# Patient Record
Sex: FEMALE | Race: WHITE | NOT HISPANIC OR LATINO | Employment: OTHER | URBAN - METROPOLITAN AREA
[De-identification: names, ages, dates, MRNs, and addresses within clinical notes are randomized per-mention and may not be internally consistent; named-entity substitution may affect disease eponyms.]

---

## 2017-02-21 ENCOUNTER — PRE VISIT (OUTPATIENT)
Dept: ORTHOPEDICS | Facility: CLINIC | Age: 72
End: 2017-02-21

## 2017-02-21 NOTE — TELEPHONE ENCOUNTER
1.  Date/reason for appt: 3/13/17 - Primary osteoarthritis of right knee/ Discuss TKA    2.  Referring provider: Dr. Kleber Valle, internal referral from [facility]     3.  Call to patient (Yes / No - short description): No, all records/imaging/injections in EPIC.    4.  Previous care at / records requested from:      -[facility]     -Dr. Valle    -Dr. Morfin 1/2/14    -Saw Jenelle Winston NP on 11/12/10 for knee pain     -Images are in EPIC/PACS    -[facility] done in [facility]

## 2017-03-10 DIAGNOSIS — M17.10 ARTHRITIS OF KNEE: Primary | ICD-10-CM

## 2017-03-10 ASSESSMENT — ENCOUNTER SYMPTOMS
ARTHRALGIAS: 1
SYNCOPE: 0
HYPERTENSION: 0
ORTHOPNEA: 0
NAUSEA: 0
CLAUDICATION: 0
SLEEP DISTURBANCES DUE TO BREATHING: 0
ABDOMINAL PAIN: 0
LIGHT-HEADEDNESS: 1
MUSCLE CRAMPS: 0
PALPITATIONS: 0
EXERCISE INTOLERANCE: 0
BLOOD IN STOOL: 0
RECTAL PAIN: 1
MYALGIAS: 1
TACHYCARDIA: 0
STIFFNESS: 1
LEG PAIN: 1
LEG SWELLING: 1
MUSCLE WEAKNESS: 1
NECK PAIN: 0
BACK PAIN: 1
BOWEL INCONTINENCE: 0
VOMITING: 0
BLOATING: 0
HYPOTENSION: 0
DIARRHEA: 0
JOINT SWELLING: 1
RECTAL BLEEDING: 0
HEARTBURN: 0
CONSTIPATION: 0
JAUNDICE: 0

## 2017-03-13 ENCOUNTER — OFFICE VISIT (OUTPATIENT)
Dept: ORTHOPEDICS | Facility: CLINIC | Age: 72
End: 2017-03-13

## 2017-03-13 VITALS — HEIGHT: 62 IN | BODY MASS INDEX: 35.88 KG/M2 | WEIGHT: 195 LBS

## 2017-03-13 DIAGNOSIS — M17.10 ARTHRITIS OF KNEE: Primary | ICD-10-CM

## 2017-03-13 NOTE — MR AVS SNAPSHOT
After Visit Summary   3/13/2017    Connie J Frankenberg    MRN: [id number]           Patient Information     Date Of Birth          1945        Visit Information        Provider Department      3/13/2017 1:30 PM Apollo Nguyen MD [facility]        Today's Diagnoses     Arthritis of knee    -  1       Follow-ups after your visit        Your next 10 appointments already scheduled     Oct 19, 2017 10:20 AM CDT   (Arrive by 10:05 AM)   Return Visit with Sammi Beltre MD   [facility] ([facility])    [address] [place] [id number] [phone]              Who to contact     Please call your clinic at [phone] to:    Ask questions about your health    Make or cancel appointments    Discuss your medicines    Learn about your test results    Speak to your doctor   If you have compliments or concerns about an experience at your clinic, or if you wish to file a complaint, please contact [facility] Patient Relations at [phone] or email us at [email or web address]         Additional Information About Your Visit        MyChart Information     Eventapt gives you secure access to your electronic health record. If you see a primary care provider, you can also send messages to your care team and make appointments. If you have questions, please call your primary care clinic.  If you do not have a primary care provider, please call [phone] and they will assist you.      OfferLounge is an electronic gateway that provides easy, online access to your medical records. With [employer]Lounge, you can request a clinic appointment, read your test results, renew a prescription or communicate with your care team.     To access your existing account, please contact your [facility] or call [phone] for assistance.        Care EveryWhere ID     This is your Care  "EveryWhere ID. This could be used by other organizations to access your [facility] medical records  [id number]        Your Vitals Were     Height BMI (Body Mass Index)                1.562 m (5' 1.5\") 36.25 kg/m2           Blood Pressure from Last 3 Encounters:   No data found for BP    Weight from Last 3 Encounters:   No data found for Wt              Today, you had the following     No orders found for display       Primary Care Provider Office Phone # Fax #    Sammi Beltre [phone]        PH[facility] [address] [facility] [address]  [facility] [address]        Thank you!     Thank you for choosing [facility]  for your care. Our goal is always to provide you with excellent care. Hearing back from our patients is one way we can continue to improve our services. Please take a few minutes to complete the written survey that you may receive in the mail after your visit with us. Thank you!             Your Updated Medication List - Protect others around you: Learn how to safely use, store and throw away your medicines at www.disposemymeds.org.          This list is accurate as of: 3/13/17 11:59 PM.  Always use your most recent med list.                   Brand Name Dispense Instructions for use    aspirin 81 MG tablet      Take 1 tablet by mouth daily.       BENADRYL PO      Take 1 tablet by mouth as needed.       * METROGEL 1 % gel   Generic drug:  metroNIDAZOLE      Apply  topically daily.       * metroNIDAZOLE 0.75 % cream    METROCREAM    45 g    Apply  topically 2 times daily.       mupirocin 2 % ointment    BACTROBAN    30 g    Use 2 times a day to affected area.       SUPER B COMPLEX PO      Take 1 tablet by mouth daily.       * VITAMIN D PO      Take 1 tablet by mouth daily.       * vitamin D3 2000 UNITS Caps     90 capsule    Take 1 Units by mouth daily       * Notice:  This list has 4 medication(s) that are the same as other medications prescribed for you. Read the " directions carefully, and ask your doctor or other care provider to review them with you.

## 2017-03-13 NOTE — LETTER
3/13/2017     RE: Connie J Frankenberg  [address]  [facility] [address]     Dear Colleague,    Thank you for referring your patient, Connie J Frankenberg, to the [facility] at [facility]. Please see a copy of my visit note below.        [facility], Orthopaedic Surgery Consultation  by Apollo Nguyen M.D.    Connie J Frankenberg MRN# [id number]   Age: 71 year old YOB: 1945     Requesting physician: Kleber Valle            Assessment and Plan:   Assessment:  Medial and PF OA R knee      Plan:  R TKA in the future   BMI 36- Discussed she may optimize this to 35 in order to minimize risk of infection as much as possible prior to surgery            History of Present Illness:   HISTORY OF PRESENT ILLNESS:  Ms. Frankenberg is a pleasant 71-year-old female with longstanding history of right knee pain.  The patient states she has had right knee pain for the past several years and has been progressively worse over the past 4-5 months.  She has had a series of corticosteroid injections administered by both Dr. Kleber Valle and Dr. Vinay Morfin, the former with ultrasound guidance.  She states that these initially provided relief in the past, but are no longer providing her significant amount of relief.  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  She does have a good understanding of what the surgical treatment of her knee would entail.  She does have a bit of trepidation as she had a brother-in-law who succumbed to the pulmonary embolus after a knee arthroscopy 30 years ago.  Other than this, she states that she is doing relatively well.  Takes only over-the-counter pain medicine occasionally and her pain has progressed to the point where she is no longer able to do the activities that she liked to previously.  The pain is no longer acceptable for her.  At this time,  "she would like to proceed with a right total knee arthroplasty after having all her questions answered and attending a joint class.       Background history:  DX:  1. OA R knee   2. HLP  BPV    TREATMENTS:  1. 4-5 corticosteroid injections in the past. No longer provide adequate relief even with U/S guidance         Current symptoms:  Problem: R knee pain  Onset and duration: several years   Awakens from sleep due to sx's:  Yes  Precipitating Injury:  No    Other joints or sites painful:  No  Fever: No  Appetite change or weight loss: No           Physical Exam:     EXAMINATION pertinent findings:   VITAL SIGNS: Height 1.562 m (5' 1.5\"), weight 88.5 kg (195 lb), not currently breastfeeding.  Body mass index is 36.25 kg/(m^2).  RESP: non labored breathing   ABD: benign   SKIN: grossly normal   LYMPHATIC: grossly normal   NEURO: grossly normal   VASCULAR: satisfactory perfusion of all extremities     MUSCULOSKELETAL:   RLE-  Skin c/d/i  No venous stasis evident today  +25 deg flexion contracture  Flexes to 125 degrees   +EHL/FHL/TA/GSC  SILT DP/SP/SN/Saph/Tib dist  Palp DP pulse                  Data:   All laboratory data reviewed  All imaging studies reviewed by me       MD Dandy Beltran Family Professor  Oncology and Adult Reconstructive Surgery  Dept Orthopaedic Surgery, [facility]  [phone] office, [phone] pager  [email or web address]            DATA for DOCUMENTATION:         Past Medical History:     Patient Active Problem List   Diagnosis     Skin cancer, basal cell     Hyperlipidemia     Classical migraine     Insomnia     Arthritis of knee     Hemorrhoids     Vasovagal syncope     BCC (basal cell carcinoma), face     Swelling of lower extremity     Past Medical History   Diagnosis Date     Arthritis of knee      Basal cell carcinoma      Hemorrhoids      Hyperlipidemia      Insomnia      Migraines, classic      sparkly aura     Plantar fasciitis, bilateral      Skin cancer, basal cell  "     Squamous cell carcinoma (H)      Swelling of the ankle, feet, or leg      Vasovagal response      to injections, scents     Vasovagal syncope      with blood draws, injections       Also see scanned health assessment forms.       Past Surgical History:     Past Surgical History   Procedure Laterality Date     C nonspecific procedure       laparoscopy,     C nonspecific procedure       laser surg basal cell skin cancer     Cataract iol, rt/lt       Lasik bilateral       Mohs micrographic procedure              Social History:     Social History     Social History     Marital status: Single     Spouse name: N/A     Number of children: N/A     Years of education: N/A     Occupational History     EEG [employer]     Social History Main Topics     Smoking status: Never Smoker     Smokeless tobacco: Never Used     Alcohol use Yes      Comment: occ     Drug use: No     Sexual activity: Yes     Partners: Male     Other Topics Concern     Not on file     Social History Narrative    Social History:  Casual/Semi-Retired.  Single, .  Has boyfriend.  One son.    Does vascular/EEG at [employer].            Family History:       Family History   Problem Relation Age of Onset     Neurologic Disorder Mother      headaches     Neurologic Disorder Sister      headaches     Neurologic Disorder Mother      narcolepsy     CANCER Mother      skin     CANCER Father      lung and skin (BCC)     C.A.D. Brother      CABGAge 58, March 2012     CEREBROVASCULAR DISEASE Mother      Thyroid Disease Mother      hypothyroid     Thyroid Disease Sister      hypothyroid     CANCER Brother      skin cancer     C.A.D. Father      MI            Medications:     Current Outpatient Prescriptions   Medication Sig     atorvastatin (LIPITOR) 80 MG tablet Take 1 tablet (80 mg) by mouth daily     butalbital-aspirin-caffeine (FIORINAL) -40 MG per capsule Take 1 capsule by mouth daily as needed     Cholecalciferol  (VITAMIN D3) 2000 UNITS CAPS Take 1 Units by mouth daily     mupirocin (BACTROBAN) 2 % ointment Use 2 times a day to affected area.     metroNIDAZOLE (METROGEL) 1 % gel Apply  topically daily.     metroNIDAZOLE (METROCREAM) 0.75 % cream Apply  topically 2 times daily.     aspirin 81 MG tablet Take 1 tablet by mouth daily.     DiphenhydrAMINE HCl (BENADRYL PO) Take 1 tablet by mouth as needed.     B Complex-C (SUPER B COMPLEX PO) Take 1 tablet by mouth daily.     Cholecalciferol (VITAMIN D PO) Take 1 tablet by mouth daily.     No current facility-administered medications for this visit.               Review of Systems:   A comprehensive 10 point review of systems (constitutional, ENT, cardiac, peripheral vascular, lymphatic, respiratory, GI, , Musculoskeletal, skin, Neurological) was performed and found to be negative except as described in this note.     See intake form completed by patient        Answers for HPI/ROS submitted by the patient on 3/10/2017   General Symptoms: No  Skin Symptoms: No  HENT Symptoms: No  EYE SYMPTOMS: No  HEART SYMPTOMS: Yes  LUNG SYMPTOMS: No  INTESTINAL SYMPTOMS: Yes  URINARY SYMPTOMS: No  GYNECOLOGIC SYMPTOMS: No  BREAST SYMPTOMS: No  SKELETAL SYMPTOMS: Yes  BLOOD SYMPTOMS: No  NERVOUS SYSTEM SYMPTOMS: No  MENTAL HEALTH SYMPTOMS: No  Chest pain or pressure: No  Fast or irregular heartbeat: No  Pain in legs with walking: Yes  Swelling in feet or ankles: Yes  Trouble breathing while lying down: No  Fingers or Toes appear blue: No  High blood pressure: No  Low blood pressure: No  Fainting: No  Murmurs: No  Chest pain on exertion: No  Chest pain at rest: No  Cramping pain in leg during exercise: No  Pacemaker: No  Varicose veins: Yes  Edema or swelling: Yes  Fast heart beat: No  Wake up at night with shortness of breath: No  Heart flutters: No  Light-headedness: Yes  Exercise intolerance: No  Heart burn or indigestion: No  Nausea: No  Vomiting: No  Abdominal pain: No  Bloating:  No  Constipation: No  Diarrhea: No  Blood in stool: No  Black stools: No  Rectal or Anal pain: Yes  Fecal incontinence: No  Rectal bleeding: No  Yellowing of skin or eyes: No  Vomit with blood: No  Change in stools: No  Hemorrhoids: Yes  Back pain: Yes  Muscle aches: Yes  Neck pain: No  Swollen joints: Yes  Joint pain: Yes  Bone pain: Yes  Muscle cramps: No  Muscle weakness: Yes  Joint stiffness: Yes  Bone fracture: No    Answers for HPI/ROS submitted by the patient on 3/10/2017   General Symptoms: No  Skin Symptoms: No  HENT Symptoms: No  EYE SYMPTOMS: No  HEART SYMPTOMS: Yes  LUNG SYMPTOMS: No  INTESTINAL SYMPTOMS: Yes  URINARY SYMPTOMS: No  GYNECOLOGIC SYMPTOMS: No  BREAST SYMPTOMS: No  SKELETAL SYMPTOMS: Yes  BLOOD SYMPTOMS: No  NERVOUS SYSTEM SYMPTOMS: No  MENTAL HEALTH SYMPTOMS: No  Chest pain or pressure: No  Fast or irregular heartbeat: No  Pain in legs with walking: Yes  Swelling in feet or ankles: Yes  Trouble breathing while lying down: No  Fingers or Toes appear blue: No  High blood pressure: No  Low blood pressure: No  Fainting: No  Murmurs: No  Chest pain on exertion: No  Chest pain at rest: No  Cramping pain in leg during exercise: No  Pacemaker: No  Varicose veins: Yes  Edema or swelling: Yes  Fast heart beat: No  Wake up at night with shortness of breath: No  Heart flutters: No  Light-headedness: Yes  Exercise intolerance: No  Heart burn or indigestion: No  Nausea: No  Vomiting: No  Abdominal pain: No  Bloating: No  Constipation: No  Diarrhea: No  Blood in stool: No  Black stools: No  Rectal or Anal pain: Yes  Fecal incontinence: No  Rectal bleeding: No  Yellowing of skin or eyes: No  Vomit with blood: No  Change in stools: No  Hemorrhoids: Yes  Back pain: Yes  Muscle aches: Yes  Neck pain: No  Swollen joints: Yes  Joint pain: Yes  Bone pain: Yes  Muscle cramps: No  Muscle weakness: Yes  Joint stiffness: Yes  Bone fracture: No    Again, thank you for allowing me to participate in the care of your  patient.      Sincerely,    Apollo Nguyen MD

## 2017-03-13 NOTE — NURSING NOTE
"Reason For Visit:   Chief Complaint   Patient presents with     Musculoskeletal Problem     Right knee OA, TKA discussion, Referral from Dr YVONNE Valle     Age: 71 year old    Occupation: Retired (Neurodiagnostic Tech)    Date of surgery: No surgical history to knee    Smoker: No    Pain Assessment  Patient Currently in Pain: Yes  Primary Pain Location: Knee  Pain Orientation: Right  Pain Descriptors: Constant, Discomfort  Alleviating Factors: [place] steroid  Aggravating Factors: Walking, Stairs (Nighttime pain)    Ht 1.562 m (5' 1.5\")  Wt 88.5 kg (195 lb)  BMI 36.25 kg/m2    Current Outpatient Prescriptions   Medication     atorvastatin (LIPITOR) 80 MG tablet     butalbital-aspirin-caffeine (FIORINAL) -40 MG per capsule     Cholecalciferol (VITAMIN D3) 2000 UNITS CAPS     mupirocin (BACTROBAN) 2 % ointment     metroNIDAZOLE (METROGEL) 1 % gel     metroNIDAZOLE (METROCREAM) 0.75 % cream     aspirin 81 MG tablet     DiphenhydrAMINE HCl (BENADRYL PO)     B Complex-C (SUPER B COMPLEX PO)     Cholecalciferol (VITAMIN D PO)     No current facility-administered medications for this visit.                                                       "

## 2017-03-13 NOTE — PROGRESS NOTES
[facility], Orthopaedic Surgery Consultation  by Apollo Nguyen M.D.    Connie J Frankenberg MRN# [id number]   Age: 71 year old YOB: 1945     Requesting physician: Kleber Valle            Assessment and Plan:   Assessment:  Medial and PF OA R knee      Plan:  R TKA in the future   BMI 36- Discussed she may optimize this to 35 in order to minimize risk of infection as much as possible prior to surgery            History of Present Illness:   HISTORY OF PRESENT ILLNESS:  Ms. Frankenberg is a pleasant 71-year-old female with longstanding history of right knee pain.  The patient states she has had right knee pain for the past several years and has been progressively worse over the past 4-5 months.  She has had a series of corticosteroid injections administered by both Dr. Kleber Valle and Dr. Vinay Morfin, the former with ultrasound guidance.  She states that these initially provided relief in the past, but are no longer providing her significant amount of relief.  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  She does have a good understanding of what the surgical treatment of her knee would entail.  She does have a bit of trepidation as she had a brother-in-law who succumbed to the pulmonary embolus after a knee arthroscopy 30 years ago.  Other than this, she states that she is doing relatively well.  Takes only over-the-counter pain medicine occasionally and her pain has progressed to the point where she is no longer able to do the activities that she liked to previously.  The pain is no longer acceptable for her.  At this time, she would like to proceed with a right total knee arthroplasty after having all her questions answered and attending a joint class.       Background history:  DX:  1. OA R knee   2. HLP  BPV    TREATMENTS:  1. 4-5 corticosteroid injections in the past. No longer provide adequate relief even with U/S  "guidance         Current symptoms:  Problem: R knee pain  Onset and duration: several years   Awakens from sleep due to sx's:  Yes  Precipitating Injury:  No    Other joints or sites painful:  No  Fever: No  Appetite change or weight loss: No           Physical Exam:     EXAMINATION pertinent findings:   VITAL SIGNS: Height 1.562 m (5' 1.5\"), weight 88.5 kg (195 lb), not currently breastfeeding.  Body mass index is 36.25 kg/(m^2).  RESP: non labored breathing   ABD: benign   SKIN: grossly normal   LYMPHATIC: grossly normal   NEURO: grossly normal   VASCULAR: satisfactory perfusion of all extremities     MUSCULOSKELETAL:   RLE-  Skin c/d/i  No venous stasis evident today  +25 deg flexion contracture  Flexes to 125 degrees   +EHL/FHL/TA/GSC  SILT DP/SP/SN/Saph/Tib dist  Palp DP pulse                  Data:   All laboratory data reviewed  All imaging studies reviewed by me       MD Dandy Beltran Family Professor  Oncology and Adult Reconstructive Surgery  Dept Orthopaedic Surgery, [facility]  [phone] office, [phone] pager  [email or web address]            DATA for DOCUMENTATION:         Past Medical History:     Patient Active Problem List   Diagnosis     Skin cancer, basal cell     Hyperlipidemia     Classical migraine     Insomnia     Arthritis of knee     Hemorrhoids     Vasovagal syncope     BCC (basal cell carcinoma), face     Swelling of lower extremity     Past Medical History   Diagnosis Date     Arthritis of knee      Basal cell carcinoma      Hemorrhoids      Hyperlipidemia      Insomnia      Migraines, classic      sparkly aura     Plantar fasciitis, bilateral      Skin cancer, basal cell      Squamous cell carcinoma (H)      Swelling of the ankle, feet, or leg      Vasovagal response      to injections, scents     Vasovagal syncope      with blood draws, injections       Also see scanned health assessment forms.       Past Surgical History:     Past Surgical History   Procedure " Laterality Date     C nonspecific procedure       laparoscopy,     C nonspecific procedure       laser surg basal cell skin cancer     Cataract iol, rt/lt       Lasik bilateral       Mohs micrographic procedure              Social History:     Social History     Social History     Marital status: Single     Spouse name: N/A     Number of children: N/A     Years of education: N/A     Occupational History     EEG [employer]     Social History Main Topics     Smoking status: Never Smoker     Smokeless tobacco: Never Used     Alcohol use Yes      Comment: occ     Drug use: No     Sexual activity: Yes     Partners: Male     Other Topics Concern     Not on file     Social History Narrative    Social History:  Casual/Semi-Retired.  Single, .  Has boyfriend.  One son.    Does vascular/EEG at [employer].            Family History:       Family History   Problem Relation Age of Onset     Neurologic Disorder Mother      headaches     Neurologic Disorder Sister      headaches     Neurologic Disorder Mother      narcolepsy     CANCER Mother      skin     CANCER Father      lung and skin (BCC)     C.A.D. Brother      CABGAge 58, March 2012     CEREBROVASCULAR DISEASE Mother      Thyroid Disease Mother      hypothyroid     Thyroid Disease Sister      hypothyroid     CANCER Brother      skin cancer     C.A.D. Father      MI            Medications:     Current Outpatient Prescriptions   Medication Sig     atorvastatin (LIPITOR) 80 MG tablet Take 1 tablet (80 mg) by mouth daily     butalbital-aspirin-caffeine (FIORINAL) -40 MG per capsule Take 1 capsule by mouth daily as needed     Cholecalciferol (VITAMIN D3) 2000 UNITS CAPS Take 1 Units by mouth daily     mupirocin (BACTROBAN) 2 % ointment Use 2 times a day to affected area.     metroNIDAZOLE (METROGEL) 1 % gel Apply  topically daily.     metroNIDAZOLE (METROCREAM) 0.75 % cream Apply  topically 2 times daily.     aspirin 81 MG tablet  Take 1 tablet by mouth daily.     DiphenhydrAMINE HCl (BENADRYL PO) Take 1 tablet by mouth as needed.     B Complex-C (SUPER B COMPLEX PO) Take 1 tablet by mouth daily.     Cholecalciferol (VITAMIN D PO) Take 1 tablet by mouth daily.     No current facility-administered medications for this visit.               Review of Systems:   A comprehensive 10 point review of systems (constitutional, ENT, cardiac, peripheral vascular, lymphatic, respiratory, GI, , Musculoskeletal, skin, Neurological) was performed and found to be negative except as described in this note.     See intake form completed by patient        Answers for HPI/ROS submitted by the patient on 3/10/2017   General Symptoms: No  Skin Symptoms: No  HENT Symptoms: No  EYE SYMPTOMS: No  HEART SYMPTOMS: Yes  LUNG SYMPTOMS: No  INTESTINAL SYMPTOMS: Yes  URINARY SYMPTOMS: No  GYNECOLOGIC SYMPTOMS: No  BREAST SYMPTOMS: No  SKELETAL SYMPTOMS: Yes  BLOOD SYMPTOMS: No  NERVOUS SYSTEM SYMPTOMS: No  MENTAL HEALTH SYMPTOMS: No  Chest pain or pressure: No  Fast or irregular heartbeat: No  Pain in legs with walking: Yes  Swelling in feet or ankles: Yes  Trouble breathing while lying down: No  Fingers or Toes appear blue: No  High blood pressure: No  Low blood pressure: No  Fainting: No  Murmurs: No  Chest pain on exertion: No  Chest pain at rest: No  Cramping pain in leg during exercise: No  Pacemaker: No  Varicose veins: Yes  Edema or swelling: Yes  Fast heart beat: No  Wake up at night with shortness of breath: No  Heart flutters: No  Light-headedness: Yes  Exercise intolerance: No  Heart burn or indigestion: No  Nausea: No  Vomiting: No  Abdominal pain: No  Bloating: No  Constipation: No  Diarrhea: No  Blood in stool: No  Black stools: No  Rectal or Anal pain: Yes  Fecal incontinence: No  Rectal bleeding: No  Yellowing of skin or eyes: No  Vomit with blood: No  Change in stools: No  Hemorrhoids: Yes  Back pain: Yes  Muscle aches: Yes  Neck pain: No  Swollen joints:  Yes  Joint pain: Yes  Bone pain: Yes  Muscle cramps: No  Muscle weakness: Yes  Joint stiffness: Yes  Bone fracture: No    Answers for HPI/ROS submitted by the patient on 3/10/2017   General Symptoms: No  Skin Symptoms: No  HENT Symptoms: No  EYE SYMPTOMS: No  HEART SYMPTOMS: Yes  LUNG SYMPTOMS: No  INTESTINAL SYMPTOMS: Yes  URINARY SYMPTOMS: No  GYNECOLOGIC SYMPTOMS: No  BREAST SYMPTOMS: No  SKELETAL SYMPTOMS: Yes  BLOOD SYMPTOMS: No  NERVOUS SYSTEM SYMPTOMS: No  MENTAL HEALTH SYMPTOMS: No  Chest pain or pressure: No  Fast or irregular heartbeat: No  Pain in legs with walking: Yes  Swelling in feet or ankles: Yes  Trouble breathing while lying down: No  Fingers or Toes appear blue: No  High blood pressure: No  Low blood pressure: No  Fainting: No  Murmurs: No  Chest pain on exertion: No  Chest pain at rest: No  Cramping pain in leg during exercise: No  Pacemaker: No  Varicose veins: Yes  Edema or swelling: Yes  Fast heart beat: No  Wake up at night with shortness of breath: No  Heart flutters: No  Light-headedness: Yes  Exercise intolerance: No  Heart burn or indigestion: No  Nausea: No  Vomiting: No  Abdominal pain: No  Bloating: No  Constipation: No  Diarrhea: No  Blood in stool: No  Black stools: No  Rectal or Anal pain: Yes  Fecal incontinence: No  Rectal bleeding: No  Yellowing of skin or eyes: No  Vomit with blood: No  Change in stools: No  Hemorrhoids: Yes  Back pain: Yes  Muscle aches: Yes  Neck pain: No  Swollen joints: Yes  Joint pain: Yes  Bone pain: Yes  Muscle cramps: No  Muscle weakness: Yes  Joint stiffness: Yes  Bone fracture: No

## 2017-03-16 DIAGNOSIS — E78.5 HYPERLIPIDEMIA LDL GOAL <130: ICD-10-CM

## 2017-03-16 NOTE — LETTER
[facility]  [address] [place] [address]      March 17, 2017      Connie J Frankenberg  [address]  [facility] [address]        Dear Concha,      This letter is a reminder that you are overdue to see your Primary Care Provider for an Annual Visit and needed labs. You must be seen by your Primary Care Provider on a yearly basis and have appropriate labs drawn for continued care and prescription refills. Please call [phone] to schedule an appointment for an Annual Visit with Dr Sammi Beltre MD.         You have been given a 30 day supply/refill of your Atorvastatin while you get your clinic visit/labs completed.      Regards,    Primary Care Center

## 2017-03-17 ENCOUNTER — MYC MEDICAL ADVICE (OUTPATIENT)
Dept: PSYCHIATRY | Facility: CLINIC | Age: 72
End: 2017-03-17

## 2017-03-17 RX ORDER — ATORVASTATIN CALCIUM 80 MG/1
80 TABLET, FILM COATED ORAL DAILY
Qty: 30 TABLET | Refills: 0 | Status: SHIPPED | OUTPATIENT
Start: 2017-03-17 | End: 2017-04-15

## 2017-03-17 NOTE — TELEPHONE ENCOUNTER
atorvastatin (LIPITOR) 80 MG tablet      Last Written Prescription Date:  1-6-16  Last Fill Quantity: 90,   # refills: 3  Last Office Visit : 1-6-16  Future Office visit:  none        Kathleen M Doege RN

## 2017-04-15 ENCOUNTER — OFFICE VISIT (OUTPATIENT)
Dept: INTERNAL MEDICINE | Facility: CLINIC | Age: 72
End: 2017-04-15

## 2017-04-15 VITALS
HEART RATE: 65 BPM | WEIGHT: 182.9 LBS | OXYGEN SATURATION: 97 % | SYSTOLIC BLOOD PRESSURE: 126 MMHG | TEMPERATURE: 97.7 F | DIASTOLIC BLOOD PRESSURE: 78 MMHG | BODY MASS INDEX: 34 KG/M2

## 2017-04-15 DIAGNOSIS — E55.9 VITAMIN D DEFICIENCY: ICD-10-CM

## 2017-04-15 DIAGNOSIS — E53.8 VITAMIN B12 DEFICIENCY: ICD-10-CM

## 2017-04-15 DIAGNOSIS — Z00.00 ROUTINE GENERAL MEDICAL EXAMINATION AT A HEALTH CARE FACILITY: Primary | ICD-10-CM

## 2017-04-15 DIAGNOSIS — E78.5 HYPERLIPIDEMIA, UNSPECIFIED HYPERLIPIDEMIA TYPE: ICD-10-CM

## 2017-04-15 DIAGNOSIS — E66.9 OBESITY (BMI 30-39.9): ICD-10-CM

## 2017-04-15 DIAGNOSIS — R73.09 ELEVATED GLUCOSE: ICD-10-CM

## 2017-04-15 DIAGNOSIS — G43.109 MIGRAINE WITH AURA AND WITHOUT STATUS MIGRAINOSUS, NOT INTRACTABLE: ICD-10-CM

## 2017-04-15 DIAGNOSIS — Z23 NEED FOR VACCINATION: ICD-10-CM

## 2017-04-15 DIAGNOSIS — E78.5 HYPERLIPIDEMIA LDL GOAL <130: ICD-10-CM

## 2017-04-15 DIAGNOSIS — E03.9 HYPOTHYROIDISM, UNSPECIFIED TYPE: ICD-10-CM

## 2017-04-15 DIAGNOSIS — Z00.00 ROUTINE GENERAL MEDICAL EXAMINATION AT A HEALTH CARE FACILITY: ICD-10-CM

## 2017-04-15 DIAGNOSIS — Z78.0 ASYMPTOMATIC MENOPAUSAL STATE: ICD-10-CM

## 2017-04-15 DIAGNOSIS — Z12.31 VISIT FOR SCREENING MAMMOGRAM: ICD-10-CM

## 2017-04-15 DIAGNOSIS — Z84.89 FAMILY HISTORY OF HIP FRACTURE: ICD-10-CM

## 2017-04-15 LAB
ALBUMIN SERPL-MCNC: 4.2 G/DL (ref 3.4–5)
ALP SERPL-CCNC: 51 U/L (ref 40–150)
ALT SERPL W P-5'-P-CCNC: 35 U/L (ref 0–50)
ANION GAP SERPL CALCULATED.3IONS-SCNC: 6 MMOL/L (ref 3–14)
AST SERPL W P-5'-P-CCNC: 21 U/L (ref 0–45)
BILIRUB SERPL-MCNC: 0.7 MG/DL (ref 0.2–1.3)
BUN SERPL-MCNC: 20 MG/DL (ref 7–30)
CALCIUM SERPL-MCNC: 9.7 MG/DL (ref 8.5–10.1)
CHLORIDE SERPL-SCNC: 106 MMOL/L (ref 94–109)
CHOLEST SERPL-MCNC: 203 MG/DL
CO2 SERPL-SCNC: 29 MMOL/L (ref 20–32)
CREAT SERPL-MCNC: 0.92 MG/DL (ref 0.52–1.04)
GFR SERPL CREATININE-BSD FRML MDRD: 60 ML/MIN/1.7M2
GLUCOSE SERPL-MCNC: 97 MG/DL (ref 70–99)
HBA1C MFR BLD: 6.3 % (ref 4.3–6)
HDLC SERPL-MCNC: 69 MG/DL
LDLC SERPL CALC-MCNC: 114 MG/DL
NONHDLC SERPL-MCNC: 134 MG/DL
POTASSIUM SERPL-SCNC: 4.5 MMOL/L (ref 3.4–5.3)
PROT SERPL-MCNC: 7.3 G/DL (ref 6.8–8.8)
SODIUM SERPL-SCNC: 141 MMOL/L (ref 133–144)
TRIGL SERPL-MCNC: 97 MG/DL
TSH SERPL DL<=0.005 MIU/L-ACNC: 1.53 MU/L (ref 0.4–4)
VIT B12 SERPL-MCNC: 547 PG/ML (ref 193–986)

## 2017-04-15 RX ORDER — BUTALBITAL/ASPIRIN/CAFFEINE 50-325-40
1 CAPSULE ORAL DAILY PRN
Qty: 30 CAPSULE | Refills: 2 | Status: ON HOLD | OUTPATIENT
Start: 2017-04-15 | End: 2018-04-16

## 2017-04-15 RX ORDER — ATORVASTATIN CALCIUM 80 MG/1
80 TABLET, FILM COATED ORAL DAILY
Qty: 90 TABLET | Refills: 3 | Status: SHIPPED | OUTPATIENT
Start: 2017-04-15 | End: 2018-07-13

## 2017-04-15 ASSESSMENT — PAIN SCALES - GENERAL: PAINLEVEL: EXTREME PAIN (8)

## 2017-04-15 NOTE — MR AVS SNAPSHOT
After Visit Summary   4/15/2017    Connie J Frankenberg    MRN: [id number]           Patient Information     Date Of Birth          1945        Visit Information        Provider Department      4/15/2017 8:40 AM Sammi Beltre MD [facility]        Today's Diagnoses     Visit for screening mammogram    -  1    Routine general medical examination at a health care facility        Obesity (BMI 30-39.9)        Hyperlipidemia, unspecified hyperlipidemia type        Need for vaccination        Hyperlipidemia LDL goal <130        Migraine with aura and without status migrainosus, not intractable        Vitamin B12 deficiency        Vitamin D deficiency        Hypothyroidism, unspecified type        Elevated glucose        Family history of hip fracture        Asymptomatic menopausal state            Follow-ups after your visit        Follow-up notes from your care team     Return in about 6 months (around 10/15/2017) for Routine Visit.      Your next 10 appointments already scheduled     Apr 15, 2017  9:45 AM CDT   LAB with  LAB   [facility] ([facility])    [address] [place] [id number] [phone]           Patient must bring picture ID.  Patient should be prepared to give a urine specimen  Please do not eat 10-12 hours before your appointment if you are coming in fasting for labs on lipids, cholesterol, or glucose (sugar).  Pregnant women should follow their Care Team instructions. Water with medications is okay. Do not drink coffee or other fluids.   If you have concerns about taking  your medications, please ask at office or if scheduling via [email or web address], send a message by clicking on Secure Messaging, Message Your Care Team.              Future tests that were ordered for you today     Open Future Orders        Priority Expected Expires Ordered    Dexa hip/pelvis/spine* Routine  4/15/2018 4/15/2017    MA SCREENING DIGITAL  BILAT - Future  (s+30) Routine  4/15/2018 4/15/2017    Lipid Profile Routine 4/15/2017 4/15/2018 4/15/2017    Hemoglobin A1c Routine 4/15/2017 4/29/2017 4/15/2017    Comprehensive metabolic panel Routine 4/15/2017 4/29/2017 4/15/2017    TSH with free T4 reflex Routine 4/15/2017 4/29/2017 4/15/2017    Vitamin D Deficiency Routine 4/15/2017 4/15/2018 4/15/2017    Vitamin B12 Routine 4/15/2017 4/15/2018 4/15/2017            Who to contact     Please call your clinic at [phone] to:    Ask questions about your health    Make or cancel appointments    Discuss your medicines    Learn about your test results    Speak to your doctor   If you have compliments or concerns about an experience at your clinic, or if you wish to file a complaint, please contact [facility] Patient Relations at [phone] or email us at [email or web address]         Additional Information About Your Visit        DwellableharSpotwise Information     Rue89 gives you secure access to your electronic health record. If you see a primary care provider, you can also send messages to your care team and make appointments. If you have questions, please call your primary care clinic.  If you do not have a primary care provider, please call [phone] and they will assist you.      Rue89 is an electronic gateway that provides easy, online access to your medical records. With Rue89, you can request a clinic appointment, read your test results, renew a prescription or communicate with your care team.     To access your existing account, please contact your [facility] or call [phone] for assistance.        Care EveryWhere ID     This is your Care EveryWhere ID. This could be used by other organizations to access your [facility] [place] medical records  [id number]        Your Vitals Were     Pulse Temperature Pulse Oximetry Breastfeeding? BMI (Body Mass Index)       65 97.7  F (36.5  C) (Oral) 97%  No 34 kg/m2        Blood Pressure from Last 3 Encounters:   04/15/17 126/78   12/05/16 (!) 152/98   04/21/16 123/82    Weight from Last 3 Encounters:   04/15/17 83 kg (182 lb 14.4 oz)   03/13/17 88.5 kg (195 lb)   12/05/16 86.8 kg (191 lb 4.8 oz)              We Performed the Following     Pneumococcal vaccine 13 valent PCV13 IM (Prevnar) [37231]          Where to get your medicines      These medications were sent to [facility] [address]  [address], [facility] [address]     Phone:  [phone]     atorvastatin 80 MG tablet         Some of these will need a paper prescription and others can be bought over the counter.  Ask your nurse if you have questions.     Bring a paper prescription for each of these medications     butalbital-aspirin-caffeine -40 MG per capsule          Primary Care Provider Office Phone # Fax #    Sammi Beltre [phone]        [facility] [address] [place] [address] [address]  [facility] [address]        Thank you!     Thank you for choosing [facility]  for your care. Our goal is always to provide you with excellent care. Hearing back from our patients is one way we can continue to improve our services. Please take a few minutes to complete the written survey that you may receive in the mail after your visit with us. Thank you!             Your Updated Medication List - Protect others around you: Learn how to safely use, store and throw away your medicines at www.disposemymeds.org.          This list is accurate as of: 4/15/17  9:42 AM.  Always use your most recent med list.                   Brand Name Dispense Instructions for use    aspirin 81 MG tablet      Take 1 tablet by mouth daily.       atorvastatin 80 MG tablet    LIPITOR    90 tablet    Take 1 tablet (80 mg) by mouth daily       BENADRYL PO      Take 1 tablet by mouth as needed.       butalbital-aspirin-caffeine -40 MG per capsule    FIORINAL     30 capsule    Take 1 capsule by mouth daily as needed       * METROGEL 1 % gel   Generic drug:  metroNIDAZOLE      Apply  topically daily.       * metroNIDAZOLE 0.75 % cream    METROCREAM    45 g    Apply  topically 2 times daily.       mupirocin 2 % ointment    BACTROBAN    30 g    Use 2 times a day to affected area.       SUPER B COMPLEX PO      Take 1 tablet by mouth daily.       * VITAMIN D PO      Take 1 tablet by mouth daily.       * vitamin D3 2000 UNITS Caps     90 capsule    Take 1 Units by mouth daily       * Notice:  This list has 4 medication(s) that are the same as other medications prescribed for you. Read the directions carefully, and ask your doctor or other care provider to review them with you.

## 2017-04-15 NOTE — PROGRESS NOTES
CC:  Physical.    HPI:    Concha is here for a routine physical.      The following health care maintenance topics were addressed:  Cancer screening  Blood pressure  Lifestyle habits including exercise  Hyperlipidemia  Immunizations  Advance Directives.  Bone density screening  Weight management  Diabetes screening  Skin cancer screening--going every 6 months to derm, hx BCC  She declines Hep C screening      Personal and family health history was updated    Medications and need for refills reviewed.    ROS:  14 point ROS negative except for:  Right knee pain, close to considering TKA. Ortho notes reviewed. She is contemplating risk/benefits.  It is significantly limiting her daily function and ability to exercise.  She has lost weight, now on Weight Watchers and is eager to continue this program.  Has noticed some intermittent tingling in her left lateral foot and pinky toe.   We discussed her CVD risk calculator results (9.6%).  She was thinking about not taking her stating and eventually agreed to continuing it.    Patient Active Problem List   Diagnosis     Skin cancer, basal cell     Hyperlipidemia     Classical migraine     Insomnia     Arthritis of knee     Hemorrhoids     Vasovagal syncope     BCC (basal cell carcinoma), face     Swelling of lower extremity     Past Surgical History:   Procedure Laterality Date     C NONSPECIFIC PROCEDURE      laparoscopy,     C NONSPECIFIC PROCEDURE      laser surg basal cell skin cancer     CATARACT IOL, RT/LT       LASIK BILATERAL       MOHS MICROGRAPHIC PROCEDURE       Family History   Problem Relation Age of Onset     Neurologic Disorder Mother      headaches     Neurologic Disorder Sister      headaches     Neurologic Disorder Mother      narcolepsy     CANCER Mother      skin     CANCER Father      lung and skin (BCC)     C.A.D. Brother      CABGAge 58, March 2012     CEREBROVASCULAR DISEASE Mother      Thyroid Disease Mother      hypothyroid     Thyroid Disease  Sister      hypothyroid     CANCER Brother      skin cancer     C.A.D. Father      MI     Social History     Social History     Marital status: Single     Spouse name: N/A     Number of children: N/A     Years of education: N/A     Occupational History     EEG [employer]     Social History Main Topics     Smoking status: Never Smoker     Smokeless tobacco: Never Used     Alcohol use Yes      Comment: occ     Drug use: No     Sexual activity: Yes     Partners: Male     Other Topics Concern     Not on file     Social History Narrative    Social History:  Casual/Semi-Retired.  Single, .  Has boyfriend.  One son.    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     Current Outpatient Prescriptions   Medication Sig Dispense Refill     atorvastatin (LIPITOR) 80 MG tablet Take 1 tablet (80 mg) by mouth daily 30 tablet 0     butalbital-aspirin-caffeine (FIORINAL) -40 MG per capsule Take 1 capsule by mouth daily as needed 30 capsule 2     Cholecalciferol (VITAMIN D3) 2000 UNITS CAPS Take 1 Units by mouth daily 90 capsule 3     mupirocin (BACTROBAN) 2 % ointment Use 2 times a day to affected area. 30 g 6     metroNIDAZOLE (METROGEL) 1 % gel Apply  topically daily.       metroNIDAZOLE (METROCREAM) 0.75 % cream Apply  topically 2 times daily. 45 g 11     aspirin 81 MG tablet Take 1 tablet by mouth daily.       DiphenhydrAMINE HCl (BENADRYL PO) Take 1 tablet by mouth as needed.       B Complex-C (SUPER B COMPLEX PO) Take 1 tablet by mouth daily.       Cholecalciferol (VITAMIN D PO) Take 1 tablet by mouth daily.       Allergies   Allergen Reactions     Codeine GI Disturbance     Procaine          Physical Examination:  /78  Pulse 65  Temp 97.7  F (36.5  C) (Oral)  Wt 83 kg (182 lb 14.4 oz)  SpO2 97%  Breastfeeding? No  BMI 34 kg/m2    General:  Pleasant, alert, no acute distress  Eyes:  Pupils 2-3 mm, sclera white, EOM's full.    Ears:  TM's normal, EAC's patent, clear of  cerumen  Nose:  Nasal passages clear, turbinates not swollen, no polyps visualized.  Throat/Mouth:  No pharyngeal erythema or exudate, oral mucosa and tongue moist, no suspicious oral lesions  Neck:  Full AROM, supple, thyroid smooth, symmetric, not enlarge, no nodules  Lungs:  Clear to auscultation throughout, no wheezes, rhonchi or rales.  C/V:  Regular rate and rhythm, no murmurs, rubs or gallops.  No JVD, no carotid bruits.  No peripheral edema.    Breast exam:  No suspicious masses, skin changes, nipple discharge or retraction  Abdomen:  Not distended.  Bowel sounds active.  No tenderness, no hepatosplenomegaly or masses.  No CVA tenderness or masses.  Lymph:  No cervical, axillary or inguinal lymphadenopathy  Neuro:   Face symmetric. No tremor.  M/S:  No joint deformities, no joint swelling noted in hands or feet.  Skin:  No suspicious lesions noted on face, neck, trunk, abdomen, hands, feet.  No rashes or jaundice in same areas.  Normal moisture, good skin turgor.   Psych:  Broad range affect.  Not psychomotor slowed.  No signs of anxiety or agitation.        Concha was seen today for physical.    Diagnoses and all orders for this visit:    Routine general medical examination at a health care facility  -     Vitamin D Deficiency; Future  -     Vitamin B12; Future  -     Dexa hip/pelvis/spine*; Future    Visit for screening mammogram  -     MA SCREENING DIGITAL BILAT - Future  (s+30); Future    Obesity (BMI 30-39.9)  -     Comprehensive metabolic panel; Future  -     On Weight Watchers and has lost 12 pounds in the last month.  Encouraged continued efforts towards weight loss and exercise (currently limited due to right knee pain)    Hyperlipidemia, unspecified hyperlipidemia type  -     Lipid Profile; Future    Need for vaccination  -     Pneumococcal vaccine 13 valent PCV13 IM (Prevnar) [39095]    Hyperlipidemia LDL goal <130  -     Refill atorvastatin (LIPITOR) 80 MG tablet; Take 1 tablet (80 mg) by mouth  daily    Migraine with aura and without status migrainosus, not intractable  -     Refill butalbital-aspirin-caffeine (FIORINAL) -40 MG per capsule; Take 1 capsule by mouth daily as needed    Left foot tingling:  eval for Vitamin B12 deficiency, hypothyroidism  -     Vitamin B12; Future    Hx Vitamin D deficiency  -     Check Vit D    Hx Elevated glucose  -     Hemoglobin A1c; Future    Family history of hip fracture, Asymptomatic menopausal state     -     Dexa hip/pelvis/spine*; Future    RTC for preop once TKA scheduled, and as needed.  Otherwise f/u one year.    Sammi Beltre M.D.  Internal Medicine  Primary Care Center   pager [phone]

## 2017-04-15 NOTE — NURSING NOTE
Patient received Prevnar 13 vaccine.  See immunization list for administration details.  Patient tolerated injection well.     KE TALLEY at 9:48 AM on 4/15/2017.

## 2017-04-15 NOTE — NURSING NOTE
Chief Complaint   Patient presents with     Physical     Patient here for annual physical.     Oumou Bell LPN at 8:46 AM on 4/15/2017.

## 2017-04-17 LAB — DEPRECATED CALCIDIOL+CALCIFEROL SERPL-MC: 22 UG/L (ref 20–75)

## 2017-04-18 DIAGNOSIS — E55.9 VITAMIN D DEFICIENCY: Primary | ICD-10-CM

## 2017-04-18 RX ORDER — ERGOCALCIFEROL 1.25 MG/1
50000 CAPSULE, LIQUID FILLED ORAL WEEKLY
Qty: 8 CAPSULE | Refills: 1 | Status: SHIPPED | OUTPATIENT
Start: 2017-04-18 | End: 2017-06-07

## 2017-05-03 ENCOUNTER — RADIANT APPOINTMENT (OUTPATIENT)
Dept: MAMMOGRAPHY | Facility: CLINIC | Age: 72
End: 2017-05-03
Attending: INTERNAL MEDICINE

## 2017-05-03 DIAGNOSIS — Z12.31 VISIT FOR SCREENING MAMMOGRAM: ICD-10-CM

## 2017-06-10 ENCOUNTER — HEALTH MAINTENANCE LETTER (OUTPATIENT)
Age: 72
End: 2017-06-10

## 2017-08-07 DIAGNOSIS — E55.9 VITAMIN D DEFICIENCY: ICD-10-CM

## 2017-08-07 LAB — DEPRECATED CALCIDIOL+CALCIFEROL SERPL-MC: 35 UG/L (ref 20–75)

## 2017-08-07 PROCEDURE — 82306 VITAMIN D 25 HYDROXY: CPT | Performed by: FAMILY MEDICINE

## 2017-08-07 PROCEDURE — 36415 COLL VENOUS BLD VENIPUNCTURE: CPT | Performed by: FAMILY MEDICINE

## 2017-08-08 ENCOUNTER — TELEPHONE (OUTPATIENT)
Dept: INTERNAL MEDICINE | Facility: CLINIC | Age: 72
End: 2017-08-08

## 2017-08-08 NOTE — TELEPHONE ENCOUNTER
----- Message from Garrick Nelson sent at 8/7/2017  3:48 PM CDT -----  Regarding: Referral/ hemorrhoid  Contact: [phone]  Patient would like to speak with you in regards to her hemorrhoid and wants to see if Dr. Beltre have any suggestions to who she should see for this, and whether or not she would need a referral.       Message left for pt to call backs  Gabi West RN 8:15 AM on 8/8/2017.

## 2017-08-10 ENCOUNTER — MYC MEDICAL ADVICE (OUTPATIENT)
Dept: INTERNAL MEDICINE | Facility: CLINIC | Age: 72
End: 2017-08-10

## 2017-08-10 DIAGNOSIS — K64.4 EXTERNAL HEMORRHOIDS: Primary | ICD-10-CM

## 2017-08-10 NOTE — TELEPHONE ENCOUNTER
I have contacted both Medicare and [facility] . Medicare told me to get  a referral from my primary doctor to see a specialist within network to see regarding my hemorrhoids. I do not need a referral as far as [employer] is concerned. I called 2 days ago and also have sent a previous message. Can Dr. Beltre possibly prepare a referral for me. A nurse called me at 8:00 am Tues. but I was sleeping and did not hear the phone. Please call again or send me an email. [phone]. Thank you , Concha                    Referral done.  Gabi West RN 2:29 PM on 8/10/2017.

## 2017-08-11 ENCOUNTER — PRE VISIT (OUTPATIENT)
Dept: SURGERY | Facility: CLINIC | Age: 72
End: 2017-08-11

## 2017-08-11 NOTE — TELEPHONE ENCOUNTER
1.  Date/reason for appt: 8/21/17- hemorrhoid     2.  Referring provider: Dr. Beltre     3.  Call to patient (Yes / No - short description): No - pt is referred. Records with PCP in [facility]. Per appt note, no outside records.     4.  Previous care at / records requested from:     [address] [place] [address] [facility]

## 2017-08-17 ASSESSMENT — ENCOUNTER SYMPTOMS
STIFFNESS: 1
BACK PAIN: 1
CONSTIPATION: 0
ORTHOPNEA: 0
ARTHRALGIAS: 1
ABDOMINAL PAIN: 0
PALPITATIONS: 0
HYPOTENSION: 0
POOR WOUND HEALING: 0
JOINT SWELLING: 1
SYNCOPE: 0
LEG SWELLING: 1
MUSCLE CRAMPS: 0
NECK PAIN: 0
BOWEL INCONTINENCE: 0
MYALGIAS: 0
NAIL CHANGES: 0
JAUNDICE: 0
MUSCLE WEAKNESS: 0
EXERCISE INTOLERANCE: 0
RECTAL PAIN: 1
VOMITING: 0
BLOOD IN STOOL: 0
HYPERTENSION: 0
TACHYCARDIA: 0
HEARTBURN: 0
CLAUDICATION: 0
NAUSEA: 0
SLEEP DISTURBANCES DUE TO BREATHING: 0
LEG PAIN: 1
SKIN CHANGES: 1
BLOATING: 0
LIGHT-HEADEDNESS: 0
RECTAL BLEEDING: 0
DIARRHEA: 0

## 2017-08-21 ENCOUNTER — OFFICE VISIT (OUTPATIENT)
Dept: SURGERY | Facility: CLINIC | Age: 72
End: 2017-08-21

## 2017-08-21 VITALS
WEIGHT: 161.3 LBS | DIASTOLIC BLOOD PRESSURE: 86 MMHG | SYSTOLIC BLOOD PRESSURE: 148 MMHG | HEART RATE: 63 BPM | BODY MASS INDEX: 28.58 KG/M2 | TEMPERATURE: 98.8 F | HEIGHT: 63 IN | OXYGEN SATURATION: 97 %

## 2017-08-21 DIAGNOSIS — K62.9 PERIANAL LESION: Primary | ICD-10-CM

## 2017-08-21 ASSESSMENT — PAIN SCALES - GENERAL: PAINLEVEL: NO PAIN (0)

## 2017-08-21 NOTE — MR AVS SNAPSHOT
After Visit Summary   8/21/2017    Connie J Frankenberg    MRN: [id number]           Patient Information     Date Of Birth          1945        Visit Information        Provider Department      8/21/2017 4:00 PM Nya Melgar APRN [facility]        Today's Diagnoses     Perianal lesion    -  1       Follow-ups after your visit        Your next 10 appointments already scheduled     Oct 19, 2017 10:20 AM CDT   (Arrive by 10:05 AM)   Return Visit with Sammi Beltre MD   [facility] ([facility])    [address] [place] [id number] [phone]              Who to contact     Please call your clinic at [phone] to:    Ask questions about your health    Make or cancel appointments    Discuss your medicines    Learn about your test results    Speak to your doctor   If you have compliments or concerns about an experience at your clinic, or if you wish to file a complaint, please contact [facility] Patient Relations at [phone] or email us at [email or web address]         Additional Information About Your Visit        MyChart Information     [facility] gives you secure access to your electronic health record. If you see a primary care provider, you can also send messages to your care team and make appointments. If you have questions, please call your primary care clinic.  If you do not have a primary care provider, please call [phone] and they will assist you.      [facility] is an electronic gateway that provides easy, online access to your medical records. With [facility], you can request a clinic appointment, read your test results, renew a prescription or communicate with your care team.     To access your existing account, please contact your [facility] or call [phone] for assistance.        Care EveryWhere  "ID     This is your Care EveryWhere ID. This could be used by other organizations to access your [facility] medical records  [id number]        Your Vitals Were     Pulse Temperature Height Pulse Oximetry BMI (Body Mass Index)       63 98.8  F (37.1  C) (Oral) 5' 3\" 97% 28.57 kg/m2        Blood Pressure from Last 3 Encounters:   08/21/17 148/86   04/15/17 126/78   12/05/16 (!) 152/98    Weight from Last 3 Encounters:   08/21/17 161 lb 4.8 oz   04/15/17 182 lb 14.4 oz   03/13/17 195 lb              We Performed the Following     BIOPSY SKIN/SUBQ/MUC MEM, SINGLE LESION     Surgical pathology exam        Primary Care Provider Office Phone # Fax #    Sammi Beltre [phone]       [address] [place] [address]  [facility] [address]        Equal Access to Services     [facility]: Hadii aad ku hadasho Sogeoffrey, waaxda luqadaha, qaybta kaalmada adeegyada, waxay juaniin hayaan hema gibbons . So [facility] [phone].    ATENCIÓN: Si habla español, tiene a starr disposición servicios gratuitos de asistencia lingüística. Llame al [phone].    We comply with applicable federal civil rights laws and [place] laws. We do not discriminate on the basis of race, color, national origin, age, disability sex, sexual orientation or gender identity.            Thank you!     Thank you for choosing [facility]  for your care. Our goal is always to provide you with excellent care. Hearing back from our patients is one way we can continue to improve our services. Please take a few minutes to complete the written survey that you may receive in the mail after your visit with us. Thank you!             Your Updated Medication List - Protect others around you: Learn how to safely use, store and throw away your medicines at www.disposemymeds.org.          This list is accurate as of: 8/21/17  4:46 PM.  Always use your most recent med list.                   Brand Name Dispense Instructions for use " Diagnosis    aspirin 81 MG tablet      Take 1 tablet by mouth daily.        atorvastatin 80 MG tablet    LIPITOR    90 tablet    Take 1 tablet (80 mg) by mouth daily    Hyperlipidemia LDL goal <130       BENADRYL PO      Take 1 tablet by mouth as needed.        butalbital-aspirin-caffeine -40 MG per capsule    FIORINAL    30 capsule    Take 1 capsule by mouth daily as needed    Migraine with aura and without status migrainosus, not intractable       * METROGEL 1 % gel   Generic drug:  metroNIDAZOLE      Apply  topically daily.        * metroNIDAZOLE 0.75 % cream    METROCREAM    45 g    Apply  topically 2 times daily.    Rosacea       mupirocin 2 % ointment    BACTROBAN    30 g    Use 2 times a day to affected area.    Squamous cell carcinoma of skin of lip       SUPER B COMPLEX PO      Take 1 tablet by mouth daily.        * VITAMIN D PO      Take 1 tablet by mouth daily.        * vitamin D3 2000 UNITS Caps     90 capsule    Take 1 Units by mouth daily    Vitamin D deficiency       * Notice:  This list has 4 medication(s) that are the same as other medications prescribed for you. Read the directions carefully, and ask your doctor or other care provider to review them with you.

## 2017-08-21 NOTE — LETTER
2017      RE: Connie J Frankenberg  [address]  [facility] [address]       Colon and Rectal Surgery Consult Clinic Note    Date: 2017     Referring provider:  Sammi Beltre MD  [address] [place] [address] [address]  [place] [address]     RE: Connie J Frankenberg  : 1945  CHARLES: 2017    Connie J Frankenberg is a very pleasant 71 year old female with a history of basal cell carcinoma with a recent diagnosis of hemorrhoids.  Given these findings they were subsequently sent to the Colon and Rectal Surgery Clinic for an opinion on this and a new patient consultation.     Concha reports having hemorrhoids since the birth of her son 45 years ago. These get irritated when she bikes and are sometimes painful. She has used tronolane cream or takes a warm tub bath which is helpful. She developed a new external lump a few weeks ago. This was initially painful but is no longer painful but has not gone away. She denies any bleeding. She has soft bowel movements with only rare constipation with diet changes.  She had a colonoscopy 3 years ago, which was reportedly normal. She denies any family history of colon cancer. She is on a daily baby aspirin.    Assessment/Plan: 71 year old female with anal skin tag in the anterior midline and perianal lesion. She has a moderate sized, wide based skin tag in the anterior midline. No external hemorrhoids. Small internal hemorrhoids. She indicates that her site of pain was at the site of a lesion in the left perianal position. This is raised and somewhat verruciform in appearance. Recommended biopsy and removal of this in clinic today, especially given her history of multiple basal cell carcinomas. She tolerated this well and the entire lesion was removed with cautery and the base was fulgurated. Sent to pathology. Advised the use of a barrier cream externally when skin tag is bothersome. Discussed surgical removal but she feels that her symptoms are manageable and do  not warrant surgery at this time. She can return to clinic if symptoms worsen or if she would like to discuss surgical removal. Advised avoiding any constipation or straining. Patient's questions were answered to her stated satisfaction and she is in agreement with this plan.      Medical history:  Past Medical History:   Diagnosis Date     Arthritis of knee      Basal cell carcinoma      Hemorrhoids      Hyperlipidemia      Insomnia      Migraines, classic     sparkly aura     Plantar fasciitis, bilateral      Skin cancer, basal cell      Squamous cell carcinoma      Swelling of the ankle, feet, or leg      Vasovagal response     to injections, scents     Vasovagal syncope     with blood draws, injections       Surgical history:  Past Surgical History:   Procedure Laterality Date     C NONSPECIFIC PROCEDURE      laparoscopy,     C NONSPECIFIC PROCEDURE      laser surg basal cell skin cancer     CATARACT IOL, RT/LT       LASIK BILATERAL       MOHS MICROGRAPHIC PROCEDURE         Problem list:  Patient Active Problem List    Diagnosis Date Noted     Swelling of lower extremity      Priority: Medium     Diagnosis updated by automated process. Provider to review and confirm.       BCC (basal cell carcinoma), face 10/26/2012     Priority: Medium     Skin cancer, basal cell      Priority: Medium     Hyperlipidemia      Priority: Medium     Classical migraine      Priority: Medium     sparkly aura  (Problem list name updated by automated process. Provider to review and confirm.)       Insomnia      Priority: Medium     Arthritis of knee      Priority: Medium     Hemorrhoids      Priority: Medium     Vasovagal syncope      Priority: Medium     with blood draws, injections         Medications:  Current Outpatient Prescriptions   Medication Sig Dispense Refill     atorvastatin (LIPITOR) 80 MG tablet Take 1 tablet (80 mg) by mouth daily 90 tablet 3     butalbital-aspirin-caffeine (FIORINAL) -40 MG per capsule Take 1  "capsule by mouth daily as needed 30 capsule 2     Cholecalciferol (VITAMIN D3) 2000 UNITS CAPS Take 1 Units by mouth daily 90 capsule 3     mupirocin (BACTROBAN) 2 % ointment Use 2 times a day to affected area. 30 g 6     metroNIDAZOLE (METROGEL) 1 % gel Apply  topically daily.       metroNIDAZOLE (METROCREAM) 0.75 % cream Apply  topically 2 times daily. 45 g 11     aspirin 81 MG tablet Take 1 tablet by mouth daily.       DiphenhydrAMINE HCl (BENADRYL PO) Take 1 tablet by mouth as needed.       B Complex-C (SUPER B COMPLEX PO) Take 1 tablet by mouth daily.       Cholecalciferol (VITAMIN D PO) Take 1 tablet by mouth daily.         Allergies:  Allergies   Allergen Reactions     Codeine GI Disturbance       Family history:  Family History   Problem Relation Age of Onset     Neurologic Disorder Mother      headaches     Neurologic Disorder Sister      headaches     Neurologic Disorder Mother      narcolepsy     CANCER Mother      skin     CANCER Father      lung and skin (BCC)     C.A.D. Brother      CABGAge 58, March 2012     CEREBROVASCULAR DISEASE Mother      Thyroid Disease Mother      hypothyroid     Thyroid Disease Sister      hypothyroid     CANCER Brother      skin cancer     C.A.D. Father      MI       Social history:  Social History   Substance Use Topics     Smoking status: Never Smoker     Smokeless tobacco: Never Used     Alcohol use Yes      Comment: occ    Marital status: single.  Nursing Notes:   Molly Lemus LPN  8/21/2017  3:55 PM  Signed  Chief Complaint   Patient presents with     Clinic Care Coordination - Initial     new       Vitals:    08/21/17 1554   BP: 148/86   Pulse: 63   Temp: 98.8  F (37.1  C)   TempSrc: Oral   SpO2: 97%   Weight: 161 lb 4.8 oz   Height: 5' 3\"       Body mass index is 28.57 kg/(m^2).      Molly KAY LPN                             Physical Examination:  /86  Pulse 63  Temp 98.8  F (37.1  C) (Oral)  Ht 5' 3\"  Wt 161 lb 4.8 oz  SpO2 97%  BMI 28.57 kg/m2  General: " alert, oriented, in no acute distress, sitting comfortably  HEENT: mucous membranes moist  Abdomen: soft, non distended, non tender on palpation  Perianal external examination:  Perianal skin: Intact with no excoriation or lichenification.  Lesions: Yes: 2 cm raised lesion in the left lateral position without bleeding. Non tender on palpation. Surface is verruciform in appearance.  Eversion of buttocks: There was not evidence of an anal fissure. Details: N/A.  Skin tags or external hemorrhoids: None.  Digital rectal examination: Was performed.   Sphincter tone: Good.  Palpable lesions: No.  Other: None.  Bimanual examination: was not performed      Anoscopy: Was performed.   Hemorrhoids: Grade 1-2 internal hemorrhoids without active bleeding  Lesions: No      Procedures:  After injection with 1% lidocaine, fulguration was performed of the entire lesion in the left perianal position. The base was curetted out and re fulgurated. No bleeding present. Antibiotic dressing applied.    Total face to face time was 20 minutes, outside the procedure time, >50% counseling.    VANESA Mosquera, NP-C  Colon and Rectal Surgery   [facility]    This note was created using speech recognition software and may contain unintended word substitutions.    VANESA Mosquera CNP

## 2017-08-21 NOTE — PROGRESS NOTES
Colon and Rectal Surgery Consult Clinic Note    Date: 2017     Referring provider:  Sammi Beltre MD  [address] [facility] [address]  [place] [address]     RE: Connie J Frankenberg  : 1945  CHARLES: 2017    Connie J Frankenberg is a very pleasant 71 year old female with a history of basal cell carcinoma with a recent diagnosis of hemorrhoids.  Given these findings they were subsequently sent to the Colon and Rectal Surgery Clinic for an opinion on this and a new patient consultation.     Concha reports having hemorrhoids since the birth of her son 45 years ago. These get irritated when she bikes and are sometimes painful. She has used tronolane cream or takes a warm tub bath which is helpful. She developed a new external lump a few weeks ago. This was initially painful but is no longer painful but has not gone away. She denies any bleeding. She has soft bowel movements with only rare constipation with diet changes.  She had a colonoscopy 3 years ago, which was reportedly normal. She denies any family history of colon cancer. She is on a daily baby aspirin.    Assessment/Plan: 71 year old female with anal skin tag in the anterior midline and perianal lesion. She has a moderate sized, wide based skin tag in the anterior midline. No external hemorrhoids. Small internal hemorrhoids. She indicates that her site of pain was at the site of a lesion in the left perianal position. This is raised and somewhat verruciform in appearance. Recommended biopsy and removal of this in clinic today, especially given her history of multiple basal cell carcinomas. She tolerated this well and the entire lesion was removed with cautery and the base was fulgurated. Sent to pathology. Advised the use of a barrier cream externally when skin tag is bothersome. Discussed surgical removal but she feels that her symptoms are manageable and do not warrant surgery at this time. She can return to clinic if symptoms worsen or if she  would like to discuss surgical removal. Advised avoiding any constipation or straining. Patient's questions were answered to her stated satisfaction and she is in agreement with this plan.      Medical history:  Past Medical History:   Diagnosis Date     Arthritis of knee      Basal cell carcinoma      Hemorrhoids      Hyperlipidemia      Insomnia      Migraines, classic     sparkly aura     Plantar fasciitis, bilateral      Skin cancer, basal cell      Squamous cell carcinoma      Swelling of the ankle, feet, or leg      Vasovagal response     to injections, scents     Vasovagal syncope     with blood draws, injections       Surgical history:  Past Surgical History:   Procedure Laterality Date     C NONSPECIFIC PROCEDURE      laparoscopy,     C NONSPECIFIC PROCEDURE      laser surg basal cell skin cancer     CATARACT IOL, RT/LT       LASIK BILATERAL       MOHS MICROGRAPHIC PROCEDURE         Problem list:  Patient Active Problem List    Diagnosis Date Noted     Swelling of lower extremity      Priority: Medium     Diagnosis updated by automated process. Provider to review and confirm.       BCC (basal cell carcinoma), face 10/26/2012     Priority: Medium     Skin cancer, basal cell      Priority: Medium     Hyperlipidemia      Priority: Medium     Classical migraine      Priority: Medium     sparkly aura  (Problem list name updated by automated process. Provider to review and confirm.)       Insomnia      Priority: Medium     Arthritis of knee      Priority: Medium     Hemorrhoids      Priority: Medium     Vasovagal syncope      Priority: Medium     with blood draws, injections         Medications:  Current Outpatient Prescriptions   Medication Sig Dispense Refill     atorvastatin (LIPITOR) 80 MG tablet Take 1 tablet (80 mg) by mouth daily 90 tablet 3     butalbital-aspirin-caffeine (FIORINAL) -40 MG per capsule Take 1 capsule by mouth daily as needed 30 capsule 2     Cholecalciferol (VITAMIN D3) 2000 UNITS  "CAPS Take 1 Units by mouth daily 90 capsule 3     mupirocin (BACTROBAN) 2 % ointment Use 2 times a day to affected area. 30 g 6     metroNIDAZOLE (METROGEL) 1 % gel Apply  topically daily.       metroNIDAZOLE (METROCREAM) 0.75 % cream Apply  topically 2 times daily. 45 g 11     aspirin 81 MG tablet Take 1 tablet by mouth daily.       DiphenhydrAMINE HCl (BENADRYL PO) Take 1 tablet by mouth as needed.       B Complex-C (SUPER B COMPLEX PO) Take 1 tablet by mouth daily.       Cholecalciferol (VITAMIN D PO) Take 1 tablet by mouth daily.         Allergies:  Allergies   Allergen Reactions     Codeine GI Disturbance       Family history:  Family History   Problem Relation Age of Onset     Neurologic Disorder Mother      headaches     Neurologic Disorder Sister      headaches     Neurologic Disorder Mother      narcolepsy     CANCER Mother      skin     CANCER Father      lung and skin (BCC)     C.A.D. Brother      CABGAge 58, March 2012     CEREBROVASCULAR DISEASE Mother      Thyroid Disease Mother      hypothyroid     Thyroid Disease Sister      hypothyroid     CANCER Brother      skin cancer     C.A.D. Father      MI       Social history:  Social History   Substance Use Topics     Smoking status: Never Smoker     Smokeless tobacco: Never Used     Alcohol use Yes      Comment: occ    Marital status: single.  Nursing Notes:   Molly Lemus LPN  8/21/2017  3:55 PM  Signed  Chief Complaint   Patient presents with     Clinic Care Coordination - Initial     new       Vitals:    08/21/17 1554   BP: 148/86   Pulse: 63   Temp: 98.8  F (37.1  C)   TempSrc: Oral   SpO2: 97%   Weight: 161 lb 4.8 oz   Height: 5' 3\"       Body mass index is 28.57 kg/(m^2).      Molly KAY LPN                             Physical Examination:  /86  Pulse 63  Temp 98.8  F (37.1  C) (Oral)  Ht 5' 3\"  Wt 161 lb 4.8 oz  SpO2 97%  BMI 28.57 kg/m2  General: alert, oriented, in no acute distress, sitting comfortably  HEENT: mucous membranes " moist  Abdomen: soft, non distended, non tender on palpation  Perianal external examination:  Perianal skin: Intact with no excoriation or lichenification.  Lesions: Yes: 2 cm raised lesion in the left lateral position without bleeding. Non tender on palpation. Surface is verruciform in appearance.  Eversion of buttocks: There was not evidence of an anal fissure. Details: N/A.  Skin tags or external hemorrhoids: None.  Digital rectal examination: Was performed.   Sphincter tone: Good.  Palpable lesions: No.  Other: None.  Bimanual examination: was not performed      Anoscopy: Was performed.   Hemorrhoids: Grade 1-2 internal hemorrhoids without active bleeding  Lesions: No      Procedures:  After injection with 1% lidocaine, fulguration was performed of the entire lesion in the left perianal position. The base was curetted out and re fulgurated. No bleeding present. Antibiotic dressing applied.    Total face to face time was 20 minutes, outside the procedure time, >50% counseling.    VANESA Mosquera, NP-C  Colon and Rectal Surgery   [facility]    This note was created using speech recognition software and may contain unintended word substitutions.

## 2017-08-21 NOTE — NURSING NOTE
"Chief Complaint   Patient presents with     Clinic Care Coordination - Initial     new       Vitals:    08/21/17 1554   BP: 148/86   Pulse: 63   Temp: 98.8  F (37.1  C)   TempSrc: Oral   SpO2: 97%   Weight: 161 lb 4.8 oz   Height: 5' 3\"       Body mass index is 28.57 kg/(m^2).      Molly KAY LPN                          "

## 2017-08-23 ENCOUNTER — TELEPHONE (OUTPATIENT)
Dept: SURGERY | Facility: CLINIC | Age: 72
End: 2017-08-23

## 2017-08-23 LAB — COPATH REPORT: NORMAL

## 2017-09-07 DIAGNOSIS — I87.2 VENOUS (PERIPHERAL) INSUFFICIENCY: Primary | ICD-10-CM

## 2017-09-21 ASSESSMENT — ENCOUNTER SYMPTOMS
LEG PAIN: 1
SLEEP DISTURBANCES DUE TO BREATHING: 0
ARTHRALGIAS: 1
HYPOTENSION: 0
NECK PAIN: 0
LEG SWELLING: 1
PALPITATIONS: 0
STIFFNESS: 1
HYPERTENSION: 0
ORTHOPNEA: 0
MUSCLE WEAKNESS: 1
JOINT SWELLING: 1
EXERCISE INTOLERANCE: 0
CLAUDICATION: 1
MUSCLE CRAMPS: 0
BACK PAIN: 1
SYNCOPE: 0
TACHYCARDIA: 0
MYALGIAS: 1
LIGHT-HEADEDNESS: 0

## 2017-10-02 ENCOUNTER — OFFICE VISIT (OUTPATIENT)
Dept: VASCULAR SURGERY | Facility: CLINIC | Age: 72
End: 2017-10-02

## 2017-10-02 VITALS
DIASTOLIC BLOOD PRESSURE: 90 MMHG | OXYGEN SATURATION: 96 % | SYSTOLIC BLOOD PRESSURE: 148 MMHG | RESPIRATION RATE: 17 BRPM | HEART RATE: 68 BPM

## 2017-10-02 DIAGNOSIS — I87.2 VENOUS (PERIPHERAL) INSUFFICIENCY: ICD-10-CM

## 2017-10-02 DIAGNOSIS — I73.9 PAD (PERIPHERAL ARTERY DISEASE) (H): Primary | ICD-10-CM

## 2017-10-02 ASSESSMENT — PAIN SCALES - GENERAL: PAINLEVEL: NO PAIN (0)

## 2017-10-02 NOTE — MR AVS SNAPSHOT
After Visit Summary   10/2/2017    Connie J Frankenberg    MRN: [id number]           Patient Information     Date Of Birth          1945        Visit Information        Provider Department      10/2/2017 3:00 PM Audrey Byrnes MD [facility]        Today's Diagnoses     PAD (peripheral artery disease) (H)    -  1    Venous (peripheral) insufficiency           Follow-ups after your visit        Your next 10 appointments already scheduled     Oct 19, 2017 10:20 AM CDT   (Arrive by 10:05 AM)   Return Visit with Sammi Beltre MD   [facility] ([facility])    [address] [place] [address]  4th [facility] [id number] [phone]            Apr 02, 2018 12:30 PM CDT   US RAYMOND DOPPLER NO EXERCISE 1-2 LVLS BILAT with UCUSV1   [facility] ([facility])    [address] [place] [address] [id number] [phone]           Please bring a list of your medicines (including vitamins, minerals and over-the-counter drugs). Also, tell your doctor about any allergies you may have. Wear comfortable clothes and leave your valuables at home.  No caffeine or tobacco for 1 hour prior to exam.  Please call the Imaging Department at your exam site with any questions.            Apr 02, 2018  1:30 PM CDT   (Arrive by 1:15 PM)   Return Vascular Visit with Audrey Byrnes MD   [facility] ([facility])    [address] [place] [address] [id number] [phone]              Future tests that were ordered for you today     Open Future Orders        Priority Expected Expires Ordered    US RAYMOND Doppler No Exercise Routine 10/2/2017 10/2/2018 10/2/2017            Who to contact     Please call your clinic at [phone] to:    Ask questions about your health    Make or cancel appointments    Discuss your medicines    Learn about your test  results    Speak to your doctor   If you have compliments or concerns about an experience at your clinic, or if you wish to file a complaint, please contact [facility] Patient Relations at [phone] or email us at [email or web address]         Additional Information About Your Visit        [employer] Information     [employer] gives you secure access to your electronic health record. If you see a primary care provider, you can also send messages to your care team and make appointments. If you have questions, please call your primary care clinic.  If you do not have a primary care provider, please call [phone] and they will assist you.      [employer] is an electronic gateway that provides easy, online access to your medical records. With [employer], you can request a clinic appointment, read your test results, renew a prescription or communicate with your care team.     To access your existing account, please contact your [facility] or call [phone] for assistance.        Care EveryWhere ID     This is your Care EveryWhere ID. This could be used by other organizations to access your [place] medical records  [id number]        Your Vitals Were     Pulse Respirations Pulse Oximetry Breastfeeding?          68 17 96% No         Blood Pressure from Last 3 Encounters:   10/02/17 148/90   08/21/17 148/86   04/15/17 126/78    Weight from Last 3 Encounters:   08/21/17 73.2 kg (161 lb 4.8 oz)   04/15/17 83 kg (182 lb 14.4 oz)   03/13/17 88.5 kg (195 lb)                 Today's Medication Changes          These changes are accurate as of: 10/2/17  3:39 PM.  If you have any questions, ask your nurse or doctor.               Start taking these medicines.        Dose/Directions    * COMPRESSION STOCKINGS   Used for:  Venous (peripheral) insufficiency   Started by:  Audrey Byrnes MD        Dose:  2 each   2 each daily   Quantity:  2 each   Refills:  1        * COMPRESSION STOCKINGS   Used for:  PAD (peripheral artery disease) (H)   Started by:  Audrey Byrnes MD        Dose:  2 each   2 each daily   Quantity:  2 each   Refills:  1       * Notice:  This list has 2 medication(s) that are the same as other medications prescribed for you. Read the directions carefully, and ask your doctor or other care provider to review them with you.         Where to get your medicines      Some of these will need a paper prescription and others can be bought over the counter.  Ask your nurse if you have questions.     Bring a paper prescription for each of these medications     COMPRESSION STOCKINGS    COMPRESSION STOCKINGS                Primary Care Provider Office Phone # Fax #    Sammi LONNIE MD Alexsander [phone]       [address] [place] [address] [address] [place] [address]        Equal Access to Services     JUAN DIEGO BRISENO : Elizabeth smitho Sogeoffrey, waaxda luqadaha, qaybta kaalmada adeegyada, lyle gibbons . So [facility] [phone].    ATENCIÓN: Si habla español, tiene a starr disposición servicios gratuitos de asistencia lingüística. [facility] [phone].    We comply with applicable federal civil rights laws and [place] laws. We do not discriminate on the basis of race, color, national origin, age, disability, sex, sexual orientation, or gender identity.            Thank you!     Thank you for choosing [facility]  for your care. Our goal is always to provide you with excellent care. Hearing back from our patients is one way we can continue to improve our services. Please take a few minutes to complete the written survey that you may receive in the mail after your visit with us. Thank you!             Your Updated Medication List - Protect others around you: Learn how to safely use, store and throw away your medicines at www.disposemymeds.org.          This list is accurate as of: 10/2/17  3:39 PM.  Always use your most recent  med list.                   Brand Name Dispense Instructions for use Diagnosis    aspirin 81 MG tablet      Take 1 tablet by mouth daily.        atorvastatin 80 MG tablet    LIPITOR    90 tablet    Take 1 tablet (80 mg) by mouth daily    Hyperlipidemia LDL goal <130       BENADRYL PO      Take 1 tablet by mouth as needed.        butalbital-aspirin-caffeine -40 MG per capsule    FIORINAL    30 capsule    Take 1 capsule by mouth daily as needed    Migraine with aura and without status migrainosus, not intractable       * COMPRESSION STOCKINGS     2 each    2 each daily    Venous (peripheral) insufficiency       * COMPRESSION STOCKINGS     2 each    2 each daily    PAD (peripheral artery disease) (H)       * METROGEL 1 % gel   Generic drug:  metroNIDAZOLE      Apply  topically daily.        * metroNIDAZOLE 0.75 % cream    METROCREAM    45 g    Apply  topically 2 times daily.    Rosacea       mupirocin 2 % ointment    BACTROBAN    30 g    Use 2 times a day to affected area.    Squamous cell carcinoma of skin of lip       SUPER B COMPLEX PO      Take 1 tablet by mouth daily.        * VITAMIN D PO      Take 1 tablet by mouth daily.        * vitamin D3 2000 UNITS Caps     90 capsule    Take 1 Units by mouth daily    Vitamin D deficiency       * Notice:  This list has 6 medication(s) that are the same as other medications prescribed for you. Read the directions carefully, and ask your doctor or other care provider to review them with you.

## 2017-10-02 NOTE — LETTER
10/2/2017       RE: Connie J Frankenberg  [address]  [facility] [address]     Dear Colleague,    Thank you for referring your patient, Connie J Frankenberg, to the [facility]ASC[facility] at [facility]. Please see a copy of my visit note below.    VASCULAR SURGERY CLINIC PATIENT CONSULTATION NOTE    HPI:  Connie Frankenberg is a 72 year old female with several year history of bilateral leg swelling. She reports worsening leg swelling with prolonged standing. She has been wearing over-the-counter compression hose for several years which help her leg swelling.  She reports needing to have right knee surgery but wants to get second option.  She is being seen in clinic today to discuss options for leg swelling.       PAST MEDICAL HISTORY:  Past Medical History:   Diagnosis Date     Arthritis of knee      Basal cell carcinoma      Hemorrhoids      Hyperlipidemia      Insomnia      Migraines, classic     sparkly aura     Plantar fasciitis, bilateral      Skin cancer, basal cell      Squamous cell carcinoma      Swelling of the ankle, feet, or leg      Vasovagal response     to injections, scents     Vasovagal syncope     with blood draws, injections       PAST SURGICAL HISTORY:  Past Surgical History:   Procedure Laterality Date     C NONSPECIFIC PROCEDURE      laparoscopy,     C NONSPECIFIC PROCEDURE      laser surg basal cell skin cancer     CATARACT IOL, RT/LT       LASIK BILATERAL       MOHS MICROGRAPHIC PROCEDURE         FAMILY HISTORY:  Family History   Problem Relation Age of Onset     Neurologic Disorder Mother      headaches/narcolepsy     CANCER Mother      skin     CEREBROVASCULAR DISEASE Mother      Thyroid Disease Mother      hypothyroid     Neurologic Disorder Sister      headaches     CANCER Father      lung and skin (BCC)     C.A.D. Father      MI     C.A.D. 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  Lives in a home.    HOME MEDICATIONS:  Prior to Admission medications    Medication Sig Start Date End Date Taking? Authorizing Provider   atorvastatin (LIPITOR) 80 MG tablet Take 1 tablet (80 mg) by mouth daily 4/15/17   Sammi Beltre MD   butalbital-aspirin-caffeine (FIORINAL) -40 MG per capsule Take 1 capsule by mouth daily as needed 4/15/17   Sammi Beltre MD   Cholecalciferol (VITAMIN D3) 2000 UNITS CAPS Take 1 Units by mouth daily 1/6/16   Sammi Beltre MD   mupirocin (BACTROBAN) 2 % ointment Use 2 times a day to affected area. 4/15/14   Redd Holliday MD   metroNIDAZOLE (METROGEL) 1 % gel Apply  topically daily.    Reported, Patient   metroNIDAZOLE (METROCREAM) 0.75 % cream Apply  topically 2 times daily. 10/26/12   Redd Holliday MD   aspirin 81 MG tablet Take 1 tablet by mouth daily.    Reported, Patient   DiphenhydrAMINE HCl (BENADRYL PO) Take 1 tablet by mouth as needed.    Reported, Patient   B Complex-C (SUPER B COMPLEX PO) Take 1 tablet by mouth daily.    Reported, Patient   Cholecalciferol (VITAMIN D PO) Take 1 tablet by mouth daily.    Reported, Patient       VITAL SIGNS:      BP: 148/90 Pulse: 68   Resp: 17 SpO2: 96 %        0 lbs 0 oz    PHYSICAL EXAM:  NEURO/PSYCH:  The patient is alert and oriented.  Appropriate.  Moves all extremities.   SKIN: Color is appropriate for race, warm, dry.  PULMONARY:  Non- labored breathing, no acute distress  EXTREMITIES: trace bilateral ankle edema, small visible varicose veins in left calf and right posterior knee, palpable bilateral DP and PT pulses       ULTRASOUND:  US VENOUS COMPENTENCY BILATERAL 9/15/2017 2:00 PM    Impression:  1. Right leg:  a. No deep  venous incompetence.  b. No deep venous thrombosis or superficial thrombophlebitis.  c. Superficial venous incompetence involving the AASV at the proximal thigh.  d. Competent  veins are seen. No varicose veins.     2. Left leg:  a. Deep venous incompetence involving the common femoral vein.  b. No deep venous thrombosis or superficial thrombophlebitis.  c. Superficial venous incompetence of nearly all visualized veins as described above.  d. Competent  veins are seen. No varicose veins.    ASSESSMENT:  Patient Active Problem List   Diagnosis     Skin cancer, basal cell     Hyperlipidemia     Classical migraine     Insomnia     Arthritis of knee     Hemorrhoids     Vasovagal syncope     BCC (basal cell carcinoma), face     Swelling of lower extremity           PLAN:  - mild venous insufficiency , no surgical intervention warranted at this time    - Prescription compression hose, RX given to patient for 20-30 mmHg and 30-40 mmHg knee high compression hose    - will obtain ABIs    - return to clinic in 6-8 months    Please do not hesitate to contact Dr. Byrnes's vascular surgery team with any questions.    Sharmila ALEXIS, CNS  Division of Vascular Surgery  [facility]  Pager [phone]    I personally examined the patient and agree with the findings above.  I discussed the patient with the resident / fellow and care team, and agree with the assessment and plan of care as documented in the note.  Vascular surgery staff    No varicose veins on examination.  Mild edema.  Notes more pain from her right knee secondary to her arthritis than anything.  Has normal palpable distal pulses on exam, will obtain RAYMOND though for documentation prior to knee surgery  No surgeries on her leg.  Prescription hose compression given  Follow up in 6-8 months.      Again, thank you for allowing me to participate in the care of your patient.      Sincerely,    Audrey Byrnes MD

## 2017-10-02 NOTE — NURSING NOTE
Chief Complaint   Patient presents with     Consult     Venous insufficiency.  planning knee replacements        Vitals:    10/02/17 1457   BP: 148/90   BP Location: Left arm   Pulse: 68   Resp: 17   SpO2: 96%       There is no height or weight on file to calculate BMI.         Lilian Valle LPN

## 2017-10-02 NOTE — PROGRESS NOTES
VASCULAR SURGERY CLINIC PATIENT CONSULTATION NOTE    HPI:  Connie Frankenberg is a 72 year old female with several year history of bilateral leg swelling. She reports worsening leg swelling with prolonged standing. She has been wearing over-the-counter compression hose for several years which help her leg swelling.  She reports needing to have right knee surgery but wants to get second option.  She is being seen in clinic today to discuss options for leg swelling.       PAST MEDICAL HISTORY:  Past Medical History:   Diagnosis Date     Arthritis of knee      Basal cell carcinoma      Hemorrhoids      Hyperlipidemia      Insomnia      Migraines, classic     sparkly aura     Plantar fasciitis, bilateral      Skin cancer, basal cell      Squamous cell carcinoma      Swelling of the ankle, feet, or leg      Vasovagal response     to injections, scents     Vasovagal syncope     with blood draws, injections       PAST SURGICAL HISTORY:  Past Surgical History:   Procedure Laterality Date     C NONSPECIFIC PROCEDURE      laparoscopy,     C NONSPECIFIC PROCEDURE      laser surg basal cell skin cancer     CATARACT IOL, RT/LT       LASIK BILATERAL       MOHS MICROGRAPHIC PROCEDURE         FAMILY HISTORY:  Family History   Problem Relation Age of Onset     Neurologic Disorder Mother      headaches/narcolepsy     CANCER Mother      skin     CEREBROVASCULAR DISEASE Mother      Thyroid Disease Mother      hypothyroid     Neurologic Disorder Sister      headaches     CANCER Father      lung and skin (BCC)     C.A.D. Father      MI     C.A.D. 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  Lives in a  home.    HOME MEDICATIONS:  Prior to Admission medications    Medication Sig Start Date End Date Taking? Authorizing Provider   atorvastatin (LIPITOR) 80 MG tablet Take 1 tablet (80 mg) by mouth daily 4/15/17   Sammi Beltre MD   butalbital-aspirin-caffeine (FIORINAL) -40 MG per capsule Take 1 capsule by mouth daily as needed 4/15/17   Sammi Beltre MD   Cholecalciferol (VITAMIN D3) 2000 UNITS CAPS Take 1 Units by mouth daily 1/6/16   Sammi Beltre MD   mupirocin (BACTROBAN) 2 % ointment Use 2 times a day to affected area. 4/15/14   Redd Holliday MD   metroNIDAZOLE (METROGEL) 1 % gel Apply  topically daily.    Reported, Patient   metroNIDAZOLE (METROCREAM) 0.75 % cream Apply  topically 2 times daily. 10/26/12   Redd Holliday MD   aspirin 81 MG tablet Take 1 tablet by mouth daily.    Reported, Patient   DiphenhydrAMINE HCl (BENADRYL PO) Take 1 tablet by mouth as needed.    Reported, Patient   B Complex-C (SUPER B COMPLEX PO) Take 1 tablet by mouth daily.    Reported, Patient   Cholecalciferol (VITAMIN D PO) Take 1 tablet by mouth daily.    Reported, Patient       VITAL SIGNS:      BP: 148/90 Pulse: 68   Resp: 17 SpO2: 96 %        0 lbs 0 oz    PHYSICAL EXAM:  NEURO/PSYCH:  The patient is alert and oriented.  Appropriate.  Moves all extremities.   SKIN: Color is appropriate for race, warm, dry.  PULMONARY:  Non- labored breathing, no acute distress  EXTREMITIES: trace bilateral ankle edema, small visible varicose veins in left calf and right posterior knee, palpable bilateral DP and PT pulses       ULTRASOUND:  US VENOUS COMPENTENCY BILATERAL 9/15/2017 2:00 PM    Impression:  1. Right leg:  a. No deep venous incompetence.  b. No deep venous thrombosis or superficial thrombophlebitis.  c. Superficial venous incompetence involving the AASV at the proximal thigh.  d. Competent  veins are seen. No varicose veins.     2. Left leg:  a. Deep venous incompetence involving the common femoral  vein.  b. No deep venous thrombosis or superficial thrombophlebitis.  c. Superficial venous incompetence of nearly all visualized veins as described above.  d. Competent  veins are seen. No varicose veins.    ASSESSMENT:  Patient Active Problem List   Diagnosis     Skin cancer, basal cell     Hyperlipidemia     Classical migraine     Insomnia     Arthritis of knee     Hemorrhoids     Vasovagal syncope     BCC (basal cell carcinoma), face     Swelling of lower extremity           PLAN:  - mild venous insufficiency , no surgical intervention warranted at this time    - Prescription compression hose, RX given to patient for 20-30 mmHg and 30-40 mmHg knee high compression hose    - will obtain ABIs    - return to clinic in 6-8 months    Please do not hesitate to contact Dr. Byrnes's vascular surgery team with any questions.    Sharmila ALEXIS, CNS  Division of Vascular Surgery  [facility]  Pager [phone]    I personally examined the patient and agree with the findings above.  I discussed the patient with the resident / fellow and care team, and agree with the assessment and plan of care as documented in the note.  Vascular surgery staff    No varicose veins on examination.  Mild edema.  Notes more pain from her right knee secondary to her arthritis than anything.  Has normal palpable distal pulses on exam, will obtain RAYMOND though for documentation prior to knee surgery  No surgeries on her leg.  Prescription hose compression given  Follow up in 6-8 months.  Audrey Byrnes MD

## 2017-10-05 DIAGNOSIS — E55.9 VITAMIN D DEFICIENCY: ICD-10-CM

## 2017-10-09 RX ORDER — ACETAMINOPHEN 160 MG
TABLET,DISINTEGRATING ORAL
Qty: 90 CAPSULE | Refills: 3 | OUTPATIENT
Start: 2017-10-09

## 2017-12-11 ENCOUNTER — OFFICE VISIT (OUTPATIENT)
Dept: ORTHOPEDICS | Facility: CLINIC | Age: 72
End: 2017-12-11

## 2017-12-11 VITALS — BODY MASS INDEX: 28.74 KG/M2 | HEIGHT: 61 IN | WEIGHT: 152.2 LBS

## 2017-12-11 DIAGNOSIS — M17.10 ARTHRITIS OF KNEE: Primary | ICD-10-CM

## 2017-12-11 DIAGNOSIS — M17.11 ARTHRITIS OF KNEE, RIGHT: Primary | ICD-10-CM

## 2017-12-11 ASSESSMENT — ENCOUNTER SYMPTOMS
NUMBNESS: 0
BACK PAIN: 1
HEADACHES: 0
SPEECH CHANGE: 0
MUSCLE WEAKNESS: 0
MUSCLE CRAMPS: 0
TREMORS: 0
JOINT SWELLING: 1
SEIZURES: 0
STIFFNESS: 1
ARTHRALGIAS: 1
MEMORY LOSS: 0
WEAKNESS: 0
LOSS OF CONSCIOUSNESS: 0
DIZZINESS: 1
PARALYSIS: 0
TINGLING: 0
DISTURBANCES IN COORDINATION: 0
MYALGIAS: 0
NECK PAIN: 0

## 2017-12-11 ASSESSMENT — ACTIVITIES OF DAILY LIVING (ADL): FUNCTION,_DAILY_LIVING_SCORE: 38.23

## 2017-12-11 ASSESSMENT — KOOS JR
HOW SEVERE IS YOUR KNEE STIFFNESS AFTER FIRST WAKING IN MORNING: 1
HOW SEVERE IS YOUR KNEE STIFFNESS AFTER FIRST WAKING IN MORNING: SEVERE

## 2017-12-11 NOTE — LETTER
12/11/2017       RE: Connie J Frankenberg  [address]  [facility] [address]     Dear Colleague,    Thank you for referring your patient, Connie J Frankenberg, to the [facility] ORTHOPAEDIC [facility]INIC at [facility]. Please see a copy of my visit note below.        [facility], Orthopaedic Surgery Consultation  by Apollo Nguyen M.D.    Connie J Frankenberg MRN# [id number]   Age: 71 year old YOB: 1945     Requesting physician: Kleber Valle     DX:  1. OA R knee   2. HLP  BPV    TREATMENTS:  1. 4-5 corticosteroid injections in the past. No longer provide adequate relief even with U/S guidance     She has reduced BMI from 36 - 29.  She wishes to proceed with R TKA.  Having functional problems getting in and out of car, negotiates stairs one at a time, having nocturnal pain.  Has back pain when she limps.     I discussed the risks, benefits, alternatives regarding the proposed surgery with the patient who both demonstrated understanding and indicated a desire to proceed with the surgery as planned.    PLAN:  1. TJA pre-op teaching clinic  2. PACS clinic visit  3. PCP for preop clearance  4. OK to schedule.  She wishes to do so in 3/2019.       Apollo Nguyen MD  [facility] Family Professor  Oncology and Adult Reconstructive Surgery  Dept Orthopaedic Surgery, [facility]  [phone] office, [phone] pager  [email or web address]    Total Time = 25 min, 50% of which was spent in counseling and coordination of care as documented above.          Answers for HPI/ROS submitted by the patient on 12/11/2017   General Symptoms: No  Skin Symptoms: No  HENT Symptoms: No  EYE SYMPTOMS: No  HEART SYMPTOMS: No  LUNG SYMPTOMS: No  INTESTINAL SYMPTOMS: No  URINARY SYMPTOMS: No  GYNECOLOGIC SYMPTOMS: No  BREAST SYMPTOMS: No  SKELETAL SYMPTOMS: Yes  BLOOD SYMPTOMS: No  NERVOUS SYSTEM SYMPTOMS: Yes  MENTAL HEALTH SYMPTOMS: No  Back pain: Yes  Muscle aches: No  Neck  pain: No  Swollen joints: Yes  Joint pain: Yes  Bone pain: No  Muscle cramps: No  Muscle weakness: No  Joint stiffness: Yes  Bone fracture: No  Trouble with coordination: No  Dizziness or trouble with balance: Yes  Fainting or black-out spells: No  Memory loss: No  Headache: No  Seizures: No  Speech problems: No  Tingling: No  Tremor: No  Weakness: No  Difficulty walking: Yes  Paralysis: No  Numbness: No      Again, thank you for allowing me to participate in the care of your patient.      Sincerely,    Apollo Nguyen MD

## 2017-12-11 NOTE — PROGRESS NOTES
[facility], Orthopaedic Surgery Consultation  by Apollo Nguyen M.D.    Connie J Frankenberg MRN# [id number]   Age: 71 year old YOB: 1945     Requesting physician: Kleber Valle     DX:  1. OA R knee   2. HLP  BPV    TREATMENTS:  1. 4-5 corticosteroid injections in the past. No longer provide adequate relief even with U/S guidance     She has reduced BMI from 36 - 29.  She wishes to proceed with R TKA.  Having functional problems getting in and out of car, negotiates stairs one at a time, having nocturnal pain.  Has back pain when she limps.     I discussed the risks, benefits, alternatives regarding the proposed surgery with the patient who both demonstrated understanding and indicated a desire to proceed with the surgery as planned.    PLAN:  1. TJA pre-op teaching clinic  2. PACS clinic visit  3. PCP for preop clearance  4. OK to schedule.  She wishes to do so in 3/2019.       Apollo Nguyen MD  [facility] Family Professor  Oncology and Adult Reconstructive Surgery  Dept Orthopaedic Surgery, [facility]  [phone] office, [phone] pager  [email or web address]    Total Time = 25 min, 50% of which was spent in counseling and coordination of care as documented above.          Answers for HPI/ROS submitted by the patient on 12/11/2017   General Symptoms: No  Skin Symptoms: No  HENT Symptoms: No  EYE SYMPTOMS: No  HEART SYMPTOMS: No  LUNG SYMPTOMS: No  INTESTINAL SYMPTOMS: No  URINARY SYMPTOMS: No  GYNECOLOGIC SYMPTOMS: No  BREAST SYMPTOMS: No  SKELETAL SYMPTOMS: Yes  BLOOD SYMPTOMS: No  NERVOUS SYSTEM SYMPTOMS: Yes  MENTAL HEALTH SYMPTOMS: No  Back pain: Yes  Muscle aches: No  Neck pain: No  Swollen joints: Yes  Joint pain: Yes  Bone pain: No  Muscle cramps: No  Muscle weakness: No  Joint stiffness: Yes  Bone fracture: No  Trouble with coordination: No  Dizziness or trouble with balance: Yes  Fainting or black-out spells: No  Memory loss: No  Headache: No  Seizures: No  Speech  problems: No  Tingling: No  Tremor: No  Weakness: No  Difficulty walking: Yes  Paralysis: No  Numbness: No

## 2017-12-11 NOTE — NURSING NOTE
Teaching Flowsheet   Relevant Diagnosis: Osteoarthritis right knee  Teaching Topic: Pre-op:  Right total knee rejplacement     Person(s) involved in teaching:   Patient and      Motivation Level:  Asks Questions: Yes  Eager to Learn: Yes  Cooperative: Yes  Receptive (willing/able to accept information): Yes  Any cultural factors/Yarsani beliefs that may influence understanding or compliance? NA       Patient and Family demonstrates understanding of the following:  Reason for the appointment, diagnosis and treatment plan: Yes  Knowledge of proper use of medications and conditions for which they are ordered (with special attention to potential side effects or drug interactions): Yes  Which situations necessitate calling provider and whom to contact: Yes       Teaching Concerns Addressed:        Proper use and care of  (medical equip, care aids, etc.): NA  Nutritional needs and diet plan: Yes  Pain management techniques: Yes  Wound Care: Yes  How and/when to access community resources: NA     Instructional Materials Used/Given: Surgery fodler     Time spent with patient: 15 minutes.

## 2017-12-11 NOTE — LETTER
12/11/2017      RE: Connie J Frankenberg  [address]  [facility] [address]           [facility], Orthopaedic Surgery Consultation  by Apollo Nguyen M.D.    Connie J Frankenberg MRN# [id number]   Age: 71 year old YOB: 1945     Requesting physician: Kleber Valle     DX:  1. OA R knee   2. HLP  BPV    TREATMENTS:  1. 4-5 corticosteroid injections in the past. No longer provide adequate relief even with U/S guidance     She has reduced BMI from 36 - 29.  She wishes to proceed with R TKA.  Having functional problems getting in and out of car, negotiates stairs one at a time, having nocturnal pain.  Has back pain when she limps.     I discussed the risks, benefits, alternatives regarding the proposed surgery with the patient who both demonstrated understanding and indicated a desire to proceed with the surgery as planned.    PLAN:  1. TJA pre-op teaching clinic  2. PACS clinic visit  3. PCP for preop clearance  4. OK to schedule.  She wishes to do so in 3/2019.       Apollo Nguyen MD  [employer] [facility] Professor  Oncology and Adult Reconstructive Surgery  Dept Orthopaedic Surgery, [facility]  [phone] office, [phone] pager  [email or web address]    Total Time = 25 min, 50% of which was spent in counseling and coordination of care as documented above.          Answers for HPI/ROS submitted by the patient on 12/11/2017   General Symptoms: No  Skin Symptoms: No  HENT Symptoms: No  EYE SYMPTOMS: No  HEART SYMPTOMS: No  LUNG SYMPTOMS: No  INTESTINAL SYMPTOMS: No  URINARY SYMPTOMS: No  GYNECOLOGIC SYMPTOMS: No  BREAST SYMPTOMS: No  SKELETAL SYMPTOMS: Yes  BLOOD SYMPTOMS: No  NERVOUS SYSTEM SYMPTOMS: Yes  MENTAL HEALTH SYMPTOMS: No  Back pain: Yes  Muscle aches: No  Neck pain: No  Swollen joints: Yes  Joint pain: Yes  Bone pain: No  Muscle cramps: No  Muscle weakness: No  Joint stiffness: Yes  Bone fracture: No  Trouble with coordination: No  Dizziness or trouble with balance:  Yes  Fainting or black-out spells: No  Memory loss: No  Headache: No  Seizures: No  Speech problems: No  Tingling: No  Tremor: No  Weakness: No  Difficulty walking: Yes  Paralysis: No  Numbness: No      Apollo Nguyen MD

## 2017-12-11 NOTE — MR AVS SNAPSHOT
After Visit Summary   12/11/2017    Connie J Frankenberg    MRN: [id number]           Patient Information     Date Of Birth          1945        Visit Information        Provider Department      12/11/2017 2:15 PM Apollo Nguyen MD [facility]        Today's Diagnoses     Arthritis of knee    -  1       Follow-ups after your visit        Your next 10 appointments already scheduled     Apr 02, 2018 12:30 PM CDT   US RAYMOND DOPPLER NO EXERCISE 1-2 LVLS BILAT with [email or web address]   [facility] ([facility])    [address] [place], [place] [id number] [phone]           Please bring a list of your medicines (including vitamins, minerals and over-the-counter drugs). Also, tell your doctor about any allergies you may have. Wear comfortable clothes and leave your valuables at home.  No caffeine or tobacco for 1 hour prior to exam.  Please call the Imaging Department at your exam site with any questions.            Apr 02, 2018  1:30 PM CDT   (Arrive by 1:15 PM)   Return Vascular Visit with Audrey Byrnes MD   [facility] ([facility])    [address] [place] [id number] [phone]              Who to contact     Please call your clinic at [phone] to:    Ask questions about your health    Make or cancel appointments    Discuss your medicines    Learn about your test results    Speak to your doctor   If you have compliments or concerns about an experience at your clinic, or if you wish to file a complaint, please contact [facility] Patient Relations at [phone] or email us at [email or web address]         Additional Information About Your Visit        MyChart Information     [employer] gives you secure access to your electronic health record. If you see a primary care provider, you can also send messages to your care team  "and make appointments. If you have questions, please call your primary care clinic.  If you do not have a primary care provider, please call [phone] and they will assist you.      [employer] is an electronic gateway that provides easy, online access to your medical records. With [employer], you can request a clinic appointment, read your test results, renew a prescription or communicate with your care team.     To access your existing account, please contact your [facility] or call [phone] for assistance.        Care EveryWhere ID     This is your Care EveryWhere ID. This could be used by other organizations to access your [facility] medical records  [id number]        Your Vitals Were     Height BMI (Body Mass Index)                1.549 m (5' 1\") 28.76 kg/m2           Blood Pressure from Last 3 Encounters:   10/02/17 148/90   08/21/17 148/86   04/15/17 126/78    Weight from Last 3 Encounters:   12/11/17 69 kg (152 lb 3.2 oz)   08/21/17 73.2 kg (161 lb 4.8 oz)   04/15/17 83 kg (182 lb 14.4 oz)              Today, you had the following     No orders found for display       Primary Care Provider Office Phone # Fax #    Sammi Beltre [phone]       [address] [place] [address] [address] [place] [address]        Equal Access to Services     JUAN DIEGO BRISENO AH: Hadii sumit marroquin hadasho Sogeoffrey, waaxda luqadaha, qaybta kaalmada christiano, lyle isaac. So [facility] [phone].    ATENCIÓN: Si habla español, tiene a starr disposición servicios gratuitos de asistencia lingüística. Llame al [phone].    We comply with applicable federal civil rights laws and [place] laws. We do not discriminate on the basis of race, color, national origin, age, disability, sex, sexual orientation, or gender identity.            Thank you!     Thank you for choosing [facility] [place]  for your care. Our goal is always to provide you with excellent care. Hearing " back from our patients is one way we can continue to improve our services. Please take a few minutes to complete the written survey that you may receive in the mail after your visit with us. Thank you!             Your Updated Medication List - Protect others around you: Learn how to safely use, store and throw away your medicines at www.disposemymeds.org.          This list is accurate as of: 12/11/17  3:53 PM.  Always use your most recent med list.                   Brand Name Dispense Instructions for use Diagnosis    aspirin 81 MG tablet      Take 1 tablet by mouth daily.        atorvastatin 80 MG tablet    LIPITOR    90 tablet    Take 1 tablet (80 mg) by mouth daily    Hyperlipidemia LDL goal <130       BENADRYL PO      Take 1 tablet by mouth as needed.        butalbital-aspirin-caffeine -40 MG per capsule    FIORINAL    30 capsule    Take 1 capsule by mouth daily as needed    Migraine with aura and without status migrainosus, not intractable       * COMPRESSION STOCKINGS     2 each    2 each daily    Venous (peripheral) insufficiency       * COMPRESSION STOCKINGS     2 each    2 each daily    PAD (peripheral artery disease) (H)       * METROGEL 1 % gel   Generic drug:  metroNIDAZOLE      Apply  topically daily.        * metroNIDAZOLE 0.75 % cream    METROCREAM    45 g    Apply  topically 2 times daily.    Rosacea       mupirocin 2 % ointment    BACTROBAN    30 g    Use 2 times a day to affected area.    Squamous cell carcinoma of skin of lip       SUPER B COMPLEX PO      Take 1 tablet by mouth daily.        * VITAMIN D PO      Take 1 tablet by mouth daily.        * vitamin D3 2000 UNITS Caps     90 capsule    Take 1 Units by mouth daily    Vitamin D deficiency       * Notice:  This list has 6 medication(s) that are the same as other medications prescribed for you. Read the directions carefully, and ask your doctor or other care provider to review them with you.

## 2017-12-11 NOTE — NURSING NOTE
"Chief Complaint   Patient presents with     RECHECK     Pt states that she is here today to Discuss having Right TKA.        72 year old  1945    Ht 1.549 m (5' 1\")  Wt 69 kg (152 lb 3.2 oz)  BMI 28.76 kg/m2        [facility] [address]    Allergies   Allergen Reactions     Codeine GI Disturbance     Current Outpatient Prescriptions   Medication     COMPRESSION STOCKINGS     COMPRESSION STOCKINGS     atorvastatin (LIPITOR) 80 MG tablet     butalbital-aspirin-caffeine (FIORINAL) -40 MG per capsule     Cholecalciferol (VITAMIN D3) 2000 UNITS CAPS     mupirocin (BACTROBAN) 2 % ointment     metroNIDAZOLE (METROGEL) 1 % gel     metroNIDAZOLE (METROCREAM) 0.75 % cream     aspirin 81 MG tablet     DiphenhydrAMINE HCl (BENADRYL PO)     B Complex-C (SUPER B COMPLEX PO)     Cholecalciferol (VITAMIN D PO)     No current facility-administered medications for this visit.          Questionnaires:      KOOS Knee Survey Assessment    Knee Outcome Survey ADL Scale (Jerson, FRANNY; Michelle, L; Monika, RS; Iván, FH; [place], CHET; 1998) 12/11/2017   Do you have swelling in your knee? Always   Do you feel grinding, hear clicking or any other type of noise when your knee moves? Sometimes   Does your knee catch or hang up when moving? Often   Can you straighten your knee fully? Never   Can you bend your knee fully? Always   How severe is your knee joint stiffness after first wakening in the morning? Severe   How severe is your knee stiffness after sitting, lying or resting LATER IN THE DAY? Severe   How often do you experience knee pain? Daily   Twisting/pivoting on your knee Moderate   Straightening knee fully Severe   Bending knee fully Mild   Walking on flat surface Moderate   Going up or down stairs Severe   At night while in bed Mild   Sitting or lying Mild   Standing upright Mild   Descending stairs Moderate   Ascending stairs Severe   Rising from sitting Severe   Standing " Moderate   Bending to floor/ an object Severe   Walking on flat surface Moderate   Getting in/out of car Severe   Going shopping Severe   Putting on socks/stockings Mild   Rising from bed Severe   Taking off socks/stockings Moderate   Lying in bed (turning over, maintaining knee position) Severe   Getting in/out of bath Severe   Sitting Mild   Getting on/off toilet Severe   Heavy domestic duties (moving heavy boxes, scrubbing floors, etc) Severe   Light domestic duties (cooking, dusting, etc) Moderate   Squatting Moderate   Running None   Jumping None   Twisting/pivoting on your injured knee Severe   Kneeling Moderate   How often are you aware of your knee problem? Constantly   Have you modified your life style to avoid potentially damaging activities to your knee? Severely   How much are you troubled with lack of confidence in your knee? Severely   In general, how much difficulty do you have with your knee? Severe   Pain Score 52.77   Symptoms Score 32.14   Function, Daily Living Score 38.23   Sports and Rec Score 65   Quality of Life Score 18.75            Promis 10 Assessment    PROMIS 10 12/11/2017   In general, would you say your health is: Good   In general, would you say your quality of life is: Good   In general, how would you rate your physical health? Good   In general, how would you rate your mental health, including your mood and your ability to think? Very good   In general, how would you rate your satisfaction with your social activities and relationships? Excellent   In general, please rate how well you carry out your usual social activities and roles Good   To what extent are you able to carry out your everyday physical activities such as walking, climbing stairs, carrying groceries, or moving a chair? A little   How often have you been bothered by emotional problems such as feeling anxious, depressed or irritable? Rarely   How would you rate your fatigue on average? Moderate   How would you  rate your pain on average?   0 = No Pain  to  10 = Worst Imaginable Pain 8   In general, would you say your health is: 3   In general, would you say your quality of life is: 3   In general, how would you rate your physical health? 3   In general, how would you rate your mental health, including your mood and your ability to think? 4   In general, how would you rate your satisfaction with your social activities and relationships? 5   In general, please rate how well you carry out your usual social activities and roles. (This includes activities at home, at work and in your community, and responsibilities as a parent, child, spouse, employee, friend, etc.) 3   To what extent are you able to carry out your everyday physical activities such as walking, climbing stairs, carrying groceries, or moving a chair? 2   In the past 7 days, how often have you been bothered by emotional problems such as feeling anxious, depressed, or irritable? 2   In the past 7 days, how would you rate your fatigue on average? 3   In the past 7 days, how would you rate your pain on average, where 0 means no pain, and 10 means worst imaginable pain? 8   Global Mental Health Score 16   Global Physical Health Score 10   PROMIS TOTAL - SUBSCORES 26   Some recent data might be hidden            Sharmila Patel C.M.A.

## 2018-03-29 ENCOUNTER — APPOINTMENT (OUTPATIENT)
Dept: SURGERY | Facility: CLINIC | Age: 73
End: 2018-03-29
Payer: COMMERCIAL

## 2018-03-29 ENCOUNTER — OFFICE VISIT (OUTPATIENT)
Dept: SURGERY | Facility: CLINIC | Age: 73
End: 2018-03-29
Payer: COMMERCIAL

## 2018-03-29 ENCOUNTER — ALLIED HEALTH/NURSE VISIT (OUTPATIENT)
Dept: SURGERY | Facility: CLINIC | Age: 73
End: 2018-03-29
Payer: COMMERCIAL

## 2018-03-29 ENCOUNTER — ANESTHESIA EVENT (OUTPATIENT)
Dept: SURGERY | Facility: CLINIC | Age: 73
End: 2018-03-29

## 2018-03-29 VITALS
TEMPERATURE: 97.9 F | HEART RATE: 64 BPM | WEIGHT: 156.4 LBS | OXYGEN SATURATION: 98 % | BODY MASS INDEX: 29.53 KG/M2 | HEIGHT: 61 IN | RESPIRATION RATE: 16 BRPM | DIASTOLIC BLOOD PRESSURE: 88 MMHG | SYSTOLIC BLOOD PRESSURE: 155 MMHG

## 2018-03-29 DIAGNOSIS — M17.10 ARTHRITIS OF KNEE: ICD-10-CM

## 2018-03-29 DIAGNOSIS — Z01.818 PREOP EXAMINATION: Primary | ICD-10-CM

## 2018-03-29 LAB
ALBUMIN UR-MCNC: NEGATIVE MG/DL
ANION GAP SERPL CALCULATED.3IONS-SCNC: 7 MMOL/L (ref 3–14)
APPEARANCE UR: ABNORMAL
BILIRUB UR QL STRIP: NEGATIVE
BUN SERPL-MCNC: 19 MG/DL (ref 7–30)
CALCIUM SERPL-MCNC: 9.4 MG/DL (ref 8.5–10.1)
CHLORIDE SERPL-SCNC: 104 MMOL/L (ref 94–109)
CO2 SERPL-SCNC: 27 MMOL/L (ref 20–32)
COLOR UR AUTO: YELLOW
CREAT SERPL-MCNC: 0.74 MG/DL (ref 0.52–1.04)
ERYTHROCYTE [DISTWIDTH] IN BLOOD BY AUTOMATED COUNT: 13.8 % (ref 10–15)
GFR SERPL CREATININE-BSD FRML MDRD: 77 ML/MIN/1.7M2
GLUCOSE SERPL-MCNC: 95 MG/DL (ref 70–99)
GLUCOSE UR STRIP-MCNC: NEGATIVE MG/DL
HCT VFR BLD AUTO: 43.6 % (ref 35–47)
HGB BLD-MCNC: 14.2 G/DL (ref 11.7–15.7)
HGB UR QL STRIP: NEGATIVE
HYALINE CASTS #/AREA URNS LPF: 1 /LPF (ref 0–2)
KETONES UR STRIP-MCNC: NEGATIVE MG/DL
LEUKOCYTE ESTERASE UR QL STRIP: ABNORMAL
MCH RBC QN AUTO: 30.3 PG (ref 26.5–33)
MCHC RBC AUTO-ENTMCNC: 32.6 G/DL (ref 31.5–36.5)
MCV RBC AUTO: 93 FL (ref 78–100)
MUCOUS THREADS #/AREA URNS LPF: PRESENT /LPF
NITRATE UR QL: NEGATIVE
PH UR STRIP: 6 PH (ref 5–7)
PLATELET # BLD AUTO: 271 10E9/L (ref 150–450)
POTASSIUM SERPL-SCNC: 4 MMOL/L (ref 3.4–5.3)
RBC # BLD AUTO: 4.69 10E12/L (ref 3.8–5.2)
RBC #/AREA URNS AUTO: 1 /HPF (ref 0–2)
SODIUM SERPL-SCNC: 138 MMOL/L (ref 133–144)
SOURCE: ABNORMAL
SP GR UR STRIP: 1.02 (ref 1–1.03)
SQUAMOUS #/AREA URNS AUTO: 1 /HPF (ref 0–1)
UROBILINOGEN UR STRIP-MCNC: 0 MG/DL (ref 0–2)
WBC # BLD AUTO: 6.9 10E9/L (ref 4–11)
WBC #/AREA URNS AUTO: 3 /HPF (ref 0–5)

## 2018-03-29 NOTE — PATIENT INSTRUCTIONS
Preparing for Your Surgery      Name:  Connie J Frankenberg   MRN:  [id number]   :  1945   Today's Date:  3/29/2018     Arriving for surgery:  Surgery date:  4/10  Arrival time:  9:00AM  Please come to:     [facility] [address]  [address] [place]  [address]    - parking is available in front of [facility] from 5:15AM to 8:00PM. If you prefer, park your car in the Green Lot.    -Proceed to the 3rd floor, check in at the Adult Surgery Waiting Lounge. [phone]    If an escort is needed stop at the Information Desk in the lobby. Inform the information person that you are here for surgery. An escort to the Adult Surgery Waiting Lounge will be provided.        What can I eat or drink?  -  You may have solid food or milk products until 8 hours prior to your surgery--DO NOT EAT FOOD AFTER 3:00am ON THE NIGHT BEFORE SURGERY   -  You may have water, apple juice or 7up/Sprite until 2 hours prior to your surgery--OK TO HAVE CLEAR LIQUIDS UNTIL 9:00AM ON THE MORNING OF SURGERY      Which medicines can I take?  -  Do NOT take these medications in the morning, the day of surgery:    Do not take ibuprofen for two days prior to surgery   Please stop aspirin/vitamins/supplements one week prior to surgery   Do not apply cream/lotion to your skin on the day of surgery         -  Please take these medications the day of surgery:        How do I prepare myself?  -  Take two showers: one the night before surgery; and one the morning of surgery.         Use Scrubcare or Hibiclens to wash from neck down.  You may use your own shampoo and conditioner. No other hair products.   -  Do NOT use lotion, powder, deodorant, or antiperspirant the day of your surgery.  -  Do NOT wear any makeup, fingernail polish or jewelry.  -Do not bring your own medications to the hospital, except for inhalers and eye drops.  -  Bring your ID and insurance card.    Questions or  Concerns:  If you have questions or concerns regarding the day of surgery, please call the Preoperative Assessment Center (PAC), Monday-Friday 7AM-7PM:  [phone].  After surgery please call your surgeons office.             AFTER YOUR SURGERY  Breathing exercises   Breathing exercises help you recover faster. Take deep breaths and let the air out slowly. This will:     Help you wake up after surgery.    Help prevent complications like pneumonia.  Preventing complications will help you go home sooner.   We may give you a breathing device (incentive spirometer) to encourage you to breathe deeply.   Nausea and vomiting   You may feel sick to your stomach after surgery; if so, let your nurse know.    Pain control:  After surgery, you may have pain. Our goal is to help you manage your pain. Pain medicine will help you feel comfortable enough to do activities that will help you heal.  These activities may include breathing exercises, walking and physical therapy.   To help your health care team treat your pain we will ask: 1) If you have pain  2) where it is located 3) describe your pain in your words  Methods of pain control include medications given by mouth, vein or by nerve block for some surgeries.  We may give you a pain control pump that will:  1) Deliver the medicine through a tube placed in your vein  2) Control the amount of medicine you receive  3) Allow you to push a button to deliver a dose of pain medicine  Sequential Compression Device (SCD) or Pneumo Boots:  You may need to wear SCD S on your legs or feet. These are wraps connected to a machine that pumps in air and releases it. The repeated pumping helps prevent blood clots from forming.

## 2018-03-29 NOTE — MR AVS SNAPSHOT
After Visit Summary   3/29/2018    Connie J Frankenberg    MRN: [id number]           Patient Information     Date Of Birth          1945        Visit Information        Provider Department      3/29/2018 9:00 AM Rn, [facility]        Care Instructions    Preparing for Your Surgery      Name:  Connie J Frankenberg   MRN:  [id number]   :  1945   Today's Date:  3/29/2018     Arriving for surgery:  Surgery date:  4/10  Arrival time:  9:00AM  Please come to:     [facility] [address]  [address] [place], [place] [address]  [address]    - parking is available in front of [facility] from 5:15AM to 8:00PM. If you prefer, park your car in the Green Lot.    -Proceed to the 3rd floor, check in at the Adult Surgery Waiting Lounge. [phone]    If an escort is needed stop at the Information Desk in the lobby. Inform the information person that you are here for surgery. An escort to the Adult Surgery Waiting Lounge will be provided.        What can I eat or drink?  -  You may have solid food or milk products until 8 hours prior to your surgery--DO NOT EAT FOOD AFTER 3:00am ON THE NIGHT BEFORE SURGERY   -  You may have water, apple juice or 7up/Sprite until 2 hours prior to your surgery--OK TO HAVE CLEAR LIQUIDS UNTIL 9:00AM ON THE MORNING OF SURGERY      Which medicines can I take?  -  Do NOT take these medications in the morning, the day of surgery:    Do not take ibuprofen for two days prior to surgery   Please stop aspirin/vitamins/supplements one week prior to surgery   Do not apply cream/lotion to your skin on the day of surgery         -  Please take these medications the day of surgery:        How do I prepare myself?  -  Take two showers: one the night before surgery; and one the morning of surgery.         Use Scrubcare or Hibiclens to wash from neck down.  You may use your own shampoo and conditioner. No other  hair products.   -  Do NOT use lotion, powder, deodorant, or antiperspirant the day of your surgery.  -  Do NOT wear any makeup, fingernail polish or jewelry.  -Do not bring your own medications to the hospital, except for inhalers and eye drops.  -  Bring your ID and insurance card.    Questions or Concerns:  If you have questions or concerns regarding the day of surgery, please call the Preoperative Assessment Center (PAC), Monday-Friday 7AM-7PM:  [phone].  After surgery please call your surgeons office.             AFTER YOUR SURGERY  Breathing exercises   Breathing exercises help you recover faster. Take deep breaths and let the air out slowly. This will:     Help you wake up after surgery.    Help prevent complications like pneumonia.  Preventing complications will help you go home sooner.   We may give you a breathing device (incentive spirometer) to encourage you to breathe deeply.   Nausea and vomiting   You may feel sick to your stomach after surgery; if so, let your nurse know.    Pain control:  After surgery, you may have pain. Our goal is to help you manage your pain. Pain medicine will help you feel comfortable enough to do activities that will help you heal.  These activities may include breathing exercises, walking and physical therapy.   To help your health care team treat your pain we will ask: 1) If you have pain  2) where it is located 3) describe your pain in your words  Methods of pain control include medications given by mouth, vein or by nerve block for some surgeries.  We may give you a pain control pump that will:  1) Deliver the medicine through a tube placed in your vein  2) Control the amount of medicine you receive  3) Allow you to push a button to deliver a dose of pain medicine  Sequential Compression Device (SCD) or Pneumo Boots:  You may need to wear SCD S on your legs or feet. These are wraps connected to a machine that pumps in air and releases it. The repeated pumping helps  prevent blood clots from forming.           Follow-ups after your visit        Your next 10 appointments already scheduled     Mar 29, 2018  9:00 AM CDT   (Arrive by 8:45 AM)   PAC RN ASSESSMENT with Lisbeth Pac Rn   [facility] ([facility])    [address] [place] [address]  4th [facility] [id number] [phone]            Mar 29, 2018  9:10 AM CDT   (Arrive by 8:55 AM)   PAC Anesthesia Consult with  Pac Anesthesiologist   [facility] [place] ([facility])    [address] [place] [address]  4th [facility] [id number] [phone]            Mar 29, 2018  9:45 AM CDT   LAB with  LAB   [facility])    [address] [place] [id number] [phone]           Please do not eat 10-12 hours before your appointment if you are coming in fasting for labs on lipids, cholesterol, or glucose (sugar). This does not apply to pregnant women. Water, hot tea and black coffee (with nothing added) are okay. Do not drink other fluids, diet soda or chew gum.            Apr 10, 2018   Procedure with Apollo Nguyen MD   [facility], [place], Same Day Surgery (--)    [address] [facility] [id number] [phone]              Future tests that were ordered for you today     Open Future Orders        Priority Expected Expires Ordered    ABO/Rh type and screen Routine 3/29/2018 4/28/2018 3/29/2018    Basic metabolic panel Routine 3/29/2018 4/28/2018 3/29/2018    CBC with platelets Routine 3/29/2018 4/28/2018 3/29/2018    UA reflex to Microscopic and Culture Routine 3/29/2018 4/28/2018 3/29/2018            Who to contact     Please call your clinic at [phone] to:    Ask questions about your health    Make or cancel appointments    Discuss your medicines    Learn about your test results    Speak to your doctor            Additional Information About Your Visit         5o9hart Information     [employer] gives you secure access to your electronic health record. If you see a primary care provider, you can also send messages to your care team and make appointments. If you have questions, please call your primary care clinic.  If you do not have a primary care provider, please call [phone] and they will assist you.      [employer] is an electronic gateway that provides easy, online access to your medical records. With [employer], you can request a clinic appointment, read your test results, renew a prescription or communicate with your care team.     To access your existing account, please contact your [facility] or call [phone] for assistance.        Care EveryWhere ID     This is your Care EveryWhere ID. This could be used by other organizations to access your [place] medical records  [id number]         Blood Pressure from Last 3 Encounters:   03/29/18 155/88   10/02/17 148/90   08/21/17 148/86    Weight from Last 3 Encounters:   03/29/18 70.9 kg (156 lb 6.4 oz)   12/11/17 69 kg (152 lb 3.2 oz)   08/21/17 73.2 kg (161 lb 4.8 oz)              Today, you had the following     No orders found for display         Today's Medication Changes          These changes are accurate as of 3/29/18  8:52 AM.  If you have any questions, ask your nurse or doctor.               These medicines have changed or have updated prescriptions.        Dose/Directions    atorvastatin 80 MG tablet   Commonly known as:  LIPITOR   This may have changed:  when to take this   Used for:  Hyperlipidemia LDL goal <130        Dose:  80 mg   Take 1 tablet (80 mg) by mouth daily   Quantity:  90 tablet   Refills:  3                Primary Care Provider Office Phone # Fax #    Sammi Beltre [phone]       [address] [facility] [address] [place] [address]        Equal Access to Services     JUAN DIEGO BRISENO AH: semaj Castellon qaybta  lyle rideralfie gibbons ah. Tana [facility] [phone].    ATENCIÓN: Si nancy nascimento, tiene a starr disposición servicios gratuitos de asistencia lingüística. Evgeny al [phone].    We comply with applicable federal civil rights laws and [place] laws. We do not discriminate on the basis of race, color, national origin, age, disability, sex, sexual orientation, or gender identity.            Thank you!     Thank you for choosing [facility]  for your care. Our goal is always to provide you with excellent care. Hearing back from our patients is one way we can continue to improve our services. Please take a few minutes to complete the written survey that you may receive in the mail after your visit with us. Thank you!             Your Updated Medication List - Protect others around you: Learn how to safely use, store and throw away your medicines at www.disposemymeds.org.          This list is accurate as of 3/29/18  8:52 AM.  Always use your most recent med list.                   Brand Name Dispense Instructions for use Diagnosis    aspirin 81 MG tablet      Take 1 tablet by mouth every morning ON HOLD FOR SURGERY AS OF 03/15/2018        atorvastatin 80 MG tablet    LIPITOR    90 tablet    Take 1 tablet (80 mg) by mouth daily    Hyperlipidemia LDL goal <130       butalbital-aspirin-caffeine -40 MG per capsule    FIORINAL    30 capsule    Take 1 capsule by mouth daily as needed    Migraine with aura and without status migrainosus, not intractable       * COMPRESSION STOCKINGS     2 each    2 each daily    Venous (peripheral) insufficiency       * COMPRESSION STOCKINGS     2 each    2 each daily    PAD (peripheral artery disease) (H)       EXCEDRIN PO      Take 1 tablet by mouth as needed        * Notice:  This list has 2 medication(s) that are the same as other medications prescribed for you. Read the directions carefully, and ask your doctor or other  care provider to review them with you.

## 2018-03-29 NOTE — H&P
"  Pre-Operative H & P     CC:  Preoperative exam to assess for increased cardiopulmonary risk while undergoing surgery and anesthesia.    Date of Encounter: 3/29/2018  Primary Care Physician:  Sammi Beltre  Reason for visit: Arthritis of knee, right [M17.11]  - Primary   HPI  Connie J Frankenberg is a 72 year old female who presents for pre-operative H & P in preparation for Right total knee replacement with Dr. Nguyen on 4/10/18 at [facility]. History is obtained from the patient.     Patient with history of right knee arthritis and longstanding issues with pain. Her pain has been worsening over the past year and she now has some difficulty getting in and out of the car. Consult with Dr. Nguyen with above procedure now planned.   Her history is otherwise significant for HLD, vasovagal syncope, BPPV, migraines, and depression.   Past Medical History  Past Medical History:   Diagnosis Date     Arthritis of knee     right     Basal cell carcinoma      BPPV (benign paroxysmal positional vertigo)      Depressive disorder 1985     Hemorrhoids      History of PID      Hyperlipidemia      Insomnia      Migraines, classic     sparkly aura     Plantar fasciitis, bilateral      PONV (postoperative nausea and vomiting)      Skin cancer, basal cell      Squamous cell carcinoma      Swelling of the ankle, feet, or leg      Vasovagal syncope     is \"fainter\" with blood draws, injections-NEEDS TO BE LAYING DOWN       Past Surgical History  Past Surgical History:   Procedure Laterality Date     C NONSPECIFIC PROCEDURE      laparoscopy,     C NONSPECIFIC PROCEDURE      laser surg basal cell skin cancer     C STOMACH SURGERY PROCEDURE UNLISTED      laparoscopy for infertility     CATARACT IOL, RT/LT       LASIK BILATERAL       MOHS MICROGRAPHIC PROCEDURE         Hx of Blood transfusions/reactions: Denies.      Hx of abnormal bleeding or anti-platelet use: ASA 81 mg (on hold since " 3/15/18)    Menstrual history: No LMP recorded. Patient is postmenopausal.    Steroid use in the last year: Denies.     Personal or FH with difficulty with Anesthesia:  PONV.    Prior to Admission Medications  Current Outpatient Prescriptions   Medication Sig Dispense Refill     Aspirin-Acetaminophen-Caffeine (EXCEDRIN PO) Take 1 tablet by mouth as needed       atorvastatin (LIPITOR) 80 MG tablet Take 1 tablet (80 mg) by mouth daily (Patient taking differently: Take 80 mg by mouth At Bedtime ) 90 tablet 3     aspirin 81 MG tablet Take 1 tablet by mouth every morning ON HOLD FOR SURGERY AS OF 03/15/2018       COMPRESSION STOCKINGS 2 each daily 2 each 1     COMPRESSION STOCKINGS 2 each daily 2 each 1     butalbital-aspirin-caffeine (FIORINAL) -40 MG per capsule Take 1 capsule by mouth daily as needed 30 capsule 2       Allergies  Allergies   Allergen Reactions     Codeine GI Disturbance       Social History  Social History     Social History     Marital status: Single     Spouse name: N/A     Number of children: N/A     Years of education: N/A     Occupational History     EEG [employer]     Social History Main Topics     Smoking status: Never Smoker     Smokeless tobacco: Never Used     Alcohol use Yes      Comment: Very little     Drug use: No     Sexual activity: Yes     Partners: Male     Birth control/ protection: Post-menopausal     Other Topics Concern     Not on file     Social History Narrative    Social History:  Casual/Semi-Retired.  Single, .  Has boyfriend.  One son.    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       Family History  Family History   Problem Relation Age of Onset     Neurologic Disorder Mother      headaches/narcolepsy     CANCER Mother      face skin     CEREBROVASCULAR DISEASE Mother      multifocal infarcts to brain     Thyroid Disease Mother      hypothyroid     Seizure Disorder Mother      Migraines Mother      Neurologic Disorder Sister       "headaches     CANCER Father      Skin, lung     C.A.D. Father      MI     Coronary Artery Disease Father      CEREBROVASCULAR DISEASE Father      Alcoholism Father      C.A.D. Brother      CABG     Thyroid Disease Sister      hypothyroid     CANCER Brother      skin cancer     Coronary Artery Disease Brother      Hyperlipidemia Brother      Alcoholism Brother      Hypertension Sister      Thyroid Disease Sister      Substance Abuse Brother        Review of Systems  The complete review of systems is negative other than noted in the HPI or here.   ROS/MED HX    ENT/Pulmonary:  - neg pulmonary ROS     Neurologic:     (+)migraines,     Cardiovascular: Comment: Vasovagal syncope-BPPV faints easily with blood draws and fright   (+) Dyslipidemia, ----. Taking blood thinners : . . . :. . Previous cardiac testing date:results:Stress Testdate:2013 results:Normal stress echoECG reviewed date:2013 results:SR date: results:          METS/Exercise Tolerance:  Able to do housework, walking and stairs are hard. Swims and bikes   Hematologic:  - neg hematologic  ROS       Musculoskeletal:   (+) arthritis      GI/Hepatic:  - neg GI/hepatic ROS       Renal/Genitourinary:  - ROS Renal section negative       Endo:  - neg endo ROS       Psychiatric:     (+) psychiatric history depression      Infectious Disease:  - neg infectious disease ROS       Malignancy:   (+) Malignancy History of Skin  Skin CA Remission status post Surgery,         Other:    (+) C-spine cleared: N/A, H/O Chronic Pain,no other significant disability        Physical Exam      Airway   Mallampati: II  TM distance: >3 FB  Neck ROM: full    Temp: 97.9  F (36.6  C) Temp src: Oral BP: 155/88 Pulse: 64   Resp: 16 SpO2: 98 %         156 lbs 6.4 oz  5' 1\"   Body mass index is 29.55 kg/(m^2).       Physical Exam  Constitutional: Awake, alert, cooperative, no apparent distress, and appears stated age.  Eyes: Pupils equal, round and reactive to light, extra ocular muscles " intact, sclera clear, conjunctiva normal. Glasses on.  HENT: Normocephalic, oral pharynx with moist mucus membranes, good dentition. No goiter appreciated.   Respiratory: Clear to auscultation bilaterally, no crackles or wheezing. No cough or obvious dyspnea.  Cardiovascular: Regular rate and rhythm, normal S1 and S2, and no murmur noted.  Carotids +2, no bruits. No edema. Palpable pulses to radial  DP and PT arteries.   GI: Normal bowel sounds, soft, non-distended, non-tender, no masses palpated, no hepatosplenomegaly.  NOTE: Patient reports brief dizziness when I asked her to lay back on exam table.  Lymph/Hematologic: No cervical lymphadenopathy and no supraclavicular lymphadenopathy.  Genitourinary:  Deferred.  Skin: Warm and dry.    Musculoskeletal: Full ROM of neck. There is no redness, warmth, or swelling of the joints. Gross motor strength is normal.    Neurologic: Awake, alert, oriented to name, place and time. Cranial nerves II-XII are grossly intact. Gait is normal.   Neuropsychiatric: Mildly anxious, cooperative. Normal affect.     Labs: (personally reviewed)  Lab Results   Component Value Date    WBC 6.9 03/29/2018     Lab Results   Component Value Date    RBC 4.69 03/29/2018     Lab Results   Component Value Date    HGB 14.2 03/29/2018     Lab Results   Component Value Date    HCT 43.6 03/29/2018     Lab Results   Component Value Date    MCV 93 03/29/2018     Lab Results   Component Value Date    MCH 30.3 03/29/2018     Lab Results   Component Value Date    MCHC 32.6 03/29/2018     Lab Results   Component Value Date    RDW 13.8 03/29/2018     Lab Results   Component Value Date     03/29/2018     Last Basic Metabolic Panel:  Lab Results   Component Value Date     03/29/2018      Lab Results   Component Value Date    POTASSIUM 4.0 03/29/2018     Lab Results   Component Value Date    CHLORIDE 104 03/29/2018     Lab Results   Component Value Date    NEL 9.4 03/29/2018     Lab Results    Component Value Date    CO2 27 03/29/2018     Lab Results   Component Value Date    BUN 19 03/29/2018     Lab Results   Component Value Date    CR 0.74 03/29/2018     Lab Results   Component Value Date    GLC 95 03/29/2018     Lab Results   Component Value Date    AST 21 04/15/2017     Lab Results   Component Value Date    ALT 35 04/15/2017     Lab Results   Component Value Date    BILICONJ 0.0 01/21/2014      Lab Results   Component Value Date    BILITOTAL 0.7 04/15/2017     Lab Results   Component Value Date    ALBUMIN 4.2 04/15/2017     Lab Results   Component Value Date    PROTTOTAL 7.3 04/15/2017      Lab Results   Component Value Date    ALKPHOS 51 04/15/2017     UA RESULTS: 3/29/18  Recent Labs   Lab Test  03/29/18   0845   COLOR  Yellow   APPEARANCE  Slightly Cloudy   URINEGLC  Negative   URINEBILI  Negative   URINEKETONE  Negative   SG  1.018   UBLD  Negative   URINEPH  6.0   PROTEIN  Negative   NITRITE  Negative   LEUKEST  Trace*   RBCU  1   WBCU  3     EKG: Personally reviewed 2013 Sinus rhythm    Stress test: 2013 Stress echo  Conclusions:  This  is a normal stress  echocardiogram  3/2017 Bilateral knee Xray  Impression: Medial compartment predominant osteoarthritis of bilateral  knees, severe on right and moderate on left.  Imaging and cardiac testing reviewed by this provider    Outside records reviewed from: Care Everywhere    ASSESSMENT and PLAN  Connie J Frankenberg is a 72 year old female scheduled to undergo Right total knee replacement with Dr. Nguyen on 4/10/18. She has the following specific operative considerations:   - RCRI : No serious cardiac risks.   - Anesthesia considerations:  Refer to PAC assessment in anesthesia records  - VTE risk: 0.26%  - RIAZ # of risks 1/8 = Low risk  - Risk of PONV score = 4.  If > 2, anti-emetic intervention recommended. If 3 or > anti emetic intervention recommended with two or more meds     --Right knee arthritis and pain. Above procedure now planned.     --PONV. Significant history. Final decisions regarding prophylaxis by Anesthesia on DOS.   --HLD. Atorvastatin. No other cardiac history or symptoms. Normal stress echo above. ASA 81 mg with planned 7 day hold for surgery. PVD with symptoms of leg swelling and skin redness. Lower extremities have been studied but no intervention needed. Good exercise tolerance; swimming, biking.   --BPPV and vasovagal syncope. Patient becomes dizzy for brief periods with position changes. Also faints easily, triggered by smells, fear, and venipuncture. FALL RISK. Patient should be lying down for all blood draws, IV sticks.   --Nonsmoker. No pulmonary history or symptoms.   --Migraines. Fiorinal prn   --Pain management. Discussed possibility of nerve block if appropriate for patient's procedure. Final counseling and decisions by regional team on DOS.  Type and screen drawn today.     Arrival time, NPO, shower and medication instructions provided by nursing staff today. Preparing For Your Surgery handout given.  Patient was discussed with Dr Somers.    VANESA Vlaenzuela CNS  Preoperative Assessment Center  [facility]  Clinic and Surgery Center  Phone: [phone]  Fax: [phone]

## 2018-04-03 ENCOUNTER — HOSPITAL ENCOUNTER (OUTPATIENT)
Dept: EDUCATION SERVICES | Facility: CLINIC | Age: 73
End: 2018-04-03
Payer: MEDICARE

## 2018-04-10 ENCOUNTER — ANESTHESIA (OUTPATIENT)
Dept: SURGERY | Facility: CLINIC | Age: 73
End: 2018-04-10

## 2018-04-10 ENCOUNTER — HOSPITAL ENCOUNTER (OUTPATIENT)
Facility: CLINIC | Age: 73
Discharge: HOME | DRG: 470 | End: 2018-04-10
Attending: ORTHOPAEDIC SURGERY | Admitting: ORTHOPAEDIC SURGERY
Payer: MEDICARE

## 2018-04-10 ENCOUNTER — DOCUMENTATION ONLY (OUTPATIENT)
Dept: ORTHOPEDICS | Facility: CLINIC | Age: 73
End: 2018-04-10

## 2018-04-10 ENCOUNTER — SURGERY (OUTPATIENT)
Age: 73
End: 2018-04-10

## 2018-04-10 VITALS
HEIGHT: 62 IN | BODY MASS INDEX: 28.76 KG/M2 | OXYGEN SATURATION: 97 % | TEMPERATURE: 97.7 F | DIASTOLIC BLOOD PRESSURE: 93 MMHG | RESPIRATION RATE: 16 BRPM | SYSTOLIC BLOOD PRESSURE: 135 MMHG | WEIGHT: 156.31 LBS

## 2018-04-10 LAB — GLUCOSE BLDC GLUCOMTR-MCNC: 95 MG/DL (ref 70–99)

## 2018-04-10 PROCEDURE — 40000882 ZZH CANCELLED SURGERY UP TO 46-60 MINS: Performed by: ORTHOPAEDIC SURGERY

## 2018-04-10 PROCEDURE — 82962 GLUCOSE BLOOD TEST: CPT

## 2018-04-10 PROCEDURE — 25000132 ZZH RX MED GY IP 250 OP 250 PS 637: Mod: GY | Performed by: ORTHOPAEDIC SURGERY

## 2018-04-10 PROCEDURE — A9270 NON-COVERED ITEM OR SERVICE: HCPCS | Mod: GY | Performed by: ORTHOPAEDIC SURGERY

## 2018-04-10 RX ORDER — LIDOCAINE 40 MG/G
CREAM TOPICAL
Status: DISCONTINUED | OUTPATIENT
Start: 2018-04-10 | End: 2018-04-10 | Stop reason: HOSPADM

## 2018-04-10 RX ORDER — ACETAMINOPHEN 325 MG/1
975 TABLET ORAL ONCE
Status: COMPLETED | OUTPATIENT
Start: 2018-04-10 | End: 2018-04-10

## 2018-04-10 RX ORDER — CEFAZOLIN SODIUM 2 G/100ML
2 INJECTION, SOLUTION INTRAVENOUS
Status: DISCONTINUED | OUTPATIENT
Start: 2018-04-10 | End: 2018-04-10 | Stop reason: HOSPADM

## 2018-04-10 RX ORDER — SODIUM CHLORIDE, SODIUM LACTATE, POTASSIUM CHLORIDE, CALCIUM CHLORIDE 600; 310; 30; 20 MG/100ML; MG/100ML; MG/100ML; MG/100ML
INJECTION, SOLUTION INTRAVENOUS CONTINUOUS
Status: DISCONTINUED | OUTPATIENT
Start: 2018-04-10 | End: 2018-04-10 | Stop reason: HOSPADM

## 2018-04-10 RX ADMIN — ACETAMINOPHEN 975 MG: 325 TABLET ORAL at 10:01

## 2018-04-10 NOTE — OR NURSING
Dr. Nguyen at bedside to notify patient surgical case has been canceled.      50 minutes total spent on patient care

## 2018-04-10 NOTE — PROGRESS NOTES
Patient's surgery with Dr. Nguyen for 4/10/18 was canceled same day due to a delay in the operating room. Patient has been rescheduled for 4/13/18 per Allison Khan RN to Dr. Nguyen.

## 2018-04-13 ENCOUNTER — ANESTHESIA EVENT (OUTPATIENT)
Dept: SURGERY | Facility: CLINIC | Age: 73
DRG: 470 | End: 2018-04-13
Payer: MEDICARE

## 2018-04-13 ENCOUNTER — APPOINTMENT (OUTPATIENT)
Dept: GENERAL RADIOLOGY | Facility: CLINIC | Age: 73
DRG: 470 | End: 2018-04-13
Attending: ORTHOPAEDIC SURGERY
Payer: MEDICARE

## 2018-04-13 ENCOUNTER — HOSPITAL ENCOUNTER (INPATIENT)
Facility: CLINIC | Age: 73
LOS: 3 days | Discharge: NURSING, REHAB OR LONG-TERM CARE | DRG: 470 | End: 2018-04-16
Attending: ORTHOPAEDIC SURGERY | Admitting: ORTHOPAEDIC SURGERY
Payer: MEDICARE

## 2018-04-13 ENCOUNTER — ANESTHESIA (OUTPATIENT)
Dept: SURGERY | Facility: CLINIC | Age: 73
DRG: 470 | End: 2018-04-13
Payer: MEDICARE

## 2018-04-13 DIAGNOSIS — Z96.651 STATUS POST TOTAL RIGHT KNEE REPLACEMENT: Primary | ICD-10-CM

## 2018-04-13 PROBLEM — Z96.659 S/P TOTAL KNEE ARTHROPLASTY: Status: ACTIVE | Noted: 2018-04-13

## 2018-04-13 LAB
ABO + RH BLD: NORMAL
ABO + RH BLD: NORMAL
BLD GP AB SCN SERPL QL: NORMAL
BLOOD BANK CMNT PATIENT-IMP: NORMAL
BLOOD BANK CMNT PATIENT-IMP: NORMAL
GLUCOSE BLDC GLUCOMTR-MCNC: 96 MG/DL (ref 70–99)
INR PPP: 1.09 (ref 0.86–1.14)
SPECIMEN EXP DATE BLD: NORMAL

## 2018-04-13 PROCEDURE — 25000128 H RX IP 250 OP 636: Performed by: ORTHOPAEDIC SURGERY

## 2018-04-13 PROCEDURE — 36415 COLL VENOUS BLD VENIPUNCTURE: CPT | Performed by: ORTHOPAEDIC SURGERY

## 2018-04-13 PROCEDURE — A9270 NON-COVERED ITEM OR SERVICE: HCPCS | Mod: GY | Performed by: ORTHOPAEDIC SURGERY

## 2018-04-13 PROCEDURE — 25000132 ZZH RX MED GY IP 250 OP 250 PS 637: Mod: GY | Performed by: ORTHOPAEDIC SURGERY

## 2018-04-13 PROCEDURE — 25000128 H RX IP 250 OP 636: Performed by: ANESTHESIOLOGY

## 2018-04-13 PROCEDURE — 27210995 ZZH RX 272: Performed by: ORTHOPAEDIC SURGERY

## 2018-04-13 PROCEDURE — 36000064 ZZH SURGERY LEVEL 4 EA 15 ADDTL MIN - UMMC: Performed by: ORTHOPAEDIC SURGERY

## 2018-04-13 PROCEDURE — 27810169 ZZH OR IMPLANT GENERAL: Performed by: ORTHOPAEDIC SURGERY

## 2018-04-13 PROCEDURE — 00000146 ZZHCL STATISTIC GLUCOSE BY METER IP

## 2018-04-13 PROCEDURE — 37000009 ZZH ANESTHESIA TECHNICAL FEE, EACH ADDTL 15 MIN: Performed by: ORTHOPAEDIC SURGERY

## 2018-04-13 PROCEDURE — 37000008 ZZH ANESTHESIA TECHNICAL FEE, 1ST 30 MIN: Performed by: ORTHOPAEDIC SURGERY

## 2018-04-13 PROCEDURE — 25000132 ZZH RX MED GY IP 250 OP 250 PS 637: Mod: GY | Performed by: ANESTHESIOLOGY

## 2018-04-13 PROCEDURE — C1776 JOINT DEVICE (IMPLANTABLE): HCPCS | Performed by: ORTHOPAEDIC SURGERY

## 2018-04-13 PROCEDURE — 36000062 ZZH SURGERY LEVEL 4 1ST 30 MIN - UMMC: Performed by: ORTHOPAEDIC SURGERY

## 2018-04-13 PROCEDURE — 25000132 ZZH RX MED GY IP 250 OP 250 PS 637: Mod: GY | Performed by: INTERNAL MEDICINE

## 2018-04-13 PROCEDURE — A9270 NON-COVERED ITEM OR SERVICE: HCPCS | Mod: GY | Performed by: ANESTHESIOLOGY

## 2018-04-13 PROCEDURE — 85610 PROTHROMBIN TIME: CPT | Performed by: ORTHOPAEDIC SURGERY

## 2018-04-13 PROCEDURE — 0SRC0J9 REPLACEMENT OF RIGHT KNEE JOINT WITH SYNTHETIC SUBSTITUTE, CEMENTED, OPEN APPROACH: ICD-10-PCS | Performed by: ORTHOPAEDIC SURGERY

## 2018-04-13 PROCEDURE — 99232 SBSQ HOSP IP/OBS MODERATE 35: CPT | Performed by: INTERNAL MEDICINE

## 2018-04-13 PROCEDURE — 12000001 ZZH R&B MED SURG/OB UMMC

## 2018-04-13 PROCEDURE — 25800025 ZZH RX 258: Performed by: ORTHOPAEDIC SURGERY

## 2018-04-13 PROCEDURE — 25000125 ZZHC RX 250: Performed by: ANESTHESIOLOGY

## 2018-04-13 PROCEDURE — 40000986 XR KNEE PORT RT 1/2 VW: Mod: RT

## 2018-04-13 PROCEDURE — A9270 NON-COVERED ITEM OR SERVICE: HCPCS | Mod: GY | Performed by: INTERNAL MEDICINE

## 2018-04-13 PROCEDURE — 93010 ELECTROCARDIOGRAM REPORT: CPT | Performed by: INTERNAL MEDICINE

## 2018-04-13 PROCEDURE — 25000128 H RX IP 250 OP 636: Performed by: INTERNAL MEDICINE

## 2018-04-13 PROCEDURE — 40000170 ZZH STATISTIC PRE-PROCEDURE ASSESSMENT II: Performed by: ORTHOPAEDIC SURGERY

## 2018-04-13 PROCEDURE — 27210794 ZZH OR GENERAL SUPPLY STERILE: Performed by: ORTHOPAEDIC SURGERY

## 2018-04-13 PROCEDURE — 99207 ZZC CONSULT E&M CHANGED TO SUBSEQUENT LEVEL: CPT | Performed by: INTERNAL MEDICINE

## 2018-04-13 PROCEDURE — 71000015 ZZH RECOVERY PHASE 1 LEVEL 2 EA ADDTL HR: Performed by: ORTHOPAEDIC SURGERY

## 2018-04-13 PROCEDURE — 40000065 ZZH STATISTIC EKG NON-CHARGEABLE

## 2018-04-13 PROCEDURE — 71000014 ZZH RECOVERY PHASE 1 LEVEL 2 FIRST HR: Performed by: ORTHOPAEDIC SURGERY

## 2018-04-13 DEVICE — IMP COMP PATELLA BIOM ARCM MED 34MM 11-150842: Type: IMPLANTABLE DEVICE | Site: KNEE | Status: FUNCTIONAL

## 2018-04-13 DEVICE — IMP COMP FEMORAL VANGARD BIOM RT 67.5MM 183010: Type: IMPLANTABLE DEVICE | Site: KNEE | Status: FUNCTIONAL

## 2018-04-13 DEVICE — BONE CEMENT SIMPLEX FULL DOSE 6191-1-001: Type: IMPLANTABLE DEVICE | Site: KNEE | Status: FUNCTIONAL

## 2018-04-13 DEVICE — IMPLANTABLE DEVICE: Type: IMPLANTABLE DEVICE | Site: KNEE | Status: FUNCTIONAL

## 2018-04-13 DEVICE — IMP COMP TIBIAL KNEE ASCENT 71MM 141233: Type: IMPLANTABLE DEVICE | Site: KNEE | Status: FUNCTIONAL

## 2018-04-13 RX ORDER — ACETAMINOPHEN 325 MG/1
975 TABLET ORAL ONCE
Status: COMPLETED | OUTPATIENT
Start: 2018-04-13 | End: 2018-04-13

## 2018-04-13 RX ORDER — LIDOCAINE 40 MG/G
CREAM TOPICAL
Status: DISCONTINUED | OUTPATIENT
Start: 2018-04-13 | End: 2018-04-16 | Stop reason: HOSPADM

## 2018-04-13 RX ORDER — METOCLOPRAMIDE 5 MG/1
5 TABLET ORAL EVERY 6 HOURS PRN
Status: DISCONTINUED | OUTPATIENT
Start: 2018-04-13 | End: 2018-04-16 | Stop reason: HOSPADM

## 2018-04-13 RX ORDER — NALOXONE HYDROCHLORIDE 0.4 MG/ML
.1-.4 INJECTION, SOLUTION INTRAMUSCULAR; INTRAVENOUS; SUBCUTANEOUS
Status: DISCONTINUED | OUTPATIENT
Start: 2018-04-13 | End: 2018-04-13

## 2018-04-13 RX ORDER — KETOROLAC TROMETHAMINE 15 MG/ML
15 INJECTION, SOLUTION INTRAMUSCULAR; INTRAVENOUS EVERY 6 HOURS
Status: COMPLETED | OUTPATIENT
Start: 2018-04-13 | End: 2018-04-14

## 2018-04-13 RX ORDER — AMOXICILLIN 250 MG
1 CAPSULE ORAL 2 TIMES DAILY
Status: DISCONTINUED | OUTPATIENT
Start: 2018-04-13 | End: 2018-04-16 | Stop reason: HOSPADM

## 2018-04-13 RX ORDER — CEFAZOLIN SODIUM 1 G/3ML
1 INJECTION, POWDER, FOR SOLUTION INTRAMUSCULAR; INTRAVENOUS EVERY 8 HOURS
Status: COMPLETED | OUTPATIENT
Start: 2018-04-13 | End: 2018-04-14

## 2018-04-13 RX ORDER — PROPOFOL 10 MG/ML
INJECTION, EMULSION INTRAVENOUS CONTINUOUS PRN
Status: DISCONTINUED | OUTPATIENT
Start: 2018-04-13 | End: 2018-04-13

## 2018-04-13 RX ORDER — OXYCODONE HYDROCHLORIDE 5 MG/1
5-10 TABLET ORAL
Status: DISCONTINUED | OUTPATIENT
Start: 2018-04-13 | End: 2018-04-13

## 2018-04-13 RX ORDER — SODIUM CHLORIDE, SODIUM LACTATE, POTASSIUM CHLORIDE, CALCIUM CHLORIDE 600; 310; 30; 20 MG/100ML; MG/100ML; MG/100ML; MG/100ML
INJECTION, SOLUTION INTRAVENOUS CONTINUOUS
Status: DISCONTINUED | OUTPATIENT
Start: 2018-04-13 | End: 2018-04-15

## 2018-04-13 RX ORDER — PROCHLORPERAZINE MALEATE 5 MG
5 TABLET ORAL EVERY 6 HOURS PRN
Status: DISCONTINUED | OUTPATIENT
Start: 2018-04-13 | End: 2018-04-16 | Stop reason: HOSPADM

## 2018-04-13 RX ORDER — ACETAMINOPHEN 325 MG/1
650 TABLET ORAL EVERY 4 HOURS PRN
Status: DISCONTINUED | OUTPATIENT
Start: 2018-04-16 | End: 2018-04-16 | Stop reason: HOSPADM

## 2018-04-13 RX ORDER — PROPOFOL 10 MG/ML
INJECTION, EMULSION INTRAVENOUS PRN
Status: DISCONTINUED | OUTPATIENT
Start: 2018-04-13 | End: 2018-04-13

## 2018-04-13 RX ORDER — CEFAZOLIN SODIUM 1 G/3ML
INJECTION, POWDER, FOR SOLUTION INTRAMUSCULAR; INTRAVENOUS PRN
Status: DISCONTINUED | OUTPATIENT
Start: 2018-04-13 | End: 2018-04-13

## 2018-04-13 RX ORDER — BUPIVACAINE HYDROCHLORIDE 5 MG/ML
INJECTION, SOLUTION EPIDURAL; INTRACAUDAL PRN
Status: DISCONTINUED | OUTPATIENT
Start: 2018-04-13 | End: 2018-04-13

## 2018-04-13 RX ORDER — LIDOCAINE HYDROCHLORIDE 20 MG/ML
INJECTION, SOLUTION INFILTRATION; PERINEURAL PRN
Status: DISCONTINUED | OUTPATIENT
Start: 2018-04-13 | End: 2018-04-13

## 2018-04-13 RX ORDER — ATORVASTATIN CALCIUM 20 MG/1
80 TABLET, FILM COATED ORAL AT BEDTIME
Status: DISCONTINUED | OUTPATIENT
Start: 2018-04-13 | End: 2018-04-16 | Stop reason: HOSPADM

## 2018-04-13 RX ORDER — FENTANYL CITRATE 50 UG/ML
25-50 INJECTION, SOLUTION INTRAMUSCULAR; INTRAVENOUS
Status: DISCONTINUED | OUTPATIENT
Start: 2018-04-13 | End: 2018-04-13 | Stop reason: HOSPADM

## 2018-04-13 RX ORDER — NALOXONE HYDROCHLORIDE 0.4 MG/ML
.1-.4 INJECTION, SOLUTION INTRAMUSCULAR; INTRAVENOUS; SUBCUTANEOUS
Status: DISCONTINUED | OUTPATIENT
Start: 2018-04-13 | End: 2018-04-16 | Stop reason: HOSPADM

## 2018-04-13 RX ORDER — ACETAMINOPHEN 325 MG/1
975 TABLET ORAL ONCE
Status: DISCONTINUED | OUTPATIENT
Start: 2018-04-13 | End: 2018-04-13

## 2018-04-13 RX ORDER — HYDROMORPHONE HYDROCHLORIDE 1 MG/ML
0.5 INJECTION, SOLUTION INTRAMUSCULAR; INTRAVENOUS; SUBCUTANEOUS
Status: DISCONTINUED | OUTPATIENT
Start: 2018-04-13 | End: 2018-04-16 | Stop reason: HOSPADM

## 2018-04-13 RX ORDER — ONDANSETRON 4 MG/1
4 TABLET, ORALLY DISINTEGRATING ORAL EVERY 30 MIN PRN
Status: DISCONTINUED | OUTPATIENT
Start: 2018-04-13 | End: 2018-04-13 | Stop reason: HOSPADM

## 2018-04-13 RX ORDER — AMOXICILLIN 250 MG
2 CAPSULE ORAL 2 TIMES DAILY
Status: DISCONTINUED | OUTPATIENT
Start: 2018-04-13 | End: 2018-04-16 | Stop reason: HOSPADM

## 2018-04-13 RX ORDER — METOCLOPRAMIDE HYDROCHLORIDE 5 MG/ML
5 INJECTION INTRAMUSCULAR; INTRAVENOUS EVERY 6 HOURS PRN
Status: DISCONTINUED | OUTPATIENT
Start: 2018-04-13 | End: 2018-04-16 | Stop reason: HOSPADM

## 2018-04-13 RX ORDER — ONDANSETRON 2 MG/ML
INJECTION INTRAMUSCULAR; INTRAVENOUS PRN
Status: DISCONTINUED | OUTPATIENT
Start: 2018-04-13 | End: 2018-04-13

## 2018-04-13 RX ORDER — ACETAMINOPHEN 325 MG/1
975 TABLET ORAL EVERY 8 HOURS
Status: COMPLETED | OUTPATIENT
Start: 2018-04-13 | End: 2018-04-16

## 2018-04-13 RX ORDER — ONDANSETRON 2 MG/ML
4 INJECTION INTRAMUSCULAR; INTRAVENOUS EVERY 30 MIN PRN
Status: DISCONTINUED | OUTPATIENT
Start: 2018-04-13 | End: 2018-04-13 | Stop reason: HOSPADM

## 2018-04-13 RX ORDER — WARFARIN SODIUM 2 MG/1
4 TABLET ORAL
Status: COMPLETED | OUTPATIENT
Start: 2018-04-13 | End: 2018-04-13

## 2018-04-13 RX ORDER — BISACODYL 10 MG
10 SUPPOSITORY, RECTAL RECTAL DAILY
Status: DISCONTINUED | OUTPATIENT
Start: 2018-04-14 | End: 2018-04-16 | Stop reason: HOSPADM

## 2018-04-13 RX ORDER — DIPHENHYDRAMINE HCL 12.5MG/5ML
12.5 LIQUID (ML) ORAL EVERY 6 HOURS PRN
Status: DISCONTINUED | OUTPATIENT
Start: 2018-04-13 | End: 2018-04-16 | Stop reason: HOSPADM

## 2018-04-13 RX ORDER — SODIUM CHLORIDE, SODIUM LACTATE, POTASSIUM CHLORIDE, CALCIUM CHLORIDE 600; 310; 30; 20 MG/100ML; MG/100ML; MG/100ML; MG/100ML
INJECTION, SOLUTION INTRAVENOUS CONTINUOUS
Status: DISCONTINUED | OUTPATIENT
Start: 2018-04-13 | End: 2018-04-13 | Stop reason: HOSPADM

## 2018-04-13 RX ORDER — HYDROMORPHONE HYDROCHLORIDE 1 MG/ML
.3-.5 INJECTION, SOLUTION INTRAMUSCULAR; INTRAVENOUS; SUBCUTANEOUS EVERY 5 MIN PRN
Status: DISCONTINUED | OUTPATIENT
Start: 2018-04-13 | End: 2018-04-13 | Stop reason: HOSPADM

## 2018-04-13 RX ORDER — DIPHENHYDRAMINE HYDROCHLORIDE 50 MG/ML
12.5 INJECTION INTRAMUSCULAR; INTRAVENOUS EVERY 6 HOURS PRN
Status: DISCONTINUED | OUTPATIENT
Start: 2018-04-13 | End: 2018-04-16 | Stop reason: HOSPADM

## 2018-04-13 RX ORDER — EPHEDRINE SULFATE 50 MG/ML
INJECTION, SOLUTION INTRAVENOUS PRN
Status: DISCONTINUED | OUTPATIENT
Start: 2018-04-13 | End: 2018-04-13

## 2018-04-13 RX ORDER — ONDANSETRON 4 MG/1
4 TABLET, ORALLY DISINTEGRATING ORAL EVERY 6 HOURS PRN
Status: DISCONTINUED | OUTPATIENT
Start: 2018-04-13 | End: 2018-04-16 | Stop reason: HOSPADM

## 2018-04-13 RX ORDER — CEFAZOLIN SODIUM 2 G/100ML
2 INJECTION, SOLUTION INTRAVENOUS
Status: COMPLETED | OUTPATIENT
Start: 2018-04-13 | End: 2018-04-13

## 2018-04-13 RX ORDER — ONDANSETRON 2 MG/ML
4 INJECTION INTRAMUSCULAR; INTRAVENOUS EVERY 6 HOURS PRN
Status: DISCONTINUED | OUTPATIENT
Start: 2018-04-13 | End: 2018-04-16 | Stop reason: HOSPADM

## 2018-04-13 RX ORDER — MAGNESIUM HYDROXIDE 1200 MG/15ML
LIQUID ORAL PRN
Status: DISCONTINUED | OUTPATIENT
Start: 2018-04-13 | End: 2018-04-13 | Stop reason: HOSPADM

## 2018-04-13 RX ORDER — SODIUM CHLORIDE, SODIUM LACTATE, POTASSIUM CHLORIDE, CALCIUM CHLORIDE 600; 310; 30; 20 MG/100ML; MG/100ML; MG/100ML; MG/100ML
INJECTION, SOLUTION INTRAVENOUS CONTINUOUS
Status: DISCONTINUED | OUTPATIENT
Start: 2018-04-13 | End: 2018-04-13

## 2018-04-13 RX ADMIN — CEFAZOLIN 1 G: 1 INJECTION, POWDER, FOR SOLUTION INTRAMUSCULAR; INTRAVENOUS at 22:06

## 2018-04-13 RX ADMIN — ACETAMINOPHEN 975 MG: 325 TABLET ORAL at 09:29

## 2018-04-13 RX ADMIN — SODIUM CHLORIDE, POTASSIUM CHLORIDE, SODIUM LACTATE AND CALCIUM CHLORIDE: 600; 310; 30; 20 INJECTION, SOLUTION INTRAVENOUS at 17:07

## 2018-04-13 RX ADMIN — WARFARIN SODIUM 4 MG: 2 TABLET ORAL at 18:32

## 2018-04-13 RX ADMIN — KETOROLAC TROMETHAMINE 15 MG: 15 INJECTION, SOLUTION INTRAMUSCULAR; INTRAVENOUS at 17:11

## 2018-04-13 RX ADMIN — EPHEDRINE SULFATE 5 MG: 50 INJECTION, SOLUTION INTRAVENOUS at 13:15

## 2018-04-13 RX ADMIN — SODIUM CHLORIDE, POTASSIUM CHLORIDE, SODIUM LACTATE AND CALCIUM CHLORIDE: 600; 310; 30; 20 INJECTION, SOLUTION INTRAVENOUS at 14:25

## 2018-04-13 RX ADMIN — MIDAZOLAM 1 MG: 1 INJECTION INTRAMUSCULAR; INTRAVENOUS at 11:59

## 2018-04-13 RX ADMIN — Medication 1 MG: at 19:26

## 2018-04-13 RX ADMIN — ONDANSETRON 4 MG: 2 INJECTION INTRAMUSCULAR; INTRAVENOUS at 13:59

## 2018-04-13 RX ADMIN — EPHEDRINE SULFATE 5 MG: 50 INJECTION, SOLUTION INTRAVENOUS at 13:05

## 2018-04-13 RX ADMIN — Medication 1 MG: at 22:53

## 2018-04-13 RX ADMIN — CEFAZOLIN SODIUM 2 G: 2 INJECTION, SOLUTION INTRAVENOUS at 12:15

## 2018-04-13 RX ADMIN — Medication 1 MG: at 20:43

## 2018-04-13 RX ADMIN — SODIUM CHLORIDE, POTASSIUM CHLORIDE, SODIUM LACTATE AND CALCIUM CHLORIDE: 600; 310; 30; 20 INJECTION, SOLUTION INTRAVENOUS at 11:59

## 2018-04-13 RX ADMIN — EPHEDRINE SULFATE 5 MG: 50 INJECTION, SOLUTION INTRAVENOUS at 13:25

## 2018-04-13 RX ADMIN — KETOROLAC TROMETHAMINE 15 MG: 15 INJECTION, SOLUTION INTRAMUSCULAR; INTRAVENOUS at 22:38

## 2018-04-13 RX ADMIN — CEFAZOLIN 1 G: 1 INJECTION, POWDER, FOR SOLUTION INTRAMUSCULAR; INTRAVENOUS at 14:17

## 2018-04-13 RX ADMIN — BUPIVACAINE HYDROCHLORIDE 3 ML: 5 INJECTION, SOLUTION EPIDURAL; INTRACAUDAL at 12:07

## 2018-04-13 RX ADMIN — ATORVASTATIN CALCIUM 80 MG: 20 TABLET, FILM COATED ORAL at 22:05

## 2018-04-13 RX ADMIN — LIDOCAINE HYDROCHLORIDE 60 MG: 20 INJECTION, SOLUTION INFILTRATION; PERINEURAL at 12:09

## 2018-04-13 RX ADMIN — PROPOFOL 30 MG: 10 INJECTION, EMULSION INTRAVENOUS at 12:09

## 2018-04-13 RX ADMIN — PROPOFOL 40 MCG/KG/MIN: 10 INJECTION, EMULSION INTRAVENOUS at 12:09

## 2018-04-13 RX ADMIN — EPHEDRINE SULFATE 5 MG: 50 INJECTION, SOLUTION INTRAVENOUS at 13:49

## 2018-04-13 RX ADMIN — ACETAMINOPHEN 975 MG: 325 TABLET ORAL at 17:13

## 2018-04-13 ASSESSMENT — ACTIVITIES OF DAILY LIVING (ADL)
FALL_HISTORY_WITHIN_LAST_SIX_MONTHS: NO
TOILETING: 0-->INDEPENDENT
AMBULATION: 0-->INDEPENDENT
DRESS: 0-->INDEPENDENT
SWALLOWING: 0-->SWALLOWS FOODS/LIQUIDS WITHOUT DIFFICULTY
BATHING: 0-->INDEPENDENT
RETIRED_EATING: 0-->INDEPENDENT
COGNITION: 0 - NO COGNITION ISSUES REPORTED
RETIRED_COMMUNICATION: 0-->UNDERSTANDS/COMMUNICATES WITHOUT DIFFICULTY
TRANSFERRING: 0-->INDEPENDENT

## 2018-04-13 NOTE — LETTER
Transition Communication Hand-off for Care Transitions to Next Level of Care Provider    Name: Connie J Frankenberg  : 1945  MRN #: [id number]  Primary Care Provider: Sammi Beltre     Primary Clinic: [address] [place] [address] [address]  [facility] [address]     Reason for Hospitalization:  Right Knee Osteoarthritis   S/P total knee arthroplasty  Admit Date/Time: 2018  8:29 AM  Discharge Date: 18  Payor Source: Payor: MEDICA / Plan: MEDICA PRIME SOLUTION / Product Type: Indemnity /     Reason for Communication Hand-off Referral: Other DC plan    Discharge Plan:  TCU at [facility] [phone]  Will be followed by Noble Reyna [phone]     Concern for non-adherence with plan of care:   Y/N N  Discharge Needs Assessment:  Needs       Most Recent Value    Equipment Currently Used at Home none    Transportation Available Medicaid transportation          Follow-up plan:  Future Appointments  Date Time Provider Department Center   2018 1:00 PM Keeley Mcgovern, PT URPT [place]   2018 6:30 AM UR OT OVERFLOW UROT [place]       Any outstanding tests or procedures:            JAYNA Nguyễn  8A/10A Ortho/Med/Surg and Adult [address] [facility]    [phone]  [email or web address]      AVS/Discharge Summary is the source of truth; this is a helpful guide for improved communication of patient story

## 2018-04-13 NOTE — PHARMACY-ANTICOAGULATION SERVICE
Clinical Pharmacy - Warfarin Dosing Consult     Pharmacy has been consulted to manage this patient s warfarin therapy.  Indication: DVT/PE Prophylaxis, s/p R Total Knee replacement on 4/13  Therapy Goal: INR 2-3  Warfarin Prior to Admission: No    INR   Date Value Ref Range Status   04/13/2018 1.09 0.86 - 1.14 Final       Recommend warfarin 4 mg today.  Pharmacy will monitor Connie J Frankenberg daily and order warfarin doses to achieve specified goal.      Please contact pharmacy as soon as possible if the warfarin needs to be held for a procedure or if the warfarin goals change.

## 2018-04-13 NOTE — BRIEF OP NOTE
[facility], [place]    Brief Operative Note    Pre-operative diagnosis: Right Knee Osteoarthritis   Post-operative diagnosis * No post-op diagnosis entered *  Procedure: Procedure(s):  Right Total Knee Replacement - Wound Class: I-Clean  Surgeon: Surgeon(s) and Role:     * Apollo Nguyen MD - Primary     * Allan Menezes MD - Assisting     * Chon Sommer - Resident - Assisting  Anesthesia: Spinal   Estimated blood loss: Less than 100 ml  Drains: Shaheen-Kahn  Specimens: * No specimens in log *  Findings:   None.  Complications: None.  Implants: See dictated op note.    POST OPERATIVE PLAN    Assessment/Plan: Connie J Frankenberg is a 72 year old female s/p Procedure(s):  Right Total Knee Replacement - Wound Class: I-Clean on 4/13/2018 with Dr. Spring.    Activity: Up with assist and assistive devices as needed until independent. Weight bearing status: WBAT  Antibiotics: Cefazolin or Clindamycin x 24 hours  Diet: Begin with clear fluids and progress diet as tolerated. Bowel regimen. Anti-emetics PRN.    DVT prophylaxis: warfarin x 4 weeks  Elevation: Elevate heels off of bed on pillows   Wound Care: Aquacel, leave in place for 7-9 days post op    Drains: none  Pain management: Orals PRN, IV for breakthrough only  X-rays: xrays in PACU  Physical Therapy: Mobilization, ROM, ADL's   Occupational Therapy: ADL's  Labs: Trend Hgb on POD #1, 2  Consults: PT, OT. Hospitalist, appreciate assistance in caring for this patient throughout the perioperative period    Future Appointments  Date Time Provider Department [place]   4/14/2018 9:30 AM Keeley Mcgovern, PT [facility] [place]   4/14/2018 11:00 AM Roxana Cee OT [facility] [place]   4/14/2018 1:30 PM Keeley Mcgovern, PT [facility] [place]       Disposition: Pending progress with therapies, pain control on orals, and medical stability, anticipate discharge to Home vs TCU on POD #2-3.      Chon Sommer  PGY4  [phone]

## 2018-04-13 NOTE — IP AVS SNAPSHOT
UR 8A    [address]    [place] [address]    Phone:  [phone]                                       After Visit Summary   4/13/2018    Connie J Frankenberg    MRN: [id number]           After Visit Summary Signature Page     I have received my discharge instructions, and my questions have been answered. I have discussed any challenges I see with this plan with the nurse or doctor.    ..........................................................................................................................................  Patient/Patient Representative Signature      ..........................................................................................................................................  Patient Representative Print Name and Relationship to Patient    ..................................................               ................................................  Date                                            Time    ..........................................................................................................................................  Reviewed by Signature/Title    ...................................................              ..............................................  Date                                                            Time

## 2018-04-13 NOTE — CONSULTS
Consult Date:  04/13/2018      ASSESSMENT:   1.  Status post right total knee arthroplasty.  The patient is doing well postoperatively.   2.  History of frequent longstanding vasovagal episodes which have been precipitated by a variety of stimuli including blood draw, pain, noxious smells.  She is at a high risk for having such episodes in the immediate postoperative period and will need close attention by nursing staff when she is mobilized.   3.  History of benign positional vertigo, stable.   4.  No known coronary artery disease based on negative stress echocardiogram performed in 2013.   5.  History of migraine headaches.   6.  No history of deep venous thrombosis or pulmonary embolism.      RECOMMENDATIONS:  Routine postoperative labs are ordered.  Oxycodone will be changed to Dilaudid as she has not tolerated oxycodone in the past.  She likely would benefit from a short course of ketorolac and attempt to minimize the use of opioids.  She will need to be monitored closely by all providers as she is at risk for paroxysmal vasovagal episodes related to pain, noxious stimuli, etc.  This was discussed with the patient and her friend at length.  Deep venous thrombosis prophylaxis will be with warfarin per Dr. Nguyen's protocol.      DISCUSSION:  Connie Frankenberg is a very pleasant 72-year-old female who underwent a right total knee arthroplasty by Dr. Nguyen for the treatment of osteoarthritis of the knee.  I have been asked to see her by Dr. Nguyen to assess history of vasovagal syncope and benign positional vertigo.      Please see Dr. Nguyen's notes in the charting for details regarding the patient's orthopedic history and the indications for surgery.      The events of surgery are noted.  Estimated blood loss was 100 mL.  She did receive spinal anesthesia.  This also theoretically increases the risk of hypotension with standing.  Vital signs were stable throughout.  Blood pressure most recently was 133/74.       Ms. Frankenberg is seen by me shortly after admission to the Orthopedic unit.  She reports feeling well and has minimal pain in her knee at this time.      PAST MEDICAL HISTORY:   1.  Chronic knee pain secondary to arthritis.  She has remained physically active in spite of her pain.   2.  Long history of vasovagal events which has included dizziness and syncope.  These occur anywhere from several times per month to several times per year and have generally been precipitated by pain and nausea and by noxious smells.   3.  No known cardiac disease.  She had a negative stress test in 2013.   4.  History of migraine headaches.   5.  Benign positional vertigo.      MEDICATIONS PREOPERATIVELY:   1.  Lipitor 80 mg daily.   2.  Aspirin 81 mg daily.   3.  Excedrin on a p.r.n. basis.     4.  Fiorinal on a p.r.n. basis.      ALLERGIES:  Codeine and Percocet which causes GI upset.      SOCIAL HISTORY:  The patient is single.  She works as [employer] tech.  She drinks alcohol very infrequently.      FAMILY HISTORY:  Reviewed and is noncontributory.      PAST SURGERIES:  Include laparoscopy for infertility, basal cell cancer resection.      REVIEW OF SYSTEMS:  A baseline is remarkable for limitation of activity secondary to knee pain, though she does remain active with certain activities including swimming.  She denies exertional chest pain, shortness of breath, abdominal pain, nausea, vomiting, diarrhea, cough.  As above, she does have episodes of lightheadedness and even syncope related to vasovagal episodes.  She describes frequent short-lived episodes of dizziness and vertigo when she turns her head suddenly from one side to another.      PHYSICAL EXAMINATION:   GENERAL:  She is a very pleasant, well-appearing female who appears comfortable.  She is fully oriented.   VITAL SIGNS:  Stable.  O2 saturations are in the 90s on room air.   HEENT:  Facies are symmetric.   NECK:  Supple.  I did not assess range of motion of her head.    LUNGS:  Clear.   CARDIAC:  Reveals a regular rate and rhythm without murmurs.   ABDOMEN:  Soft, nontender, nondistended.   EXTREMITIES:  There was no edema lower extremities are wrapped.   NEUROLOGIC:  Otherwise, she is alert, fully oriented and quite pleasant.  Facies are symmetric.      LABORATORY DATA:  Preoperative labs include a normal BMP and CBC.  EKG revealed normal sinus rhythm.  There are no acute-appearing changes.         ALCIE LIANG MD             D: 2018   T: 2018   MT: NTS      Name:     FRANKENBERG, CONNIE   MRN:      [id number]        Account:       [id number]   :      1945           Consult Date:  2018      Document: [id number]       cc: Sammi Beltre MD

## 2018-04-13 NOTE — ANESTHESIA CARE TRANSFER NOTE
Patient: Connie J Frankenberg    Procedure(s):  Right Total Knee Replacement - Wound Class: I-Clean    Diagnosis: Right Knee Osteoarthritis   Diagnosis Additional Information: No value filed.    Anesthesia Type:   Spinal     Note:  Airway :Face Mask  Patient transferred to:PACU  Handoff Report: Identifed the Patient, Identified the Reponsible Provider, Reviewed the pertinent medical history, Discussed the surgical course, Reviewed Intra-OP anesthesia mangement and issues during anesthesia, Set expectations for post-procedure period and Allowed opportunity for questions and acknowledgement of understanding      Vitals: (Last set prior to Anesthesia Care Transfer)    CRNA VITALS  4/13/2018 1403 - 4/13/2018 1443      4/13/2018             Pulse: 88    SpO2: 94 %                Electronically Signed By: VANESA Stephen CRNA  April 13, 2018  2:43 PM

## 2018-04-13 NOTE — ANESTHESIA PREPROCEDURE EVALUATION
Anesthesia Evaluation     . Pt has had prior anesthetic.     History of anesthetic complications   - PONV        ROS/MED HX    ENT/Pulmonary:  - neg pulmonary ROS     Neurologic: Comment: Benign positional vertigo     - neg neurologic ROS   (+)migraines,     Cardiovascular:  - neg cardiovascular ROS       METS/Exercise Tolerance:     Hematologic:  - neg hematologic  ROS       Musculoskeletal:  - neg musculoskeletal ROS (+) arthritis, , , -       GI/Hepatic:  - neg GI/hepatic ROS       Renal/Genitourinary:  - ROS Renal section negative       Endo: Comment: hyperlipidemia - neg endo ROS       Psychiatric:  - neg psychiatric ROS       Infectious Disease:  - neg infectious disease ROS       Malignancy:   (+) Malignancy History of Skin  Skin CA Remission status post,      - no malignancy   Other:    - neg other ROS                 Physical Exam  Normal systems: cardiovascular, pulmonary and dental    Airway   Mallampati: II  TM distance: >3 FB  Neck ROM: full    Dental     Cardiovascular       Pulmonary                     Anesthesia Plan      History & Physical Review  History and physical reviewed and following examination; no interval change.    ASA Status:  2 .    NPO Status:  > 8 hours    Plan for Spinal Maintenance will be TIVA.    PONV prophylaxis:  Ondansetron (or other 5HT-3)       Postoperative Care  Postoperative pain management:  IV analgesics, Oral pain medications and Multi-modal analgesia.      Consents  Anesthetic plan, risks, benefits and alternatives discussed with:  Patient..                          .

## 2018-04-13 NOTE — ANESTHESIA PROCEDURE NOTES
Spinal/LP Procedure Note    Spinal Block  Staff:     Anesthesiologist:  CORNELL CHI    Resident/CRNA:  WARREN GONZALEZ    Spinal/LP performed by resident/CRNA in presence of a teaching physician.    Location: In OR BEFORE Induction  Procedure Start/Stop Times:      4/13/2018 12:02 PM     4/13/2018 12:07 PM    patient identified, IV checked, site marked, risks and benefits discussed, informed consent, monitors and equipment checked, pre-op evaluation, at physician/surgeon's request and post-op pain management      Correct Patient: Yes      Correct Position: Yes      Correct Site: Yes      Correct Procedure: Yes      Correct Laterality:  Yes    Site Marked:  Yes  Procedure:     Procedure:  Intrathecal    ASA:  2    Position:  Sitting    Sterile Prep: chloraprep, mask, sterile gloves and patient draped      Insertion site:  L3-4    Approach:  Midline    Needle Type:  Richard    Needle gauge (G):  25    Local Skin Infiltration:  1% lidocaine    amount (ml):  2    Needle Length (in):  3.5    Introducer used: Yes      Introducer gauge:  20 G    Attempts:  1    Redirects:  0    CSF:  Clear    Paresthesias:  No    Time injected:  12:07

## 2018-04-13 NOTE — ANESTHESIA POSTPROCEDURE EVALUATION
Patient: Connie J Frankenberg    Procedure(s):  Right Total Knee Replacement - Wound Class: I-Clean    Diagnosis:Right Knee Osteoarthritis   Diagnosis Additional Information: No value filed.    Anesthesia Type:  Spinal    Note:  Anesthesia Post Evaluation    Patient location during evaluation: PACU  Patient participation: Able to participate in evaluation but full recovery from regional anesthesia has not yet ocurrred but is anticipated to occur within 48 hours  Level of consciousness: awake and alert  Pain management: adequate  Airway patency: patent  Cardiovascular status: hemodynamically stable  Respiratory status: acceptable, spontaneous ventilation and nasal cannula  Hydration status: acceptable  PONV: none     Anesthetic complications: None    Comments: NC for comfort--SpO2 mid-high 90s on RA. Will be monitored with capnography and pulse ox on the floor. No PONV. No pain currently. Has had water and apple juice. No questions re anesthesia.        Last vitals:  Vitals:    04/13/18 1515 04/13/18 1530 04/13/18 1545   BP: 120/72 119/71 122/74   Pulse:      Resp: 17 14 10   Temp:      SpO2: 98% 98% 99%         Electronically Signed By: Deana Vera MD  April 13, 2018  3:56 PM

## 2018-04-13 NOTE — OP NOTE
OPERATION REPORT     DATE OF OPERATION: 4/13/2018     SURGEON: Apollo Nguyen MD.     ASSISTANT: Allan Menezes, Fellow, Chon Sommer, PGY4    ANESTHESIOLOGIST: Anesthesiologist: Campbell Ramey MD  CRNA: Pili Walden APRN CRNA; Edmund Phipps APRN CRNA  Anesthesia Resident: Deana Grover MD    ANESTHESIA: Spinal    PREOPERATIVE DIAGNOSIS: Right knee degenerative joint disease.     POSTOPERATIVE DIAGNOSIS: Right knee degenerative joint disease.     OPERATION(S) PERFORMED: Right total knee arthroplasty.     BACKGROUND:  Patient with end-stage DJD of the Right knee that has failed non operative treatment including injections, PT and NSAIDS     OPERATIVE FINDINGS: Patient with end-stage DJD of the Right knee.     OPERATIVE PROCEDURE: After informed consent was obtained, the patient brought to operating room on 4/13/2018. Spinal and Block were performed by the department of anesthesiology. The patient was sedated and then placed supine on the OR table with all bony prominences appropriately padded. A nonsterile tourniquet was placed as proximal as possible. Surgical pause was instituted with agreement between surgeon anesthesia and nursing that the right knee was the correct operative site.  The operative lower extremity was prepped and draped in usual sterile fashion. Midline incision was made, went sharply through skin and subcutaneous tissues. A medial arthrotomy was made through the quadriceps tendon.  The proximal tibial cut was performed perpendicular to the long axis of the tibia using an external guide.  The distal femoral cut was performed in 5 degrees of valgus using an intramedullary guide removing 10 mm of bone.  Osteophytes were removed and the collateral ligaments were balanced and released as necessary given the existing deformity.  The tensioning device was used to measure the extension gap at 40 lbs of tension. After correction the residual medial tightness, the flexion  gap was measured at the same tension force and the size of the femoral component was determined while maintaining the same flexion gap for isometricity of the collateral ligaments.  The 4-in-1 cutting block for the selected femoral size was impacted onto the femur, confirming that it was in an appropriate amount of external rotation. The anterior, posterior and chamfer cuts were now made and the femoral component was placed in trial position. A trial reduction was performed.  The knee was found to be stable with balanced collateral ligaments throughout the entire range of motion.  The tibial component rotation was confirmed and marked.  The femoral component was removed and the tibial tray was used to prepare the tibial surface by using the punch for the tibial keel and winged flange. We then proceeded with preparation of the patella by performing a cut of the articular surface after measuring the patellar thickness in order to restore the anatomic thickness. Trial components were again placed to ensure proper patellofemoral kinematics.  The bony surface of the patella and tibia were drilled with an 1/8 inch drill bit to enhance cement interdigitation with the bone, as were any sclerotic areas of the tibia and femur.  Each of the real components were then cemented in place after pulsatile lavage and cleansing of the bony surfaces. Excess cement was removed.  A 50 cc dose of the anesthetic cocktail using bupicaine, ketorolac, and morphine was injected into the posterior capsule, medial and lateral gutters with care taken to avoid intravascular injection.  After trialing multiple options, the final tibial polyethelene insert was locked in place.  An additional 50 cc dose of the anesthetic cocktail was injected into the quadriceps tendon and muscle tissue.    Wound closure was performed after placing a drain in the knee.  Standard technique using 2-0 vicryl running suture for the fat pad and synovium, interrupted  figure-of-8 0-vicryl sutures for arthrotomy and quadriceps, 2-0 vicryl for subcutaneous tissue, and 3-0 PDS running subcuticular skin closure.  Dressing was applied in the usual sterile fashion..    ESTIMATED BLOOD LOSS: 200 mL.     TOURNIQUET TIME: 69 min    POSTOPERATIVE PLAN: is standard total knee protocol.  Wounds were dressed in usual sterile fashion. The patient was awakened from sedation and sent to PACU in stable condition.     FINAL IMPLANTS:   Implant Name Type Inv. Item Serial No.  Lot No. LRB No. Used   BONE CEMENT SIMPLEX FULL DOSE 6191-1-001 Cement, Bone BONE CEMENT SIMPLEX FULL DOSE 6191-1-001  [employer] [id number] Right 2   IMP COMP TIBIAL KNEE ASCENT 71MM [id number] Total Joint Component/Insert IMP COMP TIBIAL KNEE ASCENT 71MM [id number]  [employer] [id number] Right 1   IMP COMP FEMORAL VANGARD BIOM RT 67.5MM [id number] Total Joint Component/Insert IMP COMP FEMORAL VANGARD BIOM RT 67.5MM [id number]  [employer] [id number] Right 1   IMP COMP PATELLA BIOM ARCM MED 34MM [id number] Total Joint Component/Insert IMP COMP PATELLA BIOM ARCM MED 34MM [id number]  [employer] [id number] Right 1   IMP TIBIAL BEARING CR-LIPPED VANGUARD BIOM 10X71/75 [id number] Total Joint Component/Insert IMP TIBIAL BEARING CR-LIPPED VANGUARD BIOM 10X71/75 [id number]   [employer] [id number] Right 1       ATTESTATION: Dr Nguyen was present at all critical portions of this case and immediately available at all times during the duration of the case.     Signed:  Allan Menezes 4/13/2018 6:26 PM    Attending MD (Dr. Apollo Nguyen) Attestation:  I was present during the key portions of the procedure and I was immediately available for the entire procedure between opening and closing.    MD Dandy Beltran Family Professor  Oncology and Adult Reconstructive Surgery  Dept Orthopaedic Surgery, [facility]

## 2018-04-13 NOTE — OR NURSING
PACU to Inpatient Nursing Handoff    Patient Connie J Frankenberg is a 72 year old female who speaks English.   Procedure Procedure(s):  Right Total Knee Replacement - Wound Class: I-Clean   Surgeon(s) Primary: Apollo Nguyen MD  Assisting: Allan Menezes MD  Resident - Assisting: Chon Sommer     Allergies   Allergen Reactions     Codeine GI Disturbance       Isolation  No active isolations     Past Medical History   has a past medical history of Arthritis of knee; Basal cell carcinoma; BPPV (benign paroxysmal positional vertigo); Depressive disorder (1985); Hemorrhoids; History of PID; Hyperlipidemia; Insomnia; Migraines, classic; Plantar fasciitis, bilateral; PONV (postoperative nausea and vomiting); Skin cancer, basal cell; Squamous cell carcinoma; Swelling of the ankle, feet, or leg; and Vasovagal syncope.    Anesthesia Spinal   Dermatome Level Dermatomes Left: L2  Dermatomes Right: L2   Preop Meds acetaminophen (Tylenol) - time given: 0929   Nerve block Not applicable   Intraop Meds ondansetron (Zofran): last given at 1359  Versed 1mg   Local Meds Yes - Local Cocktail (morphine, ropivacaine, epinephrine, Toradol)   Antibiotics cefazolin (Ancef) - last given at 1215     Pain Patient Currently in Pain: denies   PACU meds  Not applicable   PCA / epidural No   Capnography     Telemetry ECG Rhythm: Normal sinus rhythm   Inpatient Telemetry Monitor Ordered? No        Labs Glucose Lab Results   Component Value Date    GLC 95 03/29/2018       Hgb Lab Results   Component Value Date    HGB 14.2 03/29/2018       INR No results found for: INR   PACU Imaging Completed     Wound/Incision Incision/Surgical Site 04/13/18 Right Knee (Active)   Incision Assessment UTV 4/13/2018  2:37 PM   Closure RAFA 4/13/2018  2:37 PM   Incision Drainage Amount None 4/13/2018  2:37 PM   Dressing Intervention Clean, dry, intact 4/13/2018  2:37 PM   Number of days:0      CMS Peripheral Neurovascular WDL: WDL (04/13/18 3397)  All  Extremities Temperature: warm (04/13/18 1437)  All Extremities Color: no discoloration (04/13/18 1437)  All Extremities Sensation: no numbness;no tingling (04/13/18 0910)  LLE Temperature: warm (04/13/18 1437)  LLE Color: no discoloration (04/13/18 1437)  LLE Sensation: numbness present (spinal) (04/13/18 1437)  RLE Temperature: warm (04/13/18 1437)  RLE Color: no discoloration (04/13/18 1437)  RLE Sensation: numbness present (spinal) (04/13/18 1437)   Equipment ice pack and continuous passive motion machine (CPM)   Other LDA       IV Access Peripheral IV 04/13/18 Right Hand (Active)   Site Assessment WDL 4/13/2018  2:37 PM   Line Status Infusing 4/13/2018  2:37 PM   Phlebitis Scale 0-->no symptoms 4/13/2018  2:37 PM   Infiltration Scale 0 4/13/2018  2:37 PM   Infiltration Site Treatment Method  None 4/13/2018  9:20 AM   Extravasation? No 4/13/2018  9:20 AM   Dressing Intervention New dressing  4/13/2018  9:20 AM   Number of days:0      Blood Products Not applicable  mL   Intake/Output Date 04/13/18 0700 - 04/14/18 0659   Shift 0164-2945 0300-1729 0249-1751 24 Hour Total   I  N  T  A  K  E   P.O. 120 100  220    I.V. 1000   1000    Shift Total  (mL/kg) 1120  (15.91) 100  (1.42)  1220  (17.33)   O  U  T  P  U  T   Urine 100   100    Blood 200   200    Shift Total  (mL/kg) 300  (4.26)   300  (4.26)   Weight (kg) 70.4 70.4 70.4 70.4        Drains / Hicks Closed/Suction Drain Right Knee Bulb 15 Kiswahili (Active)   Site Description UTV 4/13/2018  2:37 PM   Dressing Status Normal: Clean, Dry & Intact 4/13/2018  2:37 PM   Drainage Appearance Dark Red 4/13/2018  2:37 PM   Status To bulb suction 4/13/2018  2:37 PM   Number of days:0       Urethral Catheter Double-lumen;Latex;Straight-tip 16 fr (Active)   Collection Container Standard 4/13/2018  2:37 PM   Securement Method Securing device (Describe) 4/13/2018  2:37 PM   Rationale for Continued Use Anesthesia 4/13/2018  2:37 PM   Number of days:0      Time of void PreOp  Void Prior to Procedure: 0850 (04/13/18 0912)    PostOp      Diapered? No   Bladder Scan      mL (apple juice) (04/13/18 1525)  water     Vitals    B/P: 119/71  T: 97.3  F (36.3  C)    Temp src: Axillary  P:  Pulse: 69 (04/13/18 0846)    Heart Rate: 60 (04/13/18 1530)     R: 14  O2:  SpO2: 98 %    O2 Device: Nasal cannula (pt requests for comfort) (04/13/18 1530)    Oxygen Delivery: 2 LPM (04/13/18 1530)         Family/support present significant other   Patient belongings Patient Belongings: cell phone/electronics;clothing;shoes  Disposition of Belongings: Locker   Patient transported on bed   DC meds/scripts (obs/outpt) Not applicable   Inpatient Pain Meds Released? Yes       Special needs/considerations Pt just told RN that percocet makes her throw-up   Tasks needing completion None       Elsa Velasco, RN  [facility] [id number]

## 2018-04-13 NOTE — IP AVS SNAPSHOT
Frankenberg, Connie J #[id number] (CSN: [id number])  (72 year old F)  (Adm: 18)     [id number]               UR 8A: [phone]            Patient Demographics     Patient Name Sex          Age SSN Address Phone    Frankenberg, Connie J Female 1945 (72 year old) [id number]  [facility] [address] [phone] (Home)  [phone] (Mobile) *Preferred*      Emergency Contact(s)     Name Relation Home Work Mobile    KIKO HERNÁNDEZ Significant other [phone] none [phone]      Admission Information     Attending Provider Admitting Provider Admission Type Admission Date/Time    Apollo Nguyen MD Cheng, Edward Y, MD Elective 18  0829    Discharge Date Hospital Service Auth/Cert Status Service Area     Orthopedics [facility]    Unit Room/Bed Admission Status       UR 8A  Admission (Confirmed)       Admission     Complaint    Right Knee Osteoarthritis , S/P total knee arthroplasty      Hospital Account     Name Acct ID Class Status Primary Coverage    Frankenberg, Connie J [id number] Inpatient Open MEDICARE - MEDICARE FOR HB SUPPLEMENT            Guarantor Account (for Hospital Account #[id number])     Name Relation to Pt Service Area Active? Acct Type    Frankenberg, Connie J  FCS Yes Personal/Family    Address Phone          [address] [place] [address] [phone](H)  [phone](O)              Coverage Information (for Hospital Account #[id number])     1. MEDICARE/MEDICARE FOR HB SUPPLEMENT     F/O Payor/Plan Precert #    MEDICARE/MEDICARE FOR HB SUPPLEMENT     Subscriber Subscriber #    Frankenberg, Connie J [id number]    Address Phone    ATTN CLAIMS  [address]  [facility] [place] [address] [phone]          2. MEDICA/MEDICA PRIME SOLUTION     F/O Payor/Plan Precert #    MEDICA/MEDICA PRIME SOLUTION     Subscriber Subscriber #    Frankenberg, Connie J [id number]    Address Phone    [id number]  [place] [address] [phone]                                                      INTERAGENCY TRANSFER FORM - PHYSICIAN ORDERS   4/13/2018                       UR 8A: [phone]            Attending Provider: Apollo Nguyen MD     Allergies:  Codeine, Percocet [Oxycodone-acetaminophen]    Infection:  None   Service:  ORTHOPEDICS    Ht:  1.575 m (5' 2.01\")   Wt:  70.4 kg (155 lb 3.3 oz)   Admission Wt:  70.4 kg (155 lb 3.3 oz)    BMI:  28.38 kg/m 2   BSA:  1.75 m 2            ED Clinical Impression     Diagnosis Description Comment Added By Time Added    Status post total right knee replacement [Z96.651] Status post total right knee replacement [Z96.651]  Chon Sommer 4/14/2018  9:35 AM      Hospital Problems as of 4/16/2018              Priority Class Noted POA    S/P total knee arthroplasty Medium  4/13/2018 Yes      Non-Hospital Problems as of 4/16/2018              Priority Class Noted    Skin cancer, basal cell Medium  Unknown    Hyperlipidemia Medium  Unknown    Classical migraine Medium  Unknown    Insomnia Medium  Unknown    Arthritis of knee Medium  Unknown    Hemorrhoids Medium  Unknown    Vasovagal syncope Medium  Unknown    BCC (basal cell carcinoma), face Medium  10/26/2012    Swelling of lower extremity Medium  Unknown      Code Status History     Date Active Date Inactive Code Status Order ID Comments User Context    4/14/2018  9:39 AM  Full Code [id number]  Chon Sommer Outpatient    4/13/2018  4:26 PM 4/14/2018  9:39 AM Full Code [id number]  Allan Menezes MD Inpatient    6/11/2004  9:01 AM 6/11/2004  9:01 AM  None  Sherron Weber Demographics      Current Code Status     Date Active Code Status Order ID Comments User Context       Prior      Summary of Visit     Reason for your hospital stay       You were admitted to the hospital following your total knee arthroplasty.                Medication Review      START taking        Dose / Directions Comments    " acetaminophen 325 MG tablet   Commonly known as:  TYLENOL        Dose:  650 mg   Take 2 tablets (650 mg) by mouth every 4 hours as needed for mild pain   Quantity:  100 tablet   Refills:  1        HYDROmorphone 2 MG tablet   Commonly known as:  DILAUDID        For pain complaints of 1-5 give 1 tablets, for pain complaints of 6-10 give 2 tablets every 4 hours as needed for pain.   Quantity:  60 tablet   Refills:  0        senna-docusate 8.6-50 MG per tablet   Commonly known as:  SENOKOT-S;PERICOLACE        Dose:  1 tablet   Take 1 tablet by mouth 2 times daily   Quantity:  100 tablet   Refills:  0        warfarin 5 MG tablet   Commonly known as:  COUMADIN        Dose:  5 mg   Take 1 tablet (5 mg) by mouth once for 1 dose INR tomorrow. LOT 28 days. INR goal 1.8-2.5   Quantity:  30 tablet   Refills:  0          CONTINUE these medications which may have CHANGED, or have new prescriptions. If we are uncertain of the size of tablets/capsules you have at home, strength may be listed as something that might have changed.        Dose / Directions Comments    atorvastatin 80 MG tablet   Commonly known as:  LIPITOR   This may have changed:  when to take this   Used for:  Hyperlipidemia LDL goal <130        Dose:  80 mg   Take 1 tablet (80 mg) by mouth daily   Quantity:  90 tablet   Refills:  3          CONTINUE these medications which have NOT CHANGED        Dose / Directions Comments    * COMPRESSION STOCKINGS   Used for:  Venous (peripheral) insufficiency        Dose:  2 each   2 each daily   Quantity:  2 each   Refills:  1    20-30 mm Hg knee high open toe compression hose. Wear daily       * COMPRESSION STOCKINGS   Used for:  PAD (peripheral artery disease) (H)        Dose:  2 each   2 each daily   Quantity:  2 each   Refills:  1    30-40 mm Hg knee high compressions hose, wear daily.       * Notice:  This list has 2 medication(s) that are the same as other medications prescribed for you. Read the directions carefully, and  ask your doctor or other care provider to review them with you.      STOP taking     aspirin 81 MG tablet           butalbital-aspirin-caffeine -40 MG per capsule   Commonly known as:  FIORINAL           EXCEDRIN PO                   After Care     Activity - Up ad mahendra           Advance Diet as Tolerated       Follow this diet upon discharge: Orders Placed This Encounter      Regular Diet Adult      Diet       Diet       You may return to your regular, pre-surgery diet unless instructed otherwise by your provider.       Discharge Instructions       Post Operative Instructions for Connie J Frankenberg is a 72 year old female    Surgery: Right Total Knee Replacement on 4/13.    If you have any questions contact Dr. Nguyen at the following number: [facility] call [phone].    Follow up appointment:   You are scheduled to follow up with Dr. Nguyen approximately 4 weeks after your surgery.           Anticoagulation:   Take coumadin x 4 weeks (INR goal 1.5-2.5).  This is given to help minimize your risk of blood clot. After this you should resume your home aspirin.     Activity:   -Continue to weight bear on your operative leg as tolerated by pain.  As you are more comfortable, you can discontinue use of the walker and transition to a cane.  Once you are comfortable with the cane, you can also wean from the cane and progress to using no assistive devices.  Do not transition until you are comfortable and have good balance.      -Follow the posterior hip precautions you were taught while at the hospital.        Pain Medications:   -Take pain medications as prescribed.  Wean off the the narcotic pain medications (Oxycodone, Dilaudid, etc) as tolerated by pain.  To help with this, take the Tylenol and Celebrex (if prescribed) to minimize the amount of narcotic pain medication you need to take.      -Do not drive while on pain medications or until your leg feels well enough to do so.      Bandage Care and Showering:    -Keep the dressing clean/dry/intact until follow up.  OK to remove on POD #5 to redress/refinforce as needed.      -If no drainage is present along the wound, OK to shower and let soap and water run over the wound at 10-14 days.  If there is any questions, please call the clinic to confirm.  Do not soak or bathe the incision in water.  Do not scrub the incision.  Just let the water from the shower run over the incision.    -You may remove the exposed tails of suture (looks like fishing line) with a scissors at the level of the skin    --Contact Dr. Nguyen or his staff if there is any drainage from the incision or redness.    Leg Swelling and Icing  -Continue icing the knee 5-6 times per day for approximately 20 minutes each time.  This will help with swelling.    -Some swelling in the leg is normal after surgery.  This type of swelling is usually gravity dependent and is improved in the morning after sleeping.  If the swelling is painful in the calf or the swelling is getting worse, contact Dr. Nguyen's office.  We may recommend presenting to the Emergency Department to obtain an ultrasound of the leg if there is concern for a DVT.      -Elevate the leg if you are sitting as this will help reduce swelling.    Contact clinic if you note any of the following:  -Fevers greater than 101.5 chills  -Increased pain, redness, swelling or discharge at the surgical site.   -During regular business hours call Dr Nguyen's office and request to speak with his nurse or the triage nurses.   -After hours or on weekends call the hospital  at [phone] and ask to speak with the Orthopaedic resident on call.       General info for SNF       Length of Stay Estimate: Short Term Care: Estimated # of Days <30  Condition at Discharge: Improving  Level of care:skilled   Rehabilitation Potential: Excellent  Admission H&P remains valid and up-to-date: Yes  Recent Chemotherapy: N/A  Use Nursing Home Standing Orders: Yes       [place]  instructions       Give two-step Mantoux (PPD) Per Facility Policy Yes       Wound care and dressings       You have a clean Aquacel dressing on your surgical wound. This dressing should stay in place for 7 days to allow the incision to heal. After 7 days, you may change the dressing. Please use sterile 4x4 gauze dressings with tape over top to secure the dressing. If the dressing becomes wet or saturated with wound drainage, it is appropriate to change the dressing. If drainage persists from the incision site to the extent that it is frequently saturating the dressing, please contact your clinic nurse.             Follow-Up Appointment Instructions     Adult [facility] Follow-up and recommended labs and tests       You are to return to clinic at 4  weeks after your surgery, you will be scheduled for an appointment with . At this time, AP and lateral knee imaging will be obtained.    If you need to schedule your 4 week postoperative visit or haven't received confirmation regarding these visits, please call one of the following numbers within 7 days of discharge.    For patients that see Dr. nair at:   -[facility]: [phone]       Follow Up and recommended labs and tests       Daily INR goal 1.8-2.5 LOT 28 days. Facility provider to manage warfarin.             Your next 10 appointments already scheduled     May 07, 2018 11:00 AM CDT   (Arrive by 10:45 AM)   Return Visit with Apollo Nair MD   [facility] ([facility])    [address] [place] [address] [id number] [phone]              Statement of Approval     Ordered          04/16/18 1045  I have reviewed and agree with all the recommendations and orders detailed in this document.  EFFECTIVE NOW     Approved and electronically signed by:  Luba Wilks APRN CNP           04/16/18 6706  I have reviewed and agree with all the recommendations and  "orders detailed in this document.     Approved and electronically signed by:  Pernell Cordero MD                                                 INTERAGENCY TRANSFER FORM - NURSING   4/13/2018                       UR 8A: [phone]            Attending Provider: Apollo Nguyen MD     Allergies:  Codeine, Percocet [Oxycodone-acetaminophen]    Infection:  None   Service:  ORTHOPEDICS    Ht:  1.575 m (5' 2.01\")   Wt:  70.4 kg (155 lb 3.3 oz)   Admission Wt:  70.4 kg (155 lb 3.3 oz)    BMI:  28.38 kg/m 2   BSA:  1.75 m 2            Advance Directives        Scanned docmt in ACP Activity?           Yes, scanned ACP docmt        Immunizations     Name Date      HEPA 01/06/16     Influenza (IIV3) PF 10/10/13     Influenza (IIV3) PF 12/26/11     Influenza Vaccine, 3 YRS +, IM (QUADRIVALENT W/PRESERVATIVES) 11/11/15     Pneumo Conj 13-V (2010&after) 04/15/17     Pneumococcal 23 valent 10/24/12     TD (ADULT, 7+) 02/10/95     TDAP Vaccine (Boostrix) 12/17/14     Typhoid IM 01/06/16       ASSESSMENT     Discharge Profile Flowsheet     DISCHARGE NEEDS ASSESSMENT     Inspection under devices  Full except (identify device(s) not inspected) 04/16/18 1051    Equipment Currently Used at Home  none 04/14/18 1357   Not Inspected under devices  -- (UTV under surgical dressing) 04/16/18 1051    Transportation Available  Medicaid transportation 04/14/18 1206   Skin WDL  ex 04/16/18 0426    GASTROINTESTINAL (ADULT,PEDIATRIC,OB)     Skin Temperature  warm 04/16/18 1051    GI WDL  WDL 04/16/18 1051   Skin Moisture  dry 04/16/18 1051    Last Bowel Movement  04/16/18 04/16/18 1051   Skin Elasticity  quick return to original state 04/16/18 1051    Passing flatus  yes 04/16/18 1051   Skin Integrity  incision(s) 04/16/18 1051    COMMUNICATION ASSESSMENT     SAFETY      Patient's communication style  spoken language (English or Bilingual) 03/29/18 0848   Safety WDL  WDL 04/16/18 1056    SKIN     Safety Factors  bed in low " "position;wheels locked;call light in reach 04/16/18 1056    Inspection of bony prominences  Full 04/16/18 1051   All Alarms  alarm(s) activated and audible 04/15/18 1442    Full except areas not inspected   Buttock, left;Buttock, right;Sacrum;Coccyx 04/16/18 1051                      Assessment WDL (Within Defined Limits) Definitions           Safety WDL     Effective: 09/28/15    Row Information: <b>WDL Definition:</b> Bed in low position, wheels locked; call light in reach; upper side rails up x 2; ID band on<br> <font color=\"gray\"><i>Item=AS safety wdl>>List=AS safety wdl>>Version=F14</i></font>      Skin WDL     Effective: 09/28/15    Row Information: <b>WDL Definition:</b> Warm; dry; intact; elastic; without discoloration; pressure points without redness<br> <font color=\"gray\"><i>Item=AS skin wdl>>List=AS skin wdl>>Version=F14</i></font>      Vitals     Vital Signs Flowsheet     VITAL SIGNS     Change in Pain  getting better 04/16/18 1251    Temp  97.6  F (36.4  C) 04/16/18 0804   Pain Control  partially effective 04/16/18 1251    Temp src  Oral 04/16/18 0533   Functioning  can do most things, but pain gets in the way of some 04/16/18 1251    Resp  16 04/16/18 0804   Sleep  awake with occasional pain 04/16/18 1251    Pulse  69 04/16/18 0804   ANALGESIA SIDE EFFECTS MONITORING      Heart Rate  74 04/16/18 0533   Side Effects Monitoring: Respiratory Quality  R 04/16/18 1251    Pulse/Heart Rate Source  Monitor 04/16/18 0533   Side Effects Monitoring: Respiratory Depth  N 04/16/18 1251    BP  148/89 04/16/18 0804   Side Effects Monitoring: Sedation Level  1 04/16/18 1251    BP Location  Left arm 04/16/18 0533   HEIGHT AND WEIGHT      Patient Position  Lying 04/13/18 1608   Height  1.575 m (5' 2.01\") 04/13/18 0846    OXYGEN THERAPY     Weight  70.4 kg (155 lb 3.3 oz) 04/13/18 0846    SpO2  97 % 04/16/18 0804   BSA (Calculated - sq m)  1.75 04/13/18 0846    O2 Device  None (Room air) 04/16/18 0804   BMI (Calculated)  " 28.44 04/13/18 0846    Oxygen Delivery  1.5 LPM 04/14/18 0727   POSITIONING      RESPIRATORY MONITORING     Body Position  independently positioning 04/16/18 1056    Respiratory Monitoring (EtCO2)  35 mmHg 04/14/18 0413   Head of Bed (HOB)  HOB at 20-30 degrees 04/16/18 1056    Integrated Pulmonary Index (IPI)  8-9 04/14/18 0413   Chair  Upright in chair 04/14/18 1513    PAIN/COMFORT     ECG      Patient Currently in Pain  yes 04/16/18 1139   ECG Rhythm  Sinus rhythm 04/13/18 1608    Preferred Pain Scale  CAPA (Clinically Aligned Pain Assessment) ([facility] Adults Only) 04/16/18 1139   Ectopy  None 04/13/18 1608    Pain Location  Leg 04/16/18 1139   Lead Monitored  Lead II;V 1 04/13/18 1608    Pain Orientation  Right 04/16/18 1139   DAILY CARE      Pain Descriptors  Aching 04/16/18 1139   Activity Management  ambulated to bathroom 04/16/18 1056    Pain Intervention(s)  Medication (See eMAR) 04/16/18 1139   Activity Assistance Provided  assistance, 1 person 04/16/18 1056    CLINICALLY ALIGNED PAIN ASSESSMENT (CAPA) ([facility] ADULTS ONLY)     Assistive Device Utilized  gait belt;walker 04/15/18 2259    Comfort  tolerable with discomfort 04/16/18 1139                 Patient Lines/Drains/Airways Status    Active LINES/DRAINS/AIRWAYS     Name: Placement date: Placement time: Site: Days: Last dressing change:    Incision/Surgical Site 04/13/18 Right Knee 04/13/18   1254    3             Patient Lines/Drains/Airways Status    Active PICC/CVC     None            Intake/Output Detail Report     Date Intake     Output     Net    Shift P.O. I.V. IV Piggyback Total Urine Drains Blood Total       Day 04/15/18 0000 - 04/15/18 0659 -- -- -- -- -- 35 -- 35 -35    Anne-Marie 04/15/18 0700 - 04/15/18 1459 -- -- -- -- -- -- -- -- 0    Noc 04/15/18 1500 - 04/15/18 2359 480 -- -- 480 -- -- -- -- 480    Day 04/16/18 0000 - 04/16/18 0659 -- -- -- -- -- -- -- -- 0    Anne-Marie 04/16/18 0700 - 04/16/18 1459 -- -- -- --  -- -- -- -- 0      Last Void/BM       Most Recent Value    Urine Occurrence 1 at 04/16/2018 0537    Stool Occurrence 1 at 04/15/2018 1930      Case Management/Discharge Planning     Case Management/Discharge Planning Flowsheet     LIVING ENVIRONMENT     Equipment Currently Used at Home  none 04/14/18 1357    Lives With  significant other (Noble stefanoburton) 04/14/18 1357   ABUSE RISK SCREEN      Living Arrangements  house 04/14/18 1357   QUESTION TO PATIENT:  Has a member of your family or a partner(now or in the past) intimidated, hurt, manipulated, or controlled you in any way?  no 04/13/18 0910    COPING/STRESS     QUESTION TO PATIENT: Do you feel safe going back to the place where you are living?  yes 04/13/18 0910    Major Change/Loss/Stressor  none 04/16/18 1103   OBSERVATION: Is there reason to believe there has been maltreatment of a vulnerable adult (ie. Physical/Sexual/Emotional abuse, self neglect, lack of adequate food, shelter, medical care, or financial exploitation)?  no 04/13/18 0910    DISCHARGE PLANNING     OTHER      Transportation Available  Medicaid transportation 04/14/18 1206   Are you depressed or being treated for depression?  No 04/13/18 1854    FINAL RESOURCES                         UR 8A: [phone]            Medication Administration Report for Frankenberg, Connie J as of 04/16/18 1302   Legend:    Given Hold Not Given Due Canceled Entry Other Actions    Time Time (Time) Time  Time-Action       Inactive    Active    Linked        Medications 04/10/18 04/11/18 04/12/18 04/13/18 04/14/18 04/15/18 04/16/18    acetaminophen (TYLENOL) tablet 650 mg  Dose: 650 mg  Freq: EVERY 4 HOURS PRN Route: PO  PRN Reason: other  PRN Comment: multimodal surgical pain management along with NSAIDS and opioid medication as indicated based on pain control and physical function.  Start: 04/16/18 0000   Admin Instructions: May give first dose 4 hours after last scheduled dose of acetaminophen.  Maximum  acetaminophen dose from all sources = 75 mg/kg/day not to exceed 4 grams/day.    Admin. Amount: 2 tablet (2 × 325 mg tablet)  Dispense Loc: [facility]  POC: Post-procedure               atorvastatin (LIPITOR) tablet 80 mg  Dose: 80 mg  Freq: AT BEDTIME Route: PO  Start: 04/13/18 2200   Admin. Amount: 4 tablet (4 × 20 mg tablet)  Last Admin: 04/15/18 2316  Dispense Loc: [facility]        2205 (80 mg)-Given        2220 (80 mg)-Given        2316 (80 mg)-Given        [ ] 2200           benzocaine-menthol (CEPACOL) 15-3.6 MG lozenge 1-2 lozenge  Dose: 1-2 lozenge  Freq: EVERY 1 HOUR PRN Route: BU  PRN Reason: sore throat  PRN Comment: sore throat without fever  Start: 04/13/18 1626   Admin. Amount: 1-2 lozenge  Dispense Loc: [facility]  POC: Post-procedure               bisacodyl (DULCOLAX) Suppository 10 mg  Dose: 10 mg  Freq: DAILY Route: RE  Start: 04/14/18 0800   Admin Instructions: Start POD 1.  May discontinue if patient having bowel movement.    Admin. Amount: 1 suppository (1 × 10 mg suppository)  Dispense Loc: [facility]  POC: Post-procedure         (0848)-Not Given        (1009)-Not Given        (0837)-Not Given           diphenhydrAMINE (BENADRYL) solution 12.5 mg  Dose: 12.5 mg  Freq: EVERY 6 HOURS PRN Route: PO  PRN Reason: itching  Start: 04/13/18 1626   Admin Instructions: Caution to be used when administering multiple CNS depressing meds within a short time frame.    Admin. Amount: 12.5 mg = 5 mL Conc: 2.5 mg/mL  Last Admin: 04/15/18 2039  Dispense Loc: [facility]  Volume: 10 mL  POC: Post-procedure          2039 (12.5 mg)-Given           Or  diphenhydrAMINE (BENADRYL) injection 12.5 mg  Dose: 12.5 mg  Freq: EVERY 6 HOURS PRN Route: IV  PRN Reason: itching  PRN Comment: Only give if patient unable to take PO.  Start: 04/13/18 3783   Admin Instructions: Caution to be used when administering multiple CNS depressing meds within a short time frame.  For ordered doses up  to 50 mg, give IV Push undiluted. Give each 25mg over a minimum of 1 minute. Extend in non-emergency    Admin. Amount: 12.5 mg = 0.25 mL Conc: 50 mg/mL  Dispense Loc: [facility]  Infused Over: 1-2 Minutes  Volume: 1 mL  POC: Post-procedure                      HYDROmorphone (DILAUDID) half-tab 1-2 mg  Dose: 1-2 mg  Freq: EVERY 3 HOURS PRN Route: PO  PRN Reason: moderate to severe pain  Start: 04/13/18 1654   Admin. Amount: 1-2 half-tab (1-2 × 1 mg half-tab)  Last Admin: 04/16/18 1137  Dispense Loc: [facility]        1926 (1 mg)-Given       2043 (1 mg)-Given       2253 (1 mg)-Given        0051 (1 mg)-Given       0411 (2 mg)-Given       0836 (2 mg)-Given       1202 (2 mg)-Given       1659 (2 mg)-Given       2040 (2 mg)-Given       2357 (2 mg)-Given        0307 (2 mg)-Given       0611 (2 mg)-Given       0943 (2 mg)-Given       1323 (2 mg)-Given       1702 (2 mg)-Given       2035 (1 mg)-Given       2313 (1 mg)-Given        0213 (1 mg)-Given       0533 (1 mg)-Given       0833 (1 mg)-Given       1137 (1 mg)-Given           HYDROmorphone (PF) (DILAUDID) injection 0.5 mg  Dose: 0.5 mg  Freq: EVERY 2 HOURS PRN Route: IV  PRN Reason: other  PRN Comment: breakthrough pain nonresponsive to po pain meds  Start: 04/13/18 1626   Admin Instructions: For ordered doses up to 4 mg give IV Push undiluted. Administer each 2mg over 2-5 minutes.    Admin. Amount: 0.5 mg  Dispense Loc: [facility]               ketorolac (TORADOL) injection 15 mg  Dose: 15 mg  Freq: EVERY 6 HOURS PRN Route: IV  PRN Reason: moderate to severe pain  Start: 04/15/18 1054   Admin Instructions: Can cause pain on injection. Administer through a running maintenance fluid over 1 minute followed by a flush. If patient complains of pain on injection, may dilute 15-30 mg in 5 mL and push over 1 to 2 minutes.    Admin. Amount: 15 mg = 1 mL Conc: 15 mg/mL  Last Admin: 04/15/18 1323  Dispense Loc: [facility]  Administrations Remaining:  "3  Volume: 1 mL          1323 (15 mg)-Given            lidocaine (LMX4) kit  Freq: EVERY 1 HOUR PRN Route: Top  PRN Reason: pain  PRN Comment: with VAD insertion or accessing implanted port.  Start: 04/13/18 1626   Admin Instructions: Do NOT give if patient has a history of allergy to any local anesthetic or any \"nikia\" product.   Apply 30 minutes prior to VAD insertion or port access.  MAX Dose:  2.5 g (  of 5 g tube)    Dispense Loc: [facility] Floor Stock  POC: Post-procedure               lidocaine 1 % 1 mL  Dose: 1 mL  Freq: EVERY 1 HOUR PRN Route: OTHER  PRN Comment: mild pain with VAD insertion or accessing implanted port  Start: 04/13/18 1626   Admin Instructions: Do NOT give if patient has a history of allergy to any local anesthetic or any \"nikia\" product. MAX dose 1 mL subcutaneous OR intradermal in divided doses.    Admin. Amount: 1 mL  Dispense Loc: [facility] ADS 8AEAST  Volume: 2 mL  POC: Post-procedure               melatonin tablet 1 mg  Dose: 1 mg  Freq: AT BEDTIME PRN Route: PO  PRN Reason: sleep  Start: 04/14/18 2000   Admin Instructions: POD 1.  Do not give unless at least 6 hours of uninterrupted sleep is expected.    Admin. Amount: 1 tablet (1 × 1 mg tablet)  Dispense Loc: [facility] ADS [facility]  POC: Post-procedure               metoclopramide (REGLAN) tablet 5 mg  Dose: 5 mg  Freq: EVERY 6 HOURS PRN Route: PO  PRN Reasons: nausea,vomiting  Start: 04/13/18 1626   Admin Instructions: This is Step 3 of nausea and vomiting management.  Give if nausea not resolved 15 minutes after giving prochlorperazine (COMPAZINE).  If nausea not resolved in 15-30 minutes, Notify provider.    Admin. Amount: 1 tablet (1 × 5 mg tablet)  Dispense Loc: [facility] Main [facility]acy  POC: Post-procedure              Or  metoclopramide (REGLAN) injection 5 mg  Dose: 5 mg  Freq: EVERY 6 HOURS PRN Route: IV  PRN Reasons: nausea,vomiting  Start: 04/13/18 1626   Admin Instructions: This is Step 3 of nausea and vomiting management.  Give " if nausea not resolved 15 minutes after giving prochlorperazine (COMPAZINE).  If nausea not resolved in 15-30 minutes, Notify provider.  Avoid use if patient has full bowel obstruction or perforation. Irritant. For ordered doses up to 10 mg, give IV Push undiluted over 2 minutes.    Admin. Amount: 5 mg = 1 mL Conc: 5 mg/mL  Dispense Loc: [facility] ADS [facility]  Infused Over: 2 Minutes  Volume: 2 mL  POC: Post-procedure               naloxone (NARCAN) injection 0.1-0.4 mg  Dose: 0.1-0.4 mg  Freq: EVERY 2 MIN PRN Route: IV  PRN Reason: opioid reversal  Start: 04/13/18 1626   Admin Instructions: For respiratory rate LESS than or EQUAL to 8.  Partial reversal dose:  0.1 mg titrated q 2 minutes for Analgesia Side Effects Monitoring Sedation Level of 3 (frequently drowsy, arousable, drifts to sleep during conversation).Full reversal dose:  0.4 mg bolus for Analgesia Side Effects Monitoring Sedation Level of 4 (somnolent, minimal or no response to stimulation).  For ordered doses up to 2mg give IVP. Give each 0.4mg over 15 seconds in emergency situations. For non-emergent situations further dilute in 9mL of NS to facilitate titration of response.    Admin. Amount: 0.1-0.4 mg = 0.25-1 mL Conc: 0.4 mg/mL  Dispense Loc: [facility] ADS [facility]  Volume: 1 mL  POC: Post-procedure               ondansetron (ZOFRAN-ODT) ODT tab 4 mg  Dose: 4 mg  Freq: EVERY 6 HOURS PRN Route: PO  PRN Reasons: nausea,vomiting  Start: 04/13/18 1626   Admin Instructions: This is Step 1 of nausea and vomiting management.  If nausea not resolved in 15 minutes, go to Step 2 prochlorperazine (COMPAZINE). Do not push through foil backing. Peel back foil and gently remove. Place on tongue immediately. Administration with liquid unnecessary  With dry hands, peel back foil backing and gently remove tablet; do not push oral disintegrating tablet through foil backing; administer immediately on tongue and oral disintegrating tablet dissolves in seconds; then swallow  with saliva; liquid not required.    Admin. Amount: 1 tablet (1 × 4 mg tablet)  Dispense Loc: [facility] AD[facility]  POC: Post-procedure              Or  ondansetron (ZOFRAN) injection 4 mg  Dose: 4 mg  Freq: EVERY 6 HOURS PRN Route: IV  PRN Reasons: nausea,vomiting  Start: 04/13/18 1626   Admin Instructions: This is Step 1 of nausea and vomiting management.  If nausea not resolved in 15 minutes, go to Step 2 prochlorperazine (COMPAZINE).  Irritant. For ordered doses up to 4 mg, give IV Push undiluted over 2-5 minutes.    Admin. Amount: 4 mg = 2 mL Conc: 4 mg/2 mL  Dispense Loc: [facility] AD[facility]  Infused Over: 2-5 Minutes  Volume: 2 mL  POC: Post-procedure               prochlorperazine (COMPAZINE) injection 5 mg  Dose: 5 mg  Freq: EVERY 6 HOURS PRN Route: IV  PRN Reasons: nausea,vomiting  Start: 04/13/18 1626   Admin Instructions: This is Step 2 of nausea and vomiting management.   If nausea not resolved in 15 minutes, give metoclopramide (REGLAN) if ordered (step 3 of nausea and vomiting management)  For ordered doses up to 10 mg, give IV Push undiluted. Each 5mg over 1 minute.    Admin. Amount: 5 mg = 1 mL Conc: 5 mg/mL  Dispense Loc: [facility] AD[facility]  Infused Over: 1-2 Minutes  Volume: 1 mL  POC: Post-procedure              Or  prochlorperazine (COMPAZINE) tablet 5 mg  Dose: 5 mg  Freq: EVERY 6 HOURS PRN Route: PO  PRN Reasons: nausea,vomiting  Start: 04/13/18 1626   Admin Instructions: This is Step 2 of nausea and vomiting management.   If nausea not resolved in 15 minutes, give metoclopramide (REGLAN) if ordered (step 3 of nausea and vomiting management)    Admin. Amount: 1 tablet (1 × 5 mg tablet)  Dispense Loc: [facility]  POC: Post-procedure               senna-docusate (SENOKOT-S;PERICOLACE) 8.6-50 MG per tablet 1 tablet  Dose: 1 tablet  Freq: 2 TIMES DAILY Route: PO  Start: 04/13/18 2000   Admin Instructions: If no bowel movement in 24 hours, increase to 2 tablets PO.  Hold for loose stools.     Admin. Amount: 1 tablet  Last Admin: 04/15/18 2030  Dispense Loc: [facility] ADS [facility]  POC: Post-procedure        (2012)-Not Given                              2030 (1 tablet)-Given               [ ] 2000          Or  senna-docusate (SENOKOT-S;PERICOLACE) 8.6-50 MG per tablet 2 tablet  Dose: 2 tablet  Freq: 2 TIMES DAILY Route: PO  Start: 04/13/18 2000   Admin Instructions: Hold for loose stools.    Admin. Amount: 2 tablet  Last Admin: 04/15/18 0942  Dispense Loc: [facility] ADS [facility]  POC: Post-procedure                0841 (2 tablet)-Given       1921 (2 tablet)-Given        0942 (2 tablet)-Given               (0837)-Not Given       [ ] 2000           sodium chloride (PF) 0.9% PF flush 3 mL  Dose: 3 mL  Freq: EVERY 8 HOURS Route: IK  Start: 04/13/18 1630   Admin Instructions: And Q1H PRN, to lock peripheral IV dormant line.    Admin. Amount: 3 mL  Last Admin: 04/16/18 0834  Dispense Loc: [facility] Floor Stock  Volume: 3 mL  POC: Post-procedure        1711 (3 mL)-Given        (0249)-Not Given       (0848)-Not Given       1644 (3 mL)-Given        0235 (3 mL)-Given       1216 (3 mL)-Given               0213 (3 mL)-Given       0834 (3 mL)-Given       [ ] 1630           sodium chloride (PF) 0.9% PF flush 3 mL  Dose: 3 mL  Freq: EVERY 1 HOUR PRN Route: IK  PRN Reason: line flush  PRN Comment: for peripheral IV flush post IV meds  Start: 04/13/18 1626   Admin. Amount: 3 mL  Dispense Loc: [facility] Floor Stock  Volume: 3 mL  POC: Post-procedure               Warfarin Therapy Reminder (Check START DATE - warfarin may be starting in the FUTURE)  Dose: 1 each  Freq: CONTINUOUS PRN Route: XX  Start: 04/13/18 1626   Admin Instructions: Reorder warfarin (COUMADIN) daily  Patient is on Warfarin Therapy - check for daily order    Admin. Amount: 1 each  Dispense Loc: [facility]acy  POC: Post-procedure              Future Medications  Medications 04/10/18 04/11/18 04/12/18 04/13/18 04/14/18 04/15/18 04/16/18       warfarin  (COUMADIN) tablet 5 mg  Dose: 5 mg  Freq: ONCE AT 6PM Route: PO  Start: 18 1800   Admin. Amount: 1 tablet (1 × 5 mg tablet)  Dispense Loc: [facility] ADS [facility]  Administrations Remainin           [ ] 1800          Completed Medications  Medications 04/10/18 04/11/18 04/12/18 04/13/18 04/14/18 04/15/18 04/16/18         Dose: 975 mg  Freq: EVERY 8 HOURS Route: PO  Start: 18 1700   End: 18 0831   Admin Instructions: Do not use if patient has an active opioid/acetaminophen combined analgesic product ordered for pain.  Maximum acetaminophen dose from all sources = 75 mg/kg/day not to exceed 4 grams/day.    Admin. Amount: 3 tablet (3 × 325 mg tablet)  Last Admin: 18 0831  Dispense Loc: [facility] ADS [facility]  Administrations Remainin  POC: Post-procedure        1713 (975 mg)-Given        0051 (975 mg)-Given       0835 (975 mg)-Given       1629 (975 mg)-Given        0234 (975 mg)-Given       0942 (975 mg)-Given       1702 (975 mg)-Given        0213 (975 mg)-Given       0831 (975 mg)-Given             Dose: 1 g  Freq: EVERY 8 HOURS Route: IV  Indications of Use: PERIOPERATIVE PHARMACOPROPHYLAXIS  Last Dose: 1 g (18)  Start: 18   End: 18 0703   Admin Instructions: First post-op dose due 8 hours after intra-op dose, see eMAR.    Admin. Amount: 1 g  Last Admin: 18 0633  Dispense Loc: [facility] ADS [facility]  Infused Over: 30 Minutes  Administrations Remainin  POC: Post-procedure         (1 g)-New Bag        0633 (1 g)-New Bag               Dose: 15 mg  Freq: EVERY 6 HOURS Route: IV  Start: 18 1100   End: 04/15/18 0612   Admin Instructions: Can cause pain on injection. Administer through a running maintenance fluid over 1 minute followed by a flush. If patient complains of pain on injection, may dilute 15-30 mg in 5 mL and push over 1 to 2 minutes.    Admin. Amount: 15 mg = 1 mL Conc: 15 mg/mL  Last Admin: 04/15/18 0612  Dispense Loc: [facility] ADS  [facility]  Administrations Remainin  Volume: 1 mL         1205 (15 mg)-Given       1628 (15 mg)-Given       2225 (15 mg)-Given        0612 (15 mg)-Given              Dose: 15 mg  Freq: EVERY 6 HOURS Route: IV  Start: 18 170   End: 18   Admin Instructions: Can cause pain on injection. Administer through a running maintenance fluid over 1 minute followed by a flush. If patient complains of pain on injection, may dilute 15-30 mg in 5 mL and push over 1 to 2 minutes.    Admin. Amount: 15 mg = 1 mL Conc: 15 mg/mL  Last Admin: 18  Dispense Loc: [facility] ADS [facility]  Administrations Remainin  Volume: 1 mL        1711 (15 mg)-Given       2238 (15 mg)-Given        0625 (15 mg)-Given               Freq: ONCE Route: IX  Start: 18 131   End: 18   Last Admin: 18  Dispense Loc: [facility]  Administrations Remainin  Volume: 50 mL  POC: Intra-procedure   Mixture Administration Information:   Medication Type Amount   ropivacaine 5 MG/ML SOLN Medications 200 mg   EPINEPHrine 1 MG/ML SOLN Medications 300 mcg   ketorolac 30 MG/ML SOLN Medications 15 mg   morphine 10 MG/ML SOLN Medications 2.5 mg   sodium chloride 0.9 % SOLN QS Base 8.95 mL                [ ] 1315       1354 ( )-Given [C]                Dose: 4 mg  Freq: ONCE AT 6PM Route: PO  Start: 04/15/18 1800   End: 04/15/18 1709   Admin. Amount: 2 tablet (2 × 2 mg tablet)  Last Admin: 04/15/18 1709  Dispense Loc: [facility] ADS [facility]  Administrations Remainin          1709 (4 mg)-Given              Dose: 4 mg  Freq: ONCE AT 6PM Route: PO  Start: 18 1800   End: 18   Admin. Amount: 2 tablet (2 × 2 mg tablet)  Last Admin: 18  Dispense Loc: [facility] ADS [facility]  Administrations Remainin         5 (4 mg)-Given               Dose: 4 mg  Freq: ONCE AT 6PM Route: PO  Start: 18 1800   End: 18   Admin. Amount: 2 tablet (2 × 2 mg tablet)  Last Admin: 18  1832  Dispense Loc: [facility] ADS [facility]  Administrations Remainin        1832 (4 mg)-Given             Discontinued Medications  Medications 04/10/18 04/11/18 04/12/18 04/13/18 04/14/18 04/15/18 04/16/18         Dose: 975 mg  Freq: ONCE Route: PO  Start: 18 0900   End: 18 1628   Admin Instructions: Maximum acetaminophen dose from all sources = 75 mg/kg/day not to exceed 4 grams/day.    Admin. Amount: 3 tablet (3 × 325 mg tablet)  Dispense Loc: [facility] ADS [facility]  Administrations Remainin               1628-Med Discontinued            Dose: 25-50 mcg  Freq: EVERY 2 MIN PRN Route: IV  PRN Reason: other  PRN Comment: acute pain  Start: 18   End: 18   Admin Instructions: MAX cumulative dose = 250 mcg.   Use fentaNYL (SUBLIMAZE) initially, as a short acting agent for acute pain control. If insufficient, or a longer acting agent is needed, begin morphine or HYDROmorphone (DILAUDID) if ordered.  For ordered doses up to 100 mcg give IV Push undiluted over a minimum of 3-5 minutes.    Admin. Amount: 25-50 mcg = 0.5-1 mL Conc: 50 mcg/mL  Dispense Loc: [facility] PREOP/PACU  Volume: 2 mL  POC: PACU        1614-Med Discontinued            Dose: 0.3-0.5 mg  Freq: EVERY 5 MIN PRN Route: IV  PRN Reason: other  PRN Comment: acute pain.  May administer if Respiratory Rate is greater than 10  Start: 18   End: 18   Admin Instructions: Max cumulative dose = 2 mg  If fentaNYL (SUBLIMAZE) is also ordered, use HYDROmorphone (DILAUDID) if pain control insufficient with fentaNYL (SUBLIMAZE) or a longer acting agent is needed.  For ordered doses up to 4 mg give IV Push undiluted. Administer each 2mg over 2-5 minutes.    Admin. Amount: 0.3-0.5 mg  Dispense Loc: [facility] ADS PREOP/PACU  POC: PACU        1614-Med Discontinued            Rate: 100 mL/hr   Freq: CONTINUOUS Route: IV  Last Dose: Stopped (18 0847)  Start: 18 1630   End: 04/15/18 1054   Admin Instructions:  "Discontinue when patient PO intake is GREATER than 500 mL per shift.    Last Admin: 04/14/18 0248  Dispense Loc: [facility] Floor Stock  Volume: 1,000 mL  POC: Post-procedure        1707 ( )-New Bag        0248 ( )-New Bag       0847-Stopped        1054-Med Discontinued          Rate: 25 mL/hr   Freq: CONTINUOUS Route: IV  Start: 04/13/18 0900   End: 04/13/18 1654   Admin Instructions: IF patient NOT on dialysis.    Last Admin: 04/13/18 1425  Dispense Loc: [facility] Floor Stock  Volume: 1,000 mL        1159 ( )-New Bag       1225 ( )-Anesthesia Volume Adjustment       1425 ( )-New Bag       1654-Med Discontinued            Rate: 100 mL/hr   Freq: CONTINUOUS Route: IV  Start: 04/13/18 1445   End: 04/13/18 1614   Admin Instructions: Continue until IV catheter is weaned    Dispense Loc: [facility] ADS PREOP/PACU  Volume: 1,000 mL  POC: PACU               1614-Med Discontinued            Dose: 1 mL  Freq: EVERY 1 HOUR PRN Route: OTHER  PRN Comment: mild pain with VAD insertion or accessing implanted port  Start: 04/13/18 0852   End: 04/13/18 1629   Admin Instructions: Do NOT give if patient has a history of allergy to any local anesthetic or any \"nikia\" product. MAX dose 1 mL subcutaneous OR intradermal in divided doses.    Admin. Amount: 1 mL  Dispense Loc: [facility] ADS [facility]  Volume: 2 mL        1629-Med Discontinued            Dose: 0.1-0.4 mg  Freq: EVERY 2 MIN PRN Route: IV  PRN Reason: opioid reversal  Start: 04/13/18 1626   End: 04/13/18 1628   Admin Instructions: For apnea or imminent respiratory arrest: give 0.4 mg IV undiluted Q 2 minutes PRN until desired degree of reversal is obtained, stop opioid and notify provider. Continue monitoring until discharge criteria are met for a minimum of 2 hours.  For severe sedation, decrease in respiratory depth, quality or respiratory rate less than 8: give 0.1 mg IV Q 2 minutes x 3 doses, stop opioid and notify provider.  Try to minimize reversal of analgesia especially in " end-of-life patients  For ordered doses up to 2mg give IVP. Give each 0.4mg over 15 seconds in emergency situations. For non-emergent situations further dilute in 9mL of NS to facilitate titration of response.    Admin. Amount: 0.1-0.4 mg = 0.25-1 mL Conc: 0.4 mg/mL  Volume: 1 mL  POC: Post-procedure        1628-Med Discontinued            Dose: 20 mg  Freq: EVERY MORNING BEFORE BREAKFAST Route: PO  Start: 1845   End: 18   Admin. Amount: 1 capsule (1 × 20 mg capsule)  Dispense Loc: [facility] ADS [facility]         53-Med Discontinued  (1132)-Not Given               Dose: 4 mg  Freq: EVERY 30 MIN PRN Route: PO  PRN Reason: nausea  Start: 18   End: 18   Admin Instructions: MAX total dose = 8 mg, including OR dosing. If not resolved in 15 minutes, then go to step 2 [prochlorperazine (COMPAZINE), if ordered].  With dry hands, peel back foil backing and gently remove tablet; do not push oral disintegrating tablet through foil backing; administer immediately on tongue and oral disintegrating tablet dissolves in seconds; then swallow with saliva; liquid not required.    Admin. Amount: 1 tablet (1 × 4 mg tablet)  Dispense Loc: [facility]  Administrations Remainin  POC: PACU        1614-Med Discontinued         Or    Dose: 4 mg  Freq: EVERY 30 MIN PRN Route: IV  PRN Reason: nausea  Start: 18   End: 18   Admin Instructions: MAX total dose = 8 mg, including OR dosing. If not resolved in 15 minutes, then go to step 2 [prochlorperazine (COMPAZINE), if ordered].  Irritant. For ordered doses up to 4 mg, give IV Push undiluted over 2-5 minutes.    Admin. Amount: 4 mg = 2 mL Conc: 4 mg/2 mL  Dispense Loc: [facility] ADS PREOP/PACU  Infused Over: 2-5 Minutes  Administrations Remainin  Volume: 2 mL  POC: PACU        1614-Med Discontinued            Dose: 5-10 mg  Freq: EVERY 3 HOURS PRN Route: PO  PRN Reason: other  PRN Comment: pain control or improvement in  physical function. Hold dose for analgesic side effects.  Start: 04/13/18 1445   End: 04/13/18 1654   Admin Instructions: Start with the lowest dose. May adjust dose by 5 mg every 3 hours as needed. Notify provider to assess for uncontrolled pain or analgesic side effects. Hold while on PCA or with regular IV opioid dosing. Maximum total is 80 mg in 24 hours.    Admin. Amount: 1-2 tablet (1-2 × 5 mg tablet)  POC: Post-procedure        1654-Med Discontinued            Dose: 5 mg  Freq: EVERY 6 HOURS PRN Route: IV  PRN Reasons: nausea,vomiting  Start: 04/13/18 1444   End: 04/13/18 1614   Admin Instructions: This is Step 2 of the nausea and vomiting protocol.   If nausea not resolved in 15 minutes, give metoclopramide (REGLAN) if ordered (step 3 of nausea and vomiting protocol)  For ordered doses up to 10 mg, give IV Push undiluted. Each 5mg over 1 minute.    Admin. Amount: 5 mg = 1 mL Conc: 5 mg/mL  Dispense Loc: [facility] ADS PREOP/PACU  Infused Over: 1-2 Minutes  Volume: 1 mL  POC: PACU        1614-Med Discontinued            Dose: 3 mL  Freq: EVERY 8 HOURS Route: IK  Start: 04/13/18 0900   End: 04/14/18 0828   Admin Instructions: And Q1H PRN, to lock peripheral IV dormant line.    Admin. Amount: 3 mL  Dispense Loc: [facility] Floor Stock  Volume: 3 mL        [ ] 0900               (0250)-Not Given       0828-Med Discontinued           Dose: 3 mL  Freq: EVERY 1 HOUR PRN Route: IK  PRN Reason: line flush  PRN Comment: for peripheral IV flush post IV meds  Start: 04/13/18 0852   End: 04/14/18 0828   Admin. Amount: 3 mL  Dispense Loc: [facility] Floor Stock  Volume: 3 mL         0828-Med Discontinued           Freq: PRN  Start: 04/13/18 1354   End: 04/13/18 1614   Last Admin: 04/13/18 1354  POC: Intra-procedure        1354 (1,000 mL)-Given       1614-Med Discontinued            Freq: PRN  Start: 04/13/18 1354   End: 04/13/18 1614   Last Admin: 04/13/18 1354  POC: Intra-procedure        1354 (300 mL)-Given       1614-Med  Discontinued            Dose: 1 g  Freq: ONCE Route: IV  Start: 18 1315   End: 18 0828   Admin Instructions: Once at beginning of procedure and once at closure  Each 1 gram to be infused over 10 minutes.    Admin. Amount: 1 g  Dispense Loc: [facility]  Administrations Remainin  Volume: 50 mL   Mixture Administration Information:   Medication Type Amount   tranexamic acid 1000 MG/10ML SOLN Medications 1 g   sodium chloride 0.9 % SOLN Base 50 mL                [ ] 1315        0828-Med Discontinued           Dose: 1 g  Freq: ONCE Route: IV  Start: 18 1100   End: 18 0828   Admin Instructions: Administer after anesthesia induction  Each 1 gram to be infused over 10 minutes.    Admin. Amount: 1 g  Dispense Loc: [facility]  Administrations Remainin  Volume: 50 mL   Mixture Administration Information:   Medication Type Amount   tranexamic acid 1000 MG/10ML SOLN Medications 1 g   sodium chloride 0.9 % SOLN Base 50 mL                [ ] 1100        0828-Med Discontinued      Medications 04/10/18 04/11/18 04/12/18 04/13/18 04/14/18 04/15/18 04/16/18               INTERAGENCY TRANSFER FORM - NOTES (H&P, Discharge Summary, Consults, Procedures, Therapies)   2018                        8A: [phone]               History & Physicals      H&P signed by Neftaly Grande at 2018 11:48 AM      Author:  Neftaly Grande Service:  (none) Author Type:  Physician    Filed:  2018 11:48 AM Date of Service:  2018 10:44 AM Creation Time:  2018 11:48 AM    Status:  Signed :  Neftaly Grande (Physician)     Scan on 2018 11:48 AM by Harjinder Provider : [facility] PRE-OP H/P/2018 1          Revision History        User Key Date/Time User Provider Type Action    > [N/A] 2018 11:48 AM Harjinder Provider Physician Sign                     Discharge Summaries      Discharge Summaries by Luba Wilks APRN CNP at 2018  7:12 AM     Author:  Efe  VANESA Munson CNP Service:  Orthopedics Author Type:  Nurse Practitioner    Filed:  4/16/2018 10:43 AM Date of Service:  4/16/2018  7:12 AM Creation Time:  4/16/2018  7:12 AM    Status:  Cosign Needed :  Luba Wilks APRN CNP (Nurse Practitioner)    Cosign Required:  Yes             [facility] Discharge Summary    Connie J Frankenberg MRN# [id number]   Age: 72 year old YOB: 1945     Date of Admission:  4/13/2018  Date of Discharge::[NL1.1]  4/16/2018[MB1.1]  Admitting Physician:  Apollo Nguyen MD  Discharge Physician:  Chon Sommer      Admission Diagnoses:  Right Knee Osteoarthritis   S/P total knee arthroplasty    Discharge Diagnosis:  Patient Active Problem List    Diagnosis Date Noted     S/P total knee arthroplasty 04/13/2018     Priority: Medium     Swelling of lower extremity      Priority: Medium     Diagnosis updated by automated process. Provider to review and confirm.       BCC (basal cell carcinoma), face 10/26/2012     Priority: Medium     Skin cancer, basal cell      Priority: Medium     Hyperlipidemia      Priority: Medium     Classical migraine      Priority: Medium     sparkly aura  (Problem list name updated by automated process. Provider to review and confirm.)       Insomnia      Priority: Medium     Arthritis of knee      Priority: Medium     Hemorrhoids      Priority: Medium     Vasovagal syncope      Priority: Medium     with blood draws, injections           Procedures:  Right total knee arthroplasty    Medications Prior to Admission:  Prescriptions Prior to Admission   Medication Sig Dispense Refill Last Dose     Aspirin-Acetaminophen-Caffeine (EXCEDRIN PO) Take 1 tablet by mouth as needed   Past Month at Unknown time     atorvastatin (LIPITOR) 80 MG tablet Take 1 tablet (80 mg) by mouth daily (Patient taking differently: Take 80 mg by mouth At Bedtime ) 90 tablet 3 4/12/2018 at 2300     butalbital-aspirin-caffeine (FIORINAL) -40 MG per  capsule Take 1 capsule by mouth daily as needed 30 capsule 2 Past Month at Unknown time     COMPRESSION STOCKINGS 2 each daily 2 each 1 Taking     COMPRESSION STOCKINGS 2 each daily 2 each 1 Taking     [DISCONTINUED] aspirin 81 MG tablet Take 1 tablet by mouth every morning ON HOLD FOR SURGERY AS OF 03/15/2018   Past Month at Unknown time[NL1.1]        Review of your medicines      START taking       Dose / Directions    acetaminophen 325 MG tablet   Commonly known as:  TYLENOL        Dose:  650 mg   Take 2 tablets (650 mg) by mouth every 4 hours as needed for mild pain   Quantity:  100 tablet   Refills:  1       HYDROmorphone 2 MG tablet   Commonly known as:  DILAUDID        For pain complaints of 1-5 give 1 tablets, for pain complaints of 6-10 give 2 tablets every 4 hours as needed for pain.   Quantity:  60 tablet   Refills:  0       senna-docusate 8.6-50 MG per tablet   Commonly known as:  SENOKOT-S;PERICOLACE        Dose:  1 tablet   Take 1 tablet by mouth 2 times daily   Quantity:  100 tablet   Refills:  0       warfarin 5 MG tablet   Commonly known as:  COUMADIN        Dose:  5 mg   Take 1 tablet (5 mg) by mouth once for 1 dose INR tomorrow. LOT 28 days. INR goal 1.8-2.5   Quantity:  30 tablet   Refills:  0         CONTINUE these medicines which may have CHANGED, or have new prescriptions. If we are uncertain of the size of tablets/capsules you have at home, strength may be listed as something that might have changed.       Dose / Directions    atorvastatin 80 MG tablet   Commonly known as:  LIPITOR   This may have changed:  when to take this   Used for:  Hyperlipidemia LDL goal <130        Dose:  80 mg   Take 1 tablet (80 mg) by mouth daily   Quantity:  90 tablet   Refills:  3         CONTINUE these medicines which have NOT CHANGED       Dose / Directions    * COMPRESSION STOCKINGS   Used for:  Venous (peripheral) insufficiency        Dose:  2 each   2 each daily   Quantity:  2 each   Refills:  1       *  COMPRESSION STOCKINGS   Used for:  PAD (peripheral artery disease) (H)        Dose:  2 each   2 each daily   Quantity:  2 each   Refills:  1       * Notice:  This list has 2 medication(s) that are the same as other medications prescribed for you. Read the directions carefully, and ask your doctor or other care provider to review them with you.      STOP taking          aspirin 81 MG tablet           butalbital-aspirin-caffeine -40 MG per capsule   Commonly known as:  FIORINAL           EXCEDRIN PO                Where to get your medicines      These medications were sent to [place], [place] - [address]  [address] [facility] [address], [facility] [address]     Phone:  [phone]      acetaminophen 325 MG tablet         Some of these will need a paper prescription and others can be bought over the counter. Ask your nurse if you have questions.     You don't need a prescription for these medications      senna-docusate 8.6-50 MG per tablet     warfarin 5 MG tablet         Information about where to get these medications is not yet available     ! Ask your nurse or doctor about these medications      HYDROmorphone 2 MG tablet[MB1.2]                 Consultations:  Hospital medicine, PT, PT.     Brief History of Illness:   Patient with end-stage DJD of the Right knee that has failed non operative treatment including injections, PT and NSAIDS     Hospital Course:  Patient was admitted to the hospital post-operatively for pain control and rehab. (S)He received 24 hours of antibiotics, and was placed on Warfarin for DVT ppx. (S)He did well post-op with no major complications.  Patient began working with PT on POD#1, and (s)he progressed as expected for discharge to TCU. At the time of discharge, patient was tolerating a regular diet, voiding spontaneously, had a BM, was ambulating independently, and had good PO pain control. Patient was deemed appropriate for discharge to TCU on  POD#[NL1.1] 3[MB1.1]    Physical exam at discharge:  Gen: AOx3, NAD  Resp: non-labored breathing on room air  Extremities:   RLE: Dressing C/D/I. Fires EHL, FHL, tib ant, GSC. SILT in DP/Sp/Tib nerves. DP pulse palp.       Labs at discharge:   Hemoglobin   Date Value Ref Range Status   04/15/2018 11.1 (L) 11.7 - 15.7 g/dL Final   ]    Lab Results   Component Value Date    INR 1.43 04/16/2018    INR 1.30 04/15/2018    INR 1.11 04/14/2018    INR 1.09 04/13/2018         Discharge Instructions and Follow-Up:[NL1.1]    Discharge Procedure Orders  Reason for your hospital stay   Order Comments: You were admitted to the hospital following your total knee arthroplasty.     When to contact your care team   Order Comments: CALL YOUR PHYSICIAN IF:  -Pain in your hip persists or worsens in the first few days after surgery.  -Excessive redness or drainage of cloudy or bloody material from the wounds (Clear red tinted fluid and some mild drainage should be expected). Drainage of any kind 5 days after surgery should be reported to the doctor.  -You have a temperature elevation greater than 101.5    -You have pain, swelling or redness in your calf.  -You have numbness or weakness in your leg or foot.      You may contact your physician at [phone] during business hours.    For after hours or weekend calls, you may contact the hospital at [phone] and ask for the on-call orthopedic resident     Wound care and dressings   Order Comments: You have a clean Aquacel dressing on your surgical wound. This dressing should stay in place for 7 days to allow the incision to heal. After 7 days, you may change the dressing. Please use sterile 4x4 gauze dressings with tape over top to secure the dressing. If the dressing becomes wet or saturated with wound drainage, it is appropriate to change the dressing. If drainage persists from the incision site to the extent that it is frequently saturating the dressing, please contact your  clinic nurse.     Discharge Instructions   Order Comments: Post Operative Instructions for Connie J Frankenberg is a 72 year old female    Surgery: Right Total Knee Replacement on 4/13.    If you have any questions contact Dr. Nguyen at the following number: [facility] call [phone].    Follow up appointment:   You are scheduled to follow up with Dr. Nguyen approximately 4 weeks after your surgery.           Anticoagulation:   Take coumadin x 4 weeks (INR goal 1.5-2.5).  This is given to help minimize your risk of blood clot. After this you should resume your home aspirin.     Activity:   -Continue to weight bear on your operative leg as tolerated by pain.  As you are more comfortable, you can discontinue use of the walker and transition to a cane.  Once you are comfortable with the cane, you can also wean from the cane and progress to using no assistive devices.  Do not transition until you are comfortable and have good balance.      -Follow the posterior hip precautions you were taught while at the hospital.        Pain Medications:   -Take pain medications as prescribed.  Wean off the the narcotic pain medications (Oxycodone, Dilaudid, etc) as tolerated by pain.  To help with this, take the Tylenol and Celebrex (if prescribed) to minimize the amount of narcotic pain medication you need to take.      -Do not drive while on pain medications or until your leg feels well enough to do so.      Bandage Care and Showering:   -Keep the dressing clean/dry/intact until follow up.  OK to remove on POD #5 to redress/refinforce as needed.      -If no drainage is present along the wound, OK to shower and let soap and water run over the wound at 10-14 days.  If there is any questions, please call the clinic to confirm.  Do not soak or bathe the incision in water.  Do not scrub the incision.  Just let the water from the shower run over the incision.    -You may remove the exposed tails of suture (looks like fishing line)  with a scissors at the level of the skin    --Contact Dr. Nguyen or his staff if there is any drainage from the incision or redness.    Leg Swelling and Icing  -Continue icing the knee 5-6 times per day for approximately 20 minutes each time.  This will help with swelling.    -Some swelling in the leg is normal after surgery.  This type of swelling is usually gravity dependent and is improved in the morning after sleeping.  If the swelling is painful in the calf or the swelling is getting worse, contact Dr. Nguyen's office.  We may recommend presenting to the Emergency Department to obtain an ultrasound of the leg if there is concern for a DVT.      -Elevate the leg if you are sitting as this will help reduce swelling.    Contact clinic if you note any of the following:  -Fevers greater than 101.5 chills  -Increased pain, redness, swelling or discharge at the surgical site.   -During regular business hours call Dr Nguyen's office and request to speak with his nurse or the triage nurses.   -After hours or on weekends call the hospital  at [phone] and ask to speak with the Orthopaedic resident on call.     General info for SNF   Order Comments: Length of Stay Estimate: Short Term Care: Estimated # of Days <30  Condition at Discharge: Improving  Level of care:skilled   Rehabilitation Potential: Excellent  Admission H&P remains valid and up-to-date: Yes  Recent Chemotherapy: N/A  Use Nursing Home Standing Orders: Yes     Mantoux instructions   Order Comments: Give two-step Mantoux (PPD) Per Facility Policy Yes     Activity - Up ad mahendra   Order Specific Question Answer Comments   Is discharge order? Yes      Adult [facility] Follow-up and recommended labs and tests   Order Comments: You are to return to clinic at 4  weeks after your surgery, you will be scheduled for an appointment with . At this time, AP and lateral knee imaging will be obtained.    If you need to schedule your 4 week postoperative visit  or haven't received confirmation regarding these visits, please call one of the following numbers within 7 days of discharge.    For patients that see Dr. nair at:   -[facility]: [phone]     Follow Up and recommended labs and tests   Order Comments: Daily INR goal 1.8-2.5 LOT 28 days. Facility provider to manage warfarin.     Full Code     Diet   Order Comments: You may return to your regular, pre-surgery diet unless instructed otherwise by your provider.   Order Specific Question Answer Comments   Is discharge order? Yes      Advance Diet as Tolerated   Order Comments: Follow this diet upon discharge: Orders Placed This Encounter     Regular Diet Adult     Diet   Order Specific Question Answer Comments   Is discharge order? Yes      Assign Questionnaire Series to Patient[MB1.3]           Discharge Disposition:  Discharged to short-term care facility    Chon Sommer  PGY4  [phone][NL1.1]    Discharge summary updated by me to reflect changes in plan of care/medications[MB1.1]       Revision History        User Key Date/Time User Provider Type Action    > MB1.3 4/16/2018 10:43 AM Luba Wilks APRN CNP Nurse Practitioner Sign     MB1.2 4/16/2018 10:42 AM Luba Wilks APRN CNP Nurse Practitioner      MB1.1 4/16/2018 10:41 AM Luba Wilks APRN CNP Nurse Practitioner      NL1.1 4/16/2018  7:13 AM Chon Sommer Resident Share                     Consult Notes      Consults signed by Marcelo Villagomez MD at 4/13/2018 10:25 PM      Author:  Marcelo Villagomez MD Service:  Hospitalist Author Type:  Physician    Filed:  4/13/2018 10:25 PM Date of Service:  4/13/2018  5:08 PM Creation Time:  4/13/2018  6:26 PM    Status:  Signed :  Marcelo Villagomez MD (Physician)         Consult Date:  04/13/2018      ASSESSMENT:   1.  Status post right total knee arthroplasty.  The patient is doing well postoperatively.   2.  History of frequent  longstanding vasovagal episodes which have been precipitated by a variety of stimuli including blood draw, pain, noxious smells.  She is at a high risk for having such episodes in the immediate postoperative period and will need close attention by nursing staff when she is mobilized.   3.  History of benign positional vertigo, stable.   4.  No known coronary artery disease based on negative stress echocardiogram performed in 2013.   5.  History of migraine headaches.   6.  No history of deep venous thrombosis or pulmonary embolism.      RECOMMENDATIONS:  Routine postoperative labs are ordered.  Oxycodone will be changed to Dilaudid as she has not tolerated oxycodone in the past.  She likely would benefit from a short course of ketorolac and attempt to minimize the use of opioids.  She will need to be monitored closely by all providers as she is at risk for paroxysmal vasovagal episodes related to pain, noxious stimuli, etc.  This was discussed with the patient and her friend at length.  Deep venous thrombosis prophylaxis will be with warfarin per Dr. Nguyen's protocol.      DISCUSSION:  Connie Frankenberg is a very pleasant 72-year-old female who underwent a right total knee arthroplasty by Dr. Nguyen for the treatment of osteoarthritis of the knee.  I have been asked to see her by Dr. Nguyen to assess history of vasovagal syncope and benign positional vertigo.      Please see Dr. Nguyen's notes in the charting for details regarding the patient's orthopedic history and the indications for surgery.      The events of surgery are noted.  Estimated blood loss was 100 mL.  She did receive spinal anesthesia.  This also theoretically increases the risk of hypotension with standing.  Vital signs were stable throughout.  Blood pressure most recently was 133/74.      Ms. Frankenberg is seen by me shortly after admission to the Orthopedic unit.  She reports feeling well and has minimal pain in her knee at this time.      PAST  MEDICAL HISTORY:   1.  Chronic knee pain secondary to arthritis.  She has remained physically active in spite of her pain.   2.  Long history of vasovagal events which has included dizziness and syncope.  These occur anywhere from several times per month to several times per year and have generally been precipitated by pain and nausea and by noxious smells.   3.  No known cardiac disease.  She had a negative stress test in 2013.   4.  History of migraine headaches.   5.  Benign positional vertigo.      MEDICATIONS PREOPERATIVELY:   1.  Lipitor 80 mg daily.   2.  Aspirin 81 mg daily.   3.  Excedrin on a p.r.n. basis.     4.  Fiorinal on a p.r.n. basis.      ALLERGIES:  Codeine and Percocet which causes GI upset.      SOCIAL HISTORY:  The patient is single.  She works as [employer] tech.  She drinks alcohol very infrequently.      FAMILY HISTORY:  Reviewed and is noncontributory.      PAST SURGERIES:  Include laparoscopy for infertility, basal cell cancer resection.      REVIEW OF SYSTEMS:  A baseline is remarkable for limitation of activity secondary to knee pain, though she does remain active with certain activities including swimming.  She denies exertional chest pain, shortness of breath, abdominal pain, nausea, vomiting, diarrhea, cough.  As above, she does have episodes of lightheadedness and even syncope related to vasovagal episodes.  She describes frequent short-lived episodes of dizziness and vertigo when she turns her head suddenly from one side to another.      PHYSICAL EXAMINATION:   GENERAL:  She is a very pleasant, well-appearing female who appears comfortable.  She is fully oriented.   VITAL SIGNS:  Stable.  O2 saturations are in the 90s on room air.   HEENT:  Facies are symmetric.   NECK:  Supple.  I did not assess range of motion of her head.   LUNGS:  Clear.   CARDIAC:  Reveals a regular rate and rhythm without murmurs.   ABDOMEN:  Soft, nontender, nondistended.   EXTREMITIES:  There was no edema lower  extremities are wrapped.   NEUROLOGIC:  Otherwise, she is alert, fully oriented and quite pleasant.  Facies are symmetric.      LABORATORY DATA:  Preoperative labs include a normal BMP and CBC.  EKG revealed normal sinus rhythm.  There are no acute-appearing changes.         MARCELO LIANG MD             D: 2018   T: 2018   MT: NTS      Name:     FRANKENBERG, CONNIE   MRN:      [id number]        Account:       [id number]   :      1945           Consult Date:  2018      Document: [id number]       cc: Sammi Beltre MD[DS1.1]        Revision History        User Key Date/Time User Provider Type Action    > DS1.1 2018 10:25 PM Marcelo Liang MD Physician Sign     [N/A] 2018  6:26 PM Marcelo Liang MD Physician Edit            Consults by Marcelo Liang MD at 2018  4:57 PM     Author:  Marcelo Liang MD Service:  Hospitalist Author Type:  Physician    Filed:  2018  4:57 PM Date of Service:  2018  4:57 PM Creation Time:  2018  4:56 PM    Status:  Signed :  Marcelo Liang MD (Physician)     Consult Orders:    1. Internal Medicine Adult IP Consult for [facility]: Patient to be seen: Routine within 24 hrs; Call back #: [phone]; s/p tka; Consultant may enter orders: Yes [[id number]] ordered by Allan Menezes MD at 18 1209                IM consult dictated.[DS1.1]     Revision History        User Key Date/Time User Provider Type Action    > DS1.1 2018  4:57 PM Marcelo Liang MD Physician Sign                     Progress Notes - Physician (Notes for yesterday and today)      Progress Notes by Pernell Cordero MD at 2018 11:08 AM     Author:  Pernell Cordero MD Service:  Hospitalist Author Type:  Physician    Filed:  2018 11:14 AM Date of Service:  2018 11:08 AM Creation Time:  2018 11:08 AM    Status:  Signed :  Pernell Cordero MD (Physician)      "    [facility] Internal Medicine Progress Note            Interval History:   Record reviewed.  Discussed with Dr. Villagomez.  Seen with RN.  S/P Right Total Knee Replacement 4/13, Dr. Nguyen.  Indication osteoarthritis.  Off toradol.   Adequate pain control with po dilaudid 1-2 mg dose.  Ambulatory without LH or recurrent vasovagal symptoms since 4/14.   No CP, SOB, cough with IS only.  No nausea, reflux, abd pain or distention.  Passing flatus.   Formed BM last night.  Voiding OK.  Accepted at [place].           Medications:   All medications reviewed today          Physical Exam:   Blood pressure 148/89, pulse 69, temperature 97.6  F (36.4  C), resp. rate 16, height 1.575 m (5' 2.01\"), weight 70.4 kg (155 lb 3.3 oz), SpO2 97 %, not currently breastfeeding.    Intake/Output Summary (Last 24 hours) at 04/16/18 1108  Last data filed at 04/15/18 1930   Gross per 24 hour   Intake              480 ml   Output                0 ml   Net              480 ml       General:  Alert.  Appropriate.  No distress.  No  O2.     Heent:      Neck:    Skin:    Chest:  clear    Cardiac:  Reg without gallop, murmur.  No JVD.     Abdomen:  Non distended, soft, non-tender.  BS normal.     Extremities:  Perfused.  No edema, calf, posterior thigh tenderness to suggest DVT.     Neuro:            Data:[WR1.1]     Results for orders placed or performed during the hospital encounter of 04/13/18 (from the past 24 hour(s))   INR   Result Value Ref Range    INR 1.43 (H) 0.86 - 1.14     Hemoglobin   Date Value Ref Range Status   04/15/2018 11.1 (L) 11.7 - 15.7 g/dL Final   04/14/2018 11.9 11.7 - 15.7 g/dL Final[WR1.2]   ]  Last Basic Metabolic Panel:  Lab Results   Component Value Date     04/15/2018      Lab Results   Component Value Date    POTASSIUM 4.1 04/15/2018     Lab Results   Component Value Date    CHLORIDE 108 04/15/2018     Lab Results   Component Value Date    NEL 8.2 04/15/2018     Lab Results   Component Value Date    " CO2 26 04/15/2018     Lab Results   Component Value Date    BUN 13 04/15/2018     Lab Results   Component Value Date    CR 0.62 04/15/2018     Lab Results   Component Value Date     04/15/2018                Assessment and Plan:   1)  S/P Right Total Knee Replacement 4/13, Dr. Nguyen.  Indication osteoarthritis.  Mobilizing.  Adequate pain control.    2)  Acute blood loss anemia, mild (11.1).  3)  Vasovagal episodes PTA with dizziness and syncope (precipitated by a variety of stimuli including blood draw, pain, noxious smells).  Episode LH X2  4/14 - no recurrence.    4)  Benign positional vertigo. No overt symptoms presently.   5)  Negative stress echocardiogram performed in 2013.   6)  Migraine HAs.  Quiescent.   7)  HLD on statin.     PLAN:  1)  Clinically stable for DC to TCU.  2)  Meds/orders reviewed.  Coumadin, same opiates, bowel meds.   Disposition Plan   Expected discharge today to transitional care unit .     Entered: Pernell Cordero 04/16/2018, 11:08 AM              Attestation:  I have reviewed today's vital signs, notes, medications, labs and imaging.     Pernell Cordero MD[WR1.1]             Revision History        User Key Date/Time User Provider Type Action    > WR1.2 4/16/2018 11:14 AM Pernell Cordero MD Physician Sign     WR1.1 4/16/2018 11:08 AM Pernell Cordero MD Physician             Progress Notes by Chon Sommer at 4/16/2018  6:25 AM     Author:  Chon Sommer Service:  Orthopedics Author Type:  Resident    Filed:  4/16/2018  7:10 AM Date of Service:  4/16/2018  6:25 AM Creation Time:  4/16/2018  6:25 AM    Status:  Signed :  Chon Sommer (Resident)         [facility] Orthopedic Progress Note       S:  No issues overnight.[NL1.1] No[NL1.2] vasovagal episode[NL1.1]s. Low grade one time fever overnight, resolved.[NL1.2]  Denies numbness, tingling. Tolerating PO. Voiding. No CP, SOB or dizziness now.[NL1.1] Planning for dc to  TCU.[NL1.2]     O:   Patient Vitals for the past 24 hrs:   BP Temp Temp src Heart Rate Resp SpO2   04/16/18 0532 143/74 97.8  F (36.6  C) Oral 74 - -   04/15/18 2315 155/85 - - - - -   04/15/18 2250 162/83 99.3  F (37.4  C) Oral 76 14 98 %   04/15/18 1635 147/72 97.8  F (36.6  C) Oral 72 14 96 %   04/15/18 0747 129/66 97.4  F (36.3  C) Oral 71 - 93 %       Intake/Output Summary (Last 24 hours) at 04/14/18 0738  Last data filed at 04/14/18 0633   Gross per 24 hour   Intake             1343 ml   Output             2200 ml   Net             -857 ml       Gen: A&Ox3, no acute distress  Resp: breathing equal and non-labored, no wheezing  Extremities:   RLE: Dressing C/D/I. Fires EHL, FHL, tib ant, GSC. SILT in DP/Sp/Tib nerves. DP pulse palp.       Labs:  Hemoglobin   Date Value Ref Range Status   04/15/2018 11.1 (L) 11.7 - 15.7 g/dL Final   04/14/2018 11.9 11.7 - 15.7 g/dL Final         A/P: Connie J Frankenberg is a 72 year old female s/p Right total knee arthroplasty with Dr. Nguyen.[NL1.1]     D/c pending placement, likely today.[NL1.2]     Activity: Up with assist and assistive devices as needed until independent. Weight bearing status: WBAT  Antibiotics: Cefazolin or Clindamycin x 24 hours  Diet: Begin with clear fluids and progress diet as tolerated. Bowel regimen. Anti-emetics PRN.    DVT prophylaxis: warfarin x 4 weeks  Elevation: Elevate heels off of bed on pillows   Wound Care: Aquacel, leave in place for 7-9 days post op    Drains: none  Pain management: Orals PRN, IV for breakthrough only  X-rays: xrays in PACU  Physical Therapy: Mobilization, ROM, ADL's   Occupational Therapy: ADL's  Labs: Trend Hgb on POD #1, 2  Consults: PT, OT. Hospitalist, appreciate assistance in caring for this patient throughout the perioperative period     Future Appointments  Date Time Provider Department Center   4/14/2018 9:30 AM Keeley Mcgovern, PT [facility] [place]   4/14/2018 11:00 AM Roxana Cee, OT [facility] [place]    4/14/2018 1:30 PM Keeley Mcgovern, PT [facility] [place]         Disposition: Pending progress with therapies, pain control on orals, and medical stability, anticipate discharge to Home vs TCU on POD #2-3.        Chon Ros  PGY4  [phone][NL1.1]       Revision History        User Key Date/Time User Provider Type Action    > NL1.2 4/16/2018  7:10 AM Chon Sommer Resident Sign     NL1.1 4/16/2018  6:25 AM Chon Sommer Resident             Progress Notes by Marcelo Villagomez MD at 4/15/2018 10:40 AM     Author:  Marcelo Villagomez MD Service:  Hospitalist Author Type:  Physician    Filed:  4/15/2018 10:53 AM Date of Service:  4/15/2018 10:40 AM Creation Time:  4/15/2018 10:40 AM    Status:  Signed :  Marcelo Villagomez MD (Physician)         Pt seen, case reviewed with team    Pt feels well, states pain has been well controlled  No significant dizziness or near syncope/syncope  + BM this am  Voiding without difficulty    VSS  BP 120s-140s/  HR 70s    Alert, fully oriented, in good spirits  Lungs clear  CV rrr  Abd soft, non-tender  No LE edema    Results for FRANKENBERG, CONNIE J (MRN [id number]) as of 4/15/2018 10:32   Ref. Range 4/15/2018 05:11   Sodium Latest Ref Range: 133 - 144 mmol/L 141   Potassium Latest Ref Range: 3.4 - 5.3 mmol/L 4.1   Chloride Latest Ref Range: 94 - 109 mmol/L 108   Carbon Dioxide Latest Ref Range: 20 - 32 mmol/L 26   Urea Nitrogen Latest Ref Range: 7 - 30 mg/dL 13   Creatinine Latest Ref Range: 0.52 - 1.04 mg/dL 0.62   GFR Estimate Latest Ref Range: >60 mL/min/1.7m2 >90   GFR Estimate If Black Latest Ref Range: >60 mL/min/1.7m2 >90   Calcium Latest Ref Range: 8.5 - 10.1 mg/dL 8.2 (L)   Anion Gap Latest Ref Range: 3 - 14 mmol/L 7   Glucose Latest Ref Range: 70 - 99 mg/dL 138 (H)   Hemoglobin Latest Ref Range: 11.7 - 15.7 g/dL 11.1 (L)   INR Latest Ref Range: 0.86 - 1.14  1.30 (H)       Assessment    S/p R TKA, Pt is doing well     History of  vaso-vagal episodes, none for 24 hours  Overall pain control has been good    Plan  Continue current tx  Prn ketorolac for one more day  Warfarin for DVT proph  D/c to TCU prob tomorrow[DS1.1]       Revision History        User Key Date/Time User Provider Type Action    > DS1.1 4/15/2018 10:53 AM Marcelo Villagomez MD Physician Sign            Progress Notes by Chon Sommer at 4/15/2018  7:54 AM     Author:  Chon Sommer Service:  Orthopedics Author Type:  Resident    Filed:  4/15/2018  8:30 AM Date of Service:  4/15/2018  7:54 AM Creation Time:  4/15/2018  7:54 AM    Status:  Signed :  Chon Sommer (Resident)         [facility] Orthopedic Progress Note       S:  No issues overnight. Had some vasovagal during therapy[NL1.1], but doing better than day prior[NL1.2]. Denies numbness, tingling. Tolerating PO. Voiding. No CP, SOB or dizziness now.     PT notes  Pt ambulated 50' with FWW CGA and wheelchair follow     O:   Patient Vitals for the past 24 hrs:   BP Temp Temp src Pulse Heart Rate Resp SpO2   04/15/18 0747 129/66 97.4  F (36.3  C) Oral - 71 - 93 %   04/14/18 2209 141/77 98.8  F (37.1  C) Oral - 79 18 94 %   04/14/18 2042 141/74 97.5  F (36.4  C) Oral 70 - 16 -   04/14/18 1553 132/64 98.5  F (36.9  C) Oral 79 - 16 95 %       Intake/Output Summary (Last 24 hours) at 04/14/18 0738  Last data filed at 04/14/18 0633   Gross per 24 hour   Intake             1343 ml   Output             2200 ml   Net             -857 ml       Gen: A&Ox3, no acute distress  Resp: breathing equal and non-labored, no wheezing  Extremities:   RLE: Dressing C/D/I. Fires EHL, FHL, tib ant, GSC. SILT in DP/Sp/Tib nerves. DP pulse palp.       Labs:  Hemoglobin   Date Value Ref Range Status   04/15/2018 11.1 (L) 11.7 - 15.7 g/dL Final   04/14/2018 11.9 11.7 - 15.7 g/dL Final     Drain:  40ml, 35ml.    A/P: Connie J Frankenberg is a 72 year old female s/p Right total knee arthroplasty with Dr. Nguyen.      Cont PT. recommending TCU, anticipate dc 2 days. Drain out this am.     Activity: Up with assist and assistive devices as needed until independent. Weight bearing status: WBAT  Antibiotics:[NL1.1] completed[NL1.2]  Diet: Begin with clear fluids and progress diet as tolerated. Bowel regimen. Anti-emetics PRN.    DVT prophylaxis: warfarin x 4 weeks  Elevation: Elevate heels off of bed on pillows   Wound Care: Aquacel, leave in place for 7-9 days post op    Drains: none  Pain management: Orals PRN, IV for breakthrough only  X-rays:[NL1.1] completed[NL1.2]  Physical Therapy: Mobilization, ROM, ADL's   Occupational Therapy: ADL's  Labs: Trend Hgb on POD #1, 2  Consults: PT, OT. Hospitalist, appreciate assistance in caring for this patient throughout the perioperative period     Future Appointments  Date Time Provider Department Center   4/14/2018 9:30 AM Keeley Mcgovern, PT [facility] [place]   4/14/2018 11:00 AM Roxana Cee OT [facility] [place]   4/14/2018 1:30 PM Keeley Mcgovern, PT [facility] [place]         Disposition: Pending progress with therapies, pain control on orals, and medical stability, anticipate discharge to Home vs TCU on POD #2-3.        Chon Sommer  PGY4  [phone][NL1.1]       Revision History        User Key Date/Time User Provider Type Action    > NL1.2 4/15/2018  8:30 AM Chon Sommer Resident Sign     NL1.1 4/15/2018  7:54 AM Chon Sommer Resident                   Procedure Notes     No notes of this type exist for this encounter.         Progress Notes - Therapies (Notes from 04/13/18 through 04/16/18)      Progress Notes by Melany Johnson OT at 4/14/2018  2:52 PM     Author:  Melany Johnson OT Service:  (none) Author Type:  Occupational Therapist    Filed:  4/14/2018  2:52 PM Date of Service:  4/14/2018  2:52 PM Creation Time:  4/14/2018  2:52 PM    Status:  Signed :  Melany Johnson OT (Occupational Therapist)          04/14/18 2941    Quick Adds   Type of Visit Initial Occupational Therapy Evaluation   Living Environment   Lives With significant other  (beony Dickey)   Living Arrangements house   Home Accessibility tub/shower is not walk in;bath not on first floor   Number of Stairs to Enter Home 5   Number of Stairs Within Home 13   Living Environment Comment Ebony will be out of town for a couple days. When he is back in town, will be somewhat flexible with work schedule. Her bedroom and bathroom are on the upper level so will need to do 13 steps.   Self-Care   Dominant Hand left   Usual Activity Tolerance good   Current Activity Tolerance fair   Regular Exercise yes   Activity/Exercise Type swimming;team sports   Exercise Amount/Frequency daily   Equipment Currently Used at Home none   Activity/Exercise/Self-Care Comment plays volleyball. Patient was not using any DME prior to surgery, but does have access to commode, raised toilet seat, walker   Functional Level Prior   Ambulation 0-->independent   Transferring 0-->independent   Toileting 0-->independent   Bathing 0-->independent   Dressing 0-->independent   Eating 0-->independent   Communication 0-->understands/communicates without difficulty   Swallowing 0-->swallows foods/liquids without difficulty   Cognition 0 - no cognition issues reported   Fall history within last six months no   Prior Functional Level Comment Fully retired, but very active normally.   General Information   Onset of Illness/Injury or Date of Surgery - Date 04/13/18   Referring Physician Nguyen   Patient/Family Goals Statement Go to TCU prior to DC home, be able to resume active lifestyle.   Additional Occupational Profile Info/Pertinent History of Current Problem s/p R TKA   Precautions/Limitations fall precautions   Weight-Bearing Status - RLE weight-bearing as tolerated   General Observations Resting in bed, agreeable to OT.   General Info Comments h/o VASOVAGAL fainting episodes.   Cognitive Status Examination    Orientation orientation to person, place and time   Level of Consciousness alert   Able to Follow Commands WNL/WFL   Personal Safety (Cognitive) WNL/WFL   Visual Perception   Visual Perception Wears glasses   Sensory Examination   Sensory Comments mild numbess/tingling mostly L foot that she had prior to surgery.   Pain Assessment   Patient Currently in Pain Yes, see Vital Sign flowsheet   Integumentary/Edema   Integumentary/Edema Comments Has lymphedema compression socks    Range of Motion (ROM)   ROM Comment BUE AROM intact. R knee limited post-op; using CPM approx 50 degrees.   Strength   Strength Comments Slightly able to complete (I) SLR on RLE. BUE strength intact.   Muscle Tone Assessment   Muscle Tone Quick Adds No deficits were identified   Coordination   Upper Extremity Coordination No deficits were identified   Mobility   Bed Mobility Comments Min A supine to EOB   Transfer Skill: Bed to Chair/Chair to Bed   Level of [place]: Bed to Chair contact guard   Physical Assist/Nonphysical Assist: Bed to Chair verbal cues   Weight-Bearing Restrictions weight-bearing as tolerated   Assistive Device - Transfer Skill Bed to Chair Chair to Bed Rehab Eval rolling walker   Transfer Skill: Sit to Stand   Level of [place]: Sit/Stand contact guard   Physical Assist/Nonphysical Assist: Sit/Stand verbal cues   Transfer Skill: Sit to Stand weight-bearing as tolerated   Assistive Device for Transfer: Sit/Stand rolling walker   Transfer Skill: Toilet Transfer   Level of [place]: Toilet contact guard   Physical Assist/Nonphysical Assist: Toilet verbal cues   Weight-Bearing Restrictions: Toilet weight-bearing as tolerated   Assistive Device rolling walker   Balance   Balance Comments CGA with use of WW; no overt LOB with short distance ambulation   Bathing   Level of [place] stand-by assist  (seated sponge bathing)   Physical Assist/Nonphysical Assist set-up required   Upper Body Dressing   Level of  [place]: Dress Upper Body independent   Physical Assist/Nonphysical Assist: Dress Upper Body set-up required   Lower Body Dressing   Level of [place]: Dress Lower Body minimum assist (75% patients effort)   Physical Assist/Nonphysical Assist: Dress Lower Body verbal cues   Toileting   Level of [place]: Toilet contact guard   Physical Assist/Nonphysical Assist: Toilet set-up required   Grooming   Level of [place]: Grooming contact guard   Physical Assist/Nonphysical Assist: Grooming set-up required   Eating/Self Feeding   Level of [place]: Eating independent   Instrumental Activities of Daily Living (IADL)   Previous Responsibilities meal prep;driving;shopping;housekeeping   Activities of Daily Living Analysis   Impairments Contributing to Impaired Activities of Daily Living balance impaired;ROM decreased;strength decreased;post surgical precautions;pain   General Therapy Interventions   Planned Therapy Interventions ADL retraining;IADL retraining;transfer training   Clinical Impression   Criteria for Skilled Therapeutic Interventions Met yes, treatment indicated   OT Diagnosis impaired ADL and mobility post TKA   Influenced by the following impairments pain, history of fainting, impaired ROM   Assessment of Occupational Performance 1-3 Performance Deficits   Identified Performance Deficits ADL, IADL, mobility   Clinical Decision Making (Complexity) Low complexity   Therapy Frequency daily   Predicted Duration of Therapy Intervention (days/wks) 3 days   Anticipated Equipment Needs at Discharge shower chair;reacher   Anticipated Discharge Disposition Transitional Care Facility   Risks and Benefits of Treatment have been explained. Yes   Patient, Family & other staff in agreement with plan of care Yes   Total Evaluation Time   Total Evaluation Time (Minutes) 10[KP1.1]        Revision History        User Key Date/Time User Provider Type Action    > KP1.1 4/14/2018  2:52 PM Melany Johnson  VIRIDIANA Sousa Occupational Therapist Sign            Progress Notes by Keeley Mcgovern PT at 4/14/2018 12:06 PM     Author:  Keeley Mcgovern PT Service:  (none) Author Type:  Physical Therapist    Filed:  4/14/2018 12:07 PM Date of Service:  4/14/2018 12:06 PM Creation Time:  4/14/2018 12:06 PM    Status:  Signed :  Keeley Mcgovern PT (Physical Therapist)          04/14/18 0932   Quick Adds   Type of Visit Initial PT Evaluation       Present no   Living Environment   Lives With significant other   Living Arrangements house   Home Accessibility bed and bath are not on the first floor   Number of Stairs to Enter Home 5   Number of Stairs Within Home 10   Stair Railings at Home outside, present at both sides;inside, present on left side   Transportation Available Medicaid transportation   Self-Care   Dominant Hand left   Usual Activity Tolerance poor   Current Activity Tolerance poor   Regular Exercise yes   Activity/Exercise Type swimming;team sports   Exercise Amount/Frequency 2 times/wk   Equipment Currently Used at Home commode   Functional Level Prior   Ambulation 0-->independent   Transferring 0-->independent   Toileting 0-->independent   Bathing 0-->independent   Dressing 0-->independent   Eating 0-->independent   Communication 0-->understands/communicates without difficulty   Swallowing 0-->swallows foods/liquids without difficulty   Cognition 0 - no cognition issues reported   Fall history within last six months no   Which of the above functional risks had a recent onset or change? ambulation;transferring   General Information   Onset of Illness/Injury or Date of Surgery - Date 04/13/18   Referring Physician Apollo Nguyen    Patient/Family Goals Statement Not stated   Pertinent History of Current Problem (include personal factors and/or comorbidities that impact the POC) S/p R TKA, history of vasovagal episodes, BPPV, migraine headaches   Precautions/Limitations fall precautions    Weight-Bearing Status - LUE full weight-bearing   Weight-Bearing Status - RUE full weight-bearing   Weight-Bearing Status - LLE full weight-bearing   Weight-Bearing Status - RLE weight-bearing as tolerated   General Observations Pt experiences vasovagal episodes and has BPPV, move slowly during mobility and requires supervision x2   General Info Comments PEPE drain, CPM   Cognitive Status Examination   Orientation orientation to person, place and time   Level of Consciousness alert   Follows Commands and Answers Questions 100% of the time;able to follow multistep instructions   Personal Safety and Judgment intact   Integumentary/Edema   Integumentary/Edema Comments Surgical incision   Posture    Posture Kyphosis   Range of Motion (ROM)   ROM Quick Adds Knee, Right   ROM Comment 0-20-65   Right Knee Extension/Flexion ROM   Right Knee Extension/Flexion AROM - degrees 0-20-65   Strength   Strength Comments not formally assessed, L LE >3/5   Bed Mobility   Bed Mobility Comments Able to perform supine<>sit SBA, supine/sitting scooting SBA   Transfer Skills   Transfer Comments Able to perform sit<>stand CGA   Gait   Gait Comments Pt ambulated 50' with FWW CGA and a wheelchair follow.   Balance   Balance Comments Good sitting balance   Sensory Examination   Sensory Perception Comments mild tingling in toes   Modality Interventions   Planned Modality Interventions Cryotherapy   General Therapy Interventions   Planned Therapy Interventions balance training;bed mobility training;gait training;ROM;strengthening;transfer training;risk factor education;home program guidelines;progressive activity/exercise   Clinical Impression   Criteria for Skilled Therapeutic Intervention yes, treatment indicated   PT Diagnosis Impaired functional mobility   Influenced by the following impairments Pain, decreased ROM, strength   Functional limitations due to impairments ambulation, stairs, transfers   Clinical Presentation Stable/Uncomplicated  "  Clinical Presentation Rationale Pt shows good rehab potential for resolve of impairments with skilled PT services   Clinical Decision Making (Complexity) Low complexity   Therapy Frequency` 2 times/day   Predicted Duration of Therapy Intervention (days/wks) 3 days   Anticipated Discharge Disposition Transitional Care Facility   Risk & Benefits of therapy have been explained Yes   Patient, Family & other staff in agreement with plan of care Yes   [facility] AM-PAC TM \"6 Clicks\"   2016, Trustees of [facility], under license to [employer].  All rights reserved.   6 Clicks Short Forms Basic Mobility Inpatient Short Form   [facility] AM-PAC  \"6 Clicks\" V.2 Basic Mobility Inpatient Short Form   1. Turning from your back to your side while in a flat bed without using bedrails? 4 - None   2. Moving from lying on your back to sitting on the side of a flat bed without using bedrails? 4 - None   3. Moving to and from a bed to a chair (including a wheelchair)? 3 - A Little   4. Standing up from a chair using your arms (e.g., wheelchair, or bedside chair)? 3 - A Little   5. To walk in hospital room? 3 - A Little   6. Climbing 3-5 steps with a railing? 2 - A Lot   Basic Mobility Raw Score (Score out of 24.Lower scores equate to lower levels of function) 19   Total Evaluation Time   Total Evaluation Time (Minutes) 6[MO1.1]        Revision History        User Key Date/Time User Provider Type Action    > MO1.1 4/14/2018 12:07 PM Keeley Mcgovern, PT Physical Therapist Sign                                                      INTERAGENCY TRANSFER FORM - LAB / IMAGING / EKG / EMG RESULTS   4/13/2018                       UR 8A: [phone]            Unresulted Labs (24h ago through future)    Start       Ordered    04/14/18 0600  INR  (warfarin (COUMADIN) Pharmacy Consult  )  DAILY,   Routine      04/13/18 1626    Unscheduled  Hemoglobin  CONDITIONAL X 2,   Routine     Comments:  Release on POD 1 and POD 2 if " the morning Hgb is less than 8.0    04/13/18 1626         Lab Results - 3 Days      INR [[id number]] (Abnormal)  Resulted: 04/16/18 0705, Result status: Final result    Ordering provider: Allan Menezes MD  04/16/18 0000 Resulting lab: [facility]    Specimen Information    Type Source Collected On   Blood  04/16/18 0628          Components       Value Reference Range Flag Lab   INR 1.43 0.86 - 1.14 H 13            Basic metabolic panel [[id number]] (Abnormal)  Resulted: 04/15/18 0538, Result status: Final result    Ordering provider: Marcelo Villagomez MD  04/15/18 0000 Resulting lab: [facility]    Specimen Information    Type Source Collected On   Blood  04/15/18 0511          Components       Value Reference Range Flag Lab   Sodium 141 133 - 144 mmol/L  13   Potassium 4.1 3.4 - 5.3 mmol/L  13   Chloride 108 94 - 109 mmol/L  13   Carbon Dioxide 26 20 - 32 mmol/L  13   Anion Gap 7 3 - 14 mmol/L  13   Glucose 138 70 - 99 mg/dL H 13   Urea Nitrogen 13 7 - 30 mg/dL  13   Creatinine 0.62 0.52 - 1.04 mg/dL  13   GFR Estimate >90 >60 mL/min/1.7m2  13   Comment:  Non  GFR Calc   GFR Estimate If Black >90 >60 mL/min/1.7m2  13   Comment:  African American GFR Calc   Calcium 8.2 8.5 - 10.1 mg/dL L 13            INR [[id number]] (Abnormal)  Resulted: 04/15/18 0534, Result status: Final result    Ordering provider: Allan Menezes MD  04/15/18 0000 Resulting lab: [facility]    Specimen Information    Type Source Collected On   Blood  04/15/18 0511          Components       Value Reference Range Flag Lab   INR 1.30 0.86 - 1.14 H 13            Hemoglobin [[id number]] (Abnormal)  Resulted: 04/15/18 0523, Result status: Final result    Ordering provider: Allan Menezes MD  04/15/18 0000 Resulting lab: [facility]    Specimen Information    Type Source Collected On   Blood  04/15/18  0511          Components       Value Reference Range Flag Lab   Hemoglobin 11.1 11.7 - 15.7 g/dL L 13            Basic metabolic panel [[id number]] (Abnormal)  Resulted: 04/14/18 0636, Result status: Final result    Ordering provider: Marcelo Villagomez MD  04/14/18 0000 Resulting lab: [facility]    Specimen Information    Type Source Collected On   Blood  04/14/18 0610          Components       Value Reference Range Flag Lab   Sodium 141 133 - 144 mmol/L  13   Potassium 4.8 3.4 - 5.3 mmol/L  13   Chloride 107 94 - 109 mmol/L  13   Carbon Dioxide 28 20 - 32 mmol/L  13   Anion Gap 6 3 - 14 mmol/L  13   Glucose 102 70 - 99 mg/dL H 13   Urea Nitrogen 16 7 - 30 mg/dL  13   Creatinine 0.75 0.52 - 1.04 mg/dL  13   GFR Estimate 76 >60 mL/min/1.7m2  13   Comment:  Non  GFR Calc   GFR Estimate If Black >90 >60 mL/min/1.7m2  13   Comment:  African American GFR Calc   Calcium 8.4 8.5 - 10.1 mg/dL L 13            INR [[id number]]  Resulted: 04/14/18 0625, Result status: Final result    Ordering provider: Allan Menezes MD  04/14/18 0000 Resulting lab: [facility]    Specimen Information    Type Source Collected On   Blood  04/14/18 0610          Components       Value Reference Range Flag Lab   INR 1.11 0.86 - 1.14  13            Hemoglobin [[id number]]  Resulted: 04/14/18 0617, Result status: Final result    Ordering provider: Allan Menezes MD  04/14/18 0000 Resulting lab: [facility]    Specimen Information    Type Source Collected On   Blood  04/14/18 0610          Components       Value Reference Range Flag Lab   Hemoglobin 11.9 11.7 - 15.7 g/dL  13            INR [[id number]]  Resulted: 04/13/18 1745, Result status: Final result    Ordering provider: Apollo Nguyen MD  04/13/18 1625 Resulting lab: [facility]    Specimen Information    Type Source Collected On   Blood   04/13/18 1719          Components       Value Reference Range Flag Lab   INR 1.09 0.86 - 1.14  13            Glucose by meter [[id number]]  Resulted: 04/13/18 0909, Result status: Final result    Ordering provider: Apollo Nguyen MD  04/13/18 0855 Resulting lab: POINT OF CARE TEST, GLUCOSE    Specimen Information    Type Source Collected On     04/13/18 0855          Components       Value Reference Range Flag Lab   Glucose 96 70 - 99 mg/dL  170   Comment:  Dr/RN Notified            Testing Performed By     Lab - Abbreviation Name Director Address Valid Date Range    13 - Unknown [facility] Unknown [address] [facility] [id number] 01/15/15 0916 - Present    170 - Unknown POINT OF CARE TEST, GLUCOSE Unknown Unknown 10/31/11 1114 - Present               Imaging Results - 3 Days      XR Knee Port Right 1/2 Views [[id number]]  Resulted: 04/13/18 1541, Result status: Final result    Ordering provider: Allan Menezes MD  04/13/18 1444 Resulted by: Jong Miguel DO    Performed: 04/13/18 1500 - 04/13/18 1521 Resulting lab: RADIOLOGY RESULTS    Narrative:       EXAM: XR KNEE PORT RT 1/2 VW  4/13/2018 3:21 PM      HISTORY: Post-Op Total Knee;     COMPARISON: 3/13/2017    FINDINGS: Portable AP and lateral views of the right knee.    New total right knee arthroplasty. Components well seated.  Postsurgical drain noted. Postsurgical subcutaneous gas.       Impression:       IMPRESSION: New total right knee arthroplasty.    JONG MIGUEL MD (Joe)      Testing Performed By     Lab - Abbreviation Name Director Address Valid Date Range    104 - Rad Rslts RADIOLOGY RESULTS Unknown Unknown 02/16/05 1553 - Present            Encounter-Level Documents:     There are no encounter-level documents.      Order-Level Documents:     There are no order-level documents.

## 2018-04-13 NOTE — IP AVS SNAPSHOT
MRN:[id number]                      After Visit Summary   4/13/2018    Connie J Frankenberg    MRN: [id number]           Thank you!     Thank you for choosing [facility] for your care. Our goal is always to provide you with excellent care. Hearing back from our patients is one way we can continue to improve our services. Please take a few minutes to complete the written survey that you may receive in the mail after you visit with us. Thank you!        Patient Information     Date Of Birth          1945        Designated Caregiver       Most Recent Value    Caregiver    Will someone help with your care after discharge? yes    Name of designated caregiver Care center staff    Phone number of caregiver [phone]    Caregiver address [address]       About your hospital stay     You were admitted on:  April 13, 2018 You last received care in the:  [facility]    You were discharged on:  April 16, 2018        Reason for your hospital stay       You were admitted to the hospital following your total knee arthroplasty.                  Who to Call     For medical emergencies, please call 911.  For non-urgent questions about your medical care, please call your primary care provider or clinic, [phone]  For questions related to your surgery, please call your surgery clinic        Attending Provider     Provider Specialty    Apollo Nguyen MD Orthopedics       Primary Care Provider Office Phone # Fax #    Sammi Beltre [phone]       When to contact your care team       CALL YOUR PHYSICIAN IF:  -Pain in your hip persists or worsens in the first few days after surgery.  -Excessive redness or drainage of cloudy or bloody material from the wounds (Clear red tinted fluid and some mild drainage should be expected). Drainage of any kind 5 days after surgery should be reported to the doctor.  -You have a temperature elevation greater than 101.5    -You have pain, swelling or  redness in your calf.  -You have numbness or weakness in your leg or foot.      You may contact your physician at [phone] during business hours.    For after hours or weekend calls, you may contact the hospital at [phone] and ask for the on-call orthopedic resident                  After Care Instructions     Activity - Up ad mahendra           Advance Diet as Tolerated       Follow this diet upon discharge: Orders Placed This Encounter      Regular Diet Adult      Diet            Diet       You may return to your regular, pre-surgery diet unless instructed otherwise by your provider.            Discharge Instructions       Post Operative Instructions for Connie J Frankenberg is a 72 year old female    Surgery: Right Total Knee Replacement on 4/13.    If you have any questions contact Dr. Nguyen at the following number: [facility] call [phone].    Follow up appointment:   You are scheduled to follow up with Dr. Nguyen approximately 4 weeks after your surgery.           Anticoagulation:   Take coumadin x 4 weeks (INR goal 1.5-2.5).  This is given to help minimize your risk of blood clot. After this you should resume your home aspirin.     Activity:   -Continue to weight bear on your operative leg as tolerated by pain.  As you are more comfortable, you can discontinue use of the walker and transition to a cane.  Once you are comfortable with the cane, you can also wean from the cane and progress to using no assistive devices.  Do not transition until you are comfortable and have good balance.      -Follow the posterior hip precautions you were taught while at the hospital.        Pain Medications:   -Take pain medications as prescribed.  Wean off the the narcotic pain medications (Oxycodone, Dilaudid, etc) as tolerated by pain.  To help with this, take the Tylenol and Celebrex (if prescribed) to minimize the amount of narcotic pain medication you need to take.      -Do not drive while on pain medications  or until your leg feels well enough to do so.      Bandage Care and Showering:   -Keep the dressing clean/dry/intact until follow up.  OK to remove on POD #5 to redress/refinforce as needed.      -If no drainage is present along the wound, OK to shower and let soap and water run over the wound at 10-14 days.  If there is any questions, please call the clinic to confirm.  Do not soak or bathe the incision in water.  Do not scrub the incision.  Just let the water from the shower run over the incision.    -You may remove the exposed tails of suture (looks like fishing line) with a scissors at the level of the skin    --Contact Dr. Nguyen or his staff if there is any drainage from the incision or redness.    Leg Swelling and Icing  -Continue icing the knee 5-6 times per day for approximately 20 minutes each time.  This will help with swelling.    -Some swelling in the leg is normal after surgery.  This type of swelling is usually gravity dependent and is improved in the morning after sleeping.  If the swelling is painful in the calf or the swelling is getting worse, contact Dr. Nguyen's office.  We may recommend presenting to the Emergency Department to obtain an ultrasound of the leg if there is concern for a DVT.      -Elevate the leg if you are sitting as this will help reduce swelling.    Contact clinic if you note any of the following:  -Fevers greater than 101.5 chills  -Increased pain, redness, swelling or discharge at the surgical site.   -During regular business hours call Dr Nguyen's office and request to speak with his nurse or the triage nurses.   -After hours or on weekends call the hospital  at [phone] and ask to speak with the Orthopaedic resident on call.            General info for SNF       Length of Stay Estimate: Short Term Care: Estimated # of Days <30  Condition at Discharge: Improving  Level of care:skilled   Rehabilitation Potential: Excellent  Admission H&P remains valid and  up-to-date: Yes  Recent Chemotherapy: N/A  Use Nursing Home Standing Orders: Yes            Mantoux instructions       Give two-step Mantoux (PPD) Per Facility Policy Yes            Wound care and dressings       You have a clean Aquacel dressing on your surgical wound. This dressing should stay in place for 7 days to allow the incision to heal. After 7 days, you may change the dressing. Please use sterile 4x4 gauze dressings with tape over top to secure the dressing. If the dressing becomes wet or saturated with wound drainage, it is appropriate to change the dressing. If drainage persists from the incision site to the extent that it is frequently saturating the dressing, please contact your clinic nurse.                  Follow-up Appointments     Adult [facility] Follow-up and recommended labs and tests       You are to return to clinic at 4  weeks after your surgery, you will be scheduled for an appointment with . At this time, AP and lateral knee imaging will be obtained.    If you need to schedule your 4 week postoperative visit or haven't received confirmation regarding these visits, please call one of the following numbers within 7 days of discharge.    For patients that see Dr. nair at:   -[facility]: [phone]            Follow Up and recommended labs and tests       Daily INR goal 1.8-2.5 LOT 28 days. Facility provider to manage warfarin.                  Your next 10 appointments already scheduled     May 07, 2018 11:00 AM CDT   (Arrive by 10:45 AM)   Return Visit with Apollo Nair MD   [facility] ([facility])    [address] [address] [phone]              Future tests that were ordered for you     Assign Questionnaire Series to Patient                 Warfarin Instruction     You have started taking a medicine called warfarin. This is a blood-thinning medicine  "(anticoagulant). It helps prevent and treat blood clots.      Before leaving the hospital, make sure you know how much to take and how long to take it.      You will need regular blood tests to make sure your blood is clotting safely. It is very important to see your doctor for regular blood tests.    Talk to your doctor before taking any new medicine (this includes over-the-counter drugs and herbal products). Many medicines can interact with warfarin. This may cause more bleeding or too much clotting.     Eating a lot of vitamin K--found in green, leafy vegetables--can change the way warfarin works in your body. Do NOT avoid these foods. Instead, try to eat the same amount each day.     Bleeding is the most common side-effect of warfarin. You may notice bleeding gums, a bloody nose, bruises and bleeding longer when you cut yourself. See a doctor at once if:   o You cough up blood  o You find blood in your stool (poop)  o You have a deep cut, or a cut that bleeds longer than 10 minutes   o You have a bad cut, hard fall, accident or hit your head (go to urgent care or the emergency room).    For women who can get pregnant: This medicine can harm an unborn baby. Be very careful not to get pregnant while taking this medicine. If you think you might be pregnant, call your doctor right away.    For more information, read \"Guide to Warfarin Therapy,  the booklet you received in the hospital.        Pending Results     No orders found from 4/11/2018 to 4/14/2018.            Statement of Approval     Ordered          04/16/18 1049  I have reviewed and agree with all the recommendations and orders detailed in this document.  EFFECTIVE NOW     Approved and electronically signed by:  Luba Wilks APRN CNP           04/16/18 1116  I have reviewed and agree with all the recommendations and orders detailed in this document.     Approved and electronically signed by:  Pernell Cordero MD             Admission " "Information     Date & Time Provider Department Dept. Phone    4/13/2018 Apollo Nguyen MD [facility] [phone]      Your Vitals Were     Blood Pressure Pulse Temperature Respirations Height Weight    148/89 69 97.6  F (36.4  C) 16 1.575 m (5' 2.01\") 70.4 kg (155 lb 3.3 oz)    Pulse Oximetry BMI (Body Mass Index)                97% 28.38 kg/m2          MyChart Information     [employer] gives you secure access to your electronic health record. If you see a primary care provider, you can also send messages to your care team and make appointments. If you have questions, please call your primary care clinic.  If you do not have a primary care provider, please call [phone] and they will assist you.        Care EveryWhere ID     This is your Care EveryWhere ID. This could be used by other organizations to access your [place] medical records  [id number]        Equal Access to Services     JUAN DIEGO BRISENO : Elizabeth Sanders, semaj amador, stephen dowlingmameri alvarado, lyle gibbons . So [facility] [phone].    ATENCIÓN: Si habla español, tiene a starr disposición servicios gratuitos de asistencia lingüística. Llame al [phone].    We comply with applicable federal civil rights laws and [place] laws. We do not discriminate on the basis of race, color, national origin, age, disability, sex, sexual orientation, or gender identity.               Review of your medicines      START taking        Dose / Directions    acetaminophen 325 MG tablet   Commonly known as:  TYLENOL        Dose:  650 mg   Take 2 tablets (650 mg) by mouth every 4 hours as needed for mild pain   Quantity:  100 tablet   Refills:  1       HYDROmorphone 2 MG tablet   Commonly known as:  DILAUDID        For pain complaints of 1-5 give 1 tablets, for pain complaints of 6-10 give 2 tablets every 4 hours as needed for pain.   Quantity:  60 tablet   Refills:  0       senna-docusate 8.6-50 MG per tablet   Commonly known " as:  SENOKOT-S;PERICOLACE        Dose:  1 tablet   Take 1 tablet by mouth 2 times daily   Quantity:  100 tablet   Refills:  0       warfarin 5 MG tablet   Commonly known as:  COUMADIN        Dose:  5 mg   Take 1 tablet (5 mg) by mouth once for 1 dose INR tomorrow. LOT 28 days. INR goal 1.8-2.5   Quantity:  30 tablet   Refills:  0         CONTINUE these medicines which may have CHANGED, or have new prescriptions. If we are uncertain of the size of tablets/capsules you have at home, strength may be listed as something that might have changed.        Dose / Directions    atorvastatin 80 MG tablet   Commonly known as:  LIPITOR   This may have changed:  when to take this   Used for:  Hyperlipidemia LDL goal <130        Dose:  80 mg   Take 1 tablet (80 mg) by mouth daily   Quantity:  90 tablet   Refills:  3         CONTINUE these medicines which have NOT CHANGED        Dose / Directions    * COMPRESSION STOCKINGS   Used for:  Venous (peripheral) insufficiency        Dose:  2 each   2 each daily   Quantity:  2 each   Refills:  1       * COMPRESSION STOCKINGS   Used for:  PAD (peripheral artery disease) (H)        Dose:  2 each   2 each daily   Quantity:  2 each   Refills:  1       * Notice:  This list has 2 medication(s) that are the same as other medications prescribed for you. Read the directions carefully, and ask your doctor or other care provider to review them with you.      STOP taking     aspirin 81 MG tablet           butalbital-aspirin-caffeine -40 MG per capsule   Commonly known as:  FIORINAL           EXCEDRIN PO                Where to get your medicines      These medications were sent to [facility] [place], [place] - [address]  [address] [place] [address], [facility] [address]     Phone:  [phone]     acetaminophen 325 MG tablet         Some of these will need a paper prescription and others can be bought over the counter. Ask your nurse if you have questions.     You don't need a  prescription for these medications     senna-docusate 8.6-50 MG per tablet    warfarin 5 MG tablet         Information about where to get these medications is not yet available     ! Ask your nurse or doctor about these medications     HYDROmorphone 2 MG tablet                Protect others around you: Learn how to safely use, store and throw away your medicines at www.disposemymeds.org.        Information about OPIOIDS     PRESCRIPTION OPIOIDS: WHAT YOU NEED TO KNOW    Prescription opioids can be used to help relieve moderate to severe pain and are often prescribed following a surgery or injury, or for certain health conditions. These medications can be an important part of treatment but also come with serious risks. It is important to work with your health care provider to make sure you are getting the safest, most effective care.    WHAT ARE THE RISKS AND SIDE EFFECTS OF OPIOID USE?  Prescription opioids carry serious risks of addiction and overdose, especially with prolonged use. An opioid overdose, often marked by slowed breathing can cause sudden death. The use of prescription opioids can have a number of side effects as well, even when taken as directed:      Tolerance - meaning you might need to take more of a medication for the same pain relief    Physical dependence - meaning you have symptoms of withdrawal when a medication is stopped    Increased sensitivity to pain    Constipation    Nausea, vomiting, and dry mouth    Sleepiness and dizziness    Confusion    Depression    Low levels of testosterone that can result in lower sex drive, energy, and strength    Itching and sweating    RISKS ARE GREATER WITH:    History of drug misuse, substance use disorder, or overdose    Mental health conditions (such as depression or anxiety)    Sleep apnea    Older age (65 years or older)    Pregnancy    Avoid alcohol while taking prescription opioids.   Also, unless specifically advised by your health care provider,  medications to avoid include:    Benzodiazepines (such as Xanax or Valium)    Muscle relaxants (such as Soma or Flexeril)    Hypnotics (such as Ambien or Lunesta)    Other prescription opioids    KNOW YOUR OPTIONS:  Talk to your health care provider about ways to manage your pain that do not involve prescription opioids. Some of these options may actually work better and have fewer risks and side effects:    Pain relievers such as acetaminophen, ibuprofen, and naproxen    Some medications that are also used for depression or seizures    Physical therapy and exercise    Cognitive behavioral therapy, a psychological, goal-directed approach, in which patients learn how to modify physical, behavioral, and emotional triggers of pain and stress    IF YOU ARE PRESCRIBED OPIOIDS FOR PAIN:    Never take opioids in greater amounts or more often than prescribed    Follow up with your primary health care provider and work together to create a plan on how to manage your pain.    Talk about ways to help manage your pain that do not involve prescription opioids    Talk about all concerns and side effects    Help prevent misuse and abuse    Never sell or share prescription opioids    Never use another person's prescription opioids    Store prescription opioids in a secure place and out of reach of others (this may include visitors, children, friends, and family)    Visit www.cdc.gov/drugoverdose to learn about risks of opioid abuse and overdose    If you believe you may be struggling with addiction, tell your health care provider and ask for guidance or call [facility]'s National Helpline at 2-330-460-HELP    LEARN MORE / www.cdc.gov/drugoverdose/prescribing/guideline.html    Safely dispose of unused prescription opioids: Find your local drug take-back programs and more information about the importance of safe disposal at www.doseofreality.[place].gov             Medication List: This is a list of all your medications and when to take them.  Check marks below indicate your daily home schedule. Keep this list as a reference.      Medications           Morning Afternoon Evening Bedtime As Needed    acetaminophen 325 MG tablet   Commonly known as:  TYLENOL   Take 2 tablets (650 mg) by mouth every 4 hours as needed for mild pain   Last time this was given:  975 mg on 4/16/2018  8:31 AM                                atorvastatin 80 MG tablet   Commonly known as:  LIPITOR   Take 1 tablet (80 mg) by mouth daily   Last time this was given:  80 mg on 4/15/2018 11:16 PM                                * COMPRESSION STOCKINGS   2 each daily                                * COMPRESSION STOCKINGS   2 each daily                                HYDROmorphone 2 MG tablet   Commonly known as:  DILAUDID   For pain complaints of 1-5 give 1 tablets, for pain complaints of 6-10 give 2 tablets every 4 hours as needed for pain.   Last time this was given:  1 mg on 4/16/2018 11:37 AM                                senna-docusate 8.6-50 MG per tablet   Commonly known as:  SENOKOT-S;PERICOLACE   Take 1 tablet by mouth 2 times daily   Last time this was given:  1 tablet on 4/15/2018  8:30 PM                                warfarin 5 MG tablet   Commonly known as:  COUMADIN   Take 1 tablet (5 mg) by mouth once for 1 dose INR tomorrow. LOT 28 days. INR goal 1.8-2.5   Last time this was given:  4 mg on 4/15/2018  5:09 PM                                * Notice:  This list has 2 medication(s) that are the same as other medications prescribed for you. Read the directions carefully, and ask your doctor or other care provider to review them with you.

## 2018-04-14 ENCOUNTER — APPOINTMENT (OUTPATIENT)
Dept: PHYSICAL THERAPY | Facility: CLINIC | Age: 73
DRG: 470 | End: 2018-04-14
Attending: ORTHOPAEDIC SURGERY
Payer: MEDICARE

## 2018-04-14 ENCOUNTER — APPOINTMENT (OUTPATIENT)
Dept: OCCUPATIONAL THERAPY | Facility: CLINIC | Age: 73
DRG: 470 | End: 2018-04-14
Attending: ORTHOPAEDIC SURGERY
Payer: MEDICARE

## 2018-04-14 LAB
ANION GAP SERPL CALCULATED.3IONS-SCNC: 6 MMOL/L (ref 3–14)
BUN SERPL-MCNC: 16 MG/DL (ref 7–30)
CALCIUM SERPL-MCNC: 8.4 MG/DL (ref 8.5–10.1)
CHLORIDE SERPL-SCNC: 107 MMOL/L (ref 94–109)
CO2 SERPL-SCNC: 28 MMOL/L (ref 20–32)
CREAT SERPL-MCNC: 0.75 MG/DL (ref 0.52–1.04)
GFR SERPL CREATININE-BSD FRML MDRD: 76 ML/MIN/1.7M2
GLUCOSE SERPL-MCNC: 102 MG/DL (ref 70–99)
HGB BLD-MCNC: 11.9 G/DL (ref 11.7–15.7)
INR PPP: 1.11 (ref 0.86–1.14)
POTASSIUM SERPL-SCNC: 4.8 MMOL/L (ref 3.4–5.3)
SODIUM SERPL-SCNC: 141 MMOL/L (ref 133–144)

## 2018-04-14 PROCEDURE — 25000128 H RX IP 250 OP 636: Performed by: ORTHOPAEDIC SURGERY

## 2018-04-14 PROCEDURE — 97110 THERAPEUTIC EXERCISES: CPT | Mod: GP

## 2018-04-14 PROCEDURE — A9270 NON-COVERED ITEM OR SERVICE: HCPCS | Mod: GY | Performed by: INTERNAL MEDICINE

## 2018-04-14 PROCEDURE — A9270 NON-COVERED ITEM OR SERVICE: HCPCS | Mod: GY | Performed by: ORTHOPAEDIC SURGERY

## 2018-04-14 PROCEDURE — 85610 PROTHROMBIN TIME: CPT | Performed by: ORTHOPAEDIC SURGERY

## 2018-04-14 PROCEDURE — 40000133 ZZH STATISTIC OT WARD VISIT: Performed by: OCCUPATIONAL THERAPIST

## 2018-04-14 PROCEDURE — 25000132 ZZH RX MED GY IP 250 OP 250 PS 637: Mod: GY | Performed by: ORTHOPAEDIC SURGERY

## 2018-04-14 PROCEDURE — 25000132 ZZH RX MED GY IP 250 OP 250 PS 637: Mod: GY | Performed by: INTERNAL MEDICINE

## 2018-04-14 PROCEDURE — 40000193 ZZH STATISTIC PT WARD VISIT

## 2018-04-14 PROCEDURE — 97165 OT EVAL LOW COMPLEX 30 MIN: CPT | Mod: GO | Performed by: OCCUPATIONAL THERAPIST

## 2018-04-14 PROCEDURE — 97530 THERAPEUTIC ACTIVITIES: CPT | Mod: GP

## 2018-04-14 PROCEDURE — 36415 COLL VENOUS BLD VENIPUNCTURE: CPT | Performed by: ORTHOPAEDIC SURGERY

## 2018-04-14 PROCEDURE — 99232 SBSQ HOSP IP/OBS MODERATE 35: CPT | Performed by: INTERNAL MEDICINE

## 2018-04-14 PROCEDURE — 25000128 H RX IP 250 OP 636: Performed by: INTERNAL MEDICINE

## 2018-04-14 PROCEDURE — 97161 PT EVAL LOW COMPLEX 20 MIN: CPT | Mod: GP

## 2018-04-14 PROCEDURE — 97116 GAIT TRAINING THERAPY: CPT | Mod: GP

## 2018-04-14 PROCEDURE — 80048 BASIC METABOLIC PNL TOTAL CA: CPT | Performed by: ORTHOPAEDIC SURGERY

## 2018-04-14 PROCEDURE — 85018 HEMOGLOBIN: CPT | Performed by: ORTHOPAEDIC SURGERY

## 2018-04-14 PROCEDURE — 99207 ZZC CDG-MDM COMPONENT: MEETS MODERATE - UP CODED: CPT | Performed by: INTERNAL MEDICINE

## 2018-04-14 PROCEDURE — 97535 SELF CARE MNGMENT TRAINING: CPT | Mod: GO | Performed by: OCCUPATIONAL THERAPIST

## 2018-04-14 PROCEDURE — 12000001 ZZH R&B MED SURG/OB UMMC

## 2018-04-14 RX ORDER — HYDROMORPHONE HYDROCHLORIDE 2 MG/1
1-2 TABLET ORAL
Qty: 60 TABLET | Refills: 0 | Status: SHIPPED | OUTPATIENT
Start: 2018-04-14 | End: 2018-04-16

## 2018-04-14 RX ORDER — ACETAMINOPHEN 325 MG/1
650 TABLET ORAL EVERY 4 HOURS PRN
Qty: 100 TABLET | Refills: 1 | Status: SHIPPED | OUTPATIENT
Start: 2018-04-16 | End: 2018-04-16

## 2018-04-14 RX ORDER — AMOXICILLIN 250 MG
1 CAPSULE ORAL 2 TIMES DAILY
Qty: 100 TABLET | Refills: 0 | Status: SHIPPED | OUTPATIENT
Start: 2018-04-14 | End: 2018-04-16

## 2018-04-14 RX ORDER — WARFARIN SODIUM 2 MG/1
4 TABLET ORAL
Status: COMPLETED | OUTPATIENT
Start: 2018-04-14 | End: 2018-04-14

## 2018-04-14 RX ORDER — KETOROLAC TROMETHAMINE 15 MG/ML
15 INJECTION, SOLUTION INTRAMUSCULAR; INTRAVENOUS EVERY 6 HOURS
Status: COMPLETED | OUTPATIENT
Start: 2018-04-14 | End: 2018-04-15

## 2018-04-14 RX ORDER — WARFARIN SODIUM 4 MG/1
4 TABLET ORAL DAILY
Qty: 45 TABLET | Refills: 0 | Status: SHIPPED | OUTPATIENT
Start: 2018-04-14 | End: 2018-04-16

## 2018-04-14 RX ADMIN — ACETAMINOPHEN 975 MG: 325 TABLET ORAL at 16:29

## 2018-04-14 RX ADMIN — KETOROLAC TROMETHAMINE 15 MG: 15 INJECTION, SOLUTION INTRAMUSCULAR; INTRAVENOUS at 12:05

## 2018-04-14 RX ADMIN — Medication 2 MG: at 12:02

## 2018-04-14 RX ADMIN — Medication 2 MG: at 16:59

## 2018-04-14 RX ADMIN — ATORVASTATIN CALCIUM 80 MG: 20 TABLET, FILM COATED ORAL at 22:20

## 2018-04-14 RX ADMIN — Medication 2 MG: at 08:36

## 2018-04-14 RX ADMIN — CEFAZOLIN 1 G: 1 INJECTION, POWDER, FOR SOLUTION INTRAMUSCULAR; INTRAVENOUS at 06:33

## 2018-04-14 RX ADMIN — ACETAMINOPHEN 975 MG: 325 TABLET ORAL at 00:51

## 2018-04-14 RX ADMIN — Medication 2 MG: at 23:57

## 2018-04-14 RX ADMIN — KETOROLAC TROMETHAMINE 15 MG: 15 INJECTION, SOLUTION INTRAMUSCULAR; INTRAVENOUS at 16:28

## 2018-04-14 RX ADMIN — SENNOSIDES AND DOCUSATE SODIUM 2 TABLET: 8.6; 5 TABLET ORAL at 08:41

## 2018-04-14 RX ADMIN — KETOROLAC TROMETHAMINE 15 MG: 15 INJECTION, SOLUTION INTRAMUSCULAR; INTRAVENOUS at 22:25

## 2018-04-14 RX ADMIN — WARFARIN SODIUM 4 MG: 2 TABLET ORAL at 17:55

## 2018-04-14 RX ADMIN — Medication 2 MG: at 04:11

## 2018-04-14 RX ADMIN — SODIUM CHLORIDE, POTASSIUM CHLORIDE, SODIUM LACTATE AND CALCIUM CHLORIDE: 600; 310; 30; 20 INJECTION, SOLUTION INTRAVENOUS at 02:48

## 2018-04-14 RX ADMIN — SENNOSIDES AND DOCUSATE SODIUM 2 TABLET: 8.6; 5 TABLET ORAL at 19:21

## 2018-04-14 RX ADMIN — Medication 2 MG: at 20:40

## 2018-04-14 RX ADMIN — Medication 1 MG: at 00:51

## 2018-04-14 RX ADMIN — ACETAMINOPHEN 975 MG: 325 TABLET ORAL at 08:35

## 2018-04-14 RX ADMIN — KETOROLAC TROMETHAMINE 15 MG: 15 INJECTION, SOLUTION INTRAMUSCULAR; INTRAVENOUS at 06:25

## 2018-04-14 NOTE — PLAN OF CARE
Problem: Patient Care Overview  Goal: Plan of Care/Patient Progress Review  Pt. A&Ox4. VSS. Afebrile. Lung sounds CTA. Maintaining sats w/ 1L O2 via NC. IS encouraged. Bowel sounds active, LBM 4/12, flatus +. PP+ DP+. CMS and neuro's are intact. Reports numbness is improving, and states she has some baseline numbness in the back of her L foot. Denies nausea, shortness of breath, and chest pain. RLE pain managed w/ scheduled Tylenol, Toradol, prn PO dilaudid, and ice applied. Pt decline CPM at HS, reapplied this am currently 0-45. Adequate output via reed, removed around 0400, was up to BSC this am. Tolerating regular diet. RLE incisional dressing is CDI. PEPE intact output 110mL Pt up with ax1. PIV is patent and infusing. Bilateral heels are elevated off the bed. Call light is within reach, pt able to make needs known. Will continue to monitor.

## 2018-04-14 NOTE — PLAN OF CARE
Problem: Knee Arthroplasty (Total, Partial) (Adult)  Goal: Signs and Symptoms of Listed Potential Problems Will be Absent, Minimized or Managed (Knee Arthroplasty)  Signs and symptoms of listed potential problems will be absent, minimized or managed by discharge/transition of care (reference Knee Arthroplasty (Total, Partial) (Adult) CPG).   Outcome: No Change  Patient A & O x4. Lung sounds clear bilaterally. Patient denies chest pain, SOB, tingling, numbness, nausea, and vomiting. Patient complains of dizziness and lightheadedness upon standing and had one episode while in bed. Bowel sounds active, last BM 4/12/18, passing gas. Drinking well and voiding spontaneously without difficulties. Patient has PEPE drain and output was 80 ml this shift. IV and saline lock. Patient able to wiggle toes. CMS intact with exception to intermittent numbness in bilateral feet. PCD on left leg and CPM on right leg for 6 hours this shift. Dressing clean, dry, and intact. Pain not tolerable and patient taking Toradol 15 mg and Dilaudid 2 mg oral for pain, ice pack applied to right knee. Spirometer was encouraged and done several times, repositioned and turned in bed, heels elevated off bed. Patient demonstrates ability to use call light appropriately. Will continue to monitor patient.

## 2018-04-14 NOTE — PROGRESS NOTES
04/14/18 1315   Quick Adds   Type of Visit Initial Occupational Therapy Evaluation   Living Environment   Lives With significant other  (ebony Dickey)   Living Arrangements house   Home Accessibility tub/shower is not walk in;bath not on first floor   Number of Stairs to Enter Home 5   Number of Stairs Within Home 13   Living Environment Comment Ebony will be out of town for a couple days. When he is back in town, will be somewhat flexible with work schedule. Her bedroom and bathroom are on the upper level so will need to do 13 steps.   Self-Care   Dominant Hand left   Usual Activity Tolerance good   Current Activity Tolerance fair   Regular Exercise yes   Activity/Exercise Type swimming;team sports   Exercise Amount/Frequency daily   Equipment Currently Used at Home none   Activity/Exercise/Self-Care Comment plays volleyball. Patient was not using any DME prior to surgery, but does have access to commode, raised toilet seat, walker   Functional Level Prior   Ambulation 0-->independent   Transferring 0-->independent   Toileting 0-->independent   Bathing 0-->independent   Dressing 0-->independent   Eating 0-->independent   Communication 0-->understands/communicates without difficulty   Swallowing 0-->swallows foods/liquids without difficulty   Cognition 0 - no cognition issues reported   Fall history within last six months no   Prior Functional Level Comment Fully retired, but very active normally.   General Information   Onset of Illness/Injury or Date of Surgery - Date 04/13/18   Referring Physician Nguyen   Patient/Family Goals Statement Go to TCU prior to DC home, be able to resume active lifestyle.   Additional Occupational Profile Info/Pertinent History of Current Problem s/p R TKA   Precautions/Limitations fall precautions   Weight-Bearing Status - RLE weight-bearing as tolerated   General Observations Resting in bed, agreeable to OT.   General Info Comments h/o VASOVAGAL fainting episodes.   Cognitive  Status Examination   Orientation orientation to person, place and time   Level of Consciousness alert   Able to Follow Commands WNL/WFL   Personal Safety (Cognitive) WNL/WFL   Visual Perception   Visual Perception Wears glasses   Sensory Examination   Sensory Comments mild numbess/tingling mostly L foot that she had prior to surgery.   Pain Assessment   Patient Currently in Pain Yes, see Vital Sign flowsheet   Integumentary/Edema   Integumentary/Edema Comments Has lymphedema compression socks    Range of Motion (ROM)   ROM Comment BUE AROM intact. R knee limited post-op; using CPM approx 50 degrees.   Strength   Strength Comments Slightly able to complete (I) SLR on RLE. BUE strength intact.   Muscle Tone Assessment   Muscle Tone Quick Adds No deficits were identified   Coordination   Upper Extremity Coordination No deficits were identified   Mobility   Bed Mobility Comments Min A supine to EOB   Transfer Skill: Bed to Chair/Chair to Bed   Level of [place]: Bed to Chair contact guard   Physical Assist/Nonphysical Assist: Bed to Chair verbal cues   Weight-Bearing Restrictions weight-bearing as tolerated   Assistive Device - Transfer Skill Bed to Chair Chair to Bed Rehab Eval rolling walker   Transfer Skill: Sit to Stand   Level of [place]: Sit/Stand contact guard   Physical Assist/Nonphysical Assist: Sit/Stand verbal cues   Transfer Skill: Sit to Stand weight-bearing as tolerated   Assistive Device for Transfer: Sit/Stand rolling walker   Transfer Skill: Toilet Transfer   Level of [place]: Toilet contact guard   Physical Assist/Nonphysical Assist: Toilet verbal cues   Weight-Bearing Restrictions: Toilet weight-bearing as tolerated   Assistive Device rolling walker   Balance   Balance Comments CGA with use of WW; no overt LOB with short distance ambulation   Bathing   Level of [place] stand-by assist  (seated sponge bathing)   Physical Assist/Nonphysical Assist set-up required   Upper Body Dressing    Level of [place]: Dress Upper Body independent   Physical Assist/Nonphysical Assist: Dress Upper Body set-up required   Lower Body Dressing   Level of [place]: Dress Lower Body minimum assist (75% patients effort)   Physical Assist/Nonphysical Assist: Dress Lower Body verbal cues   Toileting   Level of [place]: Toilet contact guard   Physical Assist/Nonphysical Assist: Toilet set-up required   Grooming   Level of [place]: Grooming contact guard   Physical Assist/Nonphysical Assist: Grooming set-up required   Eating/Self Feeding   Level of [place]: Eating independent   Instrumental Activities of Daily Living (IADL)   Previous Responsibilities meal prep;driving;shopping;housekeeping   Activities of Daily Living Analysis   Impairments Contributing to Impaired Activities of Daily Living balance impaired;ROM decreased;strength decreased;post surgical precautions;pain   General Therapy Interventions   Planned Therapy Interventions ADL retraining;IADL retraining;transfer training   Clinical Impression   Criteria for Skilled Therapeutic Interventions Met yes, treatment indicated   OT Diagnosis impaired ADL and mobility post TKA   Influenced by the following impairments pain, history of fainting, impaired ROM   Assessment of Occupational Performance 1-3 Performance Deficits   Identified Performance Deficits ADL, IADL, mobility   Clinical Decision Making (Complexity) Low complexity   Therapy Frequency daily   Predicted Duration of Therapy Intervention (days/wks) 3 days   Anticipated Equipment Needs at Discharge shower chair;reacher   Anticipated Discharge Disposition Transitional Care Facility   Risks and Benefits of Treatment have been explained. Yes   Patient, Family & other staff in agreement with plan of care Yes   Total Evaluation Time   Total Evaluation Time (Minutes) 10

## 2018-04-14 NOTE — PLAN OF CARE
Problem: Patient Care Overview  Goal: Plan of Care/Patient Progress Review  Discharge Planner OT   Patient plan for discharge: TCU  Current status: CGA with mobility, Min A with ADL  Barriers to return to prior living situation: Falls risk, h/o vasovagal fainting, limited mobility and ADL  Recommendations for discharge: TCU  Rationale for recommendations: To maximize (I) and safety for DC home.       Entered by: Melany Johnson 04/14/2018 2:40 PM

## 2018-04-14 NOTE — PLAN OF CARE
Problem: Patient Care Overview  Goal: Plan of Care/Patient Progress Review  Discharge Planner PT   Patient plan for discharge: Rehab  Current status: Initial eval complete, treatment initiated. Pt educated on role of PT and POC, agreeable to participate in rehab. Pt AAROM 0-20-65. Pt able to perform bed mobility SBA, sit<>stand CGA. Pt ambulated 50' with FWW CGA and wheelchair follow. Required instruction on proper AD sequencing. Pt completed supine LE ex for increased strength during transfers: 1x10 AP, glut set, quad set, hamstring set, MinAx1 heel slide. Pt became dizzy at the end of ambulation, but was able to recover once supine in bed. Pt left supine with HOB elevated and all needs within reach. Pt has PMH BPPV and vasovagal episodes, requires supervision x2 for mobility.  Barriers to return to prior living situation: impaired functional mobility  Recommendations for discharge: Rehab  Rationale for recommendations: Need for therapy to progress pt safety.       Entered by: Geremias Chaves 04/14/2018 10:52 AM

## 2018-04-14 NOTE — PROGRESS NOTES
Social Work: Assessment with Discharge Plan    Patient Name: Connie J Frankenberg  : 1945  Age: 72 year old  MRN: [id number]  Completed assessment with: Pt. Mostly, Noble holman entered room during visit    Presenting Information   Reason for Referral: needing ST rehab (this is pt.'s request to Dr. Villagomez).   Date of intake: 2018  Referral Source: Dr. Villagomez  Decision Maker: pt.  Alternate Decision Maker: Noble sanchez  Health Care Directive: Had HCD  Living Situation[facility] [place] [facility]  Previous Functional Status: independent  Patient and family understanding of hospitalization: Pt. Understands minimum 3 day stay then to rehab center  Cultural/Language/Spiritual Considerations: non identified  Adjustment to Illness: good    Physical Health  Reason for admission:   1. Status post total right knee replacement      Services Needed/Recommended: short term rehab    Mental Health/Chemical Dependency:   Diagnosis: none    Support System  Significant Relationship at Present time:  Ming, 2 brothers, friends  Family of Origin is available for support: Yes (Noble does travel for his job occ.)  Other support available:  Brothers, friends  Current in home services: none  Gaps in Support System: none identified    Provider Information   Primary Care Physician:Sammi Beltre       Financial   Income Source: Social Security, pt. Is retired from [employer] (had worked in [employer])  Financial Concerns:  None identified  Insurance: Medicare and [employer]      Discharge Plan   Patient and family discharge goal: to rehab facility  Provided Education on discharge plan: Yes  Patient agreeable to discharge plan:  Yes  A list of Medicare Certified Facilities was provided to the patient and/or family to encourage patient choice. Patient's choices for facility are: [facility] (pt. Made contact with this facility prior to surgery)  Will NH provide Skilled rehabilitation or complex  medical:  Yes  General information regarding anticipated insurance coverage and possible out of pocket cost was discussed. Patient and patient's family are aware patient may incur the cost of transportation to the facility, pending insurance payment: Yes. Pt. Was unsure today if she wanted family or medivan transport. Informed her insurance does not cover medivan  Barriers to discharge: Facility acceptance, medical readiness    Discharge Recommendations   Disposition:  rehab facility  Transportation Needs: family versus medivan      Additional comments   Referral made via [facility] [employer] to [facility]. Left message for admissions with SW contact info (today and weekday). Nurses' station # to call for further questions  Pt. Also had Temp. Disability parking permit filled out - had Dr. Hernando bland

## 2018-04-14 NOTE — PROGRESS NOTES
"[facility] Orthopedic Progress Note       S:  No issues overnight. Did have a vasovagal episode when getting up to bathroom, this is fairly common for her. Denies numbness, tingling. Tolerating PO. Voiding. No CP, SOB or dizziness now.     O: Patient Vitals for the past 24 hrs:   BP Temp Temp src Pulse Heart Rate Resp SpO2 Height Weight   04/14/18 0725 128/72 95.2  F (35.1  C) Oral - 56 18 95 % - -   04/14/18 0600 - - - - 60 12 94 % - -   04/14/18 0412 131/75 - - - 62 15 95 % - -   04/14/18 0300 - - - - 59 13 95 % - -   04/14/18 0200 - - - - 61 12 94 % - -   04/14/18 0100 - - - - 61 15 95 % - -   04/14/18 0051 - - - - 62 21 92 % - -   04/14/18 0033 124/69 97.6  F (36.4  C) Oral 102 68 18 93 % - -   04/13/18 2015 - - - - - 16 95 % - -   04/13/18 1915 - - - - - 16 97 % - -   04/13/18 1825 117/60 - - - 76 - - - -   04/13/18 1815 - - - - - 18 93 % - -   04/13/18 1755 129/75 - - - 68 - - - -   04/13/18 1745 - - - - - 18 95 % - -   04/13/18 1725 142/80 - - - 55 - - - -   04/13/18 1715 - - - - - 20 97 % - -   04/13/18 1710 151/72 - - - 56 - - - -   04/13/18 1700 - - - - - 13 98 % - -   04/13/18 1655 138/73 - - - 58 - - - -   04/13/18 1645 - - - - - 20 97 % - -   04/13/18 1640 137/73 - - - 55 - - - -   04/13/18 1630 133/74 97.5  F (36.4  C) Oral 118 - 18 95 % - -   04/13/18 1611 - - - - - - 98 % - -   04/13/18 1600 - - - - 65 10 99 % - -   04/13/18 1545 122/74 - - - 57 10 99 % - -   04/13/18 1530 119/71 - - - 60 14 98 % - -   04/13/18 1515 120/72 - - - 62 17 98 % - -   04/13/18 1500 109/66 97.3  F (36.3  C) Axillary - 73 18 96 % - -   04/13/18 1445 106/60 - - - 78 8 98 % - -   04/13/18 1437 102/65 98.1  F (36.7  C) Axillary - - 8 98 % - -   04/13/18 0846 (!) 125/97 98.1  F (36.7  C) Oral 69 - 18 98 % 1.575 m (5' 2.01\") 70.4 kg (155 lb 3.3 oz)       Intake/Output Summary (Last 24 hours) at 04/14/18 0738  Last data filed at 04/14/18 0633   Gross per 24 hour   Intake             1343 ml   Output             2200 ml "   Net             -857 ml       Gen: A&Ox3, no acute distress  Resp: breathing equal and non-labored, no wheezing  Extremities:   RLE: Dressing C/D/I. Fires EHL, FHL, tib ant, GSC. SILT in DP/Sp/Tib nerves. DP pulse palp.       Labs:  Hemoglobin   Date Value Ref Range Status   04/14/2018 11.9 11.7 - 15.7 g/dL Final   03/29/2018 14.2 11.7 - 15.7 g/dL Final     Drain:  115ml, 110ml.    A/P: Connie J Frankenberg is a 72 year old female s/p Right total knee arthroplasty with Dr. Nguyen.     Activity: Up with assist and assistive devices as needed until independent. Weight bearing status: WBAT  Antibiotics: Cefazolin or Clindamycin x 24 hours  Diet: Begin with clear fluids and progress diet as tolerated. Bowel regimen. Anti-emetics PRN.    DVT prophylaxis: warfarin x 4 weeks  Elevation: Elevate heels off of bed on pillows   Wound Care: Aquacel, leave in place for 7-9 days post op    Drains: none  Pain management: Orals PRN, IV for breakthrough only  X-rays: xrays in PACU  Physical Therapy: Mobilization, ROM, ADL's   Occupational Therapy: ADL's  Labs: Trend Hgb on POD #1, 2  Consults: PT, OT. Hospitalist, appreciate assistance in caring for this patient throughout the perioperative period     Future Appointments  Date Time Provider Department Center   4/14/2018 9:30 AM Keeley Mcgovern, PT [facility] [place]   4/14/2018 11:00 AM Roxana Cee, OT [facility] [place]   4/14/2018 1:30 PM Keeley Mcgovern, PT [facility] [place]         Disposition: Pending progress with therapies, pain control on orals, and medical stability, anticipate discharge to Home vs TCU on POD #2-3.        Chon Sommer  PGY4  [phone]

## 2018-04-14 NOTE — PLAN OF CARE
Problem: Knee Arthroplasty (Total, Partial) (Adult)  Goal: Signs and Symptoms of Listed Potential Problems Will be Absent, Minimized or Managed (Knee Arthroplasty)  Signs and symptoms of listed potential problems will be absent, minimized or managed by discharge/transition of care (reference Knee Arthroplasty (Total, Partial) (Adult) CPG).  Outcome: Improving    VS: Elevated upon arrival - stabilizing trending WDL. CAPNO stable as well.   O2: Stable on room air in mid to upper 90's   Output: Hicks with adequate output   Last BM: 4.12.14 per pt report   Activity: Dangled feet at bedside, up for meal - weight bearing as tolerated   Skin: Intact ex incisions, drains   Pain: Managed with PRN Dilaudid 1-2mg diana 3hrs and scheduled Toradol - pt endorse relief   CMS: Residual numbness - Spinal anesthesia    Dressing: CDI    Diet: Regular, appetite is good tolerates oral intake   LDA: PIV infusing LR at 100ml/hr   Equipment: CPM at 55, PEPE drain, Hicks    Plan: Pt would like to discharge to [facility] in [facility]   Additional Info:

## 2018-04-14 NOTE — PROGRESS NOTES
04/14/18 0932   Quick Adds   Type of Visit Initial PT Evaluation       Present no   Living Environment   Lives With significant other   Living Arrangements house   Home Accessibility bed and bath are not on the first floor   Number of Stairs to Enter Home 5   Number of Stairs Within Home 10   Stair Railings at Home outside, present at both sides;inside, present on left side   Transportation Available Medicaid transportation   Self-Care   Dominant Hand left   Usual Activity Tolerance poor   Current Activity Tolerance poor   Regular Exercise yes   Activity/Exercise Type swimming;team sports   Exercise Amount/Frequency 2 times/wk   Equipment Currently Used at Home commode   Functional Level Prior   Ambulation 0-->independent   Transferring 0-->independent   Toileting 0-->independent   Bathing 0-->independent   Dressing 0-->independent   Eating 0-->independent   Communication 0-->understands/communicates without difficulty   Swallowing 0-->swallows foods/liquids without difficulty   Cognition 0 - no cognition issues reported   Fall history within last six months no   Which of the above functional risks had a recent onset or change? ambulation;transferring   General Information   Onset of Illness/Injury or Date of Surgery - Date 04/13/18   Referring Physician Apollo Nguyen    Patient/Family Goals Statement Not stated   Pertinent History of Current Problem (include personal factors and/or comorbidities that impact the POC) S/p R TKA, history of vasovagal episodes, BPPV, migraine headaches   Precautions/Limitations fall precautions   Weight-Bearing Status - LUE full weight-bearing   Weight-Bearing Status - RUE full weight-bearing   Weight-Bearing Status - LLE full weight-bearing   Weight-Bearing Status - RLE weight-bearing as tolerated   General Observations Pt experiences vasovagal episodes and has BPPV, move slowly during mobility and requires supervision x2   General Info Comments PEPE drain, CPM    Cognitive Status Examination   Orientation orientation to person, place and time   Level of Consciousness alert   Follows Commands and Answers Questions 100% of the time;able to follow multistep instructions   Personal Safety and Judgment intact   Integumentary/Edema   Integumentary/Edema Comments Surgical incision   Posture    Posture Kyphosis   Range of Motion (ROM)   ROM Quick Adds Knee, Right   ROM Comment 0-20-65   Right Knee Extension/Flexion ROM   Right Knee Extension/Flexion AROM - degrees 0-20-65   Strength   Strength Comments not formally assessed, L LE >3/5   Bed Mobility   Bed Mobility Comments Able to perform supine<>sit SBA, supine/sitting scooting SBA   Transfer Skills   Transfer Comments Able to perform sit<>stand CGA   Gait   Gait Comments Pt ambulated 50' with FWW CGA and a wheelchair follow.   Balance   Balance Comments Good sitting balance   Sensory Examination   Sensory Perception Comments mild tingling in toes   Modality Interventions   Planned Modality Interventions Cryotherapy   General Therapy Interventions   Planned Therapy Interventions balance training;bed mobility training;gait training;ROM;strengthening;transfer training;risk factor education;home program guidelines;progressive activity/exercise   Clinical Impression   Criteria for Skilled Therapeutic Intervention yes, treatment indicated   PT Diagnosis Impaired functional mobility   Influenced by the following impairments Pain, decreased ROM, strength   Functional limitations due to impairments ambulation, stairs, transfers   Clinical Presentation Stable/Uncomplicated   Clinical Presentation Rationale Pt shows good rehab potential for resolve of impairments with skilled PT services   Clinical Decision Making (Complexity) Low complexity   Therapy Frequency` 2 times/day   Predicted Duration of Therapy Intervention (days/wks) 3 days   Anticipated Discharge Disposition Transitional Care Facility   Risk & Benefits of therapy have been  "explained Yes   Patient, Family & other staff in agreement with plan of care Yes   [facility] AM-PAC TM \"6 Clicks\"   2016, Trustees of [facility], under license to [employer].  All rights reserved.   6 Clicks Short Forms Basic Mobility Inpatient Short Form   [facility] AM-PAC  \"6 Clicks\" V.2 Basic Mobility Inpatient Short Form   1. Turning from your back to your side while in a flat bed without using bedrails? 4 - None   2. Moving from lying on your back to sitting on the side of a flat bed without using bedrails? 4 - None   3. Moving to and from a bed to a chair (including a wheelchair)? 3 - A Little   4. Standing up from a chair using your arms (e.g., wheelchair, or bedside chair)? 3 - A Little   5. To walk in hospital room? 3 - A Little   6. Climbing 3-5 steps with a railing? 2 - A Lot   Basic Mobility Raw Score (Score out of 24.Lower scores equate to lower levels of function) 19   Total Evaluation Time   Total Evaluation Time (Minutes) 6     "

## 2018-04-14 NOTE — PROGRESS NOTES
Pt seen with team    Pt notes fair pain control  She did have one episode of severe lightheadedness this am while sitting at bedside  No chest pain or SOB    VSS  HR 50s-60s  BP 120s-130s/    Alert, fully oriented  Lungs clear  CV rrr  Abd soft  No LE edema    Results for FRANKENBERG, CONNIE J (MRN [id number]) as of 4/14/2018 09:42   Ref. Range 4/14/2018 06:10   Sodium Latest Ref Range: 133 - 144 mmol/L 141   Potassium Latest Ref Range: 3.4 - 5.3 mmol/L 4.8   Chloride Latest Ref Range: 94 - 109 mmol/L 107   Carbon Dioxide Latest Ref Range: 20 - 32 mmol/L 28   Urea Nitrogen Latest Ref Range: 7 - 30 mg/dL 16   Creatinine Latest Ref Range: 0.52 - 1.04 mg/dL 0.75   GFR Estimate Latest Ref Range: >60 mL/min/1.7m2 76   GFR Estimate If Black Latest Ref Range: >60 mL/min/1.7m2 >90   Calcium Latest Ref Range: 8.5 - 10.1 mg/dL 8.4 (L)   Anion Gap Latest Ref Range: 3 - 14 mmol/L 6   Glucose Latest Ref Range: 70 - 99 mg/dL 102 (H)   Hemoglobin Latest Ref Range: 11.7 - 15.7 g/dL 11.9   INR Latest Ref Range: 0.86 - 1.14  1.11       Assessment    S/p R TKA, Pt is doing well    Vaso-vagal episode, not unexpected, given long history of such episodes. Will try to maximize use of non-narcotics (ie ketorolac) in attempt to manage pain with relatively lower doses of narcotics    Plan  Staff aware of need to closely monitor Pt when she is out of bed  Continue ketorolac for one more day  Repeat BMP  Warfarin for DVT proph

## 2018-04-15 ENCOUNTER — APPOINTMENT (OUTPATIENT)
Dept: PHYSICAL THERAPY | Facility: CLINIC | Age: 73
DRG: 470 | End: 2018-04-15
Attending: ORTHOPAEDIC SURGERY
Payer: MEDICARE

## 2018-04-15 ENCOUNTER — APPOINTMENT (OUTPATIENT)
Dept: OCCUPATIONAL THERAPY | Facility: CLINIC | Age: 73
DRG: 470 | End: 2018-04-15
Attending: ORTHOPAEDIC SURGERY
Payer: MEDICARE

## 2018-04-15 LAB
ANION GAP SERPL CALCULATED.3IONS-SCNC: 7 MMOL/L (ref 3–14)
BUN SERPL-MCNC: 13 MG/DL (ref 7–30)
CALCIUM SERPL-MCNC: 8.2 MG/DL (ref 8.5–10.1)
CHLORIDE SERPL-SCNC: 108 MMOL/L (ref 94–109)
CO2 SERPL-SCNC: 26 MMOL/L (ref 20–32)
CREAT SERPL-MCNC: 0.62 MG/DL (ref 0.52–1.04)
GFR SERPL CREATININE-BSD FRML MDRD: >90 ML/MIN/1.7M2
GLUCOSE SERPL-MCNC: 138 MG/DL (ref 70–99)
HGB BLD-MCNC: 11.1 G/DL (ref 11.7–15.7)
INR PPP: 1.3 (ref 0.86–1.14)
POTASSIUM SERPL-SCNC: 4.1 MMOL/L (ref 3.4–5.3)
SODIUM SERPL-SCNC: 141 MMOL/L (ref 133–144)

## 2018-04-15 PROCEDURE — 97535 SELF CARE MNGMENT TRAINING: CPT | Mod: GO | Performed by: OCCUPATIONAL THERAPIST

## 2018-04-15 PROCEDURE — 40000133 ZZH STATISTIC OT WARD VISIT: Performed by: OCCUPATIONAL THERAPIST

## 2018-04-15 PROCEDURE — 12000001 ZZH R&B MED SURG/OB UMMC

## 2018-04-15 PROCEDURE — 99231 SBSQ HOSP IP/OBS SF/LOW 25: CPT | Performed by: INTERNAL MEDICINE

## 2018-04-15 PROCEDURE — 25000132 ZZH RX MED GY IP 250 OP 250 PS 637: Mod: GY | Performed by: ORTHOPAEDIC SURGERY

## 2018-04-15 PROCEDURE — 80048 BASIC METABOLIC PNL TOTAL CA: CPT | Performed by: ORTHOPAEDIC SURGERY

## 2018-04-15 PROCEDURE — A9270 NON-COVERED ITEM OR SERVICE: HCPCS | Mod: GY | Performed by: ORTHOPAEDIC SURGERY

## 2018-04-15 PROCEDURE — 97116 GAIT TRAINING THERAPY: CPT | Mod: GP

## 2018-04-15 PROCEDURE — 25000132 ZZH RX MED GY IP 250 OP 250 PS 637: Mod: GY | Performed by: INTERNAL MEDICINE

## 2018-04-15 PROCEDURE — 97110 THERAPEUTIC EXERCISES: CPT | Mod: GP

## 2018-04-15 PROCEDURE — 36415 COLL VENOUS BLD VENIPUNCTURE: CPT | Performed by: ORTHOPAEDIC SURGERY

## 2018-04-15 PROCEDURE — 25000128 H RX IP 250 OP 636: Performed by: INTERNAL MEDICINE

## 2018-04-15 PROCEDURE — 85610 PROTHROMBIN TIME: CPT | Performed by: ORTHOPAEDIC SURGERY

## 2018-04-15 PROCEDURE — A9270 NON-COVERED ITEM OR SERVICE: HCPCS | Mod: GY | Performed by: INTERNAL MEDICINE

## 2018-04-15 PROCEDURE — 85018 HEMOGLOBIN: CPT | Performed by: ORTHOPAEDIC SURGERY

## 2018-04-15 PROCEDURE — 40000193 ZZH STATISTIC PT WARD VISIT

## 2018-04-15 RX ORDER — WARFARIN SODIUM 2 MG/1
4 TABLET ORAL
Status: COMPLETED | OUTPATIENT
Start: 2018-04-15 | End: 2018-04-15

## 2018-04-15 RX ORDER — KETOROLAC TROMETHAMINE 15 MG/ML
15 INJECTION, SOLUTION INTRAMUSCULAR; INTRAVENOUS EVERY 6 HOURS PRN
Status: DISCONTINUED | OUTPATIENT
Start: 2018-04-15 | End: 2018-04-16 | Stop reason: HOSPADM

## 2018-04-15 RX ADMIN — Medication 1 MG: at 23:13

## 2018-04-15 RX ADMIN — Medication 1 MG: at 20:35

## 2018-04-15 RX ADMIN — WARFARIN SODIUM 4 MG: 2 TABLET ORAL at 17:09

## 2018-04-15 RX ADMIN — KETOROLAC TROMETHAMINE 15 MG: 15 INJECTION, SOLUTION INTRAMUSCULAR; INTRAVENOUS at 06:12

## 2018-04-15 RX ADMIN — Medication 2 MG: at 13:23

## 2018-04-15 RX ADMIN — ACETAMINOPHEN 975 MG: 325 TABLET ORAL at 09:42

## 2018-04-15 RX ADMIN — Medication 2 MG: at 03:07

## 2018-04-15 RX ADMIN — KETOROLAC TROMETHAMINE 15 MG: 15 INJECTION, SOLUTION INTRAMUSCULAR; INTRAVENOUS at 13:23

## 2018-04-15 RX ADMIN — ATORVASTATIN CALCIUM 80 MG: 20 TABLET, FILM COATED ORAL at 23:16

## 2018-04-15 RX ADMIN — Medication 2 MG: at 06:11

## 2018-04-15 RX ADMIN — SENNOSIDES AND DOCUSATE SODIUM 1 TABLET: 8.6; 5 TABLET ORAL at 20:30

## 2018-04-15 RX ADMIN — Medication 2 MG: at 17:02

## 2018-04-15 RX ADMIN — ACETAMINOPHEN 975 MG: 325 TABLET ORAL at 17:02

## 2018-04-15 RX ADMIN — SENNOSIDES AND DOCUSATE SODIUM 2 TABLET: 8.6; 5 TABLET ORAL at 09:42

## 2018-04-15 RX ADMIN — DIPHENHYDRAMINE HYDROCHLORIDE 12.5 MG: 25 SOLUTION ORAL at 20:39

## 2018-04-15 RX ADMIN — ACETAMINOPHEN 975 MG: 325 TABLET ORAL at 02:34

## 2018-04-15 RX ADMIN — Medication 2 MG: at 09:43

## 2018-04-15 NOTE — PLAN OF CARE
Problem: Patient Care Overview  Goal: Plan of Care/Patient Progress Review  Discharge Planner PT   Patient plan for discharge: rehab  Current status: Pt educated on PT and POC, agreeable to participate in therapy. Pt able to perform all bed mobility and transfers SBA. PT ambulated 250' with FWW and SBA, verbal cues given for heel toe strike pattern and increasing knee flexion during stance. Pt completed sitting exercise for LE strength and ROM: static leg extension elevated on chair 1x90 seconds, 1x10 knee flexion with pillowcase under foot and CGA LAQ. Pt left on CPM per pt request and all needs within reach.  Barriers to return to prior living situation: impaired functional mobility  Recommendations for discharge: rehab  Rationale for recommendations: To increase pt safety and progress towards independence.        Entered by: Geremias Chaves 04/15/2018 9:13 AM

## 2018-04-15 NOTE — PROGRESS NOTES
[facility] Orthopedic Progress Note       S:  No issues overnight. Had some vasovagal during therapy, but doing better than day prior. Denies numbness, tingling. Tolerating PO. Voiding. No CP, SOB or dizziness now.     PT notes  Pt ambulated 50' with FWW CGA and wheelchair follow     O:   Patient Vitals for the past 24 hrs:   BP Temp Temp src Pulse Heart Rate Resp SpO2   04/15/18 0747 129/66 97.4  F (36.3  C) Oral - 71 - 93 %   04/14/18 2209 141/77 98.8  F (37.1  C) Oral - 79 18 94 %   04/14/18 2042 141/74 97.5  F (36.4  C) Oral 70 - 16 -   04/14/18 1553 132/64 98.5  F (36.9  C) Oral 79 - 16 95 %       Intake/Output Summary (Last 24 hours) at 04/14/18 0738  Last data filed at 04/14/18 0633   Gross per 24 hour   Intake             1343 ml   Output             2200 ml   Net             -857 ml       Gen: A&Ox3, no acute distress  Resp: breathing equal and non-labored, no wheezing  Extremities:   RLE: Dressing C/D/I. Fires EHL, FHL, tib ant, GSC. SILT in DP/Sp/Tib nerves. DP pulse palp.       Labs:  Hemoglobin   Date Value Ref Range Status   04/15/2018 11.1 (L) 11.7 - 15.7 g/dL Final   04/14/2018 11.9 11.7 - 15.7 g/dL Final     Drain:  40ml, 35ml.    A/P: Connie J Frankenberg is a 72 year old female s/p Right total knee arthroplasty with Dr. Nguyen.     Cont PT. recommending TCU, anticipate dc 2 days. Drain out this am.     Activity: Up with assist and assistive devices as needed until independent. Weight bearing status: WBAT  Antibiotics: completed  Diet: Begin with clear fluids and progress diet as tolerated. Bowel regimen. Anti-emetics PRN.    DVT prophylaxis: warfarin x 4 weeks  Elevation: Elevate heels off of bed on pillows   Wound Care: Aquacel, leave in place for 7-9 days post op    Drains: none  Pain management: Orals PRN, IV for breakthrough only  X-rays: completed  Physical Therapy: Mobilization, ROM, ADL's   Occupational Therapy: ADL's  Labs: Trend Hgb on POD #1, 2  Consults: PT, OT. Hospitalist,  appreciate assistance in caring for this patient throughout the perioperative period     Future Appointments  Date Time Provider Department Center   4/14/2018 9:30 AM Keeley Mcgovern, MARY URPT [place]   4/14/2018 11:00 AM Roxana Cee, OT [facility]OT [place]   4/14/2018 1:30 PM Keeley Mcgovern, PT [facility]         Disposition: Pending progress with therapies, pain control on orals, and medical stability, anticipate discharge to Home vs TCU on POD #2-3.        Chon Sommer  PGY4  [phone]

## 2018-04-15 NOTE — PLAN OF CARE
Problem: Knee Arthroplasty (Total, Partial) (Adult)  Goal: Signs and Symptoms of Listed Potential Problems Will be Absent, Minimized or Managed (Knee Arthroplasty)  Signs and symptoms of listed potential problems will be absent, minimized or managed by discharge/transition of care (reference Knee Arthroplasty (Total, Partial) (Adult) CPG).           VS:       Pt A/O X 4. Afebrile. VSS. Lungs- clear and equal bilaterally. IS encouraged. Denies nausea, shortness of breath, and chest pain. Denies dizziness during ambulation.     Output:       Bowels- active in all four quadrants. Voids spontaneously without   difficulty in the commode. PVR 45.      Activity:       Pt up assist of 1 with walker.     Skin:   Intact.     Pain:       Has pain in the right knee and given dilaudid 2mg, ICE applied, and is tolerating well.      CMS:       CMS and Neuro's are intact. Denies numbness and tingling in all extremities.      Dressing:       Right knee incisional dressing is CDI.      Diet:       Pt is on a Regular diet and appetite was fair this shift.       LDA:       PIV is patent in the right wrist and saline locked.      Equipment:       CPM is ON and set to 55 degrees flexion and 0 degrees extension. PCD's on BLE's. Bilateral heels are elevated off the bed.     Plan:       Pt is able to make needs known and the call light is within the pt's reach. Continue to monitor.

## 2018-04-15 NOTE — PLAN OF CARE
Problem: Patient Care Overview  Goal: Plan of Care/Patient Progress Review  Discharge Planner OT   Patient plan for discharge: TCU, tomorrow if able  Current status: CGA with mobility and ADL, decreased endurance. Dressing and toileting with SBA-CGA and cues. No dizziness this session. Bending knee to approx 90 during ADL tasks.  Barriers to return to prior living situation: spouse will be out of town, stairs, h/o fainting, impaired endurance  Recommendations for discharge: TCU  Rationale for recommendations: will benefit from approx 5-7 days at TCU to maximize safety and (I) prior to DC home.       Entered by: Melany Johnson 04/15/2018 1:32 PM

## 2018-04-15 NOTE — PLAN OF CARE
Problem: Knee Arthroplasty (Total, Partial) (Adult)  Goal: Signs and Symptoms of Listed Potential Problems Will be Absent, Minimized or Managed (Knee Arthroplasty)  Signs and symptoms of listed potential problems will be absent, minimized or managed by discharge/transition of care (reference Knee Arthroplasty (Total, Partial) (Adult) CPG).   Outcome: No Change  Patient A & O x4. Lung sounds clear bilaterally. Patient denies chest pain, SOB, tingling, nausea, and vomiting. Patient complains of intermittent lightheadedness and dizziness upon standing.  Bowel sounds active, passing gas, patient had one BM today. Drinking well and voiding spontaneously without difficulties. IV and saline lock on right forearm. Patient able to wiggle toes. CMS intact with the exception of numbness in bilateral feet. Dressing clean, dry, and intact. Pain is not tolerable and patient taking Toradol and Dilaudid for pain-last dose 1323, ice pack applied to right knee. CPM on right knee and PCD on left calf. Spirometer was encouraged and done several times, repositioned and turned in bed, heels elevated off bed. Patient demonstrates ability to use call light appropriately. Will continue to monitor patient.

## 2018-04-15 NOTE — PROGRESS NOTES
Pt seen, case reviewed with team    Pt feels well, states pain has been well controlled  No significant dizziness or near syncope/syncope  + BM this am  Voiding without difficulty    VSS  BP 120s-140s/  HR 70s    Alert, fully oriented, in good spirits  Lungs clear  CV rrr  Abd soft, non-tender  No LE edema    Results for FRANKENBERG, CONNIE J (MRN [id number]) as of 4/15/2018 10:32   Ref. Range 4/15/2018 05:11   Sodium Latest Ref Range: 133 - 144 mmol/L 141   Potassium Latest Ref Range: 3.4 - 5.3 mmol/L 4.1   Chloride Latest Ref Range: 94 - 109 mmol/L 108   Carbon Dioxide Latest Ref Range: 20 - 32 mmol/L 26   Urea Nitrogen Latest Ref Range: 7 - 30 mg/dL 13   Creatinine Latest Ref Range: 0.52 - 1.04 mg/dL 0.62   GFR Estimate Latest Ref Range: >60 mL/min/1.7m2 >90   GFR Estimate If Black Latest Ref Range: >60 mL/min/1.7m2 >90   Calcium Latest Ref Range: 8.5 - 10.1 mg/dL 8.2 (L)   Anion Gap Latest Ref Range: 3 - 14 mmol/L 7   Glucose Latest Ref Range: 70 - 99 mg/dL 138 (H)   Hemoglobin Latest Ref Range: 11.7 - 15.7 g/dL 11.1 (L)   INR Latest Ref Range: 0.86 - 1.14  1.30 (H)       Assessment    S/p R TKA, Pt is doing well     History of vaso-vagal episodes, none for 24 hours  Overall pain control has been good    Plan  Continue current tx  Prn ketorolac for one more day  Warfarin for DVT proph  D/c to TCU prob tomorrow

## 2018-04-15 NOTE — PLAN OF CARE
Problem: Patient Care Overview  Goal: Plan of Care/Patient Progress Review  Pt. A&Ox4. VSS. Afebrile. Lung sounds CTA. Maintaining sats on RA. IS encouraged. Bowel sounds active, LBM 4/12, flatus +. PP+ DP+. CMS and neuro's are intact. Reports baseline numbness in bottom of both feet. Denies nausea, shortness of breath, and chest pain. RLE pain managed w/ scheduled meds, prn PO Dilaudid, and ice applied. Voids spontaneously without difficulty in the BSC. Tolerating regular diet. RLE incisional dressing is CDI. PEPE intact, output 35mL. CPM in use all night 0-60 pt increasing. Pt up with ax1. PIV is patent and SL. PCD's on BLE's. Bilateral heels are elevated off the bed. Call light is within reach, pt able to make needs known. Will continue to monitor.

## 2018-04-16 ENCOUNTER — APPOINTMENT (OUTPATIENT)
Dept: OCCUPATIONAL THERAPY | Facility: CLINIC | Age: 73
DRG: 470 | End: 2018-04-16
Attending: ORTHOPAEDIC SURGERY
Payer: MEDICARE

## 2018-04-16 ENCOUNTER — APPOINTMENT (OUTPATIENT)
Dept: PHYSICAL THERAPY | Facility: CLINIC | Age: 73
DRG: 470 | End: 2018-04-16
Attending: ORTHOPAEDIC SURGERY
Payer: MEDICARE

## 2018-04-16 VITALS
HEIGHT: 62 IN | SYSTOLIC BLOOD PRESSURE: 148 MMHG | RESPIRATION RATE: 16 BRPM | TEMPERATURE: 97.6 F | DIASTOLIC BLOOD PRESSURE: 89 MMHG | OXYGEN SATURATION: 97 % | WEIGHT: 155.2 LBS | BODY MASS INDEX: 28.56 KG/M2 | HEART RATE: 69 BPM

## 2018-04-16 LAB
INR PPP: 1.43 (ref 0.86–1.14)
INTERPRETATION ECG - MUSE: NORMAL

## 2018-04-16 PROCEDURE — 40000193 ZZH STATISTIC PT WARD VISIT

## 2018-04-16 PROCEDURE — 97110 THERAPEUTIC EXERCISES: CPT | Mod: GP

## 2018-04-16 PROCEDURE — A9270 NON-COVERED ITEM OR SERVICE: HCPCS | Mod: GY | Performed by: INTERNAL MEDICINE

## 2018-04-16 PROCEDURE — 97116 GAIT TRAINING THERAPY: CPT | Mod: GP

## 2018-04-16 PROCEDURE — 25000132 ZZH RX MED GY IP 250 OP 250 PS 637: Mod: GY | Performed by: INTERNAL MEDICINE

## 2018-04-16 PROCEDURE — 40000133 ZZH STATISTIC OT WARD VISIT: Performed by: OCCUPATIONAL THERAPIST

## 2018-04-16 PROCEDURE — 25000132 ZZH RX MED GY IP 250 OP 250 PS 637: Mod: GY | Performed by: ORTHOPAEDIC SURGERY

## 2018-04-16 PROCEDURE — 36415 COLL VENOUS BLD VENIPUNCTURE: CPT | Performed by: ORTHOPAEDIC SURGERY

## 2018-04-16 PROCEDURE — 99207 ZZC CDG-MDM COMPONENT: MEETS LOW - DOWN CODED: CPT | Performed by: INTERNAL MEDICINE

## 2018-04-16 PROCEDURE — 99231 SBSQ HOSP IP/OBS SF/LOW 25: CPT | Performed by: INTERNAL MEDICINE

## 2018-04-16 PROCEDURE — 97535 SELF CARE MNGMENT TRAINING: CPT | Mod: GO | Performed by: OCCUPATIONAL THERAPIST

## 2018-04-16 PROCEDURE — 85610 PROTHROMBIN TIME: CPT | Performed by: ORTHOPAEDIC SURGERY

## 2018-04-16 PROCEDURE — A9270 NON-COVERED ITEM OR SERVICE: HCPCS | Mod: GY | Performed by: ORTHOPAEDIC SURGERY

## 2018-04-16 RX ORDER — HYDROMORPHONE HYDROCHLORIDE 2 MG/1
TABLET ORAL
Qty: 60 TABLET | Refills: 0 | Status: SHIPPED | DISCHARGE
Start: 2018-04-16 | End: 2018-05-07

## 2018-04-16 RX ORDER — WARFARIN SODIUM 5 MG/1
5 TABLET ORAL ONCE
Qty: 30 TABLET | DISCHARGE
Start: 2018-04-16 | End: 2018-04-16

## 2018-04-16 RX ORDER — AMOXICILLIN 250 MG
1 CAPSULE ORAL 2 TIMES DAILY
Qty: 100 TABLET | Refills: 0 | DISCHARGE
Start: 2018-04-16 | End: 2018-11-28

## 2018-04-16 RX ORDER — ACETAMINOPHEN 325 MG/1
650 TABLET ORAL EVERY 4 HOURS PRN
Qty: 100 TABLET | Refills: 1 | Status: SHIPPED | OUTPATIENT
Start: 2018-04-16 | End: 2019-12-07

## 2018-04-16 RX ORDER — WARFARIN SODIUM 5 MG/1
5 TABLET ORAL
Status: DISCONTINUED | OUTPATIENT
Start: 2018-04-16 | End: 2018-04-16 | Stop reason: HOSPADM

## 2018-04-16 RX ADMIN — ACETAMINOPHEN 975 MG: 325 TABLET ORAL at 08:31

## 2018-04-16 RX ADMIN — Medication 1 MG: at 08:33

## 2018-04-16 RX ADMIN — Medication 1 MG: at 05:33

## 2018-04-16 RX ADMIN — Medication 1 MG: at 02:13

## 2018-04-16 RX ADMIN — ACETAMINOPHEN 975 MG: 325 TABLET ORAL at 02:13

## 2018-04-16 RX ADMIN — Medication 1 MG: at 11:37

## 2018-04-16 NOTE — PLAN OF CARE
Problem: Patient Care Overview  Goal: Plan of Care/Patient Progress Review  Physical Therapy Discharge Summary    Reason for therapy discharge:    Discharged to transitional care facility.    Progress towards therapy goal(s). See goals on Care Plan in [facility] electronic health record for goal details.  Goals partially met.  Barriers to achieving goals:   discharge from facility.    Therapy recommendation(s):    Continued therapy is recommended.  Rationale/Recommendations:  to improve pt safety.

## 2018-04-16 NOTE — PLAN OF CARE
Problem: Knee Arthroplasty (Total, Partial) (Adult)  Goal: Signs and Symptoms of Listed Potential Problems Will be Absent, Minimized or Managed (Knee Arthroplasty)  Signs and symptoms of listed potential problems will be absent, minimized or managed by discharge/transition of care (reference Knee Arthroplasty (Total, Partial) (Adult) CPG).   Outcome: Improving    VS: Stable.   O2: On RA and stable.   Output: Viding adequate amount to the bathroom.   Last BM: LBM 4/15 and passing gas.   Activity: Up with SBA. WBAT.   Skin: Intact except right knee incision.   Pain: Pain well managed with prn dilaudid.   CMS: Intact.   Dressing: Right knee incisional dressing CDI.   Diet: Tolerating regular diet.   LDA: PIV SL into right hand.   Equipment: Walker, PCD, CPM.   Plan: TBD. Will continue to monitor.   Additional Info:

## 2018-04-16 NOTE — PROGRESS NOTES
Pt left unit at 1345 with transport in a wheelchair. Pt was given packet with instructions to give to TCU upon arrival; hard script included. Pt has all personal belongings.

## 2018-04-16 NOTE — PLAN OF CARE
Problem: Patient Care Overview  Goal: Plan of Care/Patient Progress Review  Pt. A&Ox4. VSS. Afebrile. Lung sounds CTA. Maintaining sats on RA. IS encouraged. Bowel sounds active, LBM 4/15. PP+ DP+. CMS and neuro's are intact. Reports baseline numbness. Denies nausea, shortness of breath, and chest pain. RLE pain managed w/ scheduled meds, prn PO Dilaudid 1mg, and ice applied. Voids spontaneously without difficulty in the BSC. Tolerating regular diet. RLE incisional dressing is CDI. CPM in use reached 0-90 last evening, decreased to 0-60 overnight and increasing again this am. Pt up with ax1. PIV is patent and SL. PCD's on BLE's. Bilateral heels are elevated off the bed. Call light is within reach, pt able to make needs known. Will continue to monitor.

## 2018-04-16 NOTE — PLAN OF CARE
Problem: Patient Care Overview  Goal: Plan of Care/Patient Progress Review  Discharge Planner OT   Patient plan for discharge: TCU  Current status: Pt able to complete shower transfer with CGA and education  Barriers to return to prior living situation: Decreased activity tolerance  Recommendations for discharge: TCU  Rationale for recommendations: Pt will benefit from skilled OT services to increase independence with ADL       Entered by: Colby Eastman 04/16/2018 1:10 PM     Occupational Therapy Discharge Summary    Reason for therapy discharge:    Discharged to transitional care facility.    Progress towards therapy goal(s). See goals on Care Plan in [facility] electronic health record for goal details.  Goals partially met.  Barriers to achieving goals:   discharge from facility.    Therapy recommendation(s):    Continued therapy is recommended.  Rationale/Recommendations:  Pt will benefit from skilled OT services to increase independence with ADL.

## 2018-04-16 NOTE — PLAN OF CARE
Problem: Knee Arthroplasty (Total, Partial) (Adult)  Goal: Signs and Symptoms of Listed Potential Problems Will be Absent, Minimized or Managed (Knee Arthroplasty)  Signs and symptoms of listed potential problems will be absent, minimized or managed by discharge/transition of care (reference Knee Arthroplasty (Total, Partial) (Adult) CPG).   Outcome: Adequate for Discharge Date Met: 04/16/18  Patient A & O x4. Lung sounds clear bilaterally. Patient denies chest pain, SOB, lightheadedness, dizziness, tingling, numbness, nausea, and vomiting. Bowel sounds active, passing gas, and had last BM 4/16/18. Drinking well and voiding spontaneously without difficulties. Patient tolerating regular diet and eating adequately. IV and saline lock on right forearm. Patient able to wiggle toes. CMS intact. Dressing clean, dry, and intact. Pain is tolerable and patient taking Dilaudid 1 mg for pain, rash noted last night after taking Toradol (benadryl given), ice pack applied right knee.CPM on right knee and rate at 65 and PCD on left calf. Spirometer was encouraged and done several times, repositioned and turned in bed, heels elevated off bed. Patient demonstrates ability to use call light appropriately. Will continue to monitor patient. Patient is scheduled to discharge to TCU this afternoon.

## 2018-04-16 NOTE — PLAN OF CARE
Problem: Patient Care Overview  Goal: Plan of Care/Patient Progress Review  Discharge Planner PT   Patient plan for discharge: rehab  Current status: Pt educated on PT and POC, agreeable to participate in therapy. Knee flexion/extension 0-20-70. Pt encountered sitting EOB, able to sit>stand SBA-IND and perform all bed mobility IND. Pt ambulated 300' SBA and FWW, ascended and descended 3 stairsx3 with SBA. Unable to recall correct sequence for stairs and needed verbal instruction. Completed 1x10 seated knee flexion, CGA LAQ, 1x10 supine quad set with manual overpressure. Pt left supine in bed on CPM per pt request, all needs within reach.  Barriers to return to prior living situation: impaired functional mobility   Recommendations for discharge: rehab  Rationale for recommendations: to improve pt safety and progress towards independence.        Entered by: Geremias Chaves 04/16/2018 10:08 AM

## 2018-04-16 NOTE — PROGRESS NOTES
Social Work Services Discharge Note      Patient Name:  Connie J Frankenberg     Anticipated Discharge Date:  4/16/18    Discharge Disposition:   TCU:  [facility] [phone]    Following MD:  Noble Reyna [phone]     Pre-Admission Screening (PAS) online form has been completed.  The Level of Care (LOC) is:  Determined  Confirmation Code is:  [id number]  Patient/caregiver informed of referral to [facility] for Pre-Admission Screening for skilled nursing facility (SNF) placement and to expect a phone call post discharge from SNF.     Additional Services/Equipment Arranged:  None noted     Patient / Family response to discharge plan:  agreeable     Persons notified of above discharge plan:  Pt, Dr Cordero, Charge RN Ez, Luba Wilks, NP    Staff Discharge Instructions:  Please fax discharge orders and signed hard scripts for any controlled substances.  Please print a packet and send with patient.     CTS Handoff completed:  YES    Medicare Notice of Rights provided to the patient/family:  YES

## 2018-04-16 NOTE — PROGRESS NOTES
[facility] Orthopedic Progress Note       S:  No issues overnight. No vasovagal episodes. Low grade one time fever overnight, resolved.  Denies numbness, tingling. Tolerating PO. Voiding. No CP, SOB or dizziness now. Planning for dc to TCU.     O:   Patient Vitals for the past 24 hrs:   BP Temp Temp src Heart Rate Resp SpO2   04/16/18 0532 143/74 97.8  F (36.6  C) Oral 74 - -   04/15/18 2315 155/85 - - - - -   04/15/18 2250 162/83 99.3  F (37.4  C) Oral 76 14 98 %   04/15/18 1635 147/72 97.8  F (36.6  C) Oral 72 14 96 %   04/15/18 0747 129/66 97.4  F (36.3  C) Oral 71 - 93 %       Intake/Output Summary (Last 24 hours) at 04/14/18 0738  Last data filed at 04/14/18 0633   Gross per 24 hour   Intake             1343 ml   Output             2200 ml   Net             -857 ml       Gen: A&Ox3, no acute distress  Resp: breathing equal and non-labored, no wheezing  Extremities:   RLE: Dressing C/D/I. Fires EHL, FHL, tib ant, GSC. SILT in DP/Sp/Tib nerves. DP pulse palp.       Labs:  Hemoglobin   Date Value Ref Range Status   04/15/2018 11.1 (L) 11.7 - 15.7 g/dL Final   04/14/2018 11.9 11.7 - 15.7 g/dL Final         A/P: Connie J Frankenberg is a 72 year old female s/p Right total knee arthroplasty with Dr. Nguyen.     D/c pending placement, likely today.     Activity: Up with assist and assistive devices as needed until independent. Weight bearing status: WBAT  Antibiotics: Cefazolin or Clindamycin x 24 hours  Diet: Begin with clear fluids and progress diet as tolerated. Bowel regimen. Anti-emetics PRN.    DVT prophylaxis: warfarin x 4 weeks  Elevation: Elevate heels off of bed on pillows   Wound Care: Aquacel, leave in place for 7-9 days post op    Drains: none  Pain management: Orals PRN, IV for breakthrough only  X-rays: xrays in PACU  Physical Therapy: Mobilization, ROM, ADL's   Occupational Therapy: ADL's  Labs: Trend Hgb on POD #1, 2  Consults: PT, OT. Hospitalist, appreciate assistance in caring for this  patient throughout the perioperative period     Future Appointments  Date Time Provider Department Center   4/14/2018 9:30 AM Keeley Mcgovern, PT URPT [place]   4/14/2018 11:00 AM Roxana Cee, OT UROT [place]   4/14/2018 1:30 PM Keeley Mcgovern, PT URPT [place]         Disposition: Pending progress with therapies, pain control on orals, and medical stability, anticipate discharge to Home vs TCU on POD #2-3.        Chon Sommer  PGY4  [phone]

## 2018-04-16 NOTE — PROGRESS NOTES
"[facility] Internal Medicine Progress Note            Interval History:   Record reviewed.  Discussed with Dr. Villagomez.  Seen with RN.  S/P Right Total Knee Replacement 4/13, Dr. Nguyen.  Indication osteoarthritis.  Off toradol.   Adequate pain control with po dilaudid 1-2 mg dose.  Ambulatory without LH or recurrent vasovagal symptoms since 4/14.   No CP, SOB, cough with IS only.  No nausea, reflux, abd pain or distention.  Passing flatus.   Formed BM last night.  Voiding OK.  Accepted at [place].           Medications:   All medications reviewed today          Physical Exam:   Blood pressure 148/89, pulse 69, temperature 97.6  F (36.4  C), resp. rate 16, height 1.575 m (5' 2.01\"), weight 70.4 kg (155 lb 3.3 oz), SpO2 97 %, not currently breastfeeding.    Intake/Output Summary (Last 24 hours) at 04/16/18 1108  Last data filed at 04/15/18 1930   Gross per 24 hour   Intake              480 ml   Output                0 ml   Net              480 ml       General:  Alert.  Appropriate.  No distress.  No  O2.     Heent:      Neck:    Skin:    Chest:  clear    Cardiac:  Reg without gallop, murmur.  No JVD.     Abdomen:  Non distended, soft, non-tender.  BS normal.     Extremities:  Perfused.  No edema, calf, posterior thigh tenderness to suggest DVT.     Neuro:            Data:     Results for orders placed or performed during the hospital encounter of 04/13/18 (from the past 24 hour(s))   INR   Result Value Ref Range    INR 1.43 (H) 0.86 - 1.14     Hemoglobin   Date Value Ref Range Status   04/15/2018 11.1 (L) 11.7 - 15.7 g/dL Final   04/14/2018 11.9 11.7 - 15.7 g/dL Final   ]  Last Basic Metabolic Panel:  Lab Results   Component Value Date     04/15/2018      Lab Results   Component Value Date    POTASSIUM 4.1 04/15/2018     Lab Results   Component Value Date    CHLORIDE 108 04/15/2018     Lab Results   Component Value Date    NEL 8.2 04/15/2018     Lab Results   Component Value Date    CO2 26 04/15/2018 "     Lab Results   Component Value Date    BUN 13 04/15/2018     Lab Results   Component Value Date    CR 0.62 04/15/2018     Lab Results   Component Value Date     04/15/2018                Assessment and Plan:   1)  S/P Right Total Knee Replacement 4/13, Dr. Nguyen.  Indication osteoarthritis.  Mobilizing.  Adequate pain control.    2)  Acute blood loss anemia, mild (11.1).  3)  Vasovagal episodes PTA with dizziness and syncope (precipitated by a variety of stimuli including blood draw, pain, noxious smells).  Episode LH X2  4/14 - no recurrence.    4)  Benign positional vertigo. No overt symptoms presently.   5)  Negative stress echocardiogram performed in 2013.   6)  Migraine HAs.  Quiescent.   7)  HLD on statin.     PLAN:  1)  Clinically stable for DC to TCU.  2)  Meds/orders reviewed.  Coumadin, same opiates, bowel meds.   Disposition Plan   Expected discharge today to transitional care unit .     Entered: Pernell Cordero 04/16/2018, 11:08 AM              Attestation:  I have reviewed today's vital signs, notes, medications, labs and imaging.     Pernell Cordero MD

## 2018-04-16 NOTE — DISCHARGE SUMMARY
[facility] Discharge Summary    Connie J Frankenberg MRN# [id number]   Age: 72 year old YOB: 1945     Date of Admission:  4/13/2018  Date of Discharge::  4/16/2018  Admitting Physician:  Apollo Nguyen MD  Discharge Physician:  Chon Sommer      Admission Diagnoses:  Right Knee Osteoarthritis   S/P total knee arthroplasty    Discharge Diagnosis:  Patient Active Problem List    Diagnosis Date Noted     S/P total knee arthroplasty 04/13/2018     Priority: Medium     Swelling of lower extremity      Priority: Medium     Diagnosis updated by automated process. Provider to review and confirm.       BCC (basal cell carcinoma), face 10/26/2012     Priority: Medium     Skin cancer, basal cell      Priority: Medium     Hyperlipidemia      Priority: Medium     Classical migraine      Priority: Medium     sparkly aura  (Problem list name updated by automated process. Provider to review and confirm.)       Insomnia      Priority: Medium     Arthritis of knee      Priority: Medium     Hemorrhoids      Priority: Medium     Vasovagal syncope      Priority: Medium     with blood draws, injections           Procedures:  Right total knee arthroplasty    Medications Prior to Admission:  Prescriptions Prior to Admission   Medication Sig Dispense Refill Last Dose     Aspirin-Acetaminophen-Caffeine (EXCEDRIN PO) Take 1 tablet by mouth as needed   Past Month at Unknown time     atorvastatin (LIPITOR) 80 MG tablet Take 1 tablet (80 mg) by mouth daily (Patient taking differently: Take 80 mg by mouth At Bedtime ) 90 tablet 3 4/12/2018 at 2300     butalbital-aspirin-caffeine (FIORINAL) -40 MG per capsule Take 1 capsule by mouth daily as needed 30 capsule 2 Past Month at Unknown time     COMPRESSION STOCKINGS 2 each daily 2 each 1 Taking     COMPRESSION STOCKINGS 2 each daily 2 each 1 Taking     [DISCONTINUED] aspirin 81 MG tablet Take 1 tablet by mouth every morning ON HOLD FOR SURGERY AS OF 03/15/2018    Past Month at Unknown time        Review of your medicines      START taking       Dose / Directions    acetaminophen 325 MG tablet   Commonly known as:  TYLENOL        Dose:  650 mg   Take 2 tablets (650 mg) by mouth every 4 hours as needed for mild pain   Quantity:  100 tablet   Refills:  1       HYDROmorphone 2 MG tablet   Commonly known as:  DILAUDID        For pain complaints of 1-5 give 1 tablets, for pain complaints of 6-10 give 2 tablets every 4 hours as needed for pain.   Quantity:  60 tablet   Refills:  0       senna-docusate 8.6-50 MG per tablet   Commonly known as:  SENOKOT-S;PERICOLACE        Dose:  1 tablet   Take 1 tablet by mouth 2 times daily   Quantity:  100 tablet   Refills:  0       warfarin 5 MG tablet   Commonly known as:  COUMADIN        Dose:  5 mg   Take 1 tablet (5 mg) by mouth once for 1 dose INR tomorrow. LOT 28 days. INR goal 1.8-2.5   Quantity:  30 tablet   Refills:  0         CONTINUE these medicines which may have CHANGED, or have new prescriptions. If we are uncertain of the size of tablets/capsules you have at home, strength may be listed as something that might have changed.       Dose / Directions    atorvastatin 80 MG tablet   Commonly known as:  LIPITOR   This may have changed:  when to take this   Used for:  Hyperlipidemia LDL goal <130        Dose:  80 mg   Take 1 tablet (80 mg) by mouth daily   Quantity:  90 tablet   Refills:  3         CONTINUE these medicines which have NOT CHANGED       Dose / Directions    * COMPRESSION STOCKINGS   Used for:  Venous (peripheral) insufficiency        Dose:  2 each   2 each daily   Quantity:  2 each   Refills:  1       * COMPRESSION STOCKINGS   Used for:  PAD (peripheral artery disease) (H)        Dose:  2 each   2 each daily   Quantity:  2 each   Refills:  1       * Notice:  This list has 2 medication(s) that are the same as other medications prescribed for you. Read the directions carefully, and ask your doctor or other care provider to  review them with you.      STOP taking          aspirin 81 MG tablet           butalbital-aspirin-caffeine -40 MG per capsule   Commonly known as:  FIORINAL           EXCEDRIN PO                Where to get your medicines      These medications were sent to [facility] [place], [place] - [address]  [address], [facility] [address]     Phone:  [phone]      acetaminophen 325 MG tablet         Some of these will need a paper prescription and others can be bought over the counter. Ask your nurse if you have questions.     You don't need a prescription for these medications      senna-docusate 8.6-50 MG per tablet     warfarin 5 MG tablet         Information about where to get these medications is not yet available     ! Ask your nurse or doctor about these medications      HYDROmorphone 2 MG tablet                 Consultations:  Hospital medicine, PT, PT.     Brief History of Illness:   Patient with end-stage DJD of the Right knee that has failed non operative treatment including injections, PT and NSAIDS     Hospital Course:  Patient was admitted to the hospital post-operatively for pain control and rehab. (S)He received 24 hours of antibiotics, and was placed on Warfarin for DVT ppx. (S)He did well post-op with no major complications.  Patient began working with PT on POD#1, and (s)he progressed as expected for discharge to TCU. At the time of discharge, patient was tolerating a regular diet, voiding spontaneously, had a BM, was ambulating independently, and had good PO pain control. Patient was deemed appropriate for discharge to TCU on POD# 3    Physical exam at discharge:  Gen: AOx3, NAD  Resp: non-labored breathing on room air  Extremities:   RLE: Dressing C/D/I. Fires EHL, FHL, tib ant, GSC. SILT in DP/Sp/Tib nerves. DP pulse palp.       Labs at discharge:   Hemoglobin   Date Value Ref Range Status   04/15/2018 11.1 (L) 11.7 - 15.7 g/dL Final   ]    Lab Results    Component Value Date    INR 1.43 04/16/2018    INR 1.30 04/15/2018    INR 1.11 04/14/2018    INR 1.09 04/13/2018         Discharge Instructions and Follow-Up:    Discharge Procedure Orders  Reason for your hospital stay   Order Comments: You were admitted to the hospital following your total knee arthroplasty.     When to contact your care team   Order Comments: CALL YOUR PHYSICIAN IF:  -Pain in your hip persists or worsens in the first few days after surgery.  -Excessive redness or drainage of cloudy or bloody material from the wounds (Clear red tinted fluid and some mild drainage should be expected). Drainage of any kind 5 days after surgery should be reported to the doctor.  -You have a temperature elevation greater than 101.5    -You have pain, swelling or redness in your calf.  -You have numbness or weakness in your leg or foot.      You may contact your physician at [phone] during business hours.    For after hours or weekend calls, you may contact the hospital at [phone] and ask for the on-call orthopedic resident     Wound care and dressings   Order Comments: You have a clean Aquacel dressing on your surgical wound. This dressing should stay in place for 7 days to allow the incision to heal. After 7 days, you may change the dressing. Please use sterile 4x4 gauze dressings with tape over top to secure the dressing. If the dressing becomes wet or saturated with wound drainage, it is appropriate to change the dressing. If drainage persists from the incision site to the extent that it is frequently saturating the dressing, please contact your clinic nurse.     Discharge Instructions   Order Comments: Post Operative Instructions for Connie J Frankenberg is a 72 year old female    Surgery: Right Total Knee Replacement on 4/13.    If you have any questions contact Dr. Nguyen at the following number: [facility] call [phone].    Follow up appointment:   You are scheduled to follow up with   Patrick approximately 4 weeks after your surgery.           Anticoagulation:   Take coumadin x 4 weeks (INR goal 1.5-2.5).  This is given to help minimize your risk of blood clot. After this you should resume your home aspirin.     Activity:   -Continue to weight bear on your operative leg as tolerated by pain.  As you are more comfortable, you can discontinue use of the walker and transition to a cane.  Once you are comfortable with the cane, you can also wean from the cane and progress to using no assistive devices.  Do not transition until you are comfortable and have good balance.      -Follow the posterior hip precautions you were taught while at the hospital.        Pain Medications:   -Take pain medications as prescribed.  Wean off the the narcotic pain medications (Oxycodone, Dilaudid, etc) as tolerated by pain.  To help with this, take the Tylenol and Celebrex (if prescribed) to minimize the amount of narcotic pain medication you need to take.      -Do not drive while on pain medications or until your leg feels well enough to do so.      Bandage Care and Showering:   -Keep the dressing clean/dry/intact until follow up.  OK to remove on POD #5 to redress/refinforce as needed.      -If no drainage is present along the wound, OK to shower and let soap and water run over the wound at 10-14 days.  If there is any questions, please call the clinic to confirm.  Do not soak or bathe the incision in water.  Do not scrub the incision.  Just let the water from the shower run over the incision.    -You may remove the exposed tails of suture (looks like fishing line) with a scissors at the level of the skin    --Contact Dr. Nguyen or his staff if there is any drainage from the incision or redness.    Leg Swelling and Icing  -Continue icing the knee 5-6 times per day for approximately 20 minutes each time.  This will help with swelling.    -Some swelling in the leg is normal after surgery.  This type of swelling is usually  gravity dependent and is improved in the morning after sleeping.  If the swelling is painful in the calf or the swelling is getting worse, contact Dr. Nair's office.  We may recommend presenting to the Emergency Department to obtain an ultrasound of the leg if there is concern for a DVT.      -Elevate the leg if you are sitting as this will help reduce swelling.    Contact clinic if you note any of the following:  -Fevers greater than 101.5 chills  -Increased pain, redness, swelling or discharge at the surgical site.   -During regular business hours call Dr Nair's office and request to speak with his nurse or the triage nurses.   -After hours or on weekends call the hospital  at [phone] and ask to speak with the Orthopaedic resident on call.     General info for SNF   Order Comments: Length of Stay Estimate: Short Term Care: Estimated # of Days <30  Condition at Discharge: Improving  Level of care:skilled   Rehabilitation Potential: Excellent  Admission H&P remains valid and up-to-date: Yes  Recent Chemotherapy: N/A  Use Nursing Home Standing Orders: Yes     Mantoux instructions   Order Comments: Give two-step Mantoux (PPD) Per Facility Policy Yes     Activity - Up ad mahendra   Order Specific Question Answer Comments   Is discharge order? Yes      Adult [facility] Follow-up and recommended labs and tests   Order Comments: You are to return to clinic at 4  weeks after your surgery, you will be scheduled for an appointment with . At this time, AP and lateral knee imaging will be obtained.    If you need to schedule your 4 week postoperative visit or haven't received confirmation regarding these visits, please call one of the following numbers within 7 days of discharge.    For patients that see Dr. nair at:   -[facility]: [phone]     Follow Up and recommended labs and tests   Order Comments: Daily INR goal 1.8-2.5 LOT 28 days. Facility provider to manage  warfarin.     Full Code     Diet   Order Comments: You may return to your regular, pre-surgery diet unless instructed otherwise by your provider.   Order Specific Question Answer Comments   Is discharge order? Yes      Advance Diet as Tolerated   Order Comments: Follow this diet upon discharge: Orders Placed This Encounter     Regular Diet Adult     Diet   Order Specific Question Answer Comments   Is discharge order? Yes      Assign Questionnaire Series to Patient           Discharge Disposition:  Discharged to short-term care facility    Chon Sommer  PGY4  [phone]    Discharge summary updated by me to reflect changes in plan of care/medications

## 2018-04-17 ENCOUNTER — RECORDS - HEALTHEAST (OUTPATIENT)
Dept: LAB | Facility: CLINIC | Age: 73
End: 2018-04-17

## 2018-04-17 LAB — INR PPP: 1.39 (ref 0.9–1.1)

## 2018-04-18 ENCOUNTER — RECORDS - HEALTHEAST (OUTPATIENT)
Dept: LAB | Facility: CLINIC | Age: 73
End: 2018-04-18

## 2018-04-19 LAB
ANION GAP SERPL CALCULATED.3IONS-SCNC: 9 MMOL/L (ref 5–18)
BUN SERPL-MCNC: 15 MG/DL (ref 8–28)
CALCIUM SERPL-MCNC: 9.9 MG/DL (ref 8.5–10.5)
CHLORIDE BLD-SCNC: 102 MMOL/L (ref 98–107)
CO2 SERPL-SCNC: 24 MMOL/L (ref 22–31)
CREAT SERPL-MCNC: 0.7 MG/DL (ref 0.6–1.1)
ERYTHROCYTE [DISTWIDTH] IN BLOOD BY AUTOMATED COUNT: 13.7 % (ref 11–14.5)
GFR SERPL CREATININE-BSD FRML MDRD: >60 ML/MIN/1.73M2
GLUCOSE BLD-MCNC: 129 MG/DL (ref 70–125)
HCT VFR BLD AUTO: 37.8 % (ref 35–47)
HGB BLD-MCNC: 12.4 G/DL (ref 12–16)
MCH RBC QN AUTO: 30.2 PG (ref 27–34)
MCHC RBC AUTO-ENTMCNC: 32.8 G/DL (ref 32–36)
MCV RBC AUTO: 92 FL (ref 80–100)
PLATELET # BLD AUTO: 354 THOU/UL (ref 140–440)
PMV BLD AUTO: 9.3 FL (ref 8.5–12.5)
POTASSIUM BLD-SCNC: 4.4 MMOL/L (ref 3.5–5)
RBC # BLD AUTO: 4.1 MILL/UL (ref 3.8–5.4)
SODIUM SERPL-SCNC: 135 MMOL/L (ref 136–145)
WBC: 8.9 THOU/UL (ref 4–11)

## 2018-04-20 ENCOUNTER — RECORDS - HEALTHEAST (OUTPATIENT)
Dept: LAB | Facility: CLINIC | Age: 73
End: 2018-04-20

## 2018-04-23 LAB — INR PPP: 1.68 (ref 0.9–1.1)

## 2018-04-26 ENCOUNTER — MEDICAL CORRESPONDENCE (OUTPATIENT)
Dept: HEALTH INFORMATION MANAGEMENT | Facility: CLINIC | Age: 73
End: 2018-04-26

## 2018-04-26 ENCOUNTER — TRANSFERRED RECORDS (OUTPATIENT)
Dept: HEALTH INFORMATION MANAGEMENT | Facility: CLINIC | Age: 73
End: 2018-04-26

## 2018-04-27 ENCOUNTER — TELEPHONE (OUTPATIENT)
Dept: INTERNAL MEDICINE | Facility: CLINIC | Age: 73
End: 2018-04-27

## 2018-04-27 DIAGNOSIS — Z51.81 ENCOUNTER FOR THERAPEUTIC DRUG MONITORING: ICD-10-CM

## 2018-04-27 DIAGNOSIS — Z78.9 DEEP VEIN THROMBOSIS (DVT) PROPHYLAXIS PRESCRIBED AT DISCHARGE: Primary | ICD-10-CM

## 2018-04-27 NOTE — TELEPHONE ENCOUNTER
[facility] Call Center    Phone Message    May a detailed message be left on voicemail: yes    Reason for Call: Order(s): Other:   Reason for requested: INR labs needed to check coumadin   Date needed: Before May 2nd  Provider name: Dr. Beltre      Action Taken: Message routed to:  [facility]     Patient is in a transitional care unit currently. Patient had a knee replacement on 4/13, and was transferred to [place] on 4/16. Patient was recently started on coumadin and she will be due for an INR check on May 2nd. Please contact ([phone]) patient once orders are in, so she may schedule an INR lab appt.

## 2018-04-27 NOTE — TELEPHONE ENCOUNTER
I need the following information for referral to the coumadin clinic for monitoring:  Current dose of coumadin-6 mg  Last INR result-04/17/2018  1.39 on 5mg- Coumadin-increase to 6 mg. To have a INR on May 2nd.  Duration of therapy (per surgeon)Dr Nguyen -surgery 04/13/2018-R TKA  When coumadin was started 04/13/2018 @4mg  Please advise.  I will put orders in for the INR's.  Dr. Beltre    Anticoagulation:   Take coumadin x 4 weeks (INR goal 1.5-2.5).  This is given to help minimize your risk of blood clot. After this you should resume your home aspirin      Appt with Dr Beltre

## 2018-04-30 ENCOUNTER — ANTICOAGULATION THERAPY VISIT (OUTPATIENT)
Dept: ANTICOAGULATION | Facility: CLINIC | Age: 73
End: 2018-04-30

## 2018-04-30 DIAGNOSIS — Z96.659 S/P TOTAL KNEE ARTHROPLASTY: Primary | ICD-10-CM

## 2018-04-30 DIAGNOSIS — Z78.9 DEEP VEIN THROMBOSIS (DVT) PROPHYLAXIS PRESCRIBED AT DISCHARGE: Primary | ICD-10-CM

## 2018-04-30 DIAGNOSIS — Z96.651 STATUS POST TOTAL RIGHT KNEE REPLACEMENT: ICD-10-CM

## 2018-04-30 RX ORDER — WARFARIN SODIUM 2 MG/1
TABLET ORAL
Qty: 50 TABLET | Refills: 1 | Status: SHIPPED | OUTPATIENT
Start: 2018-04-30 | End: 2018-05-09

## 2018-04-30 NOTE — PROGRESS NOTES
Referral INR clinic:  Currently on coumadin 6 mg/day, duration of therapy 4 weeks, starting 4/13/18 for knee replacement surgery DVT prophylaxis, INR range goal 1.5-2.5, last INR 1.39 on 4/17/18.    Please call patient.  Dr. Beltre

## 2018-04-30 NOTE — PROGRESS NOTES
New referral for this pt today, she was DC to home on 4/29 after being in a TCU post hospitalization.  She currently has two 6 mg tablets of coumadin - 2 mg tabs will be ordered today.  She plans to get INR drawn at [facility].  She has home care by [facility], however, only PT & OT were ordered, no nursing care.  She does not know when home care will be started.   The collaborative agreement has a goal range different from when the pt was discharged from the hospital.  Writer sends a message of clarification re: this to Dr. Beltre.   Pt reports her last INR was on 4/17, this was: 1.39.  (this was done at the TCU).  She has been taking 6 mg of coumadin/day.  An INR will be obtained on 5/1.  Tao DAHL     ANTICOAGULATION FOLLOW-UP CLINIC VISIT    Patient Name:  Connie J Frankenberg  Date:  4/30/2018  Contact Type:  Telephone    SUBJECTIVE:        OBJECTIVE    INR   Date Value Ref Range Status   04/16/2018 1.43 (H) 0.86 - 1.14 Final       ASSESSMENT / PLAN  No question data found.  Anticoagulation Summary as of 4/30/2018     INR goal 1.5-2.5   Today's INR No new INR was available at the time of this encounter.   Maintenance plan No maintenance plan   Full instructions 4/30: 6 mg; Otherwise No maintenance plan   Next INR check 5/1/2018   Target end date 5/11/2018    Indications   S/P total knee arthroplasty [Z96.659]         Anticoagulation Episode Summary     INR check location     Preferred lab     Send INR reminders to [facility]    Comments R TKA on 4/13 - total of 4 wks of coumadin, stop date: 5/11/18  Pt was DC to a TCU, then DC to home on 4/29.  +++ 2 mg tabs ordered on 4/30+++      Anticoagulation Care Providers     Provider Role Specialty Phone number    Sammi Beltre MD [facility] Internal [facility] [phone]            See the Encounter Report to view Anticoagulation Flowsheet and Dosing Calendar (Go to Encounters tab in chart review, and find the Anticoagulation Therapy  Visit)    See progress note from today's conversation    Ilda Alfonso RN

## 2018-04-30 NOTE — MR AVS SNAPSHOT
Connie J Frankenberg   4/30/2018   Anticoagulation Therapy Visit    Description:  72 year old female   Provider:  Ilda Alfonso, RN   Department:  [facility]           INR as of 4/30/2018     Today's INR No new INR was available at the time of this encounter.      Anticoagulation Summary as of 4/30/2018     INR goal 1.5-2.5   Today's INR No new INR was available at the time of this encounter.   Full instructions 4/30: 6 mg; Otherwise No maintenance plan   Next INR check 5/1/2018    Indications   S/P total knee arthroplasty [Z96.659]         April 2018 Details    Sun Mon Tue Wed Thu Fri Sat     1               2               3               4               5               6               7                 8               9               10               11               12               13               14                 15               16               17               18               19               20               21                 22               23               24               25               26               27               28                 29               30      6 mg   See details            Date Details   04/30 This INR check               How to take your warfarin dose     To take:  6 mg Take 1 of the 6 mg tablets.           May 2018 Details    Sun Mon Tue Wed Thu Fri Sat       1            2               3               4               5                 6               7               8               9               10               11               12                 13               14               15               16               17               18               19                 20               21               22               23               24               25               26                 27               28               29               30               31                  Date Details   No additional details    Date of next INR:  5/1/2018

## 2018-05-01 ENCOUNTER — ANTICOAGULATION THERAPY VISIT (OUTPATIENT)
Dept: ANTICOAGULATION | Facility: CLINIC | Age: 73
End: 2018-05-01

## 2018-05-01 ENCOUNTER — TELEPHONE (OUTPATIENT)
Dept: INTERNAL MEDICINE | Facility: CLINIC | Age: 73
End: 2018-05-01

## 2018-05-01 DIAGNOSIS — Z51.81 ENCOUNTER FOR THERAPEUTIC DRUG MONITORING: ICD-10-CM

## 2018-05-01 DIAGNOSIS — Z78.9 DEEP VEIN THROMBOSIS (DVT) PROPHYLAXIS PRESCRIBED AT DISCHARGE: ICD-10-CM

## 2018-05-01 DIAGNOSIS — Z96.651 STATUS POST TOTAL RIGHT KNEE REPLACEMENT: ICD-10-CM

## 2018-05-01 LAB — INR PPP: 2.1 (ref 0.86–1.14)

## 2018-05-01 PROCEDURE — 85610 PROTHROMBIN TIME: CPT | Performed by: FAMILY MEDICINE

## 2018-05-01 PROCEDURE — 36416 COLLJ CAPILLARY BLOOD SPEC: CPT | Performed by: FAMILY MEDICINE

## 2018-05-01 NOTE — MR AVS SNAPSHOT
Connie J Frankenberg   5/1/2018   Anticoagulation Therapy Visit    Description:  72 year old female   Provider:  Uriel Frankel   Department:  [facility]           INR as of 5/1/2018     Today's INR 2.10      Anticoagulation Summary as of 5/1/2018     INR goal 1.8-2.5   Prior goal 1.5-2.5   Today's INR 2.10   Full instructions 5/1: 6 mg; 5/2: 6 mg; 5/3: 6 mg; Otherwise No maintenance plan   Next INR check 5/4/2018    Indications   S/P total knee arthroplasty [Z96.659]         May 2018 Details    Sun Mon Tue Wed Thu Fri Sat       1      6 mg   See details      2      6 mg         3      6 mg         4            5                 6               7               8               9               10               11               12                 13               14               15               16               17               18               19                 20               21               22               23               24               25               26                 27               28               29               30               31                  Date Details   05/01 This INR check       Date of next INR:  5/4/2018         How to take your warfarin dose     To take:  6 mg Take 1 of the 6 mg tablets.

## 2018-05-01 NOTE — TELEPHONE ENCOUNTER
[facility]  [phone]       PT Orders    2 x 1 week  3 x 1 week  2 x 1 week    Also looking for orders to discharge OT as she does not need it or want it.  Verbal approval and documented.  Gabi West RN 1:26 PM on 5/1/2018.

## 2018-05-01 NOTE — PROGRESS NOTES
5/1/18   Spoke to Concha.  She will be checked again on Friday.  They will perform a finger stick at [facility].  Dr. Beltre messaged the clinic with a new goal range.  Sent order and updated comment section.      Uriel Frankel RN    ANTICOAGULATION FOLLOW-UP CLINIC VISIT    Patient Name:  Connie J Frankenberg  Date:  5/1/2018  Contact Type:  Telephone    SUBJECTIVE:     Patient Findings     Positives No Problem Findings    Comments Changed goal range to 1.8-2.5 per Dr. Beltre.  Spoke to Concha.  Will check on Friday.           OBJECTIVE    INR   Date Value Ref Range Status   05/01/2018 2.10 (H) 0.86 - 1.14 Final     Comment:     This test is intended for monitoring Coumadin therapy.  Results are not   accurate in patients with prolonged INR due to factor deficiency.         ASSESSMENT / PLAN  INR assessment THER    Recheck INR In: 3 DAYS    INR Location Clinic      Anticoagulation Summary as of 5/1/2018     INR goal 1.8-2.5   Prior goal 1.5-2.5   Today's INR 2.10   Maintenance plan No maintenance plan   Full instructions 5/1: 6 mg; 5/2: 6 mg; 5/3: 6 mg; Otherwise No maintenance plan   Next INR check 5/4/2018   Target end date 5/11/2018    Indications   S/P total knee arthroplasty [Z96.659]         Anticoagulation Episode Summary     INR check location     Preferred lab     Send IN[facility] reminders to [facility]    Comments R TKA on 4/13 - total of 4 wks of coumadin, stop date: 5/11/18  Pt was DC to a TCU, then DC to home on 4/29.  +++ 2 mg tabs ordered on 4/30+++      Anticoagulation Care Providers     Provider Role Specialty Phone number    Sammi Beltre MD [facility] Internal Medicine [phone]            See the Encounter Report to view Anticoagulation Flowsheet and Dosing Calendar (Go to Encounters tab in chart review, and find the Anticoagulation Therapy Visit)    Spoke with patient. Gave them their lab results and new warfarin recommendation.  No changes in health, medication, or diet. No  missed doses, no falls. No signs or symptoms of bleed or clotting.    Uriel Frankel, RN

## 2018-05-04 ENCOUNTER — ANTICOAGULATION THERAPY VISIT (OUTPATIENT)
Dept: ANTICOAGULATION | Facility: CLINIC | Age: 73
End: 2018-05-04

## 2018-05-04 DIAGNOSIS — Z78.9 DEEP VEIN THROMBOSIS (DVT) PROPHYLAXIS PRESCRIBED AT DISCHARGE: ICD-10-CM

## 2018-05-04 DIAGNOSIS — Z51.81 ENCOUNTER FOR THERAPEUTIC DRUG MONITORING: ICD-10-CM

## 2018-05-04 DIAGNOSIS — Z96.651 STATUS POST TOTAL RIGHT KNEE REPLACEMENT: ICD-10-CM

## 2018-05-04 LAB — INR PPP: 2.5 (ref 0.86–1.14)

## 2018-05-04 PROCEDURE — 85610 PROTHROMBIN TIME: CPT | Performed by: INTERNAL MEDICINE

## 2018-05-04 PROCEDURE — 36416 COLLJ CAPILLARY BLOOD SPEC: CPT | Performed by: INTERNAL MEDICINE

## 2018-05-04 NOTE — MR AVS SNAPSHOT
Connie J Frankenberg   5/4/2018   Anticoagulation Therapy Visit    Description:  72 year old female   Provider:  Zahra Bernard, RN   Department:  [facility]           INR as of 5/4/2018     Today's INR 2.50      Anticoagulation Summary as of 5/4/2018     INR goal 1.8-2.5   Today's INR 2.50   Full instructions 5/4: 6 mg; 5/5: 6 mg; 5/6: 6 mg; Otherwise No maintenance plan   Next INR check 5/7/2018    Indications   S/P total knee arthroplasty [Z96.659]         May 2018 Details    Sun Mon Tue Wed Thu Fri Sat       1               2               3               4      6 mg   See details      5      6 mg           6      6 mg         7            8               9               10               11               12                 13               14               15               16               17               18               19                 20               21               22               23               24               25               26                 27               28               29               30               31                  Date Details   05/04 This INR check       Date of next INR:  5/7/2018         How to take your warfarin dose     To take:  6 mg Take 1 of the 6 mg tablets.

## 2018-05-04 NOTE — PROGRESS NOTES
ANTICOAGULATION FOLLOW-UP CLINIC VISIT    Patient Name:  Connie J Frankenberg  Date:  5/4/2018  Contact Type:  Telephone    SUBJECTIVE:     Patient Findings     Positives No Problem Findings    Comments Appointment on 5/7 so patient will get an INR then.            OBJECTIVE    INR   Date Value Ref Range Status   05/04/2018 2.50 (H) 0.86 - 1.14 Final       ASSESSMENT / PLAN  INR assessment THER    Recheck INR In: 3 DAYS    INR Location Clinic      Anticoagulation Summary as of 5/4/2018     INR goal 1.8-2.5   Today's INR 2.50   Maintenance plan No maintenance plan   Full instructions 5/4: 6 mg; 5/5: 6 mg; 5/6: 6 mg; Otherwise No maintenance plan   Next INR check 5/7/2018   Target end date 5/11/2018    Indications   S/P total knee arthroplasty [Z96.659]         Anticoagulation Episode Summary     INR check location     Preferred lab     Send INR reminders to [facility]    Comments R TKA on 4/13 - total of 4 wks of coumadin, stop date: 5/11/18  Pt was DC to a TCU, then DC to home on 4/29.  +++ 2 mg tabs ordered on 4/30+++      Anticoagulation Care Providers     Provider Role Specialty Phone number    Sammi Beltre MD [facility] Internal Medicine [phone]            See the Encounter Report to view Anticoagulation Flowsheet and Dosing Calendar (Go to Encounters tab in chart review, and find the Anticoagulation Therapy Visit)    Spoke with Concha.    Zahra Bernard RN

## 2018-05-05 ENCOUNTER — TELEPHONE (OUTPATIENT)
Dept: ORTHOPEDICS | Facility: CLINIC | Age: 73
End: 2018-05-05

## 2018-05-05 NOTE — TELEPHONE ENCOUNTER
Patient called stating only has 4 tabs of dilaudid left, TKA was 3 weeks ago on 4/13/18. Has only been taking tylenol 325mg q4hr with dilaudid. Counseled should continue to wean off of dilaudid. Instructed to do this by taking tylenol 650mg q4hr scheduled and adding ibuprofen 600mg q6hr. Only take dilaudid if needed. Ice to decrease swelling and pain.     Keep follow up with Dr. Nguyen on Monday as scheduled.     Patient agreeable to plan.     Cara Soto PGY-4  Orthopedic Surgery  [phone]

## 2018-05-07 ENCOUNTER — OFFICE VISIT (OUTPATIENT)
Dept: ORTHOPEDICS | Facility: CLINIC | Age: 73
End: 2018-05-07
Payer: COMMERCIAL

## 2018-05-07 ENCOUNTER — RADIANT APPOINTMENT (OUTPATIENT)
Dept: GENERAL RADIOLOGY | Facility: CLINIC | Age: 73
End: 2018-05-07
Attending: ORTHOPAEDIC SURGERY
Payer: COMMERCIAL

## 2018-05-07 DIAGNOSIS — Z96.651 STATUS POST TOTAL RIGHT KNEE REPLACEMENT: ICD-10-CM

## 2018-05-07 DIAGNOSIS — Z96.651 HISTORY OF TOTAL RIGHT KNEE REPLACEMENT: ICD-10-CM

## 2018-05-07 DIAGNOSIS — Z96.659 S/P TOTAL KNEE ARTHROPLASTY: ICD-10-CM

## 2018-05-07 RX ORDER — HYDROMORPHONE HYDROCHLORIDE 2 MG/1
TABLET ORAL
Qty: 50 TABLET | Refills: 0 | Status: SHIPPED | OUTPATIENT
Start: 2018-05-07 | End: 2018-11-28

## 2018-05-07 ASSESSMENT — ENCOUNTER SYMPTOMS
DYSURIA: 0
CHILLS: 0
DEPRESSION: 1
NERVOUS/ANXIOUS: 1
INSOMNIA: 1
POLYDIPSIA: 0
WEIGHT LOSS: 0
INCREASED ENERGY: 0
DIFFICULTY URINATING: 0
DECREASED CONCENTRATION: 0
HEMATURIA: 0
STIFFNESS: 1
BACK PAIN: 1
POLYPHAGIA: 0
MUSCLE CRAMPS: 0
ARTHRALGIAS: 1
HALLUCINATIONS: 0
FATIGUE: 1
WEIGHT GAIN: 0
FLANK PAIN: 0
NIGHT SWEATS: 0
PANIC: 0
NECK PAIN: 0
MYALGIAS: 1
FEVER: 1
JOINT SWELLING: 1
MUSCLE WEAKNESS: 1
DECREASED APPETITE: 0
ALTERED TEMPERATURE REGULATION: 0

## 2018-05-07 ASSESSMENT — KOOS JR
HOW SEVERE IS YOUR KNEE STIFFNESS AFTER FIRST WAKING IN MORNING: 1
HOW SEVERE IS YOUR KNEE STIFFNESS AFTER FIRST WAKING IN MORNING: MODERATE

## 2018-05-07 ASSESSMENT — ACTIVITIES OF DAILY LIVING (ADL): FUNCTION,_DAILY_LIVING_SCORE: 77.94

## 2018-05-07 NOTE — NURSING NOTE
Chief Complaint   Patient presents with     Surgical Followup     4wk POP F/u Right Total Knee Replacement DOS: 4/13/18       72 year old  1945             [place], [place] - [address]    Allergies   Allergen Reactions     Codeine GI Disturbance     Percocet [Oxycodone-Acetaminophen] Nausea and Vomiting     Current Outpatient Prescriptions   Medication     acetaminophen (TYLENOL) 325 MG tablet     atorvastatin (LIPITOR) 80 MG tablet     COMPRESSION STOCKINGS     COMPRESSION STOCKINGS     HYDROmorphone (DILAUDID) 2 MG tablet     IBUPROFEN PO     senna-docusate (SENOKOT-S;PERICOLACE) 8.6-50 MG per tablet     warfarin (COUMADIN) 2 MG tablet     No current facility-administered medications for this visit.

## 2018-05-07 NOTE — PROGRESS NOTES
[facility], Orthopaedic Surgery Consultation  by Apollo Nguyen M.D.    Connie J Frankenberg MRN# [id number]   Age: 71 year old YOB: 1945     Requesting physician: Kleber Valle     DX:  1. OA R knee   2. HLP  BPV    TREATMENTS:  1. 4-5 corticosteroid injections in the past. No longer provide adequate relief even with U/S guidance   2. 4/13/18, R TKA (Patrick) [facility]    Postoperative knee replacement follow-up clinic visit    Connie J Frankenberg is doing well after last surgery listed above.    Preoperative knee pain is  Partially Present  Analgesic medication: Dilaudid (Oral)    EXAM:  Incision healing, clean and dry.  Joint range of motion -20 to 85 degrees, with pain  Effusion: none  Periarticular swelling or leg edema: 1+  Peroneal and tibial nerve function: normal  Gait: Antalgic    Intraoperative cultures:  none     IMAGING:  Radiographs demonstrate the knee implant in satisfactory position without evidence of any complication.    IMPRESSION:  Excellent outcome to date after knee replacement.     PLAN:    Continue range of motion exercises and stretching.  Needs to be more aggressive with range of motion.  Should see PT 3x/week and be taught how to do home stretching 3x/day.    Quadriceps strengthening exercises.    May be weight bearing as tolerated.    Discontinue DVT prophylaxis meds (i.e., warfarin or ASA) if not required for any other reason.    Refill of:  (meds listed above): Dilaudid #50 tabs.    Patient given instructions re: prophylactic antibiotic recommendations during dental work.    Next follow-up visit in 2 weeks.  If no improvement in ROM, will consider proceeding with knee manipulation     Apollo Nguyen M.D.  [employer] Family Professor  Oncology and Adult Reconstructive Surgery  Dept Orthopaedic Surgery, [facility]  [phone] office  [email or web address]    KOOS Knee Survey Assessment    Knee Outcome Survey ADL Scale (FRANNY Arthur; LUISANA Martinez; Monika  RS; Iván, FH; CHET Borjas; 1998) 5/7/2018   Do you have swelling in your knee? Always   Do you feel grinding, hear clicking or any other type of noise when your knee moves? Never   Does your knee catch or hang up when moving? Never   Can you straighten your knee fully? Sometimes   Can you bend your knee fully? Never   How severe is your knee joint stiffness after first wakening in the morning? Moderate   How severe is your knee stiffness after sitting, lying or resting LATER IN THE DAY? Mild   How often do you experience knee pain? Daily   Twisting/pivoting on your knee Mild   Straightening knee fully Moderate   Bending knee fully Severe   Walking on flat surface Mild   Going up or down stairs Mild   At night while in bed Moderate   Sitting or lying Mild   Standing upright Mild   Descending stairs None   Ascending stairs None   Rising from sitting Mild   Standing Mild   Bending to floor/ an object Mild   Walking on flat surface Mild   Getting in/out of car Mild   Going shopping Mild   Putting on socks/stockings Mild   Rising from bed Mild   Taking off socks/stockings Mild   Lying in bed (turning over, maintaining knee position) Mild   Getting in/out of bath Mild   Sitting Mild   Getting on/off toilet Mild   Heavy domestic duties (moving heavy boxes, scrubbing floors, etc) Mild   Light domestic duties (cooking, dusting, etc) Mild   Squatting None   Running None   Jumping None   Twisting/pivoting on your injured knee Mild   Kneeling None   How often are you aware of your knee problem? Constantly   Have you modified your life style to avoid potentially damaging activities to your knee? Moderately   How much are you troubled with lack of confidence in your knee? Mildly   In general, how much difficulty do you have with your knee? Moderate   Pain Score 58.33   Symptoms Score 67.85   Function, Daily Living Score 77.94   Sports and Rec Score 95   Quality of Life Score 43.75          Answers for HPI/ROS submitted by the  patient on 5/7/2018   General Symptoms: Yes  Skin Symptoms: No  HENT Symptoms: No  EYE SYMPTOMS: No  HEART SYMPTOMS: No  LUNG SYMPTOMS: No  INTESTINAL SYMPTOMS: No  URINARY SYMPTOMS: Yes  GYNECOLOGIC SYMPTOMS: No  BREAST SYMPTOMS: No  SKELETAL SYMPTOMS: Yes  BLOOD SYMPTOMS: No  NERVOUS SYSTEM SYMPTOMS: No  MENTAL HEALTH SYMPTOMS: Yes  Fever: Yes  Loss of appetite: No  Weight loss: No  Weight gain: No  Fatigue: Yes  Night sweats: No  Chills: No  Increased stress: No  Excessive hunger: No  Excessive thirst: No  Feeling hot or cold when others believe the temperature is normal: No  Loss of height: No  Post-operative complications: No  Surgical site pain: Yes  Hallucinations: No  Change in or Loss of Energy: No  Hyperactivity: No  Confusion: No  Trouble holding urine or incontinence: No  Pain or burning: No  Trouble starting or stopping: No  Increased frequency of urination: Yes  Blood in urine: No  Decreased frequency of urination: No  Frequent nighttime urination: Yes  Flank pain: No  Difficulty emptying bladder: No  Back pain: Yes  Muscle aches: Yes  Neck pain: No  Swollen joints: Yes  Joint pain: Yes  Bone pain: Yes  Muscle cramps: No  Muscle weakness: Yes  Joint stiffness: Yes  Bone fracture: No  Nervous or Anxious: Yes  Depression: Yes  Trouble sleeping: Yes  Trouble thinking or concentrating: No  Mood changes: Yes  Panic attacks: No

## 2018-05-07 NOTE — LETTER
5/7/2018       RE: Connie J Frankenberg  [address]  [facility] [address]     Dear Colleague,    Thank you for referring your patient, Connie J Frankenberg, to the [facility]. Please see a copy of my visit note below.    [facility] Physicians, Orthopaedic Surgery Consultation  by Apollo Nguyen M.D.    Connie J Frankenberg MRN# [id number]   Age: 71 year old YOB: 1945     Requesting physician: Kleber Valle     DX:  1. OA R knee   2. HLP  BPV    TREATMENTS:  1. 4-5 corticosteroid injections in the past. No longer provide adequate relief even with U/S guidance   2. 4/13/18, R TKA (Patrick) [facility]    Postoperative knee replacement follow-up clinic visit  Connie J Frankenberg is doing well after last surgery listed above.    Preoperative knee pain is  Partially Present  Analgesic medication: Dilaudid (Oral)    EXAM:  Incision healing, clean and dry.  Joint range of motion -20 to 85 degrees, with pain  Effusion: none  Periarticular swelling or leg edema: 1+  Peroneal and tibial nerve function: normal  Gait: Antalgic    Intraoperative cultures:  none     IMAGING:  Radiographs demonstrate the knee implant in satisfactory position without evidence of any complication.    IMPRESSION:  Excellent outcome to date after knee replacement.     PLAN:    Continue range of motion exercises and stretching.  Needs to be more aggressive with range of motion.  Should see PT 3x/week and be taught how to do home stretching 3x/day.    Quadriceps strengthening exercises.    May be weight bearing as tolerated.    Discontinue DVT prophylaxis meds (i.e., warfarin or ASA) if not required for any other reason.    Refill of:  (meds listed above): Dilaudid #50 tabs.    Patient given instructions re: prophylactic antibiotic recommendations during dental work.    Next follow-up visit in 2 weeks.  If no improvement in ROM, will consider proceeding with knee  manipulation     Apollo Nguyen M.D.  [employer] Family Professor  Oncology and Adult Reconstructive Surgery  Dept Orthopaedic Surgery, [facility]  [phone] office  [email or web address]    KOOS Knee Survey Assessment    Knee Outcome Survey ADL Scale (Jerson, FRANNY; LUISANA Martinez; Monika, MIGUEL; DANIEL Minor; [place], CHET; 1998) 5/7/2018   Do you have swelling in your knee? Always   Do you feel grinding, hear clicking or any other type of noise when your knee moves? Never   Does your knee catch or hang up when moving? Never   Can you straighten your knee fully? Sometimes   Can you bend your knee fully? Never   How severe is your knee joint stiffness after first wakening in the morning? Moderate   How severe is your knee stiffness after sitting, lying or resting LATER IN THE DAY? Mild   How often do you experience knee pain? Daily   Twisting/pivoting on your knee Mild   Straightening knee fully Moderate   Bending knee fully Severe   Walking on flat surface Mild   Going up or down stairs Mild   At night while in bed Moderate   Sitting or lying Mild   Standing upright Mild   Descending stairs None   Ascending stairs None   Rising from sitting Mild   Standing Mild   Bending to floor/ an object Mild   Walking on flat surface Mild   Getting in/out of car Mild   Going shopping Mild   Putting on socks/stockings Mild   Rising from bed Mild   Taking off socks/stockings Mild   Lying in bed (turning over, maintaining knee position) Mild   Getting in/out of bath Mild   Sitting Mild   Getting on/off toilet Mild   Heavy domestic duties (moving heavy boxes, scrubbing floors, etc) Mild   Light domestic duties (cooking, dusting, etc) Mild   Squatting None   Running None   Jumping None   Twisting/pivoting on your injured knee Mild   Kneeling None   How often are you aware of your knee problem? Constantly   Have you modified your life style to avoid potentially damaging activities to your knee? Moderately   How much are you  troubled with lack of confidence in your knee? Mildly   In general, how much difficulty do you have with your knee? Moderate   Pain Score 58.33   Symptoms Score 67.85   Function, Daily Living Score 77.94   Sports and Rec Score 95   Quality of Life Score 43.75      Answers for HPI/ROS submitted by the patient on 5/7/2018       Again, thank you for allowing me to participate in the care of your patient.      Sincerely,    Apollo Nguyen MD

## 2018-05-07 NOTE — MR AVS SNAPSHOT
After Visit Summary   5/7/2018    Connie J Frankenberg    MRN: [id number]           Patient Information     Date Of Birth          1945        Visit Information        Provider Department      5/7/2018 11:00 AM Apollo Nguyen MD [facility]        Today's Diagnoses     History of total right knee replacement           Follow-ups after your visit        Your next 10 appointments already scheduled     May 10, 2018  7:40 AM CDT   (Arrive by 7:25 AM)   Return Visit with Sammi Beltre MD   [facility] ([facility])    [address] [place] [id number] [phone]            May 24, 2018 11:00 AM CDT   (Arrive by 10:45 AM)   Return Visit with Apollo Nguyen MD   [facility] ([facility])    [address] [place] [id number] [phone]              Who to contact     Please call your clinic at [phone] to:    Ask questions about your health    Make or cancel appointments    Discuss your medicines    Learn about your test results    Speak to your doctor            Additional Information About Your Visit        MyChart Information     [employer] gives you secure access to your electronic health record. If you see a primary care provider, you can also send messages to your care team and make appointments. If you have questions, please call your primary care clinic.  If you do not have a primary care provider, please call [phone] and they will assist you.      [employer] is an electronic gateway that provides easy, online access to your medical records. With [employer], you can request a clinic appointment, read your test results, renew a prescription or communicate with your care team.     To access your existing account, please contact your [facility] or call [phone] for assistance.        Care EveryWhere ID      This is your Care EveryWhere ID. This could be used by other organizations to access your [place] medical records  [id number]         Blood Pressure from Last 3 Encounters:   04/16/18 148/89   04/10/18 (!) 135/93   03/29/18 155/88    Weight from Last 3 Encounters:   04/13/18 70.4 kg (155 lb 3.3 oz)   04/10/18 70.9 kg (156 lb 4.9 oz)   03/29/18 70.9 kg (156 lb 6.4 oz)              Today, you had the following     No orders found for display         Today's Medication Changes          These changes are accurate as of 5/7/18  2:57 PM.  If you have any questions, ask your nurse or doctor.               These medicines have changed or have updated prescriptions.        Dose/Directions    atorvastatin 80 MG tablet   Commonly known as:  LIPITOR   This may have changed:  when to take this   Used for:  Hyperlipidemia LDL goal <130        Dose:  80 mg   Take 1 tablet (80 mg) by mouth daily   Quantity:  90 tablet   Refills:  3            Where to get your medicines      Some of these will need a paper prescription and others can be bought over the counter.  Ask your nurse if you have questions.     Bring a paper prescription for each of these medications     HYDROmorphone 2 MG tablet                Primary Care Provider Office Phone # Fax #    Sammi Beltre [phone]       [address] [place] [address] [address] [place] [address]        Equal Access to Services     JUAN DIEGO BRISENO AH: Elizabeth peters Sogeoffrey, waaxda luqalbert, qaybta kaallyle adkins. So [facility] [phone].    ATENCIÓN: Si habla español, tiene a starr disposición servicios gratuitos de asistencia lingüística. Llmartha al [phone].    We comply with applicable federal civil rights laws and [place] laws. We do not discriminate on the basis of race, color, national origin, age, disability, sex, sexual orientation, or gender identity.            Thank you!     Thank you for choosing [facility]  for your care. Our goal is always to provide you with excellent care. Hearing back from our patients is one way we can continue to improve our services. Please take a few minutes to complete the written survey that you may receive in the mail after your visit with us. Thank you!             Your Updated Medication List - Protect others around you: Learn how to safely use, store and throw away your medicines at www.disposemymeds.org.          This list is accurate as of 5/7/18  2:57 PM.  Always use your most recent med list.                   Brand Name Dispense Instructions for use Diagnosis    acetaminophen 325 MG tablet    TYLENOL    100 tablet    Take 2 tablets (650 mg) by mouth every 4 hours as needed for mild pain    Status post total right knee replacement       atorvastatin 80 MG tablet    LIPITOR    90 tablet    Take 1 tablet (80 mg) by mouth daily    Hyperlipidemia LDL goal <130       * COMPRESSION STOCKINGS     2 each    2 each daily    Venous (peripheral) insufficiency       * COMPRESSION STOCKINGS     2 each    2 each daily    PAD (peripheral artery disease) (H)       HYDROmorphone 2 MG tablet    DILAUDID    50 tablet    For pain complaints of 1-5 give 1 tablets, for pain complaints of 6-10 give 2 tablets every 4 hours as needed for pain.    Status post total right knee replacement       IBUPROFEN PO      Take 600 mg by mouth every 6 hours as needed for moderate pain        senna-docusate 8.6-50 MG per tablet    SENOKOT-S;PERICOLACE    100 tablet    Take 1 tablet by mouth 2 times daily    Status post total right knee replacement       warfarin 2 MG tablet    COUMADIN    50 tablet    Take 6 mg (3 tablets) on 4/30/18, then take as directed by [facility]    Deep vein thrombosis (DVT) prophylaxis prescribed at discharge       * Notice:  This list has 2 medication(s) that are the same as other medications prescribed for you. Read the directions carefully, and ask your doctor or other care  provider to review them with you.

## 2018-05-09 ENCOUNTER — TELEPHONE (OUTPATIENT)
Dept: INTERNAL MEDICINE | Facility: CLINIC | Age: 73
End: 2018-05-09

## 2018-05-09 ENCOUNTER — OFFICE VISIT (OUTPATIENT)
Dept: INTERNAL MEDICINE | Facility: CLINIC | Age: 73
End: 2018-05-09
Payer: COMMERCIAL

## 2018-05-09 VITALS
DIASTOLIC BLOOD PRESSURE: 84 MMHG | SYSTOLIC BLOOD PRESSURE: 143 MMHG | HEART RATE: 67 BPM | WEIGHT: 157.5 LBS | BODY MASS INDEX: 28.8 KG/M2

## 2018-05-09 DIAGNOSIS — G47.9 SLEEPING DIFFICULTY: Primary | ICD-10-CM

## 2018-05-09 DIAGNOSIS — Z96.651 STATUS POST TOTAL RIGHT KNEE REPLACEMENT: ICD-10-CM

## 2018-05-09 RX ORDER — LANOLIN ALCOHOL/MO/W.PET/CERES
3 CREAM (GRAM) TOPICAL
Qty: 30 TABLET | Refills: 3 | Status: SHIPPED | OUTPATIENT
Start: 2018-05-09 | End: 2018-11-28

## 2018-05-09 ASSESSMENT — PAIN SCALES - GENERAL: PAINLEVEL: MODERATE PAIN (4)

## 2018-05-09 NOTE — PATIENT INSTRUCTIONS
Insomnia  Insomnia is repeated difficulty going to sleep or staying asleep, or both. Whether you have insomnia is not defined by a specific amount of sleep. Different people need different amounts of sleep, and you may need more or less sleep at different times of your life.  There are 3 major types of insomnia:  short-term, chronic, and  other.   Short-term, or acute insomnia lasts less than 3 months.  The symptoms are temporary and can be linked directly to a stressor, such as the death of a loved one, financial problems, or a new physical problem.  Short-term insomnia stops when the stressor resolves or the person adapts to its presence.  Chronic insomnia occurs at least 3 times a week and lasts longer than 3 months.  Chronic insomnia can occur when either the cause of the sleeping problem is not clear, or the insomnia does not get better when the stressor is resolved. A number of other criteria are also used to make the diagnosis of chronic insomnia.    Other insomnia  is the third type of insomnia-related sleep disorders.  This description applies to people who have problems getting to sleep or staying asleep, but do not meet all of the factors that describe either short-term or chronic insomnia.    Many things cause insomnia. Different people may have different causes. It can be from an underlying medical or psychological condition, or lifestyle. It can also be primary insomnia, which means no cause can be found.  Causes of insomnia include:    Chronic medical problems- heart disease, gastrointestinal problems, hormonal changes, breathing problems    Anxiety    Stress    Depression    Pain    Work schedule    Sleep apnea    Illegal drugs    Certain medicines  Many different medidcines can affect your sleep, such as stimulants, caffeine, alcohol, some decongestants, and diet pills. Other medicines may include some types of blood pressure pills, steroids, asthma medicines, antihistamines, antidepressants,  seizure medicines and statins. Not all of these will affect your sleep, and they shouldn t be stopped without talking to your doctor.  Symptoms of insomnia can include:    Lying awake for long periods at night before falling asleep    Waking up several times during the night    Waking up early in the morning and not being able to get back to sleep    Feeling tired and not refreshed by sleep    Not being able to function properly during the day and finding it hard to concentrate    Irritability    Tiredness and fatigue during the day  Home care    Review your medicines with your doctor or pharmacist to find out if they can cause insomnia. Not all medicines will affect your sleep, but they shouldn't be stopped without reviewing them with your doctor. There may be serious side effects and consequences from suddenly stopping your medicines. Not taking them may cause strokes, heart attacks, and many other problems.    Caffeine, smoking and alcohol also affect sleep. Limit your daily use and do not use these before bedtime. Alcohol may make you sleepy at first, but as its effects wear off, you may awaken a few hours later and have trouble returning to sleep.    Do not exercise, eat or drink large amounts of liquid within 2 hours of your bedtime.    Improve your sleep habits. Have a fixed bed and wake-up time. Try to keep noise, light and heat in your bedroom at a comfortable level. Try using earplugs or eyeshades if needed.     Avoid watching TV in bed.    If you do not fall asleep within 30 minutes, try to relax by reading or listening to soft music.    Limit daytime napping to one 30 minute period, early in the day.    Get regular exercise. Find other ways to lessen your stress level.    If a medicine was prescribed to help reset your sleep patterns, take it as directed. Sleeping pills are intended for short-term use, only. If taken for too long, the effect wears off while the risk of physical addiction and  psychological dependence increases.  Sleep diary  If the cause isn t obvious and it is not improving, try keeping a  sleep diary  for a couple of weeks. Include in it:    The time you go to bed    How long it takes to fall asleep    How many times you wake up    What time you wake up    Your meal times and what you eat    What time you drink alcohol    Your exercise habits and times  Follow-up care  Follow up with your healthcare provider, or as advised. If X-rays or CT scans were done, you will be notified if there is a change in the reading, especially if it affects treatment.  Call 911  Call 911 if any of these occur:    Trouble breathing    Confusion or trouble waking    Fainting or loss of consciousness    Rapid heart rate    New chest, arm, shoulder, neck or upper back pain    Trouble with speech or vision, weakness of an arm or leg    Trouble walking or talking, loss of balance, numbness or weakness in one side of your body, facial droop  When to seek medical advice  Call your healthcare provider right away if any of these occur:    Extreme restlessness or irritability    Confusion or hallucinations (seeing or hearing things that are not there)    Anxiety, depression    Several days without sleeping  Date Last Reviewed: 11/19/2015 2000-2017 [employer]. [address] [place] [address], [place] [address]. All rights reserved. This information is not intended as a substitute for professional medical care. Always follow your healthcare professional's instructions.

## 2018-05-09 NOTE — PROGRESS NOTES
[facility]   Snehal JOSIE Francis, VANESA CNP  05/09/2018      Chief Complaint:   Surgical Followup     History of Present Illness:   Connie J Frankenberg is a 72 year old female with a history of right knee arthritis s/p arthroplasty, hyperlipidemia, vasovagal syncope, depressive disorder, and migraines who presents to clinic for recheck following her right knee arthroplasty on 4/13/18. She was seen by Dr. Nguyen in ortho on 5/7, at which time imaging showed that the knee replacement was in satisfactory position and her wound was healing well. She was encouraged to be more aggressive with ROM and would see physical therapy 3x/week. Anticoagulation was discontinued and she was provided refill of a 50 tab refill of Dilaudid. The patient has established care with Dr. Beltre.     Today, she reports having trouble with sleep at night secondary to her pain. She is taking Dilaudid in the morning and night, Tylenol 650 mg every 4 hours, and Ibuprofen 600 mg. However, she is cutting back on the ibuprofen due to an upset stomach with this. She rates her current pain a 4/10 in severity, which stays like this with walking and slight movements. She does endorse that her pain is greatly exacerbated with physical therapy and exercises. She is still icing and elevating at rest. She express a lot of swelling, which is somewhat relieved with massages. She voices having increase in urinary frequency recently that often wakes her in the night.     She has also had a lot of back pain, which is not new, but is recently exacerbated.     Other concerns discussed:  1. She does enjoy swimming and  May start this up again with minimal exertion once she is further healed.      Review of Systems:   Pertinent items are noted in HPI, remainder of complete ROS is negative.      Active Medications:     Current Outpatient Prescriptions:      acetaminophen (TYLENOL) 325 MG tablet, Take 2 tablets (650 mg) by mouth every 4 hours as  needed for mild pain, Disp: 100 tablet, Rfl: 1     atorvastatin (LIPITOR) 80 MG tablet, Take 1 tablet (80 mg) by mouth daily (Patient taking differently: Take 80 mg by mouth At Bedtime ), Disp: 90 tablet, Rfl: 3     HYDROmorphone (DILAUDID) 2 MG tablet, For pain complaints of 1-5 give 1 tablets, for pain complaints of 6-10 give 2 tablets every 4 hours as needed for pain., Disp: 50 tablet, Rfl: 0     IBUPROFEN PO, Take 600 mg by mouth every 6 hours as needed for moderate pain, Disp: , Rfl:      melatonin 3 MG tablet, Take 1 tablet (3 mg) by mouth nightly as needed for sleep, Disp: 30 tablet, Rfl: 3     COMPRESSION STOCKINGS, 2 each daily, Disp: 2 each, Rfl: 1     COMPRESSION STOCKINGS, 2 each daily, Disp: 2 each, Rfl: 1     senna-docusate (SENOKOT-S;PERICOLACE) 8.6-50 MG per tablet, Take 1 tablet by mouth 2 times daily, Disp: 100 tablet, Rfl: 0      Allergies:   Codeine and Percocet [oxycodone-acetaminophen]      Past Medical History:  Arthritis of knee s/p arthroplasty   Basal cell carcinoma   Squamous cell carcinoma   BPPV  Depressive disorder  Hemorrhoids  History of PID  Hyperlipidemia  Insomnia   Migraines, sparkly aura   Vasovagal syncope      Past Surgical History:  Arthroplasty right knee   Laparoscopy infertility   Laser surgery basal cell cancer   Cataract   Lasik bilateral   Micrographic procedure    Family History:   Mother - headache, narcolepsy, skin cancer, cerebrovascular disease, hypothyroidism, seizure disorder, migraines  Sister - headache, hypothyroidism, hypertension   Father - skin cancer, MI, CAD, cerebrovascular disease, alcoholism   Brother  - skin cancer, CAD, hyperlipidemia, alcoholism       Social History:   Presents her    Tobacco use: Never smoker   Alcohol consumption: Very little   Marital status:        Physical Exam:   /84  Pulse 67  Wt 71.4 kg (157 lb 8 oz)  BMI 28.8 kg/m2    Constitutional: Alert, oriented, pleasant, no acute distress  Musculoskeletal: edema  surrounding R knee, normal muscle tone, walks with walker  Skin: surgical incision well-approximated without drainage or surrounding erythema  Psychiatric: normal mentation, affect and mood      Assessment and Plan:      Sleeping difficulty  She continues with Dilaudid for pain management, however only taking 1 tablet at night. Recommended increasing to 2 tabs at night and starting on melatonin to help with her sleeping. Discussed potential drug interactions and side effects with sleeping medications like Ambien, especially high risk in older adults.  - melatonin 3 MG tablet  Dispense: 30 tablet; Refill: 3    Status post total right knee replacement  Her pain is somewhat managed on Dilaudid, Tylenol, and Ibuprofen. Continue with icing, elevation, compression stockings and working with PT. Recommended taking Dilaudid prior to PT sessions to help reduce pain while working on increasing mobility. She is following with Dr. Nguyen of orthopedics.         Follow-up: PRCLARE Hoyosibe Disclosure:   Austen MARTEL, am serving as a scribe to document services personally performed by VANESA Tijerina CNP at this visit, based upon the provider's statements to me. All documentation has been reviewed by the aforementioned provider prior to being entered into the official medical record.     Portions of this medical record were completed by a scribe. UPON MY REVIEW AND AUTHENTICATION BY ELECTRONIC SIGNATURE, this confirms (a) I performed the applicable clinical services, and (b) the record is accurate.      VANESA Tijerina CNP

## 2018-05-09 NOTE — NURSING NOTE
Chief Complaint   Patient presents with     Surgical Followup     3 weeks post-op right knee. Would like to discuss possible Rx for helping sleep.     Rooming Note  Blood Pressure   BP Readings from Last 1 Encounters:   05/09/18 148/88    Single BP recheck started, 12:05 PM (2 minutes)

## 2018-05-09 NOTE — TELEPHONE ENCOUNTER
[facility]    Phone Message    May a detailed message be left on voicemail: yes    Reason for Call: Order(s): Home Care Orders: Other: Please follow up [facility] for verbal home care orders.  Other: Home care     Action Taken: Message routed to:  [facility]  [facility]  PT increase visits 3x a week for 1 week. Verbal approval and documented.    Gabi West RN 3:34 PM on 5/9/2018.

## 2018-05-09 NOTE — MR AVS SNAPSHOT
After Visit Summary   5/9/2018    Connie J Frankenberg    MRN: [id number]           Patient Information     Date Of Birth          1945        Visit Information        Provider Department      5/9/2018 12:00 PM Snehal Francis APRN CNP [facility]        Today's Diagnoses     Sleeping difficulty    -  1    Status post total right knee replacement          Care Instructions      Insomnia  Insomnia is repeated difficulty going to sleep or staying asleep, or both. Whether you have insomnia is not defined by a specific amount of sleep. Different people need different amounts of sleep, and you may need more or less sleep at different times of your life.  There are 3 major types of insomnia:  short-term, chronic, and  other.   Short-term, or acute insomnia lasts less than 3 months.  The symptoms are temporary and can be linked directly to a stressor, such as the death of a loved one, financial problems, or a new physical problem.  Short-term insomnia stops when the stressor resolves or the person adapts to its presence.  Chronic insomnia occurs at least 3 times a week and lasts longer than 3 months.  Chronic insomnia can occur when either the cause of the sleeping problem is not clear, or the insomnia does not get better when the stressor is resolved. A number of other criteria are also used to make the diagnosis of chronic insomnia.    Other insomnia  is the third type of insomnia-related sleep disorders.  This description applies to people who have problems getting to sleep or staying asleep, but do not meet all of the factors that describe either short-term or chronic insomnia.    Many things cause insomnia. Different people may have different causes. It can be from an underlying medical or psychological condition, or lifestyle. It can also be primary insomnia, which means no cause can be found.  Causes of insomnia include:    Chronic medical problems- heart disease,  gastrointestinal problems, hormonal changes, breathing problems    Anxiety    Stress    Depression    Pain    Work schedule    Sleep apnea    Illegal drugs    Certain medicines  Many different medidcines can affect your sleep, such as stimulants, caffeine, alcohol, some decongestants, and diet pills. Other medicines may include some types of blood pressure pills, steroids, asthma medicines, antihistamines, antidepressants, seizure medicines and statins. Not all of these will affect your sleep, and they shouldn t be stopped without talking to your doctor.  Symptoms of insomnia can include:    Lying awake for long periods at night before falling asleep    Waking up several times during the night    Waking up early in the morning and not being able to get back to sleep    Feeling tired and not refreshed by sleep    Not being able to function properly during the day and finding it hard to concentrate    Irritability    Tiredness and fatigue during the day  Home care    Review your medicines with your doctor or pharmacist to find out if they can cause insomnia. Not all medicines will affect your sleep, but they shouldn't be stopped without reviewing them with your doctor. There may be serious side effects and consequences from suddenly stopping your medicines. Not taking them may cause strokes, heart attacks, and many other problems.    Caffeine, smoking and alcohol also affect sleep. Limit your daily use and do not use these before bedtime. Alcohol may make you sleepy at first, but as its effects wear off, you may awaken a few hours later and have trouble returning to sleep.    Do not exercise, eat or drink large amounts of liquid within 2 hours of your bedtime.    Improve your sleep habits. Have a fixed bed and wake-up time. Try to keep noise, light and heat in your bedroom at a comfortable level. Try using earplugs or eyeshades if needed.     Avoid watching TV in bed.    If you do not fall asleep within 30 minutes,  try to relax by reading or listening to soft music.    Limit daytime napping to one 30 minute period, early in the day.    Get regular exercise. Find other ways to lessen your stress level.    If a medicine was prescribed to help reset your sleep patterns, take it as directed. Sleeping pills are intended for short-term use, only. If taken for too long, the effect wears off while the risk of physical addiction and psychological dependence increases.  Sleep diary  If the cause isn t obvious and it is not improving, try keeping a  sleep diary  for a couple of weeks. Include in it:    The time you go to bed    How long it takes to fall asleep    How many times you wake up    What time you wake up    Your meal times and what you eat    What time you drink alcohol    Your exercise habits and times  Follow-up care  Follow up with your healthcare provider, or as advised. If X-rays or CT scans were done, you will be notified if there is a change in the reading, especially if it affects treatment.  Call 911  Call 911 if any of these occur:    Trouble breathing    Confusion or trouble waking    Fainting or loss of consciousness    Rapid heart rate    New chest, arm, shoulder, neck or upper back pain    Trouble with speech or vision, weakness of an arm or leg    Trouble walking or talking, loss of balance, numbness or weakness in one side of your body, facial droop  When to seek medical advice  Call your healthcare provider right away if any of these occur:    Extreme restlessness or irritability    Confusion or hallucinations (seeing or hearing things that are not there)    Anxiety, depression    Several days without sleeping  Date Last Reviewed: 11/19/2015 2000-2017 [employer]. [address] [place] [address], [place] [address]. All rights reserved. This information is not intended as a substitute for professional medical care. Always follow your healthcare professional's instructions.                Follow-ups after  your visit        Your next 10 appointments already scheduled     May 10, 2018  7:40 AM CDT   (Arrive by 7:25 AM)   Return Visit with Sammi Beltre MD   [facility] ([facility])    [address] [place] [address] [id number] [phone]            May 24, 2018 11:00 AM CDT   (Arrive by 10:45 AM)   Return Visit with Apollo Nguyen MD   [facility] ([facility])    [address] [place] [address] [id number] [phone]              Who to contact     Please call your clinic at [phone] to:    Ask questions about your health    Make or cancel appointments    Discuss your medicines    Learn about your test results    Speak to your doctor            Additional Information About Your Visit        [employer] Information     [employer] gives you secure access to your electronic health record. If you see a primary care provider, you can also send messages to your care team and make appointments. If you have questions, please call your primary care clinic.  If you do not have a primary care provider, please call [phone] and they will assist you.      [employer] is an electronic gateway that provides easy, online access to your medical records. With [employer], you can request a clinic appointment, read your test results, renew a prescription or communicate with your care team.     To access your existing account, please contact your [facility] or call [phone] for assistance.        Care EveryWhere ID     This is your Care EveryWhere ID. This could be used by other organizations to access your [place] medical records  [id number]        Your Vitals Were     Pulse BMI (Body Mass Index)                67 28.8 kg/m2           Blood Pressure from Last 3 Encounters:   05/09/18 143/84   04/16/18 148/89   04/10/18 (!) 135/93    Weight from Last 3 Encounters:   05/09/18 71.4 kg  (157 lb 8 oz)   04/13/18 70.4 kg (155 lb 3.3 oz)   04/10/18 70.9 kg (156 lb 4.9 oz)              Today, you had the following     No orders found for display         Today's Medication Changes          These changes are accurate as of 5/9/18 12:21 PM.  If you have any questions, ask your nurse or doctor.               Start taking these medicines.        Dose/Directions    melatonin 3 MG tablet   Used for:  Sleeping difficulty   Started by:  Snehal Francis APRN CNP        Dose:  3 mg   Take 1 tablet (3 mg) by mouth nightly as needed for sleep   Quantity:  30 tablet   Refills:  3         These medicines have changed or have updated prescriptions.        Dose/Directions    atorvastatin 80 MG tablet   Commonly known as:  LIPITOR   This may have changed:  when to take this   Used for:  Hyperlipidemia LDL goal <130        Dose:  80 mg   Take 1 tablet (80 mg) by mouth daily   Quantity:  90 tablet   Refills:  3         Stop taking these medicines if you haven't already. Please contact your care team if you have questions.     warfarin 2 MG tablet   Commonly known as:  COUMADIN   Stopped by:  Snehal Francis APRN CNP                Where to get your medicines      These medications were sent to [email or web address] [address] [place] [address] AT [address] [address]     Phone:  [phone]     melatonin 3 MG tablet                Primary Care Provider Office Phone # Fax #    Sammi Beltre [phone]       [address] [place] [address] [address]  [facility] [address]        Equal Access to Services     [facility] CenterYVONNE AH: Hadii sumit marroquin hadasho Soashleyali, waaxda luqadaha, qaybta kaalmada adeegyada, lyle isaac. So [facility] [phone].    ATENCIÓN: Si habla español, tiene a starr disposición servicios gratuitos de asistencia lingüística. Llame al [phone].    We comply with applicable federal civil rights laws and [place] laws. We  do not discriminate on the basis of race, color, national origin, age, disability, sex, sexual orientation, or gender identity.            Thank you!     Thank you for choosing [facility]  for your care. Our goal is always to provide you with excellent care. Hearing back from our patients is one way we can continue to improve our services. Please take a few minutes to complete the written survey that you may receive in the mail after your visit with us. Thank you!             Your Updated Medication List - Protect others around you: Learn how to safely use, store and throw away your medicines at www.disposemymeds.org.          This list is accurate as of 5/9/18 12:21 PM.  Always use your most recent med list.                   Brand Name Dispense Instructions for use Diagnosis    acetaminophen 325 MG tablet    TYLENOL    100 tablet    Take 2 tablets (650 mg) by mouth every 4 hours as needed for mild pain    Status post total right knee replacement       atorvastatin 80 MG tablet    LIPITOR    90 tablet    Take 1 tablet (80 mg) by mouth daily    Hyperlipidemia LDL goal <130       * COMPRESSION STOCKINGS     2 each    2 each daily    Venous (peripheral) insufficiency       * COMPRESSION STOCKINGS     2 each    2 each daily    PAD (peripheral artery disease) (H)       HYDROmorphone 2 MG tablet    DILAUDID    50 tablet    For pain complaints of 1-5 give 1 tablets, for pain complaints of 6-10 give 2 tablets every 4 hours as needed for pain.    Status post total right knee replacement       IBUPROFEN PO      Take 600 mg by mouth every 6 hours as needed for moderate pain        melatonin 3 MG tablet     30 tablet    Take 1 tablet (3 mg) by mouth nightly as needed for sleep    Sleeping difficulty       senna-docusate 8.6-50 MG per tablet    SENOKOT-S;PERICOLACE    100 tablet    Take 1 tablet by mouth 2 times daily    Status post total right knee replacement       * Notice:  This list has 2 medication(s)  that are the same as other medications prescribed for you. Read the directions carefully, and ask your doctor or other care provider to review them with you.

## 2018-05-16 ENCOUNTER — ANTICOAGULATION THERAPY VISIT (OUTPATIENT)
Dept: ANTICOAGULATION | Facility: CLINIC | Age: 73
End: 2018-05-16

## 2018-05-16 DIAGNOSIS — Z96.651 STATUS POST TOTAL RIGHT KNEE REPLACEMENT: ICD-10-CM

## 2018-05-16 NOTE — MR AVS SNAPSHOT
Connie J Frankenberg   5/16/2018   Anticoagulation Therapy Visit    Description:  72 year old female   Provider:  Danielle Whitehead, RN   Department:  [facility]           INR as of 5/16/2018     Today's INR       Anticoagulation Summary as of 5/16/2018     INR goal 1.8-2.5   Today's INR    Full instructions No maintenance plan   Next INR check     Indications   S/P total knee arthroplasty [Z96.659]         Anticoagulation Episode Summary     Resolved date 5/16/2018    Resolved reason Therapy  Complete

## 2018-05-24 ENCOUNTER — OFFICE VISIT (OUTPATIENT)
Dept: ORTHOPEDICS | Facility: CLINIC | Age: 73
End: 2018-05-24
Payer: COMMERCIAL

## 2018-05-24 DIAGNOSIS — Z96.651 STATUS POST RIGHT KNEE REPLACEMENT: Primary | ICD-10-CM

## 2018-05-24 PROBLEM — H81.13: Status: ACTIVE | Noted: 2017-02-07

## 2018-05-24 RX ORDER — HYDROMORPHONE HYDROCHLORIDE 2 MG/1
2 TABLET ORAL EVERY 6 HOURS PRN
Qty: 30 TABLET | Refills: 0 | Status: SHIPPED | OUTPATIENT
Start: 2018-05-24 | End: 2018-11-28

## 2018-05-24 ASSESSMENT — ENCOUNTER SYMPTOMS
ALTERED TEMPERATURE REGULATION: 0
POLYDIPSIA: 0
WEIGHT GAIN: 0
NIGHT SWEATS: 0
WEIGHT LOSS: 0
HALLUCINATIONS: 0
ARTHRALGIAS: 1
DECREASED APPETITE: 0
JOINT SWELLING: 1
CHILLS: 0
STIFFNESS: 1
POLYPHAGIA: 0
MYALGIAS: 1
FATIGUE: 1
BACK PAIN: 1
INCREASED ENERGY: 0
MUSCLE CRAMPS: 0
MUSCLE WEAKNESS: 1
FEVER: 0
NECK PAIN: 0

## 2018-05-24 ASSESSMENT — ACTIVITIES OF DAILY LIVING (ADL): FUNCTION,_DAILY_LIVING_SCORE: 75

## 2018-05-24 NOTE — MR AVS SNAPSHOT
After Visit Summary   5/24/2018    Connie J Frankenberg    MRN: [id number]           Patient Information     Date Of Birth          1945        Visit Information        Provider Department      5/24/2018 11:00 AM Apollo Nguyen MD [facility]        Today's Diagnoses     Status post right knee replacement    -  1       Follow-ups after your visit        Additional Services     PHYSICAL THERAPY REFERRAL (External-Prints)       Physical Therapy Referral                  Your next 10 appointments already scheduled     Jun 05, 2018 10:40 AM CDT   DANNY Extremity with Fox Adan PT   [facility]    [address] [place] [id number]   [phone]            Jun 07, 2018 12:10 PM CDT   DANNY Extremity with Redd Alexis PT   [facility]    [address] [place] [id number]   [phone]            Jun 12, 2018  8:00 AM CDT   DANNY Extremity with Fox Adan PT   [facility]    [address] [place] [id number]   [phone]            Jun 14, 2018 12:40 PM CDT   DANNY Extremity with Fox Adan PT   [facility]    [address] [place] [id number]   [phone]            Jun 19, 2018 12:00 PM CDT   DANNY Extremity with Fox Adan PT   [facility]    [address] [place] [address]   [phone]              Who to contact     Please call your clinic at [phone] to:    Ask questions about your health    Make or cancel appointments    Discuss your medicines    Learn about your test results    Speak to your doctor            Additional Information About Your Visit        Olivia  Information     [employer] gives you secure access to your electronic health record. If you see a primary care provider, you can also send messages to your care team and make appointments. If you have questions, please call your primary care clinic.  If you do not have a primary care provider, please call [phone] and they will assist you.      [employer] is an electronic gateway that provides easy, online access to your medical records. With [employer], you can request a clinic appointment, read your test results, renew a prescription or communicate with your care team.     To access your existing account, please contact your [facility] or call [phone] for assistance.        Care EveryWhere ID     This is your Care EveryWhere ID. This could be used by other organizations to access your [place] medical records  [id number]         Blood Pressure from Last 3 Encounters:   05/09/18 143/84   04/16/18 148/89   04/10/18 (!) 135/93    Weight from Last 3 Encounters:   05/09/18 71.4 kg (157 lb 8 oz)   04/13/18 70.4 kg (155 lb 3.3 oz)   04/10/18 70.9 kg (156 lb 4.9 oz)              We Performed the Following     PHYSICAL THERAPY REFERRAL (External-Prints)          Today's Medication Changes          These changes are accurate as of 5/24/18 11:59 PM.  If you have any questions, ask your nurse or doctor.               Start taking these medicines.        Dose/Directions    order for DME   Started by:  Apollo Nguyen MD        Equipment being ordered: Extension Dynasplint Sig:  To be worn at night to right knee   Quantity:  1 Units   Refills:  0         These medicines have changed or have updated prescriptions.        Dose/Directions    atorvastatin 80 MG tablet   Commonly known as:  LIPITOR   This may have changed:  when to take this   Used for:  Hyperlipidemia LDL goal <130        Dose:  80 mg   Take 1 tablet (80 mg) by mouth daily   Quantity:  90 tablet   Refills:  3       * HYDROmorphone  2 MG tablet   Commonly known as:  DILAUDID   This may have changed:  Another medication with the same name was added. Make sure you understand how and when to take each.   Used for:  Status post total right knee replacement   Changed by:  Apollo Nguyen MD        For pain complaints of 1-5 give 1 tablets, for pain complaints of 6-10 give 2 tablets every 4 hours as needed for pain.   Quantity:  50 tablet   Refills:  0       * HYDROmorphone 2 MG tablet   Commonly known as:  DILAUDID   This may have changed:  You were already taking a medication with the same name, and this prescription was added. Make sure you understand how and when to take each.   Changed by:  Apollo Nguyen MD        Dose:  2 mg   Take 1 tablet (2 mg) by mouth every 6 hours as needed for pain   Quantity:  30 tablet   Refills:  0       * Notice:  This list has 2 medication(s) that are the same as other medications prescribed for you. Read the directions carefully, and ask your doctor or other care provider to review them with you.         Where to get your medicines      Some of these will need a paper prescription and others can be bought over the counter.  Ask your nurse if you have questions.     Bring a paper prescription for each of these medications     HYDROmorphone 2 MG tablet    order for DME               Information about OPIOIDS     PRESCRIPTION OPIOIDS: WHAT YOU NEED TO KNOW   You have a prescription for an opioid (narcotic) pain medicine. Opioids can cause addiction. If you have a history of chemical dependency of any type, you are at a higher risk of becoming addicted to opioids. Only take this medicine after all other options have been tried. Take it for as short a time and as few doses as possible.     Do not:    Drive. If you drive while taking these medicines, you could be arrested for driving under the influence (DUI).    Operate heavy machinery    Do any other dangerous activities while taking these medicines.     Drink any  alcohol while taking these medicines.      Take with any other medicines that contain acetaminophen. Read all labels carefully. Look for the word  acetaminophen  or  Tylenol.  Ask your pharmacist if you have questions or are unsure.    Store your pills in a secure place, locked if possible. We will not replace any lost or stolen medicine. If you don t finish your medicine, please throw away (dispose) as directed by your pharmacist. The [place] Pollution Control Agency has more information about safe disposal: https://www.pca.[facility].[place].us/living-green/managing-unwanted-medications    All opioids tend to cause constipation. Drink plenty of water and eat foods that have a lot of fiber, such as fruits, vegetables, prune juice, apple juice and high-fiber cereal. Take a laxative (Miralax, milk of magnesia, Colace, Senna) if you don t move your bowels at least every other day.          Primary Care Provider Office Phone # Fax #    Sammi Beltre [phone]       [address] [place] [id number]        Equal Access to Services     CLAUDIA [facility]: Hadii sumit ku hadasho Soomaali, waaxda luqadaha, qaybta kaalmada adeegyameri, lyle gibbons . So [facility] [phone].    ATENCIÓN: Si habla español, tiene a starr disposición servicios gratuitos de asistencia lingüística. Llame al [phone].    We comply with applicable federal civil rights laws and [place] laws. We do not discriminate on the basis of race, color, national origin, age, disability, sex, sexual orientation, or gender identity.            Thank you!     Thank you for choosing [facility]  for your care. Our goal is always to provide you with excellent care. Hearing back from our patients is one way we can continue to improve our services. Please take a few minutes to complete the written survey that you may receive in the mail after your visit with us. Thank you!             Your Updated  Medication List - Protect others around you: Learn how to safely use, store and throw away your medicines at www.disposemymeds.org.          This list is accurate as of 5/24/18 11:59 PM.  Always use your most recent med list.                   Brand Name Dispense Instructions for use Diagnosis    acetaminophen 325 MG tablet    TYLENOL    100 tablet    Take 2 tablets (650 mg) by mouth every 4 hours as needed for mild pain    Status post total right knee replacement       atorvastatin 80 MG tablet    LIPITOR    90 tablet    Take 1 tablet (80 mg) by mouth daily    Hyperlipidemia LDL goal <130       * COMPRESSION STOCKINGS     2 each    2 each daily    Venous (peripheral) insufficiency       * COMPRESSION STOCKINGS     2 each    2 each daily    PAD (peripheral artery disease) (H)       * HYDROmorphone 2 MG tablet    DILAUDID    50 tablet    For pain complaints of 1-5 give 1 tablets, for pain complaints of 6-10 give 2 tablets every 4 hours as needed for pain.    Status post total right knee replacement       * HYDROmorphone 2 MG tablet    DILAUDID    30 tablet    Take 1 tablet (2 mg) by mouth every 6 hours as needed for pain        IBUPROFEN PO      Take 600 mg by mouth every 6 hours as needed for moderate pain        melatonin 3 MG tablet     30 tablet    Take 1 tablet (3 mg) by mouth nightly as needed for sleep    Sleeping difficulty       order for DME     1 Units    Equipment being ordered: Extension Dynasplint Sig:  To be worn at night to right knee        senna-docusate 8.6-50 MG per tablet    SENOKOT-S;PERICOLACE    100 tablet    Take 1 tablet by mouth 2 times daily    Status post total right knee replacement       * Notice:  This list has 4 medication(s) that are the same as other medications prescribed for you. Read the directions carefully, and ask your doctor or other care provider to review them with you.

## 2018-05-24 NOTE — NURSING NOTE
Chief Complaint   Patient presents with     RECHECK     Pt. states that she is here today for ROM Check S/p Right TKA DOS: 4/13/18. She states that her last in home PT Visit is tomorrow 5/25 and will need a new PT Script for Out patient PT, which she has already sched. 4 Visits.      Refill Request     Pt. states that she only has 4 Dilaudid left and is requesting another prescription. She states that she takes it at bedtime to help her sleep, otherwise she wakes up at 3am w/a lot of pain.       72 year old  1945      [facility] [id number] - [place], [place] - [facility] [address]    Allergies   Allergen Reactions     Codeine GI Disturbance     Percocet [Oxycodone-Acetaminophen] Nausea and Vomiting     Current Outpatient Prescriptions   Medication     acetaminophen (TYLENOL) 325 MG tablet     atorvastatin (LIPITOR) 80 MG tablet     COMPRESSION STOCKINGS     COMPRESSION STOCKINGS     HYDROmorphone (DILAUDID) 2 MG tablet     IBUPROFEN PO     melatonin 3 MG tablet     senna-docusate (SENOKOT-S;PERICOLACE) 8.6-50 MG per tablet     No current facility-administered medications for this visit.          Questionnaires:      KOOS Knee Survey Assessment    Knee Outcome Survey ADL Scale (Jerson, FRANNY; Michelle, L; Monika, RS; Iván, FH; [place], CD; 1998) 5/24/2018   Do you have swelling in your knee? Always   Do you feel grinding, hear clicking or any other type of noise when your knee moves? Rarely   Does your knee catch or hang up when moving? Never   Can you straighten your knee fully? Never   Can you bend your knee fully? Never   How severe is your knee joint stiffness after first wakening in the morning? Moderate   How severe is your knee stiffness after sitting, lying or resting LATER IN THE DAY? Mild   How often do you experience knee pain? Daily   Twisting/pivoting on your knee Mild   Straightening knee fully Moderate   Bending knee fully Severe   Walking on flat surface Mild   Going up or down  stairs Mild   At night while in bed Mild   Sitting or lying Mild   Standing upright None   Descending stairs Mild   Ascending stairs Mild   Rising from sitting Mild   Standing None   Bending to floor/ an object Mild   Walking on flat surface Mild   Getting in/out of car Mild   Going shopping Moderate   Putting on socks/stockings Mild   Rising from bed Mild   Taking off socks/stockings Mild   Lying in bed (turning over, maintaining knee position) Mild   Getting in/out of bath Mild   Sitting Mild   Getting on/off toilet Mild   Heavy domestic duties (moving heavy boxes, scrubbing floors, etc) Mild   Light domestic duties (cooking, dusting, etc) Mild   Squatting Moderate   Running None   Jumping None   Twisting/pivoting on your injured knee Mild   Kneeling Moderate   How often are you aware of your knee problem? Daily   Have you modified your life style to avoid potentially damaging activities to your knee? Moderately   How much are you troubled with lack of confidence in your knee? Mildly   In general, how much difficulty do you have with your knee? Mild   Pain Score 63.88   Symptoms Score 71.42   Function, Daily Living Score 75   Sports and Rec Score 75   Quality of Life Score 56.25            Promis 10 Assessment    PROMIS 10 5/7/2018   In general, would you say your health is: Good   In general, would you say your quality of life is: Good   In general, how would you rate your physical health? Good   In general, how would you rate your mental health, including your mood and your ability to think? Good   In general, how would you rate your satisfaction with your social activities and relationships? Good   In general, please rate how well you carry out your usual social activities and roles Good   To what extent are you able to carry out your everyday physical activities such as walking, climbing stairs, carrying groceries, or moving a chair? Mostly   How often have you been bothered by emotional problems such  as feeling anxious, depressed or irritable? Sometimes   How would you rate your fatigue on average? Moderate   How would you rate your pain on average?   0 = No Pain  to  10 = Worst Imaginable Pain 5   In general, would you say your health is: 3   In general, would you say your quality of life is: 3   In general, how would you rate your physical health? 3   In general, how would you rate your mental health, including your mood and your ability to think? 3   In general, how would you rate your satisfaction with your social activities and relationships? 3   In general, please rate how well you carry out your usual social activities and roles. (This includes activities at home, at work and in your community, and responsibilities as a parent, child, spouse, employee, friend, etc.) 3   To what extent are you able to carry out your everyday physical activities such as walking, climbing stairs, carrying groceries, or moving a chair? 4   In the past 7 days, how often have you been bothered by emotional problems such as feeling anxious, depressed, or irritable? 3   In the past 7 days, how would you rate your fatigue on average? 3   In the past 7 days, how would you rate your pain on average, where 0 means no pain, and 10 means worst imaginable pain? 5   Global Mental Health Score 12   Global Physical Health Score 13   PROMIS TOTAL - SUBSCORES 25   Some recent data might be hidden

## 2018-05-24 NOTE — LETTER
5/24/2018     RE: Connie J Frankenberg  [address]  [place] [facility] [address]     Dear Colleague,    Thank you for referring your patient, Connie J Frankenberg, to the [facility] at [facility]. Please see a copy of my visit note below.        [facility], Orthopaedic Surgery Consultation  by Apollo Nguyen M.D.    Connie J Frankenberg MRN# [id number]   Age: 71 year old YOB: 1945     Requesting physician: Kleber Valle     DX:  1. OA R knee   2. HLP  BPV    TREATMENTS:  1. 4-5 corticosteroid injections in the past. No longer provide adequate relief even with U/S guidance   2. 4/13/18, R TKA ([facility]) [facility]    Postoperative knee replacement follow-up clinic visit    Connie J Frankenberg is doing well after last surgery listed above.    Preoperative knee pain is  Partially Present  Analgesic medication: Dilaudid (Oral)    EXAM:  Incision healing, clean and dry.  Joint range of motion -19 to 95 degrees (4-101 reported with PT), with pain  Effusion: none  Periarticular swelling or leg edema: 1+  Peroneal and tibial nerve function: normal  Gait: Antalgic    Intraoperative cultures:  none     IMAGING:  Radiographs demonstrate the knee implant in satisfactory position without evidence of any complication.    IMPRESSION:  Continued difficulty with motion s/p R TKA    PLAN:    Continue range of motion exercises and stretching.  Needs to be more aggressive with range of motion.  Should see PT 3x/week and be taught how to do home stretching 3x/day.    Refill of:  (meds listed above): Dilaudid #50 tabs.  -Patient is strongly opposed to manipulation under anesthesia, as such she is encouraged to continue stretching and a dynasplint will be prescribed  -RTC at 3 months post op for repeat check    Signed:   Allan Menezes MD  Adult Reconstructive Surgery Fellow  Dept Orthopaedic Surgery, [facility]      Apollo Nguyen M.D.  [facility]  Professor  Oncology and Adult Reconstructive Surgery  Dept Orthopaedic Surgery, [facility]  [phone] office  [email or web address]    KOOS Knee Survey Assessment    Knee Outcome Survey ADL Scale (FRANNY Arthur; LUISANA Martinez; MIGUEL Frank; DANIEL Minor; CHET Borjas; 1998) 5/24/2018   Do you have swelling in your knee? Always   Do you feel grinding, hear clicking or any other type of noise when your knee moves? Rarely   Does your knee catch or hang up when moving? Never   Can you straighten your knee fully? Never   Can you bend your knee fully? Never   How severe is your knee joint stiffness after first wakening in the morning? Moderate   How severe is your knee stiffness after sitting, lying or resting LATER IN THE DAY? Mild   How often do you experience knee pain? Daily   Twisting/pivoting on your knee Mild   Straightening knee fully Moderate   Bending knee fully Severe   Walking on flat surface Mild   Going up or down stairs Mild   At night while in bed Mild   Sitting or lying Mild   Standing upright None   Descending stairs Mild   Ascending stairs Mild   Rising from sitting Mild   Standing None   Bending to floor/ an object Mild   Walking on flat surface Mild   Getting in/out of car Mild   Going shopping Moderate   Putting on socks/stockings Mild   Rising from bed Mild   Taking off socks/stockings Mild   Lying in bed (turning over, maintaining knee position) Mild   Getting in/out of bath Mild   Sitting Mild   Getting on/off toilet Mild   Heavy domestic duties (moving heavy boxes, scrubbing floors, etc) Mild   Light domestic duties (cooking, dusting, etc) Mild   Squatting Moderate   Running None   Jumping None   Twisting/pivoting on your injured knee Mild   Kneeling Moderate   How often are you aware of your knee problem? Daily   Have you modified your life style to avoid potentially damaging activities to your knee? Moderately   How much are you troubled with lack of confidence in your knee? Mildly   In  general, how much difficulty do you have with your knee? Mild   Pain Score 63.88   Symptoms Score 71.42   Function, Daily Living Score 75   Sports and Rec Score 75   Quality of Life Score 56.25          Answers for HPI/ROS submitted by the patient on 5/24/2018   General Symptoms: Yes  Skin Symptoms: No  HENT Symptoms: No  EYE SYMPTOMS: No  HEART SYMPTOMS: No  LUNG SYMPTOMS: No  INTESTINAL SYMPTOMS: No  URINARY SYMPTOMS: No  GYNECOLOGIC SYMPTOMS: No  BREAST SYMPTOMS: No  SKELETAL SYMPTOMS: Yes  BLOOD SYMPTOMS: No  NERVOUS SYSTEM SYMPTOMS: No  MENTAL HEALTH SYMPTOMS: No  Fever: No  Loss of appetite: No  Weight loss: No  Weight gain: No  Fatigue: Yes  Night sweats: No  Chills: No  Increased stress: No  Excessive hunger: No  Excessive thirst: No  Feeling hot or cold when others believe the temperature is normal: No  Loss of height: No  Post-operative complications: No  Surgical site pain: Yes  Hallucinations: No  Change in or Loss of Energy: No  Hyperactivity: No  Confusion: No  Back pain: Yes  Muscle aches: Yes  Neck pain: No  Swollen joints: Yes  Joint pain: Yes  Bone pain: No  Muscle cramps: No  Muscle weakness: Yes  Joint stiffness: Yes  Bone fracture: No    Again, thank you for allowing me to participate in the care of your patient.      Sincerely,    Apollo Nguyen MD

## 2018-05-24 NOTE — PROGRESS NOTES
[facility], Orthopaedic Surgery Consultation  by Apollo Nguyen M.D.    Connie J Frankenberg MRN# [id number]   Age: 71 year old YOB: 1945     Requesting physician: Kleber Valle     DX:  1. OA R knee   2. HLP  BPV    TREATMENTS:  1. 4-5 corticosteroid injections in the past. No longer provide adequate relief even with U/S guidance   2. 4/13/18, R TKA ([facility]) [facility]    Postoperative knee replacement follow-up clinic visit    Connie J Frankenberg is doing well after last surgery listed above.    Preoperative knee pain is  Partially Present  Analgesic medication: Dilaudid (Oral)    EXAM:  Incision healing, clean and dry.  Joint range of motion -19 to 95 degrees (4-101 reported with PT), with pain  Effusion: none  Periarticular swelling or leg edema: 1+  Peroneal and tibial nerve function: normal  Gait: Antalgic    Intraoperative cultures:  none     IMAGING:  Radiographs demonstrate the knee implant in satisfactory position without evidence of any complication.    IMPRESSION:  Continued difficulty with motion s/p R TKA    PLAN:    Continue range of motion exercises and stretching.  Needs to be more aggressive with range of motion.  Should see PT 3x/week and be taught how to do home stretching 3x/day.    Refill of:  (meds listed above): Dilaudid #50 tabs.  -Patient is strongly opposed to manipulation under anesthesia, as such she is encouraged to continue stretching and a dynasplint will be prescribed  -RTC at 3 months post op for repeat check    Signed:   Allan Menezes MD  Adult Reconstructive Surgery Fellow  Dept Orthopaedic Surgery, [facility]      Apollo Nguyen M.D.  Dandy Family Professor  Oncology and Adult Reconstructive Surgery  Dept Orthopaedic Surgery, [facility]  [phone] office  [email or web address]    KOOS Knee Survey Assessment    Knee Outcome Survey ADL Scale (FRANNY Arthur; Michelle, L; MIGUEL Frank; Iván, DANIEL; CHET Borjas; 1998) 5/24/2018   Do you have  swelling in your knee? Always   Do you feel grinding, hear clicking or any other type of noise when your knee moves? Rarely   Does your knee catch or hang up when moving? Never   Can you straighten your knee fully? Never   Can you bend your knee fully? Never   How severe is your knee joint stiffness after first wakening in the morning? Moderate   How severe is your knee stiffness after sitting, lying or resting LATER IN THE DAY? Mild   How often do you experience knee pain? Daily   Twisting/pivoting on your knee Mild   Straightening knee fully Moderate   Bending knee fully Severe   Walking on flat surface Mild   Going up or down stairs Mild   At night while in bed Mild   Sitting or lying Mild   Standing upright None   Descending stairs Mild   Ascending stairs Mild   Rising from sitting Mild   Standing None   Bending to floor/ an object Mild   Walking on flat surface Mild   Getting in/out of car Mild   Going shopping Moderate   Putting on socks/stockings Mild   Rising from bed Mild   Taking off socks/stockings Mild   Lying in bed (turning over, maintaining knee position) Mild   Getting in/out of bath Mild   Sitting Mild   Getting on/off toilet Mild   Heavy domestic duties (moving heavy boxes, scrubbing floors, etc) Mild   Light domestic duties (cooking, dusting, etc) Mild   Squatting Moderate   Running None   Jumping None   Twisting/pivoting on your injured knee Mild   Kneeling Moderate   How often are you aware of your knee problem? Daily   Have you modified your life style to avoid potentially damaging activities to your knee? Moderately   How much are you troubled with lack of confidence in your knee? Mildly   In general, how much difficulty do you have with your knee? Mild   Pain Score 63.88   Symptoms Score 71.42   Function, Daily Living Score 75   Sports and Rec Score 75   Quality of Life Score 56.25          Answers for HPI/ROS submitted by the patient on 5/24/2018   General Symptoms: Yes  Skin  Symptoms: No  HENT Symptoms: No  EYE SYMPTOMS: No  HEART SYMPTOMS: No  LUNG SYMPTOMS: No  INTESTINAL SYMPTOMS: No  URINARY SYMPTOMS: No  GYNECOLOGIC SYMPTOMS: No  BREAST SYMPTOMS: No  SKELETAL SYMPTOMS: Yes  BLOOD SYMPTOMS: No  NERVOUS SYSTEM SYMPTOMS: No  MENTAL HEALTH SYMPTOMS: No  Fever: No  Loss of appetite: No  Weight loss: No  Weight gain: No  Fatigue: Yes  Night sweats: No  Chills: No  Increased stress: No  Excessive hunger: No  Excessive thirst: No  Feeling hot or cold when others believe the temperature is normal: No  Loss of height: No  Post-operative complications: No  Surgical site pain: Yes  Hallucinations: No  Change in or Loss of Energy: No  Hyperactivity: No  Confusion: No  Back pain: Yes  Muscle aches: Yes  Neck pain: No  Swollen joints: Yes  Joint pain: Yes  Bone pain: No  Muscle cramps: No  Muscle weakness: Yes  Joint stiffness: Yes  Bone fracture: No

## 2018-05-25 ENCOUNTER — MEDICAL CORRESPONDENCE (OUTPATIENT)
Dept: HEALTH INFORMATION MANAGEMENT | Facility: CLINIC | Age: 73
End: 2018-05-25

## 2018-05-25 ENCOUNTER — TELEPHONE (OUTPATIENT)
Dept: ORTHOPEDICS | Facility: CLINIC | Age: 73
End: 2018-05-25

## 2018-05-25 NOTE — TELEPHONE ENCOUNTER
[facility] Call Center    Phone Message    May a detailed message be left on voicemail: yes    Reason for Call: Other: Please FU with PT re: the procedure on her knee.  She knows there needs to be some adjusting done, and has questions about the process during and post.     Action Taken: Message routed to:  [facility] ([facility] Orthopedics

## 2018-05-29 ENCOUNTER — THERAPY VISIT (OUTPATIENT)
Dept: PHYSICAL THERAPY | Facility: CLINIC | Age: 73
End: 2018-05-29
Payer: COMMERCIAL

## 2018-05-29 DIAGNOSIS — Z96.651 STATUS POST TOTAL RIGHT KNEE REPLACEMENT: Primary | ICD-10-CM

## 2018-05-29 PROCEDURE — 97161 PT EVAL LOW COMPLEX 20 MIN: CPT | Mod: GP | Performed by: PHYSICAL THERAPIST

## 2018-05-29 PROCEDURE — 97110 THERAPEUTIC EXERCISES: CPT | Mod: GP | Performed by: PHYSICAL THERAPIST

## 2018-05-29 PROCEDURE — 97530 THERAPEUTIC ACTIVITIES: CPT | Mod: GP | Performed by: PHYSICAL THERAPIST

## 2018-05-29 NOTE — LETTER
[facility]  [address] [address]  [phone]    May 31, 2018    Re: Connie J Frankenberg   :   1945  MRN:  [id number]   REFERRING PHYSICIAN:   Apollo Nguyen    [facility]    Date of Initial Evaluation: 18  Visits:  Rxs Used: 1  Reason for Referral:  Status post total right knee replacement    EVALUATION SUMMARY    [facility]  Subjective:  Patient is a 72 year old female presenting with rehab right knee hpi.   Connie J Frankenberg is a 72 year old female with a right knee condition.  Condition occurred with:  Degenerative joint disease.  Condition occurred: at home.  This is a new condition  Pt presents to PT with c/o R knee pain/swelling/stiffness s/p R TKA (18 Patrick).  Pt reports the MD is thinking about doing a manipulation because of her lack of extension.    Pt reports she had home PT and has been working on the bending and straightening.   For strengthening she is working on hamstring curls, straight leg raises, and long arc quads.    Pt is retired.   Pt is working on a recumbent bike at home.    PMH: Skin cancer, headaches, overweight.    Patient reports pain:  In the joint.  Radiates to: n/a.  Pain is described as aching and is intermittent and reported as 3/10.  Associated symptoms:  Edema, loss of strength and loss of motion/stiffness. Pain is the same all the time.  Symptoms are exacerbated by activity, ascending stairs, bending/squatting, certain positions, descending stairs, kneeling and lying on the extremity and relieved by ice and rest.  Since onset symptoms are gradually improving.  Special tests:  X-ray.  Previous treatment: n/a.  Improvement with previous treatment: none.  General health as reported by patient is good.                               General health as reported by patient is good.  Pertinent medical history includes:  Cancer,  overweight and migraines/headaches.  Medical allergies: yes (Codeine).  Other surgeries include:  Cancer surgery, orthopedic surgery and other (Skin basil; R TKO; cataracs; fallopian tubes).  Current medications:  Anti-inflammatory and sleep medication.  Current occupation is Retired EEG tech.    Primary job tasks include:  Lifting, prolonged sitting, driving and other ([employer]).    Red flags:  Pain at night/rest.            Objective:  System       Knee Evaluation:  ROM:    PROM  Hyperextension: Left: 0    Right:  0  Extension: Left: 0    Right:  14  Flexion: Left: 150    Right:  85    Mobility Testing:  Mobility testing: decreased patellar mobility in all directions.  Functional Testing:  : Squat weight shifting onto LLE. SLS R 3-5s, L 20s.  General Evaluation:  Gait:  Gait wnl general: Lacking knee ext at initial contact on the L.                               ROS    Assessment/Plan:    Patient is a 72 year old female with right side knee complaints.    Patient has the following significant findings with corresponding treatment plan.                Diagnosis 1:  S/p R TKA  Pain -  hot/cold therapy  Decreased ROM/flexibility - manual therapy and therapeutic exercise  Decreased joint mobility - manual therapy and therapeutic exercise  Decreased strength - therapeutic exercise and therapeutic activities  Impaired balance - neuro re-education and therapeutic activities  Decreased proprioception - neuro re-education and therapeutic activities  Inflammation - cold therapy  Impaired gait - gait training  Impaired muscle performance - neuro re-education  Decreased function - therapeutic activities  Impaired posture - neuro re-education    Therapy Evaluation Codes:   1) History comprised of:   Personal factors that impact the plan of care:      None.    Comorbidity factors that impact the plan of care are:      None.     Medications impacting care: None.  2) Examination of Body Systems comprised of:   Body  structures and functions that impact the plan of care:      Knee.   Activity limitations that impact the plan of care are:      Bathing, Bending, Cooking, Driving, Dressing, Lifting, Running, Sitting, Squatting/kneeling, Stairs and Standing.  3) Clinical presentation characteristics are:   Stable/Uncomplicated.  4) Decision-Making    Low complexity using standardized patient assessment instrument and/or measureable assessment of functional outcome.  Cumulative Therapy Evaluation is: Low complexity.    Previous and current functional limitations:  (See Goal Flow Sheet for this information)    Short term and Long term goals: (See Goal Flow Sheet for this information)     Communication ability:  Patient appears to be able to clearly communicate and understand verbal and written communication and follow directions correctly.  Treatment Explanation - The following has been discussed with the patient:   RX ordered/plan of care  Anticipated outcomes  Possible risks and side effects  This patient would benefit from PT intervention to resume normal activities.   Rehab potential is excellent.    Frequency:  1 X week, once daily  Duration:  for 6 weeks  Discharge Plan:  Achieve all LTG.  Independent in home treatment program.  Return to work with or without restrictions.  Return to previous functional level by discharge.  Reach maximal therapeutic benefit.      Thank you for your referral.    INQUIRIES  Therapist: Trung Adan, PT   [facility] [address] [address]  Phone: [phone]  Fax: [phone]

## 2018-05-29 NOTE — MR AVS SNAPSHOT
After Visit Summary   5/29/2018    Connie J Frankenberg    MRN: [id number]           Patient Information     Date Of Birth          1945        Visit Information        Provider Department      5/29/2018 4:30 PM Fox Adan, PT [facility]        Today's Diagnoses     Status post total right knee replacement    -  1       Follow-ups after your visit        Your next 10 appointments already scheduled     May 31, 2018 12:40 PM CDT   DANNY Extremity with Fox Adan PT   [facility]    [address] [place] [address]   [phone]            Jun 05, 2018 10:40 AM CDT   DANNY Extremity with Fox Adan PT   [facility]    [address] [place] [id number] [phone]            Jun 07, 2018 12:10 PM CDT   DANNY Extremity with Redd Alexis PT   [facility]    [address] [place] [id number] [phone]              Who to contact     If you have questions or need follow up information about today's clinic visit or your schedule please contact [facility] directly at [phone].  Normal or non-critical lab and imaging results will be communicated to you by MyChart, letter or phone within 4 business days after the clinic has received the results. If you do not hear from us within 7 days, please contact the clinic through MyChart or phone. If you have a critical or abnormal lab result, we will notify you by phone as soon as possible.  Submit refill requests through [employer] or call your pharmacy and they will forward the refill request to us. Please allow 3 business days for your refill to be completed.          Additional Information About Your Visit        MyChart Information     [employer]  gives you secure access to your electronic health record. If you see a primary care provider, you can also send messages to your care team and make appointments. If you have questions, please call your primary care clinic.  If you do not have a primary care provider, please call [phone] and they will assist you.        Care EveryWhere ID     This is your Care EveryWhere ID. This could be used by other organizations to access your [place] medical records  [id number]         Blood Pressure from Last 3 Encounters:   05/09/18 143/84   04/16/18 148/89   04/10/18 (!) 135/93    Weight from Last 3 Encounters:   05/09/18 71.4 kg (157 lb 8 oz)   04/13/18 70.4 kg (155 lb 3.3 oz)   04/10/18 70.9 kg (156 lb 4.9 oz)              We Performed the Following     PT Eval, Low Complexity (04820)     Therapeutic Activities     Therapeutic Exercises          Today's Medication Changes          These changes are accurate as of 5/29/18  5:13 PM.  If you have any questions, ask your nurse or doctor.               These medicines have changed or have updated prescriptions.        Dose/Directions    atorvastatin 80 MG tablet   Commonly known as:  LIPITOR   This may have changed:  when to take this   Used for:  Hyperlipidemia LDL goal <130        Dose:  80 mg   Take 1 tablet (80 mg) by mouth daily   Quantity:  90 tablet   Refills:  3                Primary Care Provider Office Phone # Fax #    Sammi Beltre [phone]       [address] [place] [address] [address]  [facility] [address]        Equal Access to Services     JUAN DIEGO BRISENO AH: Hadii sumit smitho Sogeoffrey, waaxda luqadaha, qaybta kaalmada lyle alvarado. So [facility] [phone].    ATENCIÓN: Si habla español, tiene a starr disposición servicios gratuitos de asistencia lingüística. Llame al [phone].    We comply with applicable federal civil rights laws and [place] laws. We do not discriminate on the basis of race, color, national  origin, age, disability, sex, sexual orientation, or gender identity.            Thank you!     Thank you for choosing [facility] [place] [facility]  for your care. Our goal is always to provide you with excellent care. Hearing back from our patients is one way we can continue to improve our services. Please take a few minutes to complete the written survey that you may receive in the mail after your visit with us. Thank you!             Your Updated Medication List - Protect others around you: Learn how to safely use, store and throw away your medicines at www.disposemymeds.org.          This list is accurate as of 5/29/18  5:13 PM.  Always use your most recent med list.                   Brand Name Dispense Instructions for use Diagnosis    acetaminophen 325 MG tablet    TYLENOL    100 tablet    Take 2 tablets (650 mg) by mouth every 4 hours as needed for mild pain    Status post total right knee replacement       atorvastatin 80 MG tablet    LIPITOR    90 tablet    Take 1 tablet (80 mg) by mouth daily    Hyperlipidemia LDL goal <130       * COMPRESSION STOCKINGS     2 each    2 each daily    Venous (peripheral) insufficiency       * COMPRESSION STOCKINGS     2 each    2 each daily    PAD (peripheral artery disease) (H)       * HYDROmorphone 2 MG tablet    DILAUDID    50 tablet    For pain complaints of 1-5 give 1 tablets, for pain complaints of 6-10 give 2 tablets every 4 hours as needed for pain.    Status post total right knee replacement       * HYDROmorphone 2 MG tablet    DILAUDID    30 tablet    Take 1 tablet (2 mg) by mouth every 6 hours as needed for pain        IBUPROFEN PO      Take 600 mg by mouth every 6 hours as needed for moderate pain        melatonin 3 MG tablet     30 tablet    Take 1 tablet (3 mg) by mouth nightly as needed for sleep    Sleeping difficulty       order for DME     1 Units    Equipment being ordered: Extension Dynasplint Sig:  To be worn at night to right knee         senna-docusate 8.6-50 MG per tablet    SENOKOT-S;PERICOLACE    100 tablet    Take 1 tablet by mouth 2 times daily    Status post total right knee replacement       * Notice:  This list has 4 medication(s) that are the same as other medications prescribed for you. Read the directions carefully, and ask your doctor or other care provider to review them with you.

## 2018-05-29 NOTE — PROGRESS NOTES
[place] [facility] Initial Evaluation  Subjective:  Patient is a 72 year old female presenting with rehab right knee hpi.   Connie J Frankenberg is a 72 year old female with a right knee condition.  Condition occurred with:  Degenerative joint disease.  Condition occurred: at home.  This is a new condition  Pt presents to PT with c/o R knee pain/swelling/stiffness s/p R TKA (4-13-18 Cheng).  Pt reports the MD is thinking about doing a manipulation because of her lack of extension.    Pt reports she had home PT and has been working on the bending and straightening.   For strengthening she is working on hamstring curls, straight leg raises, and long arc quads.    Pt is retired.   Pt is working on a recumbent bike at home.    PMH: Skin cancer, headaches, overweight.    Patient reports pain:  In the joint.  Radiates to: n/a.  Pain is described as aching and is intermittent and reported as 3/10.  Associated symptoms:  Edema, loss of strength and loss of motion/stiffness. Pain is the same all the time.  Symptoms are exacerbated by activity, ascending stairs, bending/squatting, certain positions, descending stairs, kneeling and lying on the extremity and relieved by ice and rest.  Since onset symptoms are gradually improving.  Special tests:  X-ray.  Previous treatment: n/a.  Improvement with previous treatment: none.  General health as reported by patient is good.                                              Objective:  System                                                Knee Evaluation:  ROM:      PROM    Hyperextension: Left: 0    Right:  0  Extension: Left: 0    Right:  14  Flexion: Left: 150    Right:  85                Mobility Testing:  Mobility testing: decreased patellar mobility in all directions.            Functional Testing:  : Squat weight shifting onto LLE. SLS R 3-5s, L 20s.                      General Evaluation:                              Gait:  Gait wnl general: Lacking knee ext at  initial contact on the JOSIE FRYE    Assessment/Plan:    Patient is a 72 year old female with right side knee complaints.    Patient has the following significant findings with corresponding treatment plan.                Diagnosis 1:  S/p R TKA  Pain -  hot/cold therapy  Decreased ROM/flexibility - manual therapy and therapeutic exercise  Decreased joint mobility - manual therapy and therapeutic exercise  Decreased strength - therapeutic exercise and therapeutic activities  Impaired balance - neuro re-education and therapeutic activities  Decreased proprioception - neuro re-education and therapeutic activities  Inflammation - cold therapy  Impaired gait - gait training  Impaired muscle performance - neuro re-education  Decreased function - therapeutic activities  Impaired posture - neuro re-education    Therapy Evaluation Codes:   1) History comprised of:   Personal factors that impact the plan of care:      None.    Comorbidity factors that impact the plan of care are:      None.     Medications impacting care: None.  2) Examination of Body Systems comprised of:   Body structures and functions that impact the plan of care:      Knee.   Activity limitations that impact the plan of care are:      Bathing, Bending, Cooking, Driving, Dressing, Lifting, Running, Sitting, Squatting/kneeling, Stairs and Standing.  3) Clinical presentation characteristics are:   Stable/Uncomplicated.  4) Decision-Making    Low complexity using standardized patient assessment instrument and/or measureable assessment of functional outcome.  Cumulative Therapy Evaluation is: Low complexity.    Previous and current functional limitations:  (See Goal Flow Sheet for this information)    Short term and Long term goals: (See Goal Flow Sheet for this information)     Communication ability:  Patient appears to be able to clearly communicate and understand verbal and written communication and follow directions  correctly.  Treatment Explanation - The following has been discussed with the patient:   RX ordered/plan of care  Anticipated outcomes  Possible risks and side effects  This patient would benefit from PT intervention to resume normal activities.   Rehab potential is excellent.    Frequency:  1 X week, once daily  Duration:  for 6 weeks  Discharge Plan:  Achieve all LTG.  Independent in home treatment program.  Return to work with or without restrictions.  Return to previous functional level by discharge.  Reach maximal therapeutic benefit.    Please refer to the daily flowsheet for treatment today, total treatment time and time spent performing 1:1 timed codes.

## 2018-05-30 ENCOUNTER — MEDICAL CORRESPONDENCE (OUTPATIENT)
Dept: HEALTH INFORMATION MANAGEMENT | Facility: CLINIC | Age: 73
End: 2018-05-30

## 2018-05-30 NOTE — PROGRESS NOTES
[place] [facility] Initial Evaluation  Subjective:  Patient is a 72 year old female presenting with rehab right knee hpi.                                    General health as reported by patient is good.  Pertinent medical history includes:  Cancer, overweight and migraines/headaches.  Medical allergies: yes (Codeine).  Other surgeries include:  Cancer surgery, orthopedic surgery and other (Skin basil; R TKO; cataracs; fallopian tubes).  Current medications:  Anti-inflammatory and sleep medication.  Current occupation is Retired EEG tech.    Primary job tasks include:  Lifting, prolonged sitting, driving and other ([employer]).        Red flags:  Pain at night/rest.                        Objective:  System    Physical Exam    General     ROS    Assessment/Plan:

## 2018-05-31 ENCOUNTER — THERAPY VISIT (OUTPATIENT)
Dept: PHYSICAL THERAPY | Facility: CLINIC | Age: 73
End: 2018-05-31
Payer: COMMERCIAL

## 2018-05-31 DIAGNOSIS — Z96.651 STATUS POST TOTAL RIGHT KNEE REPLACEMENT: ICD-10-CM

## 2018-05-31 PROCEDURE — 97530 THERAPEUTIC ACTIVITIES: CPT | Mod: GP | Performed by: PHYSICAL THERAPIST

## 2018-05-31 PROCEDURE — 97110 THERAPEUTIC EXERCISES: CPT | Mod: GP | Performed by: PHYSICAL THERAPIST

## 2018-05-31 PROCEDURE — 97140 MANUAL THERAPY 1/> REGIONS: CPT | Mod: GP | Performed by: PHYSICAL THERAPIST

## 2018-06-01 NOTE — TELEPHONE ENCOUNTER
Left 2nd message for Concha to call back to discuss knee manipulation.  Gave her my direct phone number

## 2018-06-05 ENCOUNTER — THERAPY VISIT (OUTPATIENT)
Dept: PHYSICAL THERAPY | Facility: CLINIC | Age: 73
End: 2018-06-05
Payer: COMMERCIAL

## 2018-06-05 DIAGNOSIS — Z96.651 STATUS POST TOTAL RIGHT KNEE REPLACEMENT: ICD-10-CM

## 2018-06-05 PROCEDURE — 97110 THERAPEUTIC EXERCISES: CPT | Mod: GP | Performed by: PHYSICAL THERAPIST

## 2018-06-05 PROCEDURE — 97140 MANUAL THERAPY 1/> REGIONS: CPT | Mod: GP | Performed by: PHYSICAL THERAPIST

## 2018-06-07 ENCOUNTER — THERAPY VISIT (OUTPATIENT)
Dept: PHYSICAL THERAPY | Facility: CLINIC | Age: 73
End: 2018-06-07
Payer: COMMERCIAL

## 2018-06-07 DIAGNOSIS — Z96.651 STATUS POST TOTAL RIGHT KNEE REPLACEMENT: ICD-10-CM

## 2018-06-07 PROCEDURE — 97140 MANUAL THERAPY 1/> REGIONS: CPT | Mod: GP | Performed by: PHYSICAL THERAPIST

## 2018-06-07 PROCEDURE — 97110 THERAPEUTIC EXERCISES: CPT | Mod: GP | Performed by: PHYSICAL THERAPIST

## 2018-06-12 ENCOUNTER — THERAPY VISIT (OUTPATIENT)
Dept: PHYSICAL THERAPY | Facility: CLINIC | Age: 73
End: 2018-06-12
Payer: COMMERCIAL

## 2018-06-12 DIAGNOSIS — Z96.651 STATUS POST TOTAL RIGHT KNEE REPLACEMENT: ICD-10-CM

## 2018-06-12 PROCEDURE — 97110 THERAPEUTIC EXERCISES: CPT | Mod: GP | Performed by: PHYSICAL THERAPIST

## 2018-06-12 PROCEDURE — 97530 THERAPEUTIC ACTIVITIES: CPT | Mod: GP | Performed by: PHYSICAL THERAPIST

## 2018-06-14 ENCOUNTER — THERAPY VISIT (OUTPATIENT)
Dept: PHYSICAL THERAPY | Facility: CLINIC | Age: 73
End: 2018-06-14
Payer: COMMERCIAL

## 2018-06-14 DIAGNOSIS — Z96.651 STATUS POST TOTAL RIGHT KNEE REPLACEMENT: ICD-10-CM

## 2018-06-14 PROCEDURE — 97140 MANUAL THERAPY 1/> REGIONS: CPT | Mod: GP | Performed by: PHYSICAL THERAPIST

## 2018-06-14 PROCEDURE — 97110 THERAPEUTIC EXERCISES: CPT | Mod: GP | Performed by: PHYSICAL THERAPIST

## 2018-06-16 ENCOUNTER — HEALTH MAINTENANCE LETTER (OUTPATIENT)
Age: 73
End: 2018-06-16

## 2018-06-19 ENCOUNTER — THERAPY VISIT (OUTPATIENT)
Dept: PHYSICAL THERAPY | Facility: CLINIC | Age: 73
End: 2018-06-19
Payer: COMMERCIAL

## 2018-06-19 DIAGNOSIS — Z96.651 STATUS POST TOTAL RIGHT KNEE REPLACEMENT: ICD-10-CM

## 2018-06-19 PROCEDURE — 97530 THERAPEUTIC ACTIVITIES: CPT | Mod: GP | Performed by: PHYSICAL THERAPIST

## 2018-06-19 PROCEDURE — 97140 MANUAL THERAPY 1/> REGIONS: CPT | Mod: GP | Performed by: PHYSICAL THERAPIST

## 2018-06-19 PROCEDURE — 97110 THERAPEUTIC EXERCISES: CPT | Mod: GP | Performed by: PHYSICAL THERAPIST

## 2018-06-26 ENCOUNTER — THERAPY VISIT (OUTPATIENT)
Dept: PHYSICAL THERAPY | Facility: CLINIC | Age: 73
End: 2018-06-26
Payer: COMMERCIAL

## 2018-06-26 DIAGNOSIS — Z96.651 STATUS POST TOTAL RIGHT KNEE REPLACEMENT: ICD-10-CM

## 2018-06-26 PROCEDURE — 97110 THERAPEUTIC EXERCISES: CPT | Mod: GP | Performed by: PHYSICAL THERAPIST

## 2018-06-26 PROCEDURE — 97530 THERAPEUTIC ACTIVITIES: CPT | Mod: GP | Performed by: PHYSICAL THERAPIST

## 2018-06-26 PROCEDURE — 97140 MANUAL THERAPY 1/> REGIONS: CPT | Mod: GP | Performed by: PHYSICAL THERAPIST

## 2018-06-28 ENCOUNTER — THERAPY VISIT (OUTPATIENT)
Dept: PHYSICAL THERAPY | Facility: CLINIC | Age: 73
End: 2018-06-28
Payer: COMMERCIAL

## 2018-06-28 DIAGNOSIS — Z96.651 STATUS POST TOTAL RIGHT KNEE REPLACEMENT: ICD-10-CM

## 2018-06-28 PROCEDURE — 97110 THERAPEUTIC EXERCISES: CPT | Mod: GP | Performed by: PHYSICAL THERAPIST

## 2018-06-28 PROCEDURE — 97140 MANUAL THERAPY 1/> REGIONS: CPT | Mod: GP | Performed by: PHYSICAL THERAPIST

## 2018-06-28 PROCEDURE — 97530 THERAPEUTIC ACTIVITIES: CPT | Mod: GP | Performed by: PHYSICAL THERAPIST

## 2018-07-03 ENCOUNTER — THERAPY VISIT (OUTPATIENT)
Dept: PHYSICAL THERAPY | Facility: CLINIC | Age: 73
End: 2018-07-03
Payer: COMMERCIAL

## 2018-07-03 DIAGNOSIS — Z96.651 STATUS POST TOTAL RIGHT KNEE REPLACEMENT: ICD-10-CM

## 2018-07-03 PROCEDURE — 97110 THERAPEUTIC EXERCISES: CPT | Mod: GP | Performed by: PHYSICAL THERAPIST

## 2018-07-03 PROCEDURE — 97530 THERAPEUTIC ACTIVITIES: CPT | Mod: GP | Performed by: PHYSICAL THERAPIST

## 2018-07-05 ENCOUNTER — THERAPY VISIT (OUTPATIENT)
Dept: PHYSICAL THERAPY | Facility: CLINIC | Age: 73
End: 2018-07-05
Payer: COMMERCIAL

## 2018-07-05 DIAGNOSIS — Z96.651 STATUS POST TOTAL RIGHT KNEE REPLACEMENT: ICD-10-CM

## 2018-07-05 PROCEDURE — 97530 THERAPEUTIC ACTIVITIES: CPT | Mod: GP | Performed by: PHYSICAL THERAPIST

## 2018-07-05 PROCEDURE — 97110 THERAPEUTIC EXERCISES: CPT | Mod: GP | Performed by: PHYSICAL THERAPIST

## 2018-07-05 PROCEDURE — 97140 MANUAL THERAPY 1/> REGIONS: CPT | Mod: GP | Performed by: PHYSICAL THERAPIST

## 2018-07-07 ENCOUNTER — HEALTH MAINTENANCE LETTER (OUTPATIENT)
Age: 73
End: 2018-07-07

## 2018-07-10 ENCOUNTER — TELEPHONE (OUTPATIENT)
Dept: ENDOCRINOLOGY | Facility: CLINIC | Age: 73
End: 2018-07-10

## 2018-07-10 ENCOUNTER — THERAPY VISIT (OUTPATIENT)
Dept: PHYSICAL THERAPY | Facility: CLINIC | Age: 73
End: 2018-07-10
Payer: COMMERCIAL

## 2018-07-10 DIAGNOSIS — Z96.651 STATUS POST TOTAL RIGHT KNEE REPLACEMENT: ICD-10-CM

## 2018-07-10 DIAGNOSIS — Z29.89 NEED FOR SBE (SUBACUTE BACTERIAL ENDOCARDITIS) PROPHYLAXIS: Primary | ICD-10-CM

## 2018-07-10 PROCEDURE — 97530 THERAPEUTIC ACTIVITIES: CPT | Mod: GP | Performed by: PHYSICAL THERAPIST

## 2018-07-10 PROCEDURE — 97110 THERAPEUTIC EXERCISES: CPT | Mod: GP | Performed by: PHYSICAL THERAPIST

## 2018-07-10 NOTE — TELEPHONE ENCOUNTER
[facility] Call Center    Phone Message    May a detailed message be left on voicemail: yes    Reason for Call: Other: PT has a dental appointment on 7/12/18 and just had knee surgery so she is requesting a RX for pre med antibiotic.  She uses the  pharmacy off [address]  Please follow up with the PT.      Action Taken: Message routed to:  [facility] OUMAR

## 2018-07-11 NOTE — TELEPHONE ENCOUNTER
Call patient back she is requesting a Rx antibiotic pre-medication for dental appointment on 7/13.  She has had a knee replacement in April of this year.  Her pharmacy is  pharmacy off [address] and [facility].   Will inform Dr. Beltre's RN of this. Alvarado Cr LPN at 2:49 PM on 7/11/2018

## 2018-07-12 ENCOUNTER — THERAPY VISIT (OUTPATIENT)
Dept: PHYSICAL THERAPY | Facility: CLINIC | Age: 73
End: 2018-07-12
Payer: COMMERCIAL

## 2018-07-12 DIAGNOSIS — Z96.651 STATUS POST TOTAL RIGHT KNEE REPLACEMENT: ICD-10-CM

## 2018-07-12 PROCEDURE — 97110 THERAPEUTIC EXERCISES: CPT | Mod: GP | Performed by: PHYSICAL THERAPIST

## 2018-07-12 PROCEDURE — 97530 THERAPEUTIC ACTIVITIES: CPT | Mod: GP | Performed by: PHYSICAL THERAPIST

## 2018-07-12 PROCEDURE — 97140 MANUAL THERAPY 1/> REGIONS: CPT | Mod: GP | Performed by: PHYSICAL THERAPIST

## 2018-07-12 RX ORDER — AMOXICILLIN 500 MG/1
CAPSULE ORAL
Qty: 4 CAPSULE | Refills: 0 | Status: SHIPPED | OUTPATIENT
Start: 2018-07-12 | End: 2018-11-28

## 2018-07-13 DIAGNOSIS — E78.5 HYPERLIPIDEMIA LDL GOAL <130: ICD-10-CM

## 2018-07-16 RX ORDER — ATORVASTATIN CALCIUM 80 MG/1
80 TABLET, FILM COATED ORAL DAILY
Qty: 90 TABLET | Refills: 0 | Status: SHIPPED | OUTPATIENT
Start: 2018-07-16 | End: 2018-10-29

## 2018-07-16 NOTE — TELEPHONE ENCOUNTER
Last Clinic Visit: 5/9/2018  [facility]  Lab past due-LDL.  Malinda refill sent for 90 days and FYI to provider.

## 2018-07-24 ENCOUNTER — THERAPY VISIT (OUTPATIENT)
Dept: PHYSICAL THERAPY | Facility: CLINIC | Age: 73
End: 2018-07-24
Payer: COMMERCIAL

## 2018-07-24 DIAGNOSIS — Z96.651 STATUS POST TOTAL RIGHT KNEE REPLACEMENT: ICD-10-CM

## 2018-07-24 PROCEDURE — 97530 THERAPEUTIC ACTIVITIES: CPT | Mod: GP | Performed by: PHYSICAL THERAPIST

## 2018-07-24 PROCEDURE — 97110 THERAPEUTIC EXERCISES: CPT | Mod: GP | Performed by: PHYSICAL THERAPIST

## 2018-07-26 ENCOUNTER — THERAPY VISIT (OUTPATIENT)
Dept: PHYSICAL THERAPY | Facility: CLINIC | Age: 73
End: 2018-07-26
Payer: COMMERCIAL

## 2018-07-26 DIAGNOSIS — Z96.651 STATUS POST TOTAL RIGHT KNEE REPLACEMENT: ICD-10-CM

## 2018-07-26 PROCEDURE — 97530 THERAPEUTIC ACTIVITIES: CPT | Mod: GP | Performed by: PHYSICAL THERAPIST

## 2018-07-26 PROCEDURE — 97140 MANUAL THERAPY 1/> REGIONS: CPT | Mod: GP | Performed by: PHYSICAL THERAPIST

## 2018-07-26 PROCEDURE — 97110 THERAPEUTIC EXERCISES: CPT | Mod: GP | Performed by: PHYSICAL THERAPIST

## 2018-08-02 ENCOUNTER — THERAPY VISIT (OUTPATIENT)
Dept: PHYSICAL THERAPY | Facility: CLINIC | Age: 73
End: 2018-08-02
Payer: COMMERCIAL

## 2018-08-02 DIAGNOSIS — Z96.651 STATUS POST TOTAL RIGHT KNEE REPLACEMENT: ICD-10-CM

## 2018-08-02 PROCEDURE — 97110 THERAPEUTIC EXERCISES: CPT | Mod: GP | Performed by: PHYSICAL THERAPIST

## 2018-08-02 PROCEDURE — 97530 THERAPEUTIC ACTIVITIES: CPT | Mod: GP | Performed by: PHYSICAL THERAPIST

## 2018-08-08 ENCOUNTER — THERAPY VISIT (OUTPATIENT)
Dept: PHYSICAL THERAPY | Facility: CLINIC | Age: 73
End: 2018-08-08
Payer: COMMERCIAL

## 2018-08-08 DIAGNOSIS — Z96.651 STATUS POST TOTAL RIGHT KNEE REPLACEMENT: ICD-10-CM

## 2018-08-08 PROCEDURE — 97140 MANUAL THERAPY 1/> REGIONS: CPT | Mod: GP | Performed by: PHYSICAL THERAPIST

## 2018-08-08 PROCEDURE — 97110 THERAPEUTIC EXERCISES: CPT | Mod: GP | Performed by: PHYSICAL THERAPIST

## 2018-08-08 NOTE — MR AVS SNAPSHOT
After Visit Summary   8/8/2018    Connie J Frankenberg    MRN: [id number]           Patient Information     Date Of Birth          1945        Visit Information        Provider Department      8/8/2018 3:00 PM Kemal Kaur, PT [facility]        Today's Diagnoses     Status post total right knee replacement           Follow-ups after your visit        Your next 10 appointments already scheduled     Aug 16, 2018 12:40 PM CDT   DANNY Extremity with Fox Adan PT   [facility] ([facility])    [address] [place] [id number]   [phone]            Aug 21, 2018 11:20 AM CDT   DANNY Extremity [facility] Adan [facility]   [facility] ([facility])    [address] [place] [address]   [phone]            Aug 23, 2018  9:30 AM CDT   DANNY Extremity with Fox Adan PT   [facility] "[employer] [facility] ([facility])    [address] [place] [address]   [phone]            Aug 30, 2018 10:10 AM CDT   DANNY Extremity with Fox Adan PT   [facility]    [address] [place] [address]   [phone]            Sep 04, 2018 10:40 AM CDT   DANNY Extremity with Fox Adan PT   [facility] ([facility])    [address] [place] [address]   [phone]              Who to contact     If you have questions or need follow up information about today's clinic visit or your schedule please contact [facility] directly at [phone].  Normal or non-critical lab and imaging results will be communicated to you by MyChart, letter or phone within 4 business days after the clinic has received the results. If you do  not hear from us within 7 days, please contact the clinic through [employer] or phone. If you have a critical or abnormal lab result, we will notify you by phone as soon as possible.  Submit refill requests through [employer] or call your pharmacy and they will forward the refill request to us. Please allow 3 business days for your refill to be completed.          Additional Information About Your Visit        [employer] Information     [employer] gives you secure access to your electronic health record. If you see a primary care provider, you can also send messages to your care team and make appointments. If you have questions, please call your primary care clinic.  If you do not have a primary care provider, please call [phone] and they will assist you.        Care EveryWhere ID     This is your Care EveryWhere ID. This could be used by other organizations to access your [place] medical records  [id number]         Blood Pressure from Last 3 Encounters:   05/09/18 143/84   04/16/18 148/89   04/10/18 (!) 135/93    Weight from Last 3 Encounters:   05/09/18 71.4 kg (157 lb 8 oz)   04/13/18 70.4 kg (155 lb 3.3 oz)   04/10/18 70.9 kg (156 lb 4.9 oz)              We Performed the Following     MANUAL THER TECH,1+REGIONS,EA 15 MIN     THERAPEUTIC EXERCISES        Primary Care Provider Office Phone # Fax #    Sammi Beltre [phone]       [address] [place] [address] [address] [place] [address]        Equal Access to Services     JUAN DIEGO BRISENO : Hadii sumit ku hadasho Soomaali, waaxda luqadaha, qaybta kaalmada cherrieegyameri, lyle moscoso Exoalfie isaac. So [facility] [phone].    ATENCIÓN: Si habla español, tiene a starr disposición servicios gratuitos de asistencia lingüística. Llame al [phone].    We comply with applicable federal civil rights laws and [place] laws. We do not discriminate on the basis of race, color, national origin, age, disability, sex, sexual orientation, or gender identity.             Thank you!     Thank you for choosing [facility]  for your care. Our goal is always to provide you with excellent care. Hearing back from our patients is one way we can continue to improve our services. Please take a few minutes to complete the written survey that you may receive in the mail after your visit with us. Thank you!             Your Updated Medication List - Protect others around you: Learn how to safely use, store and throw away your medicines at www.disposemymeds.org.          This list is accurate as of 8/8/18  3:56 PM.  Always use your most recent med list.                   Brand Name Dispense Instructions for use Diagnosis    acetaminophen 325 MG tablet    TYLENOL    100 tablet    Take 2 tablets (650 mg) by mouth every 4 hours as needed for mild pain    Status post total right knee replacement       amoxicillin 500 MG capsule    AMOXIL    4 capsule    Take 4 capsules (2000 mg) by mouth, one hour prior to dental work    Need for SBE (subacute bacterial endocarditis) prophylaxis       atorvastatin 80 MG tablet    LIPITOR    90 tablet    Take 1 tablet (80 mg) by mouth daily    Hyperlipidemia LDL goal <130       * COMPRESSION STOCKINGS     2 each    2 each daily    Venous (peripheral) insufficiency       * COMPRESSION STOCKINGS     2 each    2 each daily    PAD (peripheral artery disease) (H)       * HYDROmorphone 2 MG tablet    DILAUDID    50 tablet    For pain complaints of 1-5 give 1 tablets, for pain complaints of 6-10 give 2 tablets every 4 hours as needed for pain.    Status post total right knee replacement       * HYDROmorphone 2 MG tablet    DILAUDID    30 tablet    Take 1 tablet (2 mg) by mouth every 6 hours as needed for pain        IBUPROFEN PO      Take 600 mg by mouth every 6 hours as needed for moderate pain        melatonin 3 MG tablet     30 tablet    Take 1 tablet (3 mg) by mouth nightly as needed for sleep    Sleeping difficulty       order for DME      1 Units    Equipment being ordered: Extension Dynasplint Sig:  To be worn at night to right knee        senna-docusate 8.6-50 MG per tablet    SENOKOT-S;PERICOLACE    100 tablet    Take 1 tablet by mouth 2 times daily    Status post total right knee replacement       * Notice:  This list has 4 medication(s) that are the same as other medications prescribed for you. Read the directions carefully, and ask your doctor or other care provider to review them with you.

## 2018-08-08 NOTE — PROGRESS NOTES
1. PT exercises  2. Dynasplint bending 10 min on/10 min off/10 min on  3. PT exercises  4. Dynasplint extension min on/10 min off/10 min on  5. PT exercises or Dynasplint bending 10 min on/10 min off/10 min on    Modify time in splint due to pain and potential guarding.  Also perform flexion daily if able.

## 2018-08-16 ENCOUNTER — THERAPY VISIT (OUTPATIENT)
Dept: PHYSICAL THERAPY | Facility: CLINIC | Age: 73
End: 2018-08-16
Payer: COMMERCIAL

## 2018-08-16 DIAGNOSIS — Z96.651 STATUS POST TOTAL RIGHT KNEE REPLACEMENT: ICD-10-CM

## 2018-08-16 PROCEDURE — 97140 MANUAL THERAPY 1/> REGIONS: CPT | Mod: GP | Performed by: PHYSICAL THERAPIST

## 2018-08-16 PROCEDURE — 97110 THERAPEUTIC EXERCISES: CPT | Mod: GP | Performed by: PHYSICAL THERAPIST

## 2018-08-23 ENCOUNTER — THERAPY VISIT (OUTPATIENT)
Dept: PHYSICAL THERAPY | Facility: CLINIC | Age: 73
End: 2018-08-23
Payer: COMMERCIAL

## 2018-08-23 DIAGNOSIS — Z96.651 STATUS POST TOTAL RIGHT KNEE REPLACEMENT: ICD-10-CM

## 2018-08-23 PROCEDURE — 97110 THERAPEUTIC EXERCISES: CPT | Mod: GP | Performed by: PHYSICAL THERAPIST

## 2018-08-23 PROCEDURE — 97140 MANUAL THERAPY 1/> REGIONS: CPT | Mod: GP | Performed by: PHYSICAL THERAPIST

## 2018-08-30 ENCOUNTER — THERAPY VISIT (OUTPATIENT)
Dept: PHYSICAL THERAPY | Facility: CLINIC | Age: 73
End: 2018-08-30
Payer: COMMERCIAL

## 2018-08-30 DIAGNOSIS — Z96.651 STATUS POST TOTAL RIGHT KNEE REPLACEMENT: ICD-10-CM

## 2018-08-30 PROCEDURE — 97140 MANUAL THERAPY 1/> REGIONS: CPT | Mod: GP | Performed by: PHYSICAL THERAPIST

## 2018-08-30 PROCEDURE — 97530 THERAPEUTIC ACTIVITIES: CPT | Mod: GP | Performed by: PHYSICAL THERAPIST

## 2018-08-30 PROCEDURE — 97110 THERAPEUTIC EXERCISES: CPT | Mod: GP | Performed by: PHYSICAL THERAPIST

## 2018-09-06 ENCOUNTER — THERAPY VISIT (OUTPATIENT)
Dept: PHYSICAL THERAPY | Facility: CLINIC | Age: 73
End: 2018-09-06
Payer: COMMERCIAL

## 2018-09-06 DIAGNOSIS — Z96.651 STATUS POST TOTAL RIGHT KNEE REPLACEMENT: ICD-10-CM

## 2018-09-06 PROCEDURE — 97530 THERAPEUTIC ACTIVITIES: CPT | Mod: GP | Performed by: PHYSICAL THERAPIST

## 2018-09-06 PROCEDURE — 97110 THERAPEUTIC EXERCISES: CPT | Mod: GP | Performed by: PHYSICAL THERAPIST

## 2018-09-06 ASSESSMENT — ACTIVITIES OF DAILY LIVING (ADL)
STAND: ACTIVITY IS NOT DIFFICULT
STIFFNESS: THE SYMPTOM AFFECTS MY ACTIVITY SLIGHTLY
GO DOWN STAIRS: ACTIVITY IS SOMEWHAT DIFFICULT
SWELLING: THE SYMPTOM AFFECTS MY ACTIVITY SLIGHTLY
GO UP STAIRS: ACTIVITY IS MINIMALLY DIFFICULT
RAW_SCORE: 52
LIMPING: I HAVE THE SYMPTOM BUT IT DOES NOT AFFECT MY ACTIVITY
SQUAT: ACTIVITY IS MINIMALLY DIFFICULT
SIT WITH YOUR KNEE BENT: ACTIVITY IS NOT DIFFICULT
WEAKNESS: THE SYMPTOM AFFECTS MY ACTIVITY SLIGHTLY
WALK: ACTIVITY IS MINIMALLY DIFFICULT
KNEEL ON THE FRONT OF YOUR KNEE: ACTIVITY IS FAIRLY DIFFICULT
HOW_WOULD_YOU_RATE_THE_CURRENT_FUNCTION_OF_YOUR_KNEE_DURING_YOUR_USUAL_DAILY_ACTIVITIES_ON_A_SCALE_FROM_0_TO_100_WITH_100_BEING_YOUR_LEVEL_OF_KNEE_FUNCTION_PRIOR_TO_YOUR_INJURY_AND_0_BEING_THE_INABILITY_TO_PERFORM_ANY_OF_YOUR_USUAL_DAILY_ACTIVITIES?: 80
RISE FROM A CHAIR: ACTIVITY IS MINIMALLY DIFFICULT
AS_A_RESULT_OF_YOUR_KNEE_INJURY,_HOW_WOULD_YOU_RATE_YOUR_CURRENT_LEVEL_OF_DAILY_ACTIVITY?: NEARLY NORMAL
GIVING WAY, BUCKLING OR SHIFTING OF KNEE: I DO NOT HAVE THE SYMPTOM
KNEE_ACTIVITY_OF_DAILY_LIVING_SUM: 52
HOW_WOULD_YOU_RATE_THE_OVERALL_FUNCTION_OF_YOUR_KNEE_DURING_YOUR_USUAL_DAILY_ACTIVITIES?: NEARLY NORMAL
PAIN: THE SYMPTOM AFFECTS MY ACTIVITY SLIGHTLY
KNEE_ACTIVITY_OF_DAILY_LIVING_SCORE: 74.29

## 2018-10-29 DIAGNOSIS — E78.5 HYPERLIPIDEMIA LDL GOAL <130: ICD-10-CM

## 2018-10-30 RX ORDER — ATORVASTATIN CALCIUM 80 MG/1
80 TABLET, FILM COATED ORAL DAILY
Qty: 90 TABLET | Refills: 0 | Status: SHIPPED | OUTPATIENT
Start: 2018-10-30 | End: 2018-11-28

## 2018-10-30 NOTE — TELEPHONE ENCOUNTER
Last Clinic Visit: 5/10/2018  [facility]  Lab(s) past due-LDL.  Malinda refill 90 days and FYI to provider.

## 2018-11-20 ENCOUNTER — PATIENT OUTREACH (OUTPATIENT)
Dept: CARE COORDINATION | Facility: CLINIC | Age: 73
End: 2018-11-20

## 2018-11-21 ENCOUNTER — RADIANT APPOINTMENT (OUTPATIENT)
Dept: MAMMOGRAPHY | Facility: CLINIC | Age: 73
End: 2018-11-21
Payer: COMMERCIAL

## 2018-11-21 DIAGNOSIS — Z12.31 VISIT FOR SCREENING MAMMOGRAM: ICD-10-CM

## 2018-11-28 ENCOUNTER — OFFICE VISIT (OUTPATIENT)
Dept: INTERNAL MEDICINE | Facility: CLINIC | Age: 73
End: 2018-11-28
Payer: COMMERCIAL

## 2018-11-28 VITALS
DIASTOLIC BLOOD PRESSURE: 92 MMHG | WEIGHT: 161.3 LBS | RESPIRATION RATE: 18 BRPM | HEIGHT: 62 IN | SYSTOLIC BLOOD PRESSURE: 141 MMHG | OXYGEN SATURATION: 98 % | TEMPERATURE: 97.7 F | BODY MASS INDEX: 29.68 KG/M2 | HEART RATE: 68 BPM

## 2018-11-28 DIAGNOSIS — E78.5 HYPERLIPIDEMIA, UNSPECIFIED HYPERLIPIDEMIA TYPE: ICD-10-CM

## 2018-11-28 DIAGNOSIS — M94.9 DISORDER OF BONE AND CARTILAGE: ICD-10-CM

## 2018-11-28 DIAGNOSIS — Z85.828 HISTORY OF SKIN CANCER: ICD-10-CM

## 2018-11-28 DIAGNOSIS — R68.89 COLD INTOLERANCE: ICD-10-CM

## 2018-11-28 DIAGNOSIS — G43.109 MIGRAINE WITH AURA AND WITHOUT STATUS MIGRAINOSUS, NOT INTRACTABLE: ICD-10-CM

## 2018-11-28 DIAGNOSIS — Z23 NEED FOR VACCINATION: ICD-10-CM

## 2018-11-28 DIAGNOSIS — G47.9 SLEEPING DIFFICULTY: ICD-10-CM

## 2018-11-28 DIAGNOSIS — E55.9 VITAMIN D DEFICIENCY: ICD-10-CM

## 2018-11-28 DIAGNOSIS — M89.9 DISORDER OF BONE AND CARTILAGE: ICD-10-CM

## 2018-11-28 DIAGNOSIS — Z00.00 ROUTINE HISTORY AND PHYSICAL EXAMINATION OF ADULT: Primary | ICD-10-CM

## 2018-11-28 DIAGNOSIS — E78.5 HYPERLIPIDEMIA LDL GOAL <130: ICD-10-CM

## 2018-11-28 DIAGNOSIS — Z29.89 NEED FOR SBE (SUBACUTE BACTERIAL ENDOCARDITIS) PROPHYLAXIS: ICD-10-CM

## 2018-11-28 DIAGNOSIS — M85.80 OSTEOPENIA, UNSPECIFIED LOCATION: ICD-10-CM

## 2018-11-28 LAB
CHOLEST SERPL-MCNC: 159 MG/DL
DEPRECATED CALCIDIOL+CALCIFEROL SERPL-MC: 28 UG/L (ref 20–75)
HDLC SERPL-MCNC: 60 MG/DL
LDLC SERPL CALC-MCNC: 83 MG/DL
NONHDLC SERPL-MCNC: 99 MG/DL
TRIGL SERPL-MCNC: 79 MG/DL
TSH SERPL DL<=0.005 MIU/L-ACNC: 2.42 MU/L (ref 0.4–4)

## 2018-11-28 RX ORDER — LANOLIN ALCOHOL/MO/W.PET/CERES
3 CREAM (GRAM) TOPICAL
Qty: 30 TABLET | Refills: 3 | Status: SHIPPED | OUTPATIENT
Start: 2018-11-28 | End: 2018-11-28

## 2018-11-28 RX ORDER — BUTALBITAL/ASPIRIN/CAFFEINE 50-325-40
1 CAPSULE ORAL DAILY PRN
Qty: 30 CAPSULE | Refills: 2 | Status: SHIPPED | OUTPATIENT
Start: 2018-11-28 | End: 2019-10-03

## 2018-11-28 RX ORDER — AMOXICILLIN 500 MG/1
CAPSULE ORAL
Qty: 12 CAPSULE | Refills: 1 | Status: SHIPPED | OUTPATIENT
Start: 2018-11-28 | End: 2021-12-29

## 2018-11-28 RX ORDER — ATORVASTATIN CALCIUM 80 MG/1
80 TABLET, FILM COATED ORAL DAILY
Qty: 90 TABLET | Refills: 3 | Status: SHIPPED | OUTPATIENT
Start: 2018-11-28 | End: 2019-12-07

## 2018-11-28 ASSESSMENT — ENCOUNTER SYMPTOMS
FLANK PAIN: 0
HOARSE VOICE: 0
VOMITING: 0
BLOOD IN STOOL: 0
TROUBLE SWALLOWING: 0
MYALGIAS: 1
HEARTBURN: 0
MUSCLE CRAMPS: 0
CHILLS: 0
NECK PAIN: 0
POLYPHAGIA: 0
BACK PAIN: 1
TASTE DISTURBANCE: 0
SMELL DISTURBANCE: 0
DIARRHEA: 1
SINUS PAIN: 0
HEMATURIA: 0
JOINT SWELLING: 1
CONSTIPATION: 0
JAUNDICE: 0
ARTHRALGIAS: 1
DYSURIA: 0
HALLUCINATIONS: 0
SORE THROAT: 0
ALTERED TEMPERATURE REGULATION: 1
POLYDIPSIA: 0
WEIGHT LOSS: 0
DIFFICULTY URINATING: 0
NECK MASS: 0
DECREASED APPETITE: 0
FEVER: 0
INCREASED ENERGY: 0
NIGHT SWEATS: 0
SINUS CONGESTION: 0
ABDOMINAL PAIN: 0
STIFFNESS: 1
MUSCLE WEAKNESS: 0
BLOATING: 0
NAUSEA: 0
RECTAL PAIN: 0
BOWEL INCONTINENCE: 0
WEIGHT GAIN: 0
FATIGUE: 0

## 2018-11-28 ASSESSMENT — PAIN SCALES - GENERAL: PAINLEVEL: NO PAIN (0)

## 2018-11-28 NOTE — MR AVS SNAPSHOT
After Visit Summary   11/28/2018    Connie J Frankenberg    MRN: [id number]           Patient Information     Date Of Birth          1945        Visit Information        Provider Department      11/28/2018 7:30 AM Sammi Beltre MD [facility]        Today's Diagnoses     Routine history and physical examination of adult    -  1    History of skin cancer        Hyperlipidemia, unspecified hyperlipidemia type        Vitamin D deficiency        Osteopenia, unspecified location        Disorder of bone and cartilage        Need for SBE (subacute bacterial endocarditis) prophylaxis        Migraine with aura and without status migrainosus, not intractable        Hyperlipidemia LDL goal <130        Sleeping difficulty        Cold intolerance        Need for vaccination           Follow-ups after your visit        Additional Services     DERMATOLOGY REFERRAL       Your provider has referred you to: patient to schedule at location of choice for continuity of care  Please be aware that coverage of these services is subject to the terms and limitations of your health insurance plan.  Call member services at your health plan with any benefit or coverage questions.      Please bring the following with you to your appointment:    (1) Any X-Rays, CTs or MRIs which have been performed.  Contact the facility where they were done to arrange for  prior to your scheduled appointment.    (2) List of current medications  (3) This referral request   (4) Any documents/labs given to you for this referral                  Follow-up notes from your care team     Return if symptoms worsen or fail to improve, for back pain eval.      Future tests that were ordered for you today     Open Future Orders        Priority Expected Expires Ordered    Lipid Profile FASTING Routine 11/28/2018 11/28/2019 11/28/2018    Vitamin D Deficiency Routine 11/28/2018 11/28/2019 11/28/2018    TSH with free T4 reflex  "Routine 11/28/2018 12/12/2018 11/28/2018            Who to contact     Please call your clinic at [phone] to:    Ask questions about your health    Make or cancel appointments    Discuss your medicines    Learn about your test results    Speak to your doctor            Additional Information About Your Visit        MyChart Information     [employer] gives you secure access to your electronic health record. If you see a primary care provider, you can also send messages to your care team and make appointments. If you have questions, please call your primary care clinic.  If you do not have a primary care provider, please call [phone] and they will assist you.      [employer] is an electronic gateway that provides easy, online access to your medical records. With [employer], you can request a clinic appointment, read your test results, renew a prescription or communicate with your care team.     To access your existing account, please contact your [facility] or call [phone] for assistance.        Care EveryWhere ID     This is your Care EveryWhere ID. This could be used by other organizations to access your [place] medical records  [id number]        Your Vitals Were     Pulse Temperature Respirations Height Pulse Oximetry Breastfeeding?    68 97.7  F (36.5  C) (Oral) 18 1.562 m (5' 1.5\") 98% No    BMI (Body Mass Index)                   29.98 kg/m2            Blood Pressure from Last 3 Encounters:   11/28/18 (!) 141/92   05/09/18 143/84   04/16/18 148/89    Weight from Last 3 Encounters:   11/28/18 73.2 kg (161 lb 4.8 oz)   05/09/18 71.4 kg (157 lb 8 oz)   04/13/18 70.4 kg (155 lb 3.3 oz)              We Performed the Following     DERMATOLOGY REFERRAL     FLU VACCINE HIGH DOSE PRESERVATIVE FREE, AGE =>65 YR          Today's Medication Changes          These changes are accurate as of 11/28/18  8:27 AM.  If you have any questions, ask your nurse or doctor.               Start " taking these medicines.        Dose/Directions    butalbital-aspirin-caffeine -40 MG per capsule   Commonly known as:  FIORINAL   Used for:  Migraine with aura and without status migrainosus, not intractable   Started by:  Sammi Beltre MD        Dose:  1 capsule   Take 1 capsule by mouth daily as needed for headaches   Quantity:  30 capsule   Refills:  2         These medicines have changed or have updated prescriptions.        Dose/Directions    COMPRESSION STOCKINGS   This may have changed:  Another medication with the same name was removed. Continue taking this medication, and follow the directions you see here.   Used for:  PAD (peripheral artery disease) (H)   Changed by:  aSmmi Beltre MD        Dose:  2 each   2 each daily   Quantity:  2 each   Refills:  1         Stop taking these medicines if you haven't already. Please contact your care team if you have questions.     HYDROmorphone 2 MG tablet   Commonly known as:  DILAUDID   Stopped by:  Sammi Beltre MD           melatonin 3 MG tablet   Stopped by:  Sammi Beltre MD           order for DME   Stopped by:  Sammi Beltre MD           senna-docusate 8.6-50 MG tablet   Commonly known as:  SENOKOT-S/PERICOLACE   Stopped by:  Sammi Beltre MD                Where to get your medicines      These medications were sent to [facility] [place], [place] - [address]  [address], [facility] [address]     Phone:  [phone]     amoxicillin 500 MG capsule    atorvastatin 80 MG tablet         Some of these will need a paper prescription and others can be bought over the counter.  Ask your nurse if you have questions.     Bring a paper prescription for each of these medications     butalbital-aspirin-caffeine -40 MG per capsule                Primary Care Provider Office Phone # Fax #    Sammi Beltre [phone]       [address] [place] [address] [address]  [facility] [address]        [facility]  Access to Services     [facility]: Hadii aad ku hadjamesalan Tommy, waaxda luqadaha, qaybta kaalmameri alvarado, lyle isaac. So [facility] [phone].    ATENCIÓN: Si habla español, tiene a starr disposición servicios gratuitos de asistencia lingüística. Llame al [phone].    We comply with applicable federal civil rights laws and [place] laws. We do not discriminate on the basis of race, color, national origin, age, disability, sex, sexual orientation, or gender identity.            Thank you!     Thank you for choosing [facility]  for your care. Our goal is always to provide you with excellent care. Hearing back from our patients is one way we can continue to improve our services. Please take a few minutes to complete the written survey that you may receive in the mail after your visit with us. Thank you!             Your Updated Medication List - Protect others around you: Learn how to safely use, store and throw away your medicines at www.disposemymeds.org.          This list is accurate as of 11/28/18  8:27 AM.  Always use your most recent med list.                   Brand Name Dispense Instructions for use Diagnosis    acetaminophen 325 MG tablet    TYLENOL    100 tablet    Take 2 tablets (650 mg) by mouth every 4 hours as needed for mild pain    Status post total right knee replacement       amoxicillin 500 MG capsule    AMOXIL    12 capsule    Take 4 capsules (2000 mg) by mouth, one hour prior to dental work    Need for SBE (subacute bacterial endocarditis) prophylaxis       aspirin 81 MG tablet    ASA    90 tablet    Take 1 tablet (81 mg) by mouth daily        atorvastatin 80 MG tablet    LIPITOR    90 tablet    Take 1 tablet (80 mg) by mouth daily    Hyperlipidemia LDL goal <130       butalbital-aspirin-caffeine -40 MG per capsule    FIORINAL    30 capsule    Take 1 capsule by mouth daily as needed for headaches    Migraine with aura and without status  migrainosus, not intractable       COMPRESSION STOCKINGS     2 each    2 each daily    PAD (peripheral artery disease) (H)       IBUPROFEN PO      Take 600 mg by mouth every 6 hours as needed for moderate pain        VITAMIN D (CHOLECALCIFEROL) PO      Take 2,000 Units by mouth daily as needed

## 2018-11-28 NOTE — PROGRESS NOTES
Rooming Note  Health Maintenance   Health Maintenance Due   Topic Date Due     HEPATITIS C SCREENING  [birth year]/24/1965     ADVANCE DIRECTIVE PLANNING Q5 YRS  08/24/2002     PAP Q1 YR DIAGNOSTIC  10/20/2011     FALL RISK ASSESSMENT  08/24/2012     PHQ-2 Q1 YR  01/06/2017     COLONOSCOPY Q10 YR  03/17/2018     INFLUENZA VACCINE (1) 09/01/2018          Fall Risk  FALL RISK ASSESSMENT 11/28/2018   Fallen 2 or more times in the past year? No   Any fall with injury in the past year? No     Depression Screening   PHQ-2 Score 11/28/2018 1/6/2016 9/17/2015   PHQ-2 Total Score (Adult) - Positive if 3 or more points; Administer PHQ-9 if positive 0 0 1    PHQ9 given? No          Ethan Rashid CMA ([facility]) at 7:50 AM on 11/28/2018

## 2018-11-28 NOTE — PROGRESS NOTES
[facility]  Primary Care Center   Sammi Beltre MD  11/28/2018      Chief Complaint:   Physical     History of Present Illness:   Connie J Frankenberg is a 73 year old female with a history of right knee arthritis s/p arthroplasty, hyperlipidemia, vasovagal syncope, depressive disorder, and migraines who presents alone for a physical.    Right Knee Pain: The patient had a right knee arthroscopy on 4/13/18. Since then, her knee pain has improved, but she continues to experience moderate pain. She reports that her knee is very stiff and she is unable to bend it pas 90 degrees. This does not bother her when walking, but she does notice it when going up and down stairs. She realizes that she needs to give her knee more time to heal and feel better.     Concha is now retired. In her new free time she has been doing activities such as going to weight watchers, playing volleyball once per week, swimming once per week, and playing with her grandchildren. She does miss working, but is enjoying her new free time.    Seborrheic Keratosis: She used to follow with Dr. Holliday for this, but has started seeing someone else because Dr. Holliday transferred locations.      Health Maintenance:  She would like her cholesterol checked.  Intermittently takes her Vitamin D supplement and baby aspirin. She believes that she got less compliant after her knee surgery.  Uses ibuprofen for her chronic back pain, reporting mild low back arthritis. Reports increased back pain with walking.   Does not use melatonin.   Mammogram on 11/21/18 was unremarkable.   She is unsure about receiving a flu shot.     Other concerns discussed:  1. She developed a toothache about a month ago and is asking for an antibiotic before she goes to the dentist to have her teeth fixed.   2. Would like a refill of butalbital for migraines. She went through a fair amount since her last visit in 2017.   3. We discussed the difference between preventative and problem based  visits.   4. Reports some cold sensations in her hands. Also notes that she gets hot/cold flashes.      Review of Systems:   Pertinent items are noted in HPI or as in patient entered ROS below, remainder of complete ROS is negative.   Answers for HPI/ROS submitted by the patient on 11/28/2018   General Symptoms: Yes  Skin Symptoms: No  HENT Symptoms: Yes  EYE SYMPTOMS: No  HEART SYMPTOMS: No  LUNG SYMPTOMS: No  INTESTINAL SYMPTOMS: Yes  URINARY SYMPTOMS: Yes  GYNECOLOGIC SYMPTOMS: No  BREAST SYMPTOMS: No  SKELETAL SYMPTOMS: Yes  BLOOD SYMPTOMS: No  NERVOUS SYSTEM SYMPTOMS: No  MENTAL HEALTH SYMPTOMS: No  Fever: No  Loss of appetite: No  Weight loss: No  Weight gain: No  Fatigue: No  Night sweats: No  Chills: No  Increased stress: No  Excessive hunger: No  Excessive thirst: No  Feeling hot or cold when others believe the temperature is normal: Yes  Loss of height: No  Post-operative complications: Yes  Surgical site pain: No  Hallucinations: No  Change in or Loss of Energy: No  Hyperactivity: No  Confusion: No  Ear pain: Yes  Ear discharge: No  Hearing loss: No  Tinnitus: No  Nosebleeds: No  Congestion: No  Sinus pain: No  Trouble swallowing: No   Voice hoarseness: No  Mouth sores: No  Sore throat: No  Tooth pain: Yes  Gum tenderness: No  Bleeding gums: No  Change in taste: No  Change in sense of smell: No  Dry mouth: No  Hearing aid used: No  Neck lump: No  Heart burn or indigestion: No  Nausea: No  Vomiting: No  Abdominal pain: No  Bloating: No  Constipation: No  Diarrhea: Yes  Blood in stool: No  Black stools: No  Rectal or Anal pain: No  Fecal incontinence: No  Yellowing of skin or eyes: No  Vomit with blood: No  Change in stools: No  Trouble holding urine or incontinence: No  Pain or burning: No  Trouble starting or stopping: No  Increased frequency of urination: No  Blood in urine: No  Decreased frequency of urination: No  Frequent nighttime urination: Yes  Flank pain: No  Difficulty emptying bladder: No  Back  pain: Yes  Muscle aches: Yes  Neck pain: No  Swollen joints: Yes  Joint pain: Yes  Bone pain: No  Muscle cramps: No  Muscle weakness: No  Joint stiffness: Yes  Bone fracture: No    Active Medications:      acetaminophen (TYLENOL) 325 MG tablet, Take 2 tablets (650 mg) by mouth every 4 hours as needed for mild pain, Disp: 100 tablet, Rfl: 1     amoxicillin (AMOXIL) 500 MG capsule, Take 4 capsules (2000 mg) by mouth, one hour prior to dental work, Disp: 4 capsule, Rfl: 0     atorvastatin (LIPITOR) 80 MG tablet, Take 1 tablet (80 mg) by mouth daily, Disp: 90 tablet, Rfl: 0     IBUPROFEN PO, Take 600 mg by mouth every 6 hours as needed for moderate pain, Disp: , Rfl:      VITAMIN D, CHOLECALCIFEROL, PO, Take 2,000 Units by mouth daily as needed, Disp: , Rfl:      HYDROmorphone (DILAUDID) 2 MG tablet, Take 1 tablet (2 mg) by mouth every 6 hours as needed for pain (Patient not taking: Reported on 11/28/2018), Disp: 30 tablet, Rfl: 0     HYDROmorphone (DILAUDID) 2 MG tablet, For pain complaints of 1-5 give 1 tablets, for pain complaints of 6-10 give 2 tablets every 4 hours as needed for pain. (Patient not taking: Reported on 11/28/2018), Disp: 50 tablet, Rfl: 0     melatonin 3 MG tablet, Take 1 tablet (3 mg) by mouth nightly as needed for sleep (Patient not taking: Reported on 11/28/2018), Disp: 30 tablet, Rfl: 3     senna-docusate (SENOKOT-S;PERICOLACE) 8.6-50 MG per tablet, Take 1 tablet by mouth 2 times daily (Patient not taking: Reported on 11/28/2018), Disp: 100 tablet, Rfl: 0      Allergies:   Codeine and Percocet [oxycodone-acetaminophen]      Past Medical History:  Right knee arthritis  Basal cell carcinoma  Depressive disorder  Hyperlipidemia  insomnia  Migraines  Plantar fasciitis, bilateral  PONV  Squamous cell carcinoma  Vasovagal syncope   Hemorrhoids  Swelling of LE  Vertigo      Past Surgical History:   Right knee arthroplasty  laparoscopy  Laser surgery, basal cell carcinoma   Cataract  Lasik  "Bilateral  Mohs micrographic procedure     Family History:   Mother: headache, narcolepsy, cancer, cerebrovascular disease, hypothyroid, migraines, seizures   Sister: headaches, hypothyroid, hypertension  Father: skin cancer, lung cancer, MI, CAD, cerebrovascular disease, alcoholism, migraines  Brother: CAD, skin cancer, CAD, hyperlipidemia,  alcoholism, substance abuse, cancer     Social History:   The patient is , a nonsmoker, and rarely consumes alcohol.     Physical Exam:   BP (!) 141/92 (BP Location: Right arm, Patient Position: Sitting, Cuff Size: Adult Regular)  Pulse 68  Temp 97.7  F (36.5  C) (Oral)  Resp 18  Ht 1.562 m (5' 1.5\")  Wt 73.2 kg (161 lb 4.8 oz)  SpO2 98%  Breastfeeding? No  BMI 29.98 kg/m2   BP Readings from Last 6 Encounters:   11/28/18 (!) 141/92   05/09/18 143/84   04/16/18 148/89   04/10/18 (!) 135/93   03/29/18 155/88   10/02/17 148/90     Physical Examination:  General:  Pleasant, alert, no acute distress  Eyes:  Pupils 2-3 mm, sclera white, EOM's full. Right eye ptosis.    Ears:  Wax full on the left, partial on the right, o/w EAC and TM intact   Nose:  Nasal passages clear, turbinates not swollen  Throat/Mouth:  No pharyngeal erythema or exudate, oral mucosa and tongue moist, no suspicious oral lesions  Neck:  Full AROM, supple, thyroid smooth, symmetric, not enlarged, no nodules  Lungs:  Clear to auscultation throughout, no wheezes, rhonchi or rales.  C/V:  Regular rate and rhythm, no murmurs, rubs or gallops.  No JVD, no carotid bruits.  No peripheral edema. Peripheral pulses intact.   Abdomen:  Not distended.  Bowel sounds active.  No tenderness, no hepatosplenomegaly or masses.  No CVA tenderness or masses.   Lymph:  No cervical lymph nodes.  Neuro:  Alert and oriented, face symmetric. No tremor.  Gait steady.    M/S:   No joint deformities noted.  No joint swelling. No LE edema.   Skin:   No rashes or jaundice.  Normal moisture, good skin turgor.  Psych:  Broad range " affect.  Not psychomotor slowed.  No signs of anxiety or agitation.     Mammogram:  EXAMINATION: Bilateral digital screening mammography with computer  aided detection. 11/21/18  IMPRESSION: BI-RADS CATEGORY: 1 -  NEGATIVE.    Assessment and Plan:  Concha was seen today for physical.    Diagnoses and all orders for this visit:    Routine history and physical examination of adult    History of skin cancer  -     DERMATOLOGY REFERRAL    Hyperlipidemia, unspecified hyperlipidemia type  Continued monitoring.   -     Lipid Profile FASTING; Future    Disorder of bone and cartilage, Osteopenia, unspecified location, Vitamin D deficiency  She has not been taking her Vitamin D supplements, but we discussed this and I told her that she needs to be taking her Vitamin D supplements in attempt to prevent bone density loss.   -     Vitamin D Deficiency; Future    Need for SBE (subacute bacterial endocarditis) prophylaxis  -     amoxicillin (AMOXIL) 500 MG capsule; Take 4 capsules (2000 mg) by mouth, one hour prior to dental work    Migraine with aura and without status migrainosus, not intractable  I advised her to use butalbital sparingly as it has addictive properties.   -     butalbital-aspirin-caffeine (FIORINAL) -40 MG per capsule; Take 1 capsule by mouth daily as needed for headaches    Hyperlipidemia LDL goal <130  Refill medication.   -     atorvastatin (LIPITOR) 80 MG tablet; Take 1 tablet (80 mg) by mouth daily    Cold intolerance  She reports experiencing some hot and cold flashes recently. We will check her thyroid.   -     TSH with free T4 reflex; Future    Need for vaccination  Routine flu vaccination, which she will receive in clinic today.   -     FLU VACCINE HIGH DOSE PRESERVATIVE FREE, AGE =>65 YR    Shingrix  We discussed the new Shingrix vaccine. She will check with their insurance for coverage.     Follow-up: within the next few weeks for back pain and elevated blood pressure         Scribe Disclosure:    I, Nya Sierra, am serving as a scribe to document services personally performed by Sammi Beltre MD at this visit, based upon the provider's statements to me. All documentation has been reviewed by the aforementioned provider prior to being entered into the official medical record.     Portions of this medical record were completed by a scribe. UPON MY REVIEW AND AUTHENTICATION BY ELECTRONIC SIGNATURE, this confirms (a) I performed the applicable clinical services, and (b) the record is accurate.     Sammi Beltre M.D.  Internal Medicine  Primary Care Center   pager [phone]

## 2018-11-28 NOTE — NURSING NOTE
Chief Complaint   Patient presents with     Physical     Patient is here for annual physical       Ethan Rashid CMA (AAMA) at 7:42 AM on 11/28/2018

## 2019-09-30 ENCOUNTER — HEALTH MAINTENANCE LETTER (OUTPATIENT)
Age: 74
End: 2019-09-30

## 2019-10-01 ENCOUNTER — TELEPHONE (OUTPATIENT)
Dept: INTERNAL MEDICINE | Facility: CLINIC | Age: 74
End: 2019-10-01

## 2019-10-01 DIAGNOSIS — Z96.651 STATUS POST TOTAL RIGHT KNEE REPLACEMENT: ICD-10-CM

## 2019-10-01 DIAGNOSIS — G43.109 MIGRAINE WITH AURA AND WITHOUT STATUS MIGRAINOSUS, NOT INTRACTABLE: ICD-10-CM

## 2019-10-01 NOTE — TELEPHONE ENCOUNTER
Health Call Center    Phone Message    May a detailed message be left on voicemail: no    Reason for Call: Medication Question or concern regarding medication   Prescription Clarification  Name of Medication: butalbital-aspirin-caffeine (FIORINAL) -40 MG per capsule  Prescribing Provider: Dr Beltre   Pharmacy:    [facility] [place] -  What on the order needs clarification? Pharmacy calling about the prescription. The prescription is not signed. It will need a verbal in order to get to be dispensed. Please call the pharmacy to discuss further.          Action Taken: Message routed to:  [facility] ([facility] meds refill team

## 2019-10-02 ENCOUNTER — DOCUMENTATION ONLY (OUTPATIENT)
Dept: CARE COORDINATION | Facility: CLINIC | Age: 74
End: 2019-10-02

## 2019-10-02 NOTE — TELEPHONE ENCOUNTER
Called pharmacy on hold twice, routed to MD for verification for approval or denial. Muna Guido Paramedic on 10/2/2019 at 2:02 PM

## 2019-10-03 ENCOUNTER — OFFICE VISIT (OUTPATIENT)
Dept: DERMATOLOGY | Facility: CLINIC | Age: 74
End: 2019-10-03
Payer: COMMERCIAL

## 2019-10-03 DIAGNOSIS — D48.5 NEOPLASM OF UNCERTAIN BEHAVIOR OF SKIN: ICD-10-CM

## 2019-10-03 DIAGNOSIS — Z85.828 HISTORY OF BASAL CELL CARCINOMA (BCC): ICD-10-CM

## 2019-10-03 DIAGNOSIS — L57.0 ACTINIC KERATOSIS: Primary | ICD-10-CM

## 2019-10-03 DIAGNOSIS — Z85.828 HISTORY OF SCC (SQUAMOUS CELL CARCINOMA) OF SKIN: ICD-10-CM

## 2019-10-03 DIAGNOSIS — L81.4 SOLAR LENTIGO: ICD-10-CM

## 2019-10-03 DIAGNOSIS — L82.1 SEBORRHEIC KERATOSIS: ICD-10-CM

## 2019-10-03 DIAGNOSIS — L71.9 ROSACEA: ICD-10-CM

## 2019-10-03 RX ORDER — METRONIDAZOLE 7.5 MG/G
GEL TOPICAL
Qty: 45 G | Refills: 11 | Status: SHIPPED | OUTPATIENT
Start: 2019-10-03

## 2019-10-03 RX ORDER — BUTALBITAL/ASPIRIN/CAFFEINE 50-325-40
1 CAPSULE ORAL DAILY PRN
Qty: 30 CAPSULE | Refills: 2 | Status: SHIPPED | OUTPATIENT
Start: 2019-10-03 | End: 2021-12-29

## 2019-10-03 ASSESSMENT — PAIN SCALES - GENERAL: PAINLEVEL: NO PAIN (0)

## 2019-10-03 NOTE — LETTER
10/3/2019       RE: Connie J Frankenberg  [address]  [facility] [address]     Dear Colleague,    Thank you for referring your patient, Connie J Frankenberg, to the [facility] DERMATOLOGY at [facility]. Please see a copy of my visit note below.    [facility] Dermatology Note      Dermatology Problem List:  FBSE 10/3/19  0. NUB, L forehead. 5 mm papule concerning for BCC. Pending biopsy.   1. Hx NMSC   - BCC, central chest,   - SCC, right upper cutaneous lip, s/p MMS 2014   -    2. AKs. LiqN2.    - consider field treatment at follow-up.   3. SKs, lentigenes, dermal nevi.     CC:   Chief Complaint   Patient presents with     Skin Check     Concha is here today for a skin check. Concha notes an itchy rough patch on upper right back, bilateral forearms and right shin, and red spot on left cheek. HX of BCC, SCC, AK and SK.        Encounter Date: Oct 3, 2019    History of Present Illness:  Ms. Connie J Frankenberg is a 74 year old female who presents in self referral for a history of skin cancer and has a few concerns include multiple spots on her cheeks. She reports a 3-4 month history of multiple pink gritty spots on the bilateral cheeks, forehead, and forearms. No associated pain, bleeding of pruritus.     She has a history of multiple BCC (>10), and a SCC on the R upper cutaneous lip. Has prior followed with Dr. Holliday at [facility] but most recently with Dr. Dobson at [facility]. She reports a family history of skin cancer, including BCCs / SCCs, in both parents and her brother. She has had cryotherapy for AKs in the past, but no field treatment. No history of indoor tanning. No history of immunosuppressions.     The patient otherwise denies any new or concerning lesions. No bleeding, painful, pruritic, or changing lesions. They use sunscreen and protective clothing when outdoors for sun protection.     Of note, patient also reports a history of rosacea on the cheeks  "and nose. Has used metronidazole 0.75% gel intermittently in the past as needed for control. She ran out of this recently and does not have refills.     Health otherwise stable. No other skin concerns.       Past Medical History:   Patient Active Problem List   Diagnosis     Skin cancer, basal cell     Hyperlipidemia     Classical migraine     Insomnia     Arthritis of knee     Hemorrhoids     Vasovagal syncope     BCC (basal cell carcinoma), face     Swelling of lower extremity     S/P total knee arthroplasty     Vertigo, benign positional, bilateral     Status post total right knee replacement     Osteopenia     Disorder of bone and cartilage     Past Medical History:   Diagnosis Date     Arthritis of knee     right     Basal cell carcinoma      BPPV (benign paroxysmal positional vertigo)      Depressive disorder 1985     Disorder of bone and cartilage     (osteopenia)     Hemorrhoids      History of PID      Hyperlipidemia      Insomnia      Migraines, classic     sparkly aura     Osteopenia      Plantar fasciitis, bilateral      PONV (postoperative nausea and vomiting)      Skin cancer, basal cell      Squamous cell carcinoma      Swelling of the ankle, feet, or leg      Vasovagal syncope     is \"fainter\" with blood draws, injections-NEEDS TO BE LAYING DOWN     Past Surgical History:   Procedure Laterality Date     ARTHROPLASTY KNEE Right 4/13/2018    Procedure: ARTHROPLASTY KNEE;  Right Total Knee Replacement;  Surgeon: Apollo Nguyen MD;  Location: [facility] OR     C NONSPECIFIC PROCEDURE      laparoscopy,     C NONSPECIFIC PROCEDURE      laser surg basal cell skin cancer     C STOMACH SURGERY PROCEDURE UNLISTED      laparoscopy for infertility     CATARACT IOL, RT/LT       LASIK BILATERAL       MOHS MICROGRAPHIC PROCEDURE         Social History:  Patient reports that she has never smoked. She has never used smokeless tobacco. She reports current alcohol use. She reports that she does not use drugs.    Family " History:  Family History   Problem Relation Age of Onset     Neurologic Disorder Mother         headaches/narcolepsy     Cancer Mother         face skin     Cerebrovascular Disease Mother         multifocal infarcts to brain     Thyroid Disease Mother         hypothyroid     Seizure Disorder Mother      Migraines Mother      Neurologic Disorder Sister         headaches     Cancer Father         Skin, lung     C.A.D. Father         MI     Coronary Artery Disease Father      Cerebrovascular Disease Father         ,, ,     Alcoholism Father      C.A.D. Brother         CABG     Thyroid Disease Sister         hypothyroid     Cancer Brother         skin cancer     Coronary Artery Disease Brother      Hyperlipidemia Brother      Alcoholism Brother      Hypertension Sister      Thyroid Disease Sister      Migraines Sister      Substance Abuse Brother      Cancer Brother      Thyroid Disease Sister        Medications:  Current Outpatient Medications   Medication Sig Dispense Refill     acetaminophen (TYLENOL) 325 MG tablet Take 2 tablets (650 mg) by mouth every 4 hours as needed for mild pain 100 tablet 1     amoxicillin (AMOXIL) 500 MG capsule Take 4 capsules (2000 mg) by mouth, one hour prior to dental work 12 capsule 1     aspirin (ASA) 81 MG tablet Take 1 tablet (81 mg) by mouth daily 90 tablet 3     atorvastatin (LIPITOR) 80 MG tablet Take 1 tablet (80 mg) by mouth daily 90 tablet 3     butalbital-aspirin-caffeine (FIORINAL) -40 MG per capsule Take 1 capsule by mouth daily as needed for headaches 30 capsule 2     COMPRESSION STOCKINGS 2 each daily 2 each 1     IBUPROFEN PO Take 600 mg by mouth every 6 hours as needed for moderate pain       VITAMIN D, CHOLECALCIFEROL, PO Take 2,000 Units by mouth daily as needed       Allergies   Allergen Reactions     Codeine GI Disturbance     Percocet [Oxycodone-Acetaminophen] Nausea and Vomiting         Review of Systems:  -Skin Establ Pt: The patient denies any new rash,  pruritus, or lesions that are symptomatic, changing or bleeding, except as per HPI.  -Constitutional: Otherwise feeling well today, in usual state of health.  -HEENT: Patient denies nonhealing oral sores.  -Skin: As above in HPI. No additional skin concerns.    Physical exam:  Vitals: There were no vitals taken for this visit.  GEN: This is a well developed, well-nourished female in no acute distress, in a pleasant mood.    SKIN: Total skin excluding the undergarment areas was performed. The exam included the head/face, neck, both arms, chest, back, abdomen, both legs, digits and/or nails.   - [place] skin type: I  - Multiple pink gritty papules on bilateral cheeks, nose, forehead x 7 and bilateral forearms x 4.   - Well healed scars at prior NMSC excision sites without evidence of recurrence, nodularity or tenderness.   - On the L forehead, there is a 4-5 mm shiny papule with dilated blood vessels appreciated under dermoscopy.   - Brown, waxy, stuck-on appearing papules on trunk and extremities.  - Brown macules on sun exposed areas.  - Few skin-colored papules on bilateral forehead with pigment distally c/w dermal nevi.   - No other lesions of concern on areas examined.         Impression/Plan:    1. History of nonmelanoma skin cancer, no clincial evidence of recurrence    Sun precaution was advised including the use of sun screens of SPF 30 or higher, sun protective clothing, and avoidance of tanning beds.    2. Actinic keratosis. Will treat lesions on the face and forearms/dorsal hands today. Would consider field treatment with efudex to the face in the future pending biopsy as below.     Cryotherapy procedure note: After verbal consent and discussion of risks and benefits including but no limited to dyspigmentation/scar, blister, and pain, 11 lesions were treated with 1-2mm freeze border for 2 cycles with liquid nitrogen. Post cryotherapy instructions were provided.    3. Neoplasm of uncertain behavior on  the left forehead. The differential diagnosis includes basal cell carcinoma versus dermal nevus. We recommended biopsy. Patient has family event upcoming and preferred to return in 2-4 weeks for biopsy. Clinical photo taken today for reference.       4. Multiple clinically benign nevi on the face and trunk.     ABCDs of melanoma were discussed and self skin checks were advised.     5. Seborrheic keratosis, solar lentigos. Explained to patient benign nature of lesion. No treatment is necessary at this time unless the lesion changes or becomes symptomatic.     Follow-up in 2- 4 weeks for biopsy of the lesion on the L forehead.      Staff Involved:  Staff Only    Jason Gao MD    Department of Dermatology  [facility]: Phone: [phone], Fax:[phone]  [facility]: Phone: [phone], Fax: [phone]

## 2019-10-03 NOTE — PATIENT INSTRUCTIONS
Cryotherapy    What is it?    Use of a very cold liquid, such as liquid nitrogen, to freeze and destroy abnormal skin cells that need to be removed    What should I expect?    Tenderness and redness    A small blister that might grow and fill with dark purple blood. There may be crusting.    More than one treatment may be needed if the lesions do not go away.    How do I care for the treated area?    Gently wash the area with your hands when bathing.    Use a thin layer of Vaseline to help with healing. You may use a Band-Aid.     The area should heal within 7-10 days and may leave behind a pink or lighter color.     Do not use an antibiotic or Neosporin ointment.     You may take acetaminophen (Tylenol) for pain.     Call your Doctor if you have:    Severe pain    Signs of infection (warmth, redness, cloudy yellow drainage, and or a bad smell)    Questions or concerns    Who should I call with questions?       [facility]: [phone]       [facility]: [phone]       For urgent needs outside of business hours call the [facility] at [phone]        and ask for the dermatology resident on call

## 2019-10-03 NOTE — NURSING NOTE
Chief Complaint   Patient presents with     Skin Check     Concha is here today for a skin check. Concha notes an itchy rough patch on upper right back, bilateral forearms and right shin, and red spot on left cheek. HX of BCC, SCC, AK and SK.      Amanda Bell LPN

## 2019-10-03 NOTE — PROGRESS NOTES
[facility] Dermatology Note      Dermatology Problem List:  FBSE 10/3/19  0. NUB, L forehead. 5 mm papule concerning for BCC. Pending biopsy.   1. Hx NMSC   - BCC, central chest,   - SCC, right upper cutaneous lip, s/p MMS 2014   -    2. AKs. LiqN2.    - consider field treatment at follow-up.   3. SKs, lentigenes, dermal nevi.     CC:   Chief Complaint   Patient presents with     Skin Check     Concha is here today for a skin check. Concha notes an itchy rough patch on upper right back, bilateral forearms and right shin, and red spot on left cheek. HX of BCC, SCC, AK and SK.        Encounter Date: Oct 3, 2019    History of Present Illness:  Ms. Connie J Frankenberg is a 74 year old female who presents in self referral for a history of skin cancer and has a few concerns include multiple spots on her cheeks. She reports a 3-4 month history of multiple pink gritty spots on the bilateral cheeks, forehead, and forearms. No associated pain, bleeding of pruritus.     She has a history of multiple BCC (>10), and a SCC on the R upper cutaneous lip. Has prior followed with Dr. Holliday at [facility] but most recently with Dr. Dobson at [facility]. She reports a family history of skin cancer, including BCCs / SCCs, in both parents and her brother. She has had cryotherapy for AKs in the past, but no field treatment. No history of indoor tanning. No history of immunosuppressions.     The patient otherwise denies any new or concerning lesions. No bleeding, painful, pruritic, or changing lesions. They use sunscreen and protective clothing when outdoors for sun protection.     Of note, patient also reports a history of rosacea on the cheeks and nose. Has used metronidazole 0.75% gel intermittently in the past as needed for control. She ran out of this recently and does not have refills.     Health otherwise stable. No other skin concerns.       Past Medical History:   Patient Active Problem List   Diagnosis     Skin cancer,  "basal cell     Hyperlipidemia     Classical migraine     Insomnia     Arthritis of knee     Hemorrhoids     Vasovagal syncope     BCC (basal cell carcinoma), face     Swelling of lower extremity     S/P total knee arthroplasty     Vertigo, benign positional, bilateral     Status post total right knee replacement     Osteopenia     Disorder of bone and cartilage     Past Medical History:   Diagnosis Date     Arthritis of knee     right     Basal cell carcinoma      BPPV (benign paroxysmal positional vertigo)      Depressive disorder 1985     Disorder of bone and cartilage     (osteopenia)     Hemorrhoids      History of PID      Hyperlipidemia      Insomnia      Migraines, classic     sparkly aura     Osteopenia      Plantar fasciitis, bilateral      PONV (postoperative nausea and vomiting)      Skin cancer, basal cell      Squamous cell carcinoma      Swelling of the ankle, feet, or leg      Vasovagal syncope     is \"fainter\" with blood draws, injections-NEEDS TO BE LAYING DOWN     Past Surgical History:   Procedure Laterality Date     ARTHROPLASTY KNEE Right 4/13/2018    Procedure: ARTHROPLASTY KNEE;  Right Total Knee Replacement;  Surgeon: Apollo Nguyen MD;  Location: [facility] OR     C NONSPECIFIC PROCEDURE      laparoscopy,     C NONSPECIFIC PROCEDURE      laser surg basal cell skin cancer     C STOMACH SURGERY PROCEDURE UNLISTED      laparoscopy for infertility     CATARACT IOL, RT/LT       LASIK BILATERAL       MOHS MICROGRAPHIC PROCEDURE         Social History:  Patient reports that she has never smoked. She has never used smokeless tobacco. She reports current alcohol use. She reports that she does not use drugs.    Family History:  Family History   Problem Relation Age of Onset     Neurologic Disorder Mother         headaches/narcolepsy     Cancer Mother         face skin     Cerebrovascular Disease Mother         multifocal infarcts to brain     Thyroid Disease Mother         hypothyroid     Seizure Disorder " Mother      Migraines Mother      Neurologic Disorder Sister         headaches     Cancer Father         Skin, lung     C.A.D. Father         MI     Coronary Artery Disease Father      Cerebrovascular Disease Father         ,, ,     Alcoholism Father      C.A.D. Brother         CABG     Thyroid Disease Sister         hypothyroid     Cancer Brother         skin cancer     Coronary Artery Disease Brother      Hyperlipidemia Brother      Alcoholism Brother      Hypertension Sister      Thyroid Disease Sister      Migraines Sister      Substance Abuse Brother      Cancer Brother      Thyroid Disease Sister        Medications:  Current Outpatient Medications   Medication Sig Dispense Refill     acetaminophen (TYLENOL) 325 MG tablet Take 2 tablets (650 mg) by mouth every 4 hours as needed for mild pain 100 tablet 1     amoxicillin (AMOXIL) 500 MG capsule Take 4 capsules (2000 mg) by mouth, one hour prior to dental work 12 capsule 1     aspirin (ASA) 81 MG tablet Take 1 tablet (81 mg) by mouth daily 90 tablet 3     atorvastatin (LIPITOR) 80 MG tablet Take 1 tablet (80 mg) by mouth daily 90 tablet 3     butalbital-aspirin-caffeine (FIORINAL) -40 MG per capsule Take 1 capsule by mouth daily as needed for headaches 30 capsule 2     COMPRESSION STOCKINGS 2 each daily 2 each 1     IBUPROFEN PO Take 600 mg by mouth every 6 hours as needed for moderate pain       VITAMIN D, CHOLECALCIFEROL, PO Take 2,000 Units by mouth daily as needed       Allergies   Allergen Reactions     Codeine GI Disturbance     Percocet [Oxycodone-Acetaminophen] Nausea and Vomiting         Review of Systems:  -Skin Establ Pt: The patient denies any new rash, pruritus, or lesions that are symptomatic, changing or bleeding, except as per HPI.  -Constitutional: Otherwise feeling well today, in usual state of health.  -HEENT: Patient denies nonhealing oral sores.  -Skin: As above in HPI. No additional skin concerns.    Physical exam:  Vitals: There were  no vitals taken for this visit.  GEN: This is a well developed, well-nourished female in no acute distress, in a pleasant mood.    SKIN: Total skin excluding the undergarment areas was performed. The exam included the head/face, neck, both arms, chest, back, abdomen, both legs, digits and/or nails.   - De La Rosa skin type: I  - Multiple pink gritty papules on bilateral cheeks, nose, forehead x 7 and bilateral forearms x 4.   - Well healed scars at prior NMSC excision sites without evidence of recurrence, nodularity or tenderness.   - On the L forehead, there is a 4-5 mm shiny papule with dilated blood vessels appreciated under dermoscopy.   - Brown, waxy, stuck-on appearing papules on trunk and extremities.  - Brown macules on sun exposed areas.  - Few skin-colored papules on bilateral forehead with pigment distally c/w dermal nevi.   - No other lesions of concern on areas examined.         Impression/Plan:    1. History of nonmelanoma skin cancer, no clincial evidence of recurrence    Sun precaution was advised including the use of sun screens of SPF 30 or higher, sun protective clothing, and avoidance of tanning beds.    2. Actinic keratosis. Will treat lesions on the face and forearms/dorsal hands today. Would consider field treatment with efudex to the face in the future pending biopsy as below.     Cryotherapy procedure note: After verbal consent and discussion of risks and benefits including but no limited to dyspigmentation/scar, blister, and pain, 11 lesions were treated with 1-2mm freeze border for 2 cycles with liquid nitrogen. Post cryotherapy instructions were provided.    3. Neoplasm of uncertain behavior on the left forehead. The differential diagnosis includes basal cell carcinoma versus dermal nevus. We recommended biopsy. Patient has family event upcoming and preferred to return in 2-4 weeks for biopsy. Clinical photo taken today for reference.       4. Multiple clinically benign nevi on the face  and trunk.     ABCDs of melanoma were discussed and self skin checks were advised.     5. Seborrheic keratosis, solar lentigos. Explained to patient benign nature of lesion. No treatment is necessary at this time unless the lesion changes or becomes symptomatic.     Follow-up in 2- 4 weeks for biopsy of the lesion on the L forehead.      Staff Involved:  Staff Only    Jason Gao MD    Department of Dermatology  [facility] [place]: Phone: [phone], Fax:[phone]  [facility]: Phone: [phone], Fax: [phone]

## 2019-10-14 NOTE — TELEPHONE ENCOUNTER
Central Prior Authorization Team   Phone: [phone]      PA Initiation    Medication: butalbital-aspirin-caffeine (FIORINAL) -40 MG per capsule-PA Pending  Insurance Company: [employer] - Phone [phone] Fax [phone]  Pharmacy Filling the Rx: [facility] [place], [place] - [address]  Filling Pharmacy Phone: [phone]  Filling Pharmacy Fax:    Start Date: 10/14/2019

## 2019-10-14 NOTE — TELEPHONE ENCOUNTER
[facility] Call Center    Phone Message    May a detailed message be left on voicemail: yes    Reason for Call: Medication Question or concern regarding medication   Prescription Clarification  Name of Medication: fiorinal  Prescribing Provider: bridget   Pharmacy: [facility]   What on the order needs clarification? The pt is asking if the refill has been okay'ed. Does it need a prior auth? When can she get the refill? Please call the pt to discuss.           Action Taken: Message routed to:  [facility] ([facility]): maria elena [facility] med

## 2019-10-14 NOTE — TELEPHONE ENCOUNTER
Prior Authorization Retail Medication Request    Medication/Dose: butalbital-aspirin-caffeine (FIORINAL) -40 MG per capsule  ICD code (if different than what is on RX):  G43.109  Previously Tried and Failed:    Rationale:      Insurance Name: [facility]  Insurance ID: [id number]

## 2019-10-15 NOTE — TELEPHONE ENCOUNTER
Prior Authorization Approval    Authorization Effective Date: 9/14/2019  Authorization Expiration Date: 10/13/2020  Medication: butalbital-aspirin-caffeine (FIORINAL) -40 MG per capsule-APPROVED  Approved Dose/Quantity:  Reference #: [id number]   Insurance Company: [employer] - Phone [phone] Fax [phone]  Expected CoPay:       CoPay Card Available:      Foundation Assistance Needed:    Which Pharmacy is filling the prescription (Not needed for infusion/clinic administered): [facility] [place], [place] - [address]  Pharmacy Notified: Yes-Pharmacy will notify patient when ready.  Patient Notified: No

## 2019-10-24 ENCOUNTER — OFFICE VISIT (OUTPATIENT)
Dept: DERMATOLOGY | Facility: CLINIC | Age: 74
End: 2019-10-24
Payer: COMMERCIAL

## 2019-10-24 DIAGNOSIS — D48.5 NEOPLASM OF UNCERTAIN BEHAVIOR OF SKIN: Primary | ICD-10-CM

## 2019-10-24 DIAGNOSIS — L57.0 ACTINIC KERATOSIS: ICD-10-CM

## 2019-10-24 ASSESSMENT — PAIN SCALES - GENERAL: PAINLEVEL: NO PAIN (0)

## 2019-10-24 NOTE — PATIENT INSTRUCTIONS

## 2019-10-24 NOTE — LETTER
10/24/2019       RE: Connie J Frankenberg  [address]  [place] [address]     Dear Colleague,    Thank you for referring your patient, Connie J Frankenberg, to the [facility] DERMATOLOGY at [facility]. Please see a copy of my visit note below.    [facility] Dermatology Note      Dermatology Problem List:  FBSE 10/3/19  0. NUB, L forehead. 5 mm papule concerning for BCC. S/p bx 10/24/19.   1. Hx NMSC   - BCC, central chest   - SCC, right upper cutaneous lip, s/p MMS 2014  2. AKs. LiqN2.    - consider field treatment at follow-up.   3. SKs, lentigenes, dermal nevi.     CC:   Chief Complaint   Patient presents with     RECHECK     Concha is here today for a forehead biopsy.        Encounter Date: Oct 24, 2019    History of Present Illness:  Ms. Connie J Frankenberg is a 74 year old female who presents as follow-up for lesion on the L forehead. Last seen about 3 weeks ago when she deferred biopsy due to a family event. She also reports that one AK on the L temple treated at last visit did not completely resolve and would like it re-treated. Health otherwise stable. No other skin concerns.     Past Medical History:   Patient Active Problem List   Diagnosis     Skin cancer, basal cell     Hyperlipidemia     Classical migraine     Insomnia     Arthritis of knee     Hemorrhoids     Vasovagal syncope     BCC (basal cell carcinoma), face     Swelling of lower extremity     S/P total knee arthroplasty     Vertigo, benign positional, bilateral     Status post total right knee replacement     Osteopenia     Disorder of bone and cartilage     Past Medical History:   Diagnosis Date     Arthritis of knee     right     Basal cell carcinoma      BPPV (benign paroxysmal positional vertigo)      Depressive disorder 1985     Disorder of bone and cartilage     (osteopenia)     Hemorrhoids      History of PID      Hyperlipidemia      Insomnia      Migraines, classic      "sparkly aura     Osteopenia      Plantar fasciitis, bilateral      PONV (postoperative nausea and vomiting)      Skin cancer, basal cell      Squamous cell carcinoma      Swelling of the ankle, feet, or leg      Vasovagal syncope     is \"fainter\" with blood draws, injections-NEEDS TO BE LAYING DOWN     Past Surgical History:   Procedure Laterality Date     ARTHROPLASTY KNEE Right 4/13/2018    Procedure: ARTHROPLASTY KNEE;  Right Total Knee Replacement;  Surgeon: Apollo Nguyen MD;  [facility]     C NONSPECIFIC PROCEDURE      laparoscopy,     C NONSPECIFIC PROCEDURE      laser surg basal cell skin cancer     C STOMACH SURGERY PROCEDURE UNLISTED      laparoscopy for infertility     CATARACT IOL, RT/LT       LASIK BILATERAL       MOHS MICROGRAPHIC PROCEDURE         Social History:  Patient reports that she has never smoked. She has never used smokeless tobacco. She reports current alcohol use. She reports that she does not use drugs.    Family History:  Family History   Problem Relation Age of Onset     Neurologic Disorder Mother         headaches/narcolepsy     Cancer Mother         face skin     Cerebrovascular Disease Mother         multifocal infarcts to brain     Thyroid Disease Mother         hypothyroid     Seizure Disorder Mother      Migraines Mother      Neurologic Disorder Sister         headaches     Cancer Father         Skin, lung     C.A.D. Father         MI     Coronary Artery Disease Father      Cerebrovascular Disease Father         ,, ,     Alcoholism Father      C.A.D. Brother         CABG     Thyroid Disease Sister         hypothyroid     Cancer Brother         skin cancer     Coronary Artery Disease Brother      Hyperlipidemia Brother      Alcoholism Brother      Hypertension Sister      Thyroid Disease Sister      Migraines Sister      Substance Abuse Brother      Cancer Brother      Thyroid Disease Sister        Medications:  Current Outpatient Medications   Medication Sig Dispense Refill "     acetaminophen (TYLENOL) 325 MG tablet Take 2 tablets (650 mg) by mouth every 4 hours as needed for mild pain 100 tablet 1     amoxicillin (AMOXIL) 500 MG capsule Take 4 capsules (2000 mg) by mouth, one hour prior to dental work 12 capsule 1     aspirin (ASA) 81 MG tablet Take 1 tablet (81 mg) by mouth daily 90 tablet 3     atorvastatin (LIPITOR) 80 MG tablet Take 1 tablet (80 mg) by mouth daily 90 tablet 3     butalbital-aspirin-caffeine (FIORINAL) -40 MG per capsule Take 1 capsule by mouth daily as needed for headaches 30 capsule 2     COMPRESSION STOCKINGS 2 each daily 2 each 1     IBUPROFEN PO Take 600 mg by mouth every 6 hours as needed for moderate pain       metroNIDAZOLE (METROGEL) 0.75 % external gel Apply twice daily as needed to face for rosacea. 45 g 11     VITAMIN D, CHOLECALCIFEROL, PO Take 2,000 Units by mouth daily as needed       Allergies   Allergen Reactions     Codeine GI Disturbance     Percocet [Oxycodone-Acetaminophen] Nausea and Vomiting         Review of Systems:  -Skin Establ Pt: The patient denies any new rash, pruritus, or lesions that are symptomatic, changing or bleeding, except as per HPI.  -Constitutional: Otherwise feeling well today, in usual state of health.  -HEENT: Patient denies nonhealing oral sores.  -Skin: As above in HPI. No additional skin concerns.    Physical exam:  Vitals: There were no vitals taken for this visit.  GEN: This is a well developed, well-nourished female in no acute distress, in a pleasant mood.    SKIN: Focused examination of the face significant for:  - De La Rosa Type I  - On the L forehead, there is a 4-5 mm pink shiny papule with arborizing vessels under dermoscopy.   - Pink gritty papule on the L temple x 1.     Impression/Plan:    1. Actinic keratosis.  L temple x 1.     Cryotherapy procedure note: After verbal consent and discussion of risks and benefits including but no limited to dyspigmentation/scar, blister, and pain, 11 lesions were  treated with 1-2mm freeze border for 2 cycles with liquid nitrogen. Post cryotherapy instructions were provided.    2. Neoplasm of uncertain behavior on the left forehead. The differential diagnosis includes basal cell carcinoma versus dermal nevus.     PROCEDURE NOTE  Shave biopsy:  After discussion of benefits and risks including but not limited to bleeding/bruising, pain/swelling, infection, scar, incomplete removal, nerve damage/numbness, recurrence, and non-diagnostic biopsy, written consent, verbal consent and photographs were obtained. Time-out was performed. The area was cleaned with isopropyl alcohol. 0.5mL of 1% lidocaine with epinephrine was injected to obtain adequate anesthesia of the lesion on the L temple. A shave biopsy was performed. Hemostasis was achieved with aluminium chloride. Vaseline and a sterile dressing were applied. The patient tolerated the procedure and no complications were noted. The patient was provided with verbal and written post care instructions.     Follow-up in 6 months for FBSE. Earlier PRN pending biospy.      Staff Involved:  Staff Only    Jason Gao MD    Department of Dermatology  [facility]: Phone: [phone], Fax:[phone]  [facility]: Phone: [phone], Fax: [phone]

## 2019-10-24 NOTE — NURSING NOTE
Lidocaine-epinephrine 1-1:883776 % injection   0.5mL once for one use, starting 10/24/2019 ending 10/24/2019,  2mL disp, R-0, injection  Injected by Dr. Gao

## 2019-10-24 NOTE — NURSING NOTE
Chief Complaint   Patient presents with     ONESIMO Kern is here today for a forehead biopsy.      Mara Epstein, CMA

## 2019-10-24 NOTE — PROGRESS NOTES
"[facility] Dermatology Note      Dermatology Problem List:  FBSE 10/3/19  0. NUB, L forehead. 5 mm papule concerning for BCC. S/p bx 10/24/19.   1. Hx NMSC   - BCC, central chest   - SCC, right upper cutaneous lip, s/p MMS 2014  2. AKs. LiqN2.    - consider field treatment at follow-up.   3. SKs, lentigenes, dermal nevi.     CC:   Chief Complaint   Patient presents with     RECHECK     Concha is here today for a forehead biopsy.        Encounter Date: Oct 24, 2019    History of Present Illness:  Ms. Connie J Frankenberg is a 74 year old female who presents as follow-up for lesion on the L forehead. Last seen about 3 weeks ago when she deferred biopsy due to a family event. She also reports that one AK on the L temple treated at last visit did not completely resolve and would like it re-treated. Health otherwise stable. No other skin concerns.     Past Medical History:   Patient Active Problem List   Diagnosis     Skin cancer, basal cell     Hyperlipidemia     Classical migraine     Insomnia     Arthritis of knee     Hemorrhoids     Vasovagal syncope     BCC (basal cell carcinoma), face     Swelling of lower extremity     S/P total knee arthroplasty     Vertigo, benign positional, bilateral     Status post total right knee replacement     Osteopenia     Disorder of bone and cartilage     Past Medical History:   Diagnosis Date     Arthritis of knee     right     Basal cell carcinoma      BPPV (benign paroxysmal positional vertigo)      Depressive disorder 1985     Disorder of bone and cartilage     (osteopenia)     Hemorrhoids      History of PID      Hyperlipidemia      Insomnia      Migraines, classic     sparkly aura     Osteopenia      Plantar fasciitis, bilateral      PONV (postoperative nausea and vomiting)      Skin cancer, basal cell      Squamous cell carcinoma      Swelling of the ankle, feet, or leg      Vasovagal syncope     is \"fainter\" with blood draws, injections-NEEDS TO BE LAYING " DOWN     Past Surgical History:   Procedure Laterality Date     ARTHROPLASTY KNEE Right 4/13/2018    Procedure: ARTHROPLASTY KNEE;  Right Total Knee Replacement;  Surgeon: Apollo Nguyen MD;  Location: [facility] OR     C NONSPECIFIC PROCEDURE      laparoscopy,     C NONSPECIFIC PROCEDURE      laser surg basal cell skin cancer     C STOMACH SURGERY PROCEDURE UNLISTED      laparoscopy for infertility     CATARACT IOL, RT/LT       LASIK BILATERAL       MOHS MICROGRAPHIC PROCEDURE         Social History:  Patient reports that she has never smoked. She has never used smokeless tobacco. She reports current alcohol use. She reports that she does not use drugs.    Family History:  Family History   Problem Relation Age of Onset     Neurologic Disorder Mother         headaches/narcolepsy     Cancer Mother         face skin     Cerebrovascular Disease Mother         multifocal infarcts to brain     Thyroid Disease Mother         hypothyroid     Seizure Disorder Mother      Migraines Mother      Neurologic Disorder Sister         headaches     Cancer Father         Skin, lung     C.A.D. Father         MI     Coronary Artery Disease Father      Cerebrovascular Disease Father         ,, ,     Alcoholism Father      C.A.D. Brother         CABG     Thyroid Disease Sister         hypothyroid     Cancer Brother         skin cancer     Coronary Artery Disease Brother      Hyperlipidemia Brother      Alcoholism Brother      Hypertension Sister      Thyroid Disease Sister      Migraines Sister      Substance Abuse Brother      Cancer Brother      Thyroid Disease Sister        Medications:  Current Outpatient Medications   Medication Sig Dispense Refill     acetaminophen (TYLENOL) 325 MG tablet Take 2 tablets (650 mg) by mouth every 4 hours as needed for mild pain 100 tablet 1     amoxicillin (AMOXIL) 500 MG capsule Take 4 capsules (2000 mg) by mouth, one hour prior to dental work 12 capsule 1     aspirin (ASA) 81 MG tablet Take 1 tablet (81  mg) by mouth daily 90 tablet 3     atorvastatin (LIPITOR) 80 MG tablet Take 1 tablet (80 mg) by mouth daily 90 tablet 3     butalbital-aspirin-caffeine (FIORINAL) -40 MG per capsule Take 1 capsule by mouth daily as needed for headaches 30 capsule 2     COMPRESSION STOCKINGS 2 each daily 2 each 1     IBUPROFEN PO Take 600 mg by mouth every 6 hours as needed for moderate pain       metroNIDAZOLE (METROGEL) 0.75 % external gel Apply twice daily as needed to face for rosacea. 45 g 11     VITAMIN D, CHOLECALCIFEROL, PO Take 2,000 Units by mouth daily as needed       Allergies   Allergen Reactions     Codeine GI Disturbance     Percocet [Oxycodone-Acetaminophen] Nausea and Vomiting         Review of Systems:  -Skin Establ Pt: The patient denies any new rash, pruritus, or lesions that are symptomatic, changing or bleeding, except as per HPI.  -Constitutional: Otherwise feeling well today, in usual state of health.  -HEENT: Patient denies nonhealing oral sores.  -Skin: As above in HPI. No additional skin concerns.    Physical exam:  Vitals: There were no vitals taken for this visit.  GEN: This is a well developed, well-nourished female in no acute distress, in a pleasant mood.    SKIN: Focused examination of the face significant for:  - De La Rosa Type I  - On the L forehead, there is a 4-5 mm pink shiny papule with arborizing vessels under dermoscopy.   - Pink gritty papule on the L temple x 1.     Impression/Plan:    1. Actinic keratosis.  L temple x 1.     Cryotherapy procedure note: After verbal consent and discussion of risks and benefits including but no limited to dyspigmentation/scar, blister, and pain, 11 lesions were treated with 1-2mm freeze border for 2 cycles with liquid nitrogen. Post cryotherapy instructions were provided.    2. Neoplasm of uncertain behavior on the left forehead. The differential diagnosis includes basal cell carcinoma versus dermal nevus.     PROCEDURE NOTE  Shave biopsy:  After  discussion of benefits and risks including but not limited to bleeding/bruising, pain/swelling, infection, scar, incomplete removal, nerve damage/numbness, recurrence, and non-diagnostic biopsy, written consent, verbal consent and photographs were obtained. Time-out was performed. The area was cleaned with isopropyl alcohol. 0.5mL of 1% lidocaine with epinephrine was injected to obtain adequate anesthesia of the lesion on the L temple. A shave biopsy was performed. Hemostasis was achieved with aluminium chloride. Vaseline and a sterile dressing were applied. The patient tolerated the procedure and no complications were noted. The patient was provided with verbal and written post care instructions.     Follow-up in 6 months for FBSE. Earlier PRN pending biospy.      Staff Involved:  Staff Only    Jason Gao MD    Department of Dermatology  [facility]: Phone: [phone], Fax:[phone]  [facility]: Phone: [phone], Fax: [phone]

## 2019-10-28 ENCOUNTER — TELEPHONE (OUTPATIENT)
Dept: DERMATOLOGY | Facility: CLINIC | Age: 74
End: 2019-10-28

## 2019-10-28 DIAGNOSIS — C44.310 BASAL CELL CARCINOMA OF FACE: Primary | ICD-10-CM

## 2019-10-28 LAB — COPATH REPORT: NORMAL

## 2019-10-28 NOTE — TELEPHONE ENCOUNTER
Scheduled patient for Nov 5th for MOHS x1 on her left forehead due to BCC. Patient confirmed understanding verbally and had no further questions or concerns.    Rose Colon LPN

## 2019-10-28 NOTE — TELEPHONE ENCOUNTER
[facility] Call Center    Phone Message    May a detailed message be left on voicemail: yes    Reason for Call: Other: Per call from PT was supposed to get a call from the clinic to schedule a Mohs surgery but no one has reached out to her. PT is requesting a call back to schedule.      Action Taken: Message routed to:  [facility] ([facility]): Derm Surgery

## 2019-10-29 ENCOUNTER — TELEPHONE (OUTPATIENT)
Dept: DERMATOLOGY | Facility: CLINIC | Age: 74
End: 2019-10-29

## 2019-10-29 NOTE — TELEPHONE ENCOUNTER
I called the patient back and she stated that she would keep her appointment for next week.   Mara Epstein, [facility]

## 2019-10-29 NOTE — TELEPHONE ENCOUNTER
[facility] Call Center    Phone Message    May a detailed message be left on voicemail: yes    Reason for Call: Other: PT would like to reschedule her MOHS procedure and follow up.  She will be out of town and is looking to reschedule the week of 11/18/19.  Please follow up with the PT.      Action Taken: Message routed to:  [facility] ([facility] derm surg

## 2019-10-30 NOTE — TELEPHONE ENCOUNTER
FUTURE VISIT INFORMATION      FUTURE VISIT INFORMATION:    Date: 11.5.19    Time: 9:30    Location:  DermSurg  REFERRAL INFORMATION:    Referring provider:  Dr. Gao    Referring providers clinic:  [facility]    Reason for visit/diagnosis:  MOHS x1 Left forehead BCC    RECORDS REQUESTED FROM:       Clinic name Comments Records Status Photos Status   [facility] 10.24.19 Dr. Gao  Path # [id number]  10.3.19 Dr. Gao [facility] 7.24.18, 9.8.17, 7.19.17 Kaleb Dobson Epic/CE

## 2019-11-05 ENCOUNTER — OFFICE VISIT (OUTPATIENT)
Dept: DERMATOLOGY | Facility: CLINIC | Age: 74
End: 2019-11-05
Payer: COMMERCIAL

## 2019-11-05 ENCOUNTER — PRE VISIT (OUTPATIENT)
Dept: DERMATOLOGY | Facility: CLINIC | Age: 74
End: 2019-11-05

## 2019-11-05 VITALS — SYSTOLIC BLOOD PRESSURE: 143 MMHG | DIASTOLIC BLOOD PRESSURE: 85 MMHG | HEART RATE: 71 BPM

## 2019-11-05 DIAGNOSIS — D48.5 NEOPLASM OF UNCERTAIN BEHAVIOR OF SKIN: ICD-10-CM

## 2019-11-05 DIAGNOSIS — C44.319 BASAL CELL CARCINOMA (BCC) OF LEFT FOREHEAD: Primary | ICD-10-CM

## 2019-11-05 ASSESSMENT — PAIN SCALES - GENERAL
PAINLEVEL: NO PAIN (0)
PAINLEVEL: NO PAIN (0)

## 2019-11-05 NOTE — PROGRESS NOTES
[facility] Dermatologic Surgery Procedure Note    Dermatology Surgery Clinic  [facility]  [address] [place] [id number]    Date of Service:  Nov 5, 2019  Surgery: Mohs micrographic surgery    Surgeon: Dayton Tellez MD  Fellow: Jose Maria Lima MD    Case 1  Repair Type: local flap (advancement)   Repair Size: 5.5 x 5.0 cm  Suture Material: Monocryl 4-0; Fast Absorbing Gut 5-0  Tumor Type: BCC - Basal cell carcinoma  Location: Left forehead  Derm-Path Accession #: [id number]  PreOp Size: 0.9 x 0.9 cm  PostOp Size: 2.0 x 1.5 cm  Mohs Accession #:  [id number]  Level of Defect: fat      Procedure:  We discussed the principles of treatment and most likely complications including scarring, bleeding, infection, swelling, pain, crusting, nerve damage, large wound,  incomplete excision, wound dehiscence,  nerve damage, recurrence, and a second procedure may be recommended to obtain the best cosmetic or functional result.    Informed consent was obtained and the patient underwent the procedure as follows:  The patient was placed supine on the operating table.  The cancer was identified, outlined with a marker, and verified by the patient.  The entire surgical field was prepped with Hibiclens.  The surgical site was anesthetized using Lidocaine 1% with epi 1:100,000.      The area of clinically apparent tumor was not debulked. The layer of tissue was then surgically excised using a #15 blade and was then transferred onto a specimen sheet maintaining the orientation of the specimen. Hemostasis was obtained using heat cautery. The wound site was then covered with a dressing while the tissue samples were processed for examination.    The excised tissue was transported to the Mohs histology laboratory maintaining the tissue orientation.  The tissue specimen was relaxed so that the entire surgical margin was in a a single horizontal plane for sectioning  and inked for precise mapping.  A precise reference map was drawn to reflect the sectioning of the specimen, colored inking of the margins, and orientation on the patient. The tissue was processed using horizontal sectioning of the base and continuous peripheral margins.  The histopathologic sections were reviewed in conjunction with the reference map.    Total blocks: 2    Total slides:  4    There were no cancer cells visualized on examination, therefore Mohs surgery was complete.      Reconstruction:  Advancement Flap    PROCEDURE:  The patient was taken to the operative suite and placed supine on the operating room table.  The defect was identified.  Appropriate markings were made with a marking pen to plan the repair.  The area was infiltrated with Lidocaine 1% with epi 1:100,000 and prepped with Hibiclens and draped with sterile towels.     The wound was debeveled and undermined broadly in all directions to the level of fat. Hemostasis was obtained using electrocoagulation.  The advancement flap was incised to the level of fat removing a cone of redundant tissue superiorly. The flap was further undermined in all directions.    Hemostasis was again obtained.  The flap was then advanced into the defect and secured using 4-0 Monocryl buried vertical mattress sutures.  Redundant areas of tissue were excised using the triangulation technique.  The flap wound edges of both the primary and secondary defects were then approximated with 4-0 Monocryl buried sutures and the epidermis was then carefully approximated using simple running 5-0 fast absorbing gut sutures.  The wound was cleansed with saline, and ointment was applied along the wound surface.  A sterile pressure dressing of non-adherent gauze was applied and wound care instructions were given verbally and in writing.  The patient will return in seven to ten days for suture removal.  The patient left the operating suite in stable condition.  Anticipate  Derma-Abrasion to be used as a second stage of this reconstruction.     Repair Size:    5.5 x 5.0 cm    The attending surgeon was present for key portions of the procedure and always immediately available.      Additional Procedures:  Shave biopsy procedure note: After discussion of benefits and risks including but not limited to bleeding, infection, scar, incomplete removal, recurrence, and non-diagnostic biopsy, written consent and photographs were obtained. The area was cleaned with isopropyl alcohol. 0.5 mL of 1% lidocaine with epinephrine was injected to obtain adequate anesthesia of the lesion on the L lateral forehead. A shave biopsy was performed. Hemostasis was achieved with aluminium chloride. Vaseline and a sterile dressing were applied. The patient tolerated the procedure and no complications were noted. The patient was provided with verbal and written post care instructions.                   Picture placed in chart for future reference.       Staff Involved:    Scribe Disclosure  I, Nava Rashid, am serving as a scribe to document services personally performed by Dr. Dayton Tellez, based on data collection and the provider's statements to me.       Attending attestation:  I was present for key elements of the procedure and immediately available for all other portions of the procedure.  I have reviewed the note and edited it as necessary.    Dayton Tellez M.D.  Professor  Director of Dermatologic Surgery  Department of Dermatology  [facility]    Dermatology Surgery Clinic  [facility] [address] [address]

## 2019-11-05 NOTE — NURSING NOTE
The surgical site was covered with Vaseline, Telfa and a pressure dressing. Wound care was gone over with the patient, all questions were answered. Mara Epstein CMA

## 2019-11-05 NOTE — NURSING NOTE
Chief Complaint   Patient presents with     Derm Problem     MOHS, L Forehead BCC     VIBHA CONKLIN on 11/5/2019 at 9:17 AM

## 2019-11-05 NOTE — PATIENT INSTRUCTIONS
Wound Care Instructions  I will experience scar, altered skin color, bleeding, swelling, pain, crusting and redness. I may experience altered sensation. Risks are excessive bleeding, infection, muscle weakness, thick (hypertrophic or keloidal) scar, and recurrence,. A second procedure may be recommended to obtain the best cosmetic or functional result.  Possible complications of any surgical procedure are bleeding, infection, scarring, alteration in skin color and sensation, muscle weakness in the area, wound dehiscence or seperation, or recurrence of the lesion or disease. On occasion, after healing, a secondary procedure or revision may be recommended in order to obtain the best cosmetic or functional result.   After your surgery, a pressure bandage will be placed over the area that has sutures. This will help prevent bleeding. Please follow these instructions, as they will help you to prevent complications as your wound heals.  For the First 48 hours After Surgery:  1. Leave the pressure bandage on and keep it dry. If it should come loose, you may retape it, but do not take it off.  2. Relax and take it easy. Do not do any vigorous exercise, heavy lifting, or bending forward. This could cause the wound to bleed.  3. Post-operative pain is usually mild. You may take plain or extra strength Tylenol every 4 hours as needed (do not take more than 4,000mg in one day). Do not take any medicine that contains aspirin, ibuprofen or motrin unless you have been recommended these by a doctor.  Avoid alcohol and vitamin E as these may increase your tendency to bleed.  4. You may put an ice pack around the bandaged area for 20 minutes every 2-3 hours. This may help reduce swelling, bruising, and pain. Make sure the ice pack is waterproof so that the pressure bandage does not get wet.   5. You may see a small amount of drainage or blood on your pressure bandage. This is normal. However, if drainage or bleeding continues or  saturates the bandage, you will need to apply firm pressure over the bandage with a washcloth for 15 minutes. If bleeding continues after applying pressure for 15 minutes then go to the nearest emergency room.  48 Hours After Surgery  Carefully remove the bandage and start daily wound care and dressing changes. You may also now shower and get the wound wet. Wash wound with a mild soap and water.  Use caution when washing the wound. Be gentle and do not let the forceful shower stream hit the wound directly.  PAT dry.  Daily Wound Care:  1. Wash wound with a mild soap and water.  Use caution when washing the wound, be gentle and do not let the forceful shower stream hit the wound directly.  2. PAT DRY.  3. Apply Vaseline (from a new container or tube) over the suture line with a Q-tip. It is very important to keep the wound continuously moist, as wounds heal best in a moist environment.  4.  Keep the site covered until sutures are removed, you can cover it with a Telfa (non-stick) dressing and tape or a band-aid.    5. If you are unable to keep wound covered, you must apply Vaseline every 2 - 3 hours (while awake) to ensure it is being kept moist for optimal healing. A dressing overnight is recommended to keep the area moist.   Call Us If:  1. You have pain that is not controlled with Tylenol.  2. You have signs or symptoms of an infection, such as: fever over 100 degrees F, redness, warmth, or foul-smelling or yellow/creamy drainage from the wound.  Who should I call with questions?    [facility]: [phone]     [facility]: [phone]    For urgent needs outside of business hours call the [facility] at [phone] and ask for the dermatology resident on call

## 2019-11-05 NOTE — LETTER
11/5/2019       RE: Connie J Frankenberg  [address]  [facility] [address]     Dear Colleague,    Thank you for referring your patient, Connie J Frankenberg, to the [facility] DERMATOLOGIC SURGERY at [facility]. Please see a copy of my visit note below.    [facility] Mohs Dermatologic Surgery Procedure Note    Dermatology Surgery Clinic  [facility]  Clinics and Surgery Center  [address] [place] [id number]    Date of Service:  Nov 5, 2019  Surgery: Mohs micrographic surgery    Surgeon: Dayton Tellez MD  Fellow: Jose Maria Lima MD    Case 1  Repair Type: local flap (advancement)   Repair Size: 5.5 x 5.0 cm  Suture Material: Monocryl 4-0; Fast Absorbing Gut 5-0  Tumor Type: BCC - Basal cell carcinoma  Location: Left forehead  Derm-Path Accession #: [id number]  PreOp Size: 0.9 x 0.9 cm  PostOp Size: 2.0 x 1.5 cm  Mohs Accession #:  [id number]  Level of Defect: fat      Procedure:  We discussed the principles of treatment and most likely complications including scarring, bleeding, infection, swelling, pain, crusting, nerve damage, large wound,  incomplete excision, wound dehiscence,  nerve damage, recurrence, and a second procedure may be recommended to obtain the best cosmetic or functional result.    Informed consent was obtained and the patient underwent the procedure as follows:  The patient was placed supine on the operating table.  The cancer was identified, outlined with a marker, and verified by the patient.  The entire surgical field was prepped with Hibiclens.  The surgical site was anesthetized using Lidocaine 1% with epi 1:100,000.      The area of clinically apparent tumor was not debulked. The layer of tissue was then surgically excised using a #15 blade and was then transferred onto a specimen sheet maintaining the orientation of the specimen. Hemostasis was obtained using heat cautery. The wound site was then  covered with a dressing while the tissue samples were processed for examination.    The excised tissue was transported to the [facility] histology laboratory maintaining the tissue orientation.  The tissue specimen was relaxed so that the entire surgical margin was in a a single horizontal plane for sectioning and inked for precise mapping.  A precise reference map was drawn to reflect the sectioning of the specimen, colored inking of the margins, and orientation on the patient. The tissue was processed using horizontal sectioning of the base and continuous peripheral margins.  The histopathologic sections were reviewed in conjunction with the reference map.    Total blocks: 2    Total slides:  4    There were no cancer cells visualized on examination, therefore Mohs surgery was complete.      Reconstruction:  Advancement Flap    PROCEDURE:  The patient was taken to the operative suite and placed supine on the operating room table.  The defect was identified.  Appropriate markings were made with a marking pen to plan the repair.  The area was infiltrated with Lidocaine 1% with epi 1:100,000 and prepped with Hibiclens and draped with sterile towels.     The wound was debeveled and undermined broadly in all directions to the level of fat. Hemostasis was obtained using electrocoagulation.  The advancement flap was incised to the level of fat removing a cone of redundant tissue superiorly. The flap was further undermined in all directions.    Hemostasis was again obtained.  The flap was then advanced into the defect and secured using 4-0 Monocryl buried vertical mattress sutures.  Redundant areas of tissue were excised using the triangulation technique.  The flap wound edges of both the primary and secondary defects were then approximated with 4-0 Monocryl buried sutures and the epidermis was then carefully approximated using simple running 5-0 fast absorbing gut sutures.  The wound was cleansed with saline, and ointment was  applied along the wound surface.  A sterile pressure dressing of non-adherent gauze was applied and wound care instructions were given verbally and in writing.  The patient will return in seven to ten days for suture removal.  The patient left the operating suite in stable condition.  Anticipate Derma-Abrasion to be used as a second stage of this reconstruction.     Repair Size:    5.5 x 5.0 cm    The attending surgeon was present for key portions of the procedure and always immediately available.      Additional Procedures:  Shave biopsy procedure note: After discussion of benefits and risks including but not limited to bleeding, infection, scar, incomplete removal, recurrence, and non-diagnostic biopsy, written consent and photographs were obtained. The area was cleaned with isopropyl alcohol. 0.5 mL of 1% lidocaine with epinephrine was injected to obtain adequate anesthesia of the lesion on the L lateral forehead. A shave biopsy was performed. Hemostasis was achieved with aluminium chloride. Vaseline and a sterile dressing were applied. The patient tolerated the procedure and no complications were noted. The patient was provided with verbal and written post care instructions.                   Picture placed in chart for future reference.       Staff Involved:    Scribe Disclosure  I, Nava Rashid, am serving as a scribe to document services personally performed by Dr. Dayton Tellez, based on data collection and the provider's statements to me.       Attending attestation:  I was present for key elements of the procedure and immediately available for all other portions of the procedure.  I have reviewed the note and edited it as necessary.    Dayton Tellez M.D.  Professor  Director of Dermatologic Surgery  Department of Dermatology  [facility]    Dermatology Surgery Clinic  [facility] [address] [address]

## 2019-11-06 ENCOUNTER — TELEPHONE (OUTPATIENT)
Dept: DERMATOLOGY | Facility: CLINIC | Age: 74
End: 2019-11-06

## 2019-11-06 NOTE — TELEPHONE ENCOUNTER
Follow up call completed following Mohs procedure with Dr. Tellez.    The following questions were asked:    What are you doing to manage your pain? tylenol  Is it helping? yes  Are you applying ice?  No, will do.   Have you had any noticeable bleeding through the bandage? A small amount.   Do you have any concerns? She is concerned with sleeping. Patient states she had an ambien from a while ago and had to take it last night. She was wondering how to get more in case she needs it to sleep again, she will contact us if she has any problems.              Please call [phone] option [phone] if you have any additional questions.

## 2019-11-08 ENCOUNTER — TELEPHONE (OUTPATIENT)
Dept: DERMATOLOGY | Facility: CLINIC | Age: 74
End: 2019-11-08

## 2019-11-09 NOTE — TELEPHONE ENCOUNTER
My name is Dr. Gennaro Tucker, and I am the dermatology resident on call. This note serves as a page documentation.    The patient paged the on call dermatology resident and asked that her photograph of the wound of the forehead be reviewed. The photograph was reviewed. She denied red flag symptoms, including fevers, chills, sweats, purulent drainage or increasing pain at the surgical site. Reassurance was provided, and she was told to continue with local wound cares as recommended on the day of surgery. All of her questions were answered to her satisfaction.

## 2019-11-22 ENCOUNTER — ANCILLARY PROCEDURE (OUTPATIENT)
Dept: MAMMOGRAPHY | Facility: CLINIC | Age: 74
End: 2019-11-22
Attending: INTERNAL MEDICINE
Payer: COMMERCIAL

## 2019-11-22 DIAGNOSIS — Z12.31 VISIT FOR SCREENING MAMMOGRAM: ICD-10-CM

## 2019-12-04 ASSESSMENT — ENCOUNTER SYMPTOMS
MUSCLE CRAMPS: 0
SKIN CHANGES: 0
DECREASED CONCENTRATION: 0
SORE THROAT: 0
FEVER: 0
DECREASED APPETITE: 0
HOARSE VOICE: 0
INCREASED ENERGY: 0
WEIGHT GAIN: 0
NAIL CHANGES: 0
POOR WOUND HEALING: 0
SMELL DISTURBANCE: 0
MUSCLE WEAKNESS: 0
CHILLS: 0
WEIGHT LOSS: 0
NERVOUS/ANXIOUS: 0
TROUBLE SWALLOWING: 0
DEPRESSION: 0
POLYDIPSIA: 0
ARTHRALGIAS: 0
STIFFNESS: 1
INSOMNIA: 1
BACK PAIN: 1
JOINT SWELLING: 0
TASTE DISTURBANCE: 0
HALLUCINATIONS: 0
NECK MASS: 0
SINUS PAIN: 0
NIGHT SWEATS: 0
FATIGUE: 0
PANIC: 0
NECK PAIN: 0
SINUS CONGESTION: 1
ALTERED TEMPERATURE REGULATION: 1
POLYPHAGIA: 0
MYALGIAS: 0

## 2019-12-04 ASSESSMENT — ACTIVITIES OF DAILY LIVING (ADL)
IN_THE_PAST_7_DAYS,_DID_YOU_NEED_HELP_FROM_OTHERS_TO_TAKE_CARE_OF_THINGS_SUCH_AS_LAUNDRY_AND_HOUSEKEEPING,_BANKING,_SHOPPING,_USING_THE_TELEPHONE,_FOOD_PREPARATION,_TRANSPORTATION,_OR_TAKING_YOUR_OWN_MEDICATIONS?: N
IN_THE_PAST_7_DAYS,_DID_YOU_NEED_HELP_FROM_OTHERS_TO_PERFORM_EVERYDAY_ACTIVITIES_SUCH_AS_EATING,_GETTING_DRESSED,_GROOMING,_BATHING,_WALKING,_OR_USING_THE_TOILET: N

## 2019-12-06 NOTE — PROGRESS NOTES
"CC:  \"Annual regular check up\"  (Medicare patient, she reports that she has coverage for a physical and specifically states she does not want a Medicare Wellness visit)    HPI:  Connie J Frankenberg is a 73 year old female with a history of right knee arthritis s/p arthroplasty, skin cancers, hyperlipidemia, vasovagal syncope, depressive disorder, and migraines who presents alone for a physical.  Routine health care maintenance reviewed in detail    Other health care team members:  Derm/Mohs Dr Geoff Gao    Answers for HPI/ROS submitted by the patient on 12/4/2019   General Symptoms: Yes  Skin Symptoms: Yes  HENT Symptoms: Yes  EYE SYMPTOMS: No  HEART SYMPTOMS: No  LUNG SYMPTOMS: No  INTESTINAL SYMPTOMS: No  URINARY SYMPTOMS: No  GYNECOLOGIC SYMPTOMS: No  BREAST SYMPTOMS: No  SKELETAL SYMPTOMS: Yes  BLOOD SYMPTOMS: No  NERVOUS SYSTEM SYMPTOMS: No  MENTAL HEALTH SYMPTOMS: Yes  Fever: No  Loss of appetite: No  Weight loss: No  Weight gain: No  Fatigue: No  Night sweats: No  Chills: No  Increased stress: Yes  Excessive hunger: No  Excessive thirst: No  Feeling hot or cold when others believe the temperature is normal: Yes  Loss of height: No  Post-operative complications: No  Surgical site pain: No  Hallucinations: No  Change in or Loss of Energy: No  Hyperactivity: No  Confusion: No  Changes in hair: No  Changes in moles/birth marks: No  Itching: No  Rashes: No  Changes in nails: No  Acne: No  Hair in places you don't want it: No  Change in facial hair: No  Warts: No  Non-healing sores: No  Scarring: Yes  Flaking of skin: No  Color changes of hands/feet in cold : No  Sun sensitivity: No  Skin thickening: No  Ear pain: No  Ear discharge: No  Hearing loss: No  Tinnitus: No  Nosebleeds: No  Congestion: Yes  Sinus pain: No  Trouble swallowing: No   Voice hoarseness: No  Mouth sores: No  Sore throat: No  Tooth pain: No  Gum tenderness: No  Bleeding gums: No  Change in taste: No  Change in sense of smell: " "No  Dry mouth: No  Hearing aid used: No  Neck lump: No  Back pain: Yes  Muscle aches: No  Neck pain: No  Swollen joints: No  Joint pain: No  Bone pain: No  Muscle cramps: No  Muscle weakness: No  Joint stiffness: Yes  Bone fracture: No  Nervous or Anxious: No  Depression: No  Trouble sleeping: Yes  Trouble thinking or concentrating: No  Mood changes: No  Panic attacks: No  Joining weight watchers again  Some LBP with prolonged walking, standing, sitting.  Relieved with prn Aleve (uses sparingly)  Doing some exercises, I also demonstrated some additional hamstring and gluteal/SI stretching.  No clear radicular symptoms at this time, no fevers, no unexplained weight loss or bowel/bladder changes.  On occasion will get fleeting tingling on the soles of her feet.  I advised her to come back in if radicular symptoms develop.  She declined PT referal for now.  Patient Active Problem List   Diagnosis     Skin cancer, basal cell     Hyperlipidemia     Classical migraine     Insomnia     Arthritis of knee     Hemorrhoids     Vasovagal syncope     BCC (basal cell carcinoma), face     Swelling of lower extremity     S/P total knee arthroplasty     Vertigo, benign positional, bilateral     Status post total right knee replacement     Osteopenia     Disorder of bone and cartilage     Past Medical History:   Diagnosis Date     Arthritis of knee     right     Basal cell carcinoma      BPPV (benign paroxysmal positional vertigo)      Depressive disorder 1985     Disorder of bone and cartilage     (osteopenia)     Hemorrhoids      History of PID      Hyperlipidemia      Insomnia      Migraines, classic     sparkly aura     Osteopenia      Plantar fasciitis, bilateral      PONV (postoperative nausea and vomiting)      Skin cancer, basal cell      Squamous cell carcinoma      Swelling of the ankle, feet, or leg      Vasovagal syncope     is \"fainter\" with blood draws, injections-NEEDS TO BE LAYING DOWN     Past Surgical History: "   Procedure Laterality Date     ARTHROPLASTY KNEE Right 4/13/2018    Procedure: ARTHROPLASTY KNEE;  Right Total Knee Replacement;  Surgeon: Apollo Nguyen MD;  Location: [facility] OR     C NONSPECIFIC PROCEDURE      laparoscopy,     C NONSPECIFIC PROCEDURE      laser surg basal cell skin cancer     C STOMACH SURGERY PROCEDURE UNLISTED      laparoscopy for infertility     CATARACT IOL, RT/LT       LASIK BILATERAL       MOHS MICROGRAPHIC PROCEDURE       Family History   Problem Relation Age of Onset     Neurologic Disorder Mother         headaches/narcolepsy     Cancer Mother         face skin     Cerebrovascular Disease Mother         multifocal infarcts to brain     Thyroid Disease Mother         hypothyroid     Seizure Disorder Mother      Migraines Mother      Neurologic Disorder Sister         headaches     Cancer Father         Skin, lung     C.A.D. Father         MI     Coronary Artery Disease Father      Cerebrovascular Disease Father         ,, ,     Alcoholism Father      C.A.D. Brother         CABG     Thyroid Disease Sister         hypothyroid     Cancer Brother         skin cancer     Coronary Artery Disease Brother      Hyperlipidemia Brother      Alcoholism Brother      Hypertension Sister      Thyroid Disease Sister      Migraines Sister      Substance Abuse Brother      Cancer Brother      Thyroid Disease Sister      Social History     Tobacco Use     Smoking status: Never Smoker     Smokeless tobacco: Never Used   Substance Use Topics     Alcohol use: Yes     Comment: Very little     Drug use: No     Current Outpatient Medications   Medication Sig Dispense Refill     acetaminophen (TYLENOL) 325 MG tablet Take 2 tablets (650 mg) by mouth every 4 hours as needed for mild pain (Patient not taking: Reported on 11/5/2019) 100 tablet 1     amoxicillin (AMOXIL) 500 MG capsule Take 4 capsules (2000 mg) by mouth, one hour prior to dental work (Patient not taking: Reported on 11/5/2019) 12 capsule 1     aspirin  (ASA) 81 MG tablet Take 1 tablet (81 mg) by mouth daily 90 tablet 3     atorvastatin (LIPITOR) 80 MG tablet Take 1 tablet (80 mg) by mouth daily 90 tablet 3     butalbital-aspirin-caffeine (FIORINAL) -40 MG per capsule Take 1 capsule by mouth daily as needed for headaches (Patient not taking: Reported on 11/5/2019) 30 capsule 2     COMPRESSION STOCKINGS 2 each daily (Patient not taking: Reported on 11/5/2019) 2 each 1     IBUPROFEN PO Take 600 mg by mouth every 6 hours as needed for moderate pain       metroNIDAZOLE (METROGEL) 0.75 % external gel Apply twice daily as needed to face for rosacea. (Patient not taking: Reported on 11/5/2019) 45 g 11     VITAMIN D, CHOLECALCIFEROL, PO Take 2,000 Units by mouth daily as needed       Allergies   Allergen Reactions     Codeine GI Disturbance     Percocet [Oxycodone-Acetaminophen] Nausea and Vomiting     /79 (BP Location: Right arm, Patient Position: Sitting, Cuff Size: Adult Regular)   Pulse 67   Temp 98.1  F (36.7  C) (Oral)   SpO2 96%   Physical Examination:    General:  Conversant, generally healthy appearing, no acute distress, has bandage over left forehead in area of recent Mohs surgery for BCC  Head: atraumatic  Eyes:  Pupils 2-3 mm, sclera white, EOM's full, conjunctiva moist, no periorbital swelling    Ears:  Bilateral TM's obscured by cerumen, right greater than left  Nose:  Nasal passages clear, turbinates not swollen  Throat/Mouth:  No pharyngeal erythema, exudate, ulcers, oral mucosa and tongue moist, normal hard and soft palate  Neck:  Trachea midline, Full AROM, supple, thyroid smooth, symmetric, not enlarged, no nodules, no neck lymphadenopathy  Lungs:  Clear to auscultation throughout, no wheezes, rhonchi or rales. Normal respiratory effort and no intercostal retractions.  C/V:  Regular rate and rhythm, no murmurs, rubs or gallops.  No JVD, no carotid bruits. Radial and DP pulses 2+, equal and regular.  Abdomen:  Not distended.  Bowel sounds  active.  No tenderness, no hepatosplenomegaly or masses.  No CVA tenderness or masses.  Lymph:  No cervical lymph nodes.  Neuro: Alert and oriented, face symmetric. Able to get on/off exam table without assistance.  Strength grossly intact. No tremor.  Gait steady.   M/S:   No joint deformities noted.  No joint swelling.  Skin:   Normal temperature., turgor and texture. No rashes, suspicious lesions, or jaundice on exposed skin surfaces.   Extremities:  No peripheral edema, no digital cyanosis  Psych:  Alert and oriented. Appropriate affect.  Not psychomotor slowed.  No signs of anxiety or agitation.    Concha was seen today for physical.    Diagnoses and all orders for this visit:    Health care maintenance, Routine physical    Mixed hyperlipidemia  -     Refill atorvastatin (LIPITOR) 80 MG tablet; Take 1 tablet (80 mg) by mouth daily  -     Lipid panel reflex to direct LDL Fasting; Future    Benign essential hypertension  Controlled  Need for vaccination  -     FLU VACCINE HIGH DOSE PRESERVATIVE FREE, AGE =>65 YR  -     ZOSTER VACCINE RECOMBINANT ADJUVANTED IM NJX; Future    Vitamin D deficiency  -     Vitamin D Deficiency; Future    Asymptomatic menopausal state    Special screening for malignant neoplasms, colon  -     GASTROENTEROLOGY ADULT REF PROCEDURE ONLY    F/U one year and as needed.    Sammi Beltre M.D.  Internal Medicine  [facility]   pager [phone]

## 2019-12-07 ENCOUNTER — OFFICE VISIT (OUTPATIENT)
Dept: INTERNAL MEDICINE | Facility: CLINIC | Age: 74
End: 2019-12-07
Payer: COMMERCIAL

## 2019-12-07 VITALS
HEART RATE: 67 BPM | TEMPERATURE: 98.1 F | DIASTOLIC BLOOD PRESSURE: 79 MMHG | SYSTOLIC BLOOD PRESSURE: 123 MMHG | OXYGEN SATURATION: 96 %

## 2019-12-07 DIAGNOSIS — Z12.11 SPECIAL SCREENING FOR MALIGNANT NEOPLASMS, COLON: ICD-10-CM

## 2019-12-07 DIAGNOSIS — Z78.0 ASYMPTOMATIC MENOPAUSAL STATE: ICD-10-CM

## 2019-12-07 DIAGNOSIS — Z23 NEED FOR VACCINATION: ICD-10-CM

## 2019-12-07 DIAGNOSIS — E55.9 VITAMIN D DEFICIENCY: ICD-10-CM

## 2019-12-07 DIAGNOSIS — I10 BENIGN ESSENTIAL HYPERTENSION: ICD-10-CM

## 2019-12-07 DIAGNOSIS — E78.2 MIXED HYPERLIPIDEMIA: ICD-10-CM

## 2019-12-07 DIAGNOSIS — N95.1 SYMPTOMATIC MENOPAUSAL OR FEMALE CLIMACTERIC STATES: ICD-10-CM

## 2019-12-07 DIAGNOSIS — Z00.00 HEALTH CARE MAINTENANCE: Primary | ICD-10-CM

## 2019-12-07 LAB
CHOLEST SERPL-MCNC: 179 MG/DL
HDLC SERPL-MCNC: 67 MG/DL
LDLC SERPL CALC-MCNC: 93 MG/DL
NONHDLC SERPL-MCNC: 111 MG/DL
TRIGL SERPL-MCNC: 90 MG/DL

## 2019-12-07 RX ORDER — NAPROXEN SODIUM 220 MG
220 TABLET ORAL 2 TIMES DAILY WITH MEALS
COMMUNITY

## 2019-12-07 RX ORDER — ATORVASTATIN CALCIUM 80 MG/1
80 TABLET, FILM COATED ORAL DAILY
Qty: 90 TABLET | Refills: 3 | Status: SHIPPED | OUTPATIENT
Start: 2019-12-07 | End: 2021-02-08

## 2019-12-07 ASSESSMENT — PAIN SCALES - GENERAL: PAINLEVEL: NO PAIN (0)

## 2019-12-07 NOTE — PATIENT INSTRUCTIONS
[facility] Medication Refill Request Information:  * Please contact your pharmacy regarding ANY request for medication refills.  ** [facility] Prescription Fax = [phone]  * Please allow 3 business days for routine medication refills.  * Please allow 5 business days for controlled substance medication refills.     [facility] Test Result notification information:  *You will be notified with in 7-10 days of your appointment day regarding the results of your test.  If you are on MyChart you will be notified as soon as the provider has reviewed the results and signed off on them.    [facility]: [phone]

## 2019-12-07 NOTE — NURSING NOTE
Chief Complaint   Patient presents with     Physical     pt here for a physical       Sheridan Sibley CMA at 9:07 AM on 12/7/2019.

## 2019-12-08 LAB — DEPRECATED CALCIDIOL+CALCIFEROL SERPL-MC: 28 UG/L (ref 20–75)

## 2019-12-09 ENCOUNTER — TELEPHONE (OUTPATIENT)
Dept: GASTROENTEROLOGY | Facility: CLINIC | Age: 74
End: 2019-12-09

## 2019-12-09 DIAGNOSIS — E55.9 VITAMIN D DEFICIENCY: ICD-10-CM

## 2019-12-09 RX ORDER — CHOLECALCIFEROL (VITAMIN D3) 1250 MCG
50000 CAPSULE ORAL
Qty: 8 CAPSULE | Refills: 0 | Status: SHIPPED | OUTPATIENT
Start: 2019-12-09 | End: 2020-01-28

## 2019-12-11 ENCOUNTER — TELEPHONE (OUTPATIENT)
Dept: GASTROENTEROLOGY | Facility: CLINIC | Age: 74
End: 2019-12-11

## 2019-12-11 ENCOUNTER — ALLIED HEALTH/NURSE VISIT (OUTPATIENT)
Dept: INTERNAL MEDICINE | Facility: CLINIC | Age: 74
End: 2019-12-11
Payer: COMMERCIAL

## 2019-12-11 DIAGNOSIS — H61.23 BILATERAL IMPACTED CERUMEN: Primary | ICD-10-CM

## 2019-12-11 NOTE — NURSING NOTE
Irrigated bilateral ear with luke warm water and hydrogen peroxide. Obtained large amount of brown cerumen from left ear irrigation. Obtained medium amount of brown cerumen from right ear irrigation. Patient tolerated the procedure well. Patient declined ear pain and dizziness after ear irrigation.     NOEMÍ Gray at 10:36 AM sign on 12/11/2019

## 2019-12-12 ENCOUNTER — TELEPHONE (OUTPATIENT)
Dept: GASTROENTEROLOGY | Facility: CLINIC | Age: 74
End: 2019-12-12

## 2020-02-18 ENCOUNTER — OFFICE VISIT (OUTPATIENT)
Dept: DERMATOLOGY | Facility: CLINIC | Age: 75
End: 2020-02-18
Payer: COMMERCIAL

## 2020-02-18 DIAGNOSIS — C44.319 BASAL CELL CARCINOMA (BCC) OF LEFT FOREHEAD: Primary | ICD-10-CM

## 2020-02-18 NOTE — NURSING NOTE
Chief Complaint   Patient presents with     RECHECK     3 Month Scar Follow Up, Left Forehead. Concered about a spot on the left chin.     VIBHA CONKLIN on 2/18/2020 at 2:31 PM

## 2020-02-18 NOTE — PROGRESS NOTES
[facility] Dermatology Note    Dermatology Surgery Clinic  [facility]  [facility]  [address] [place] [id number]    Dermatology Problem List:  FBSE 10/3/19  1. Hx NMSC   - BCC, L forehead, s/p bx 10/24/19, s/p MMS 11/5/19       - BCC, central chest   - SCC, right upper cutaneous lip, s/p MMS 2014  2. AKs. LiqN2.               - consider field treatment at follow-up.   3. SKs, lentigenes, dermal nevi.     Encounter Date: Feb 18, 2020    CC:  Chief Complaint   Patient presents with     RECHECK     3 Month Scar Follow Up, Left Forehead. Concered about a spot on the left chin.       History of Present Illness:  Ms. Connie J Frankenberg is a 74 year old female who presents today for a followup from the previous surgery. Details are below.    Original Procedure Date: November 5, 2019  Reason for visit: None   Bleeding that required medical attention: None  Infection: None  Hospitalization for procedure within 30 days: None  Are you having any functional problems since the surgery: NA     Case(s) Specific Information:  Mohs Case 1:  Procedure Location: Left forehead  Type of Repair: Local flap (advancement)  Is patient happy with appearance of scar: NA  On 1-10 scale, with 10 being how the area looked before the surgery and 1 being the worst imaginable scar, how do you think it looks today?: NA    The patient was last seen in clinic on 11/5/19 for MMS with local flap (advancement) repair on a BCC of the L forehead. She comes in today for a 3 month followup. Today she reports that there is still some numbness at the site, and slight edema. Otherwise she feels that things are healing well. Notes that she hot packed a region on the L lower lateral face thinking it was acne. The patient is otherwise feeling well overall. There are no other skin concerns at this time.      Past Medical History:   Patient Active Problem List   Diagnosis     Skin cancer, basal cell  "    Hyperlipidemia     Classical migraine     Insomnia     Arthritis of knee     Hemorrhoids     Vasovagal syncope     BCC (basal cell carcinoma), face     Swelling of lower extremity     S/P total knee arthroplasty     Vertigo, benign positional, bilateral     Status post total right knee replacement     Osteopenia     Disorder of bone and cartilage     Past Medical History:   Diagnosis Date     Arthritis of knee     right     Basal cell carcinoma      BPPV (benign paroxysmal positional vertigo)      Depressive disorder 1985     Disorder of bone and cartilage     (osteopenia)     Hemorrhoids      History of PID      Hyperlipidemia      Insomnia      Migraines, classic     sparkly aura     Osteopenia      Plantar fasciitis, bilateral      PONV (postoperative nausea and vomiting)      Skin cancer, basal cell      Squamous cell carcinoma      Swelling of the ankle, feet, or leg      Vasovagal syncope     is \"fainter\" with blood draws, injections-NEEDS TO BE LAYING DOWN     Past Surgical History:   Procedure Laterality Date     ARTHROPLASTY KNEE Right 4/13/2018    Procedure: ARTHROPLASTY KNEE;  Right Total Knee Replacement;  Surgeon: Apollo Nguyen MD;  Location: [facility] OR     C NONSPECIFIC PROCEDURE      laparoscopy,     C NONSPECIFIC PROCEDURE      laser surg basal cell skin cancer     C STOMACH SURGERY PROCEDURE UNLISTED      laparoscopy for infertility     CATARACT IOL, RT/LT       LASIK BILATERAL       MOHS MICROGRAPHIC PROCEDURE         Social History:  Social History     Socioeconomic History     Marital status:      Spouse name: Not on file     Number of children: Not on file     Years of education: Not on file     Highest education level: Not on file   Occupational History     Occupation: EEG tech     Employer: [employer]   Social Needs     Financial resource strain: Not on file     Food insecurity:     Worry: Not on file     Inability: Not on file     Transportation needs:     Medical: Not " on file     Non-medical: Not on file   Tobacco Use     Smoking status: Never Smoker     Smokeless tobacco: Never Used   Substance and Sexual Activity     Alcohol use: Yes     Comment: Very little     Drug use: No     Sexual activity: Not Currently     Partners: Male     Birth control/protection: Post-menopausal   Lifestyle     Physical activity:     Days per week: Not on file     Minutes per session: Not on file     Stress: Not on file   Relationships     Social connections:     Talks on phone: Not on file     Gets together: Not on file     Attends Mandaeism service: Not on file     Active member of club or organization: Not on file     Attends meetings of clubs or organizations: Not on file     Relationship status: Not on file     Intimate partner violence:     Fear of current or ex partner: Not on file     Emotionally abused: Not on file     Physically abused: Not on file     Forced sexual activity: Not on file   Other Topics Concern     Parent/sibling w/ CABG, MI or angioplasty before 65F 55M? Not Asked   Social History Narrative    Social History:  Casual/Semi-Retired.  Single, .  Has boyfriend.  One son.    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       Family History:  Family History   Problem Relation Age of Onset     Neurologic Disorder Mother         headaches/narcolepsy     Cancer Mother         face skin     Cerebrovascular Disease Mother         multifocal infarcts to brain     Thyroid Disease Mother         hypothyroid     Seizure Disorder Mother      Migraines Mother      Neurologic Disorder Sister         headaches     Cancer Father         Skin, lung     C.A.D. Father         MI     Coronary Artery Disease Father      Cerebrovascular Disease Father         ,, ,     Alcoholism Father      C.A.D. Brother         CABG     Thyroid Disease Sister         hypothyroid     Cancer Brother         skin cancer     Coronary Artery Disease Brother      Hyperlipidemia Brother      Alcoholism Brother       Hypertension Sister      Thyroid Disease Sister      Migraines Sister      Substance Abuse Brother      Cancer Brother      Thyroid Disease Sister         Medications:  Current Outpatient Medications   Medication Sig Dispense Refill     amoxicillin (AMOXIL) 500 MG capsule Take 4 capsules (2000 mg) by mouth, one hour prior to dental work 12 capsule 1     aspirin (ASA) 81 MG tablet Take 1 tablet (81 mg) by mouth daily 90 tablet 3     atorvastatin (LIPITOR) 80 MG tablet Take 1 tablet (80 mg) by mouth daily 90 tablet 3     butalbital-aspirin-caffeine (FIORINAL) -40 MG per capsule Take 1 capsule by mouth daily as needed for headaches 30 capsule 2     COMPRESSION STOCKINGS 2 each daily 2 each 1     IBUPROFEN PO Take 600 mg by mouth every 6 hours as needed for moderate pain       metroNIDAZOLE (METROGEL) 0.75 % external gel Apply twice daily as needed to face for rosacea. 45 g 11     naproxen sodium (ANAPROX) 220 MG tablet Take 220 mg by mouth 2 times daily (with meals)       VITAMIN D, CHOLECALCIFEROL, PO Take 2,000 Units by mouth daily as needed         Allergies   Allergen Reactions     Codeine GI Disturbance     Percocet [Oxycodone-Acetaminophen] Nausea and Vomiting       Review of Systems:  As per HPI.  -Skin Establ Pt: The patient denies any new rash, pruritus, or lesions that are symptomatic, changing or bleeding, except as per HPI.  -Constitutional: The patient is feeling generally well.    Physical exam:  Vitals: There were no vitals taken for this visit.  GEN: This is a well developed, well-nourished female in no acute distress, in a pleasant mood.    SKIN: Focused examination of the face/head and neck were performed.  - Honey crusted plaque on the L melomental fold  - L forehead advancement flap healed very well with imperceptible scar. L brow 2.0 mm higher than right and counseled that would resolve  - No other lesions of concern on areas examined.       Impression/Plan:    1. BCC of the L forehead,  s/p [facility] 11/5/19  Upon review of the surgical site the patient had a well-healed advancement flap of the L forehead. Imperceptible scar at a normal conversational distance. L brow is 2.0 mm higher than the right and counseled the patient that this would resolve.    L brow 2.0 mm higher than the right and counseled the patient that this would resolve.    Recommended sunscreens SPF #30 or greater and protective clothing. Risk of scar dyspigmentation discussed.     Continue periodic self skin exams and report of any new or changing lesions.     Please refer to previous clinic note for details     Patient should have biannual skin checks with a general dermatologist due to Hx of NMSCs.    2.   Honey crusted plaque on the L melomental fold.  Impetigo vs. sBCC    Mupirocin recommended to be applied BID for a week or two. Pt has some at home. Follow-up in a month and we will reevaluate.       Follow-up in 1 month.        Staff Involved:    Scribe Disclosure  I, Nava Rashid, am serving as a scribe to document services personally performed by Dr. Dayton Tellez, based on data collection and the provider's statements to me.     Attending Attestation  I attest that the Scribe recorded the interview and exam that I personally performed.  I have reviewed the note and edited it as necessary.    Dayton Tellez M.D.  Professor  Director of Dermatologic Surgery  Department of Dermatology  [facility]

## 2020-02-18 NOTE — LETTER
2/18/2020       RE: Connie J Frankenberg  [address]  [facility] [address]     Dear Colleague,    Thank you for referring your patient, Connie J Frankenberg, to the [facility] DERMATOLOGIC SURGERY at [facility]. Please see a copy of my visit note below.    [facility] Dermatology Note    Dermatology Surgery Clinic  [facility]  Clinics and Surgery Center  [address] [place] [id number]    Dermatology Problem List:  FBSE 10/3/19  1. Hx NMSC   - BCC, L forehead, s/p bx 10/24/19, s/p MMS 11/5/19       - BCC, central chest   - SCC, right upper cutaneous lip, s/p MMS 2014  2. AKs. LiqN2.               - consider field treatment at follow-up.   3. SKs, lentigenes, dermal nevi.     Encounter Date: Feb 18, 2020    CC:  Chief Complaint   Patient presents with     RECHECK     3 Month Scar Follow Up, Left Forehead. Concered about a spot on the left chin.       History of Present Illness:  Ms. Connie J Frankenberg is a 74 year old female who presents today for a followup from the previous surgery. Details are below.    Original Procedure Date:  November 5, 2019  Reason for visit:  None   Bleeding that required medical attention:  None  Infection:  None  Hospitalization for procedure within 30 days:  None  Are you having any functional problems since the surgery:  NA     Case(s) Specific Information:  Mohs Case 1:  Procedure Location:  Left forehead  Type of Repair:  Local flap (advancement)  Is patient happy with appearance of scar: NA  On 1-10 scale, with 10 being how the area looked before the surgery and 1 being the worst imaginable scar, how do you think it looks today?: NA    The patient was last seen in clinic on 11/5/19 for MMS with local flap (advancement) repair on a BCC of the L forehead. She comes in today for a 3 month followup. Today she reports that there is still some numbness at the site, and slight edema. Otherwise she  "feels that things are healing well. Notes that she hot packed a region on the L lower lateral face thinking it was acne. The patient is otherwise feeling well overall. There are no other skin concerns at this time.      Past Medical History:   Patient Active Problem List   Diagnosis     Skin cancer, basal cell     Hyperlipidemia     Classical migraine     Insomnia     Arthritis of knee     Hemorrhoids     Vasovagal syncope     BCC (basal cell carcinoma), face     Swelling of lower extremity     S/P total knee arthroplasty     Vertigo, benign positional, bilateral     Status post total right knee replacement     Osteopenia     Disorder of bone and cartilage     Past Medical History:   Diagnosis Date     Arthritis of knee     right     Basal cell carcinoma      BPPV (benign paroxysmal positional vertigo)      Depressive disorder 1985     Disorder of bone and cartilage     (osteopenia)     Hemorrhoids      History of PID      Hyperlipidemia      Insomnia      Migraines, classic     sparkly aura     Osteopenia      Plantar fasciitis, bilateral      PONV (postoperative nausea and vomiting)      Skin cancer, basal cell      Squamous cell carcinoma      Swelling of the ankle, feet, or leg      Vasovagal syncope     is \"fainter\" with blood draws, injections-NEEDS TO BE LAYING DOWN     Past Surgical History:   Procedure Laterality Date     ARTHROPLASTY KNEE Right 4/13/2018    Procedure: ARTHROPLASTY KNEE;  Right Total Knee Replacement;  Surgeon: Apollo Nguyen MD;  [facility] OR     C NONSPECIFIC PROCEDURE      laparoscopy,     C NONSPECIFIC PROCEDURE      laser surg basal cell skin cancer     C STOMACH SURGERY PROCEDURE UNLISTED      laparoscopy for infertility     CATARACT IOL, RT/LT       LASIK BILATERAL       MOHS MICROGRAPHIC PROCEDURE         Social History:  Social History     Socioeconomic History     Marital status:      Spouse name: Not on file     Number of children: Not on file     Years of education: " Not on file     Highest education level: Not on file   Occupational History     Occupation: EEG tech     Employer: [employer]   Social Needs     Financial resource strain: Not on file     Food insecurity:     Worry: Not on file     Inability: Not on file     Transportation needs:     Medical: Not on file     Non-medical: Not on file   Tobacco Use     Smoking status: Never Smoker     Smokeless tobacco: Never Used   Substance and Sexual Activity     Alcohol use: Yes     Comment: Very little     Drug use: No     Sexual activity: Not Currently     Partners: Male     Birth control/protection: Post-menopausal   Lifestyle     Physical activity:     Days per week: Not on file     Minutes per session: Not on file     Stress: Not on file   Relationships     Social connections:     Talks on phone: Not on file     Gets together: Not on file     Attends Roman Catholic service: Not on file     Active member of club or organization: Not on file     Attends meetings of clubs or organizations: Not on file     Relationship status: Not on file     Intimate partner violence:     Fear of current or ex partner: Not on file     Emotionally abused: Not on file     Physically abused: Not on file     Forced sexual activity: Not on file   Other Topics Concern     Parent/sibling w/ CABG, MI or angioplasty before 65F 55M? Not Asked   Social History Narrative    Social History:  Casual/Semi-Retired.  Single, .  Has boyfriend.  One son.    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       Family History:  Family History   Problem Relation Age of Onset     Neurologic Disorder Mother         headaches/narcolepsy     Cancer Mother         face skin     Cerebrovascular Disease Mother         multifocal infarcts to brain     Thyroid Disease Mother         hypothyroid     Seizure Disorder Mother      Migraines Mother      Neurologic Disorder Sister         headaches     Cancer Father         Skin, lung     C.A.D. Father         MI      Coronary Artery Disease Father      Cerebrovascular Disease Father         ,, ,     Alcoholism Father      C.A.D. Brother         CABG     Thyroid Disease Sister         hypothyroid     Cancer Brother         skin cancer     Coronary Artery Disease Brother      Hyperlipidemia Brother      Alcoholism Brother      Hypertension Sister      Thyroid Disease Sister      Migraines Sister      Substance Abuse Brother      Cancer Brother      Thyroid Disease Sister         Medications:  Current Outpatient Medications   Medication Sig Dispense Refill     amoxicillin (AMOXIL) 500 MG capsule Take 4 capsules (2000 mg) by mouth, one hour prior to dental work 12 capsule 1     aspirin (ASA) 81 MG tablet Take 1 tablet (81 mg) by mouth daily 90 tablet 3     atorvastatin (LIPITOR) 80 MG tablet Take 1 tablet (80 mg) by mouth daily 90 tablet 3     butalbital-aspirin-caffeine (FIORINAL) -40 MG per capsule Take 1 capsule by mouth daily as needed for headaches 30 capsule 2     COMPRESSION STOCKINGS 2 each daily 2 each 1     IBUPROFEN PO Take 600 mg by mouth every 6 hours as needed for moderate pain       metroNIDAZOLE (METROGEL) 0.75 % external gel Apply twice daily as needed to face for rosacea. 45 g 11     naproxen sodium (ANAPROX) 220 MG tablet Take 220 mg by mouth 2 times daily (with meals)       VITAMIN D, CHOLECALCIFEROL, PO Take 2,000 Units by mouth daily as needed         Allergies   Allergen Reactions     Codeine GI Disturbance     Percocet [Oxycodone-Acetaminophen] Nausea and Vomiting       Review of Systems:  As per HPI.  -Skin Establ Pt: The patient denies any new rash, pruritus, or lesions that are symptomatic, changing or bleeding, except as per HPI.  -Constitutional: The patient is feeling generally well.    Physical exam:  Vitals: There were no vitals taken for this visit.  GEN: This is a well developed, well-nourished female in no acute distress, in a pleasant mood.    SKIN: Focused examination of the face/head  and neck were performed.  - Honey crusted plaque on the L melomental fold  - L forehead advancement flap healed very well with imperceptible scar. L brow 2.0 mm higher than right and counseled that would resolve  - No other lesions of concern on areas examined.       Impression/Plan:    1. BCC of the L forehead, s/p MMS 11/5/19  Upon review of the surgical site the patient had a well-healed advancement flap of the L forehead. Imperceptible scar at a normal conversational distance. L brow is 2.0 mm higher than the right and counseled the patient that this would resolve.    L brow 2.0 mm higher than the right and counseled the patient that this would resolve.    Recommended sunscreens SPF #30 or greater and protective clothing. Risk of scar dyspigmentation discussed.     Continue periodic self skin exams and report of any new or changing lesions.     Please refer to previous clinic note for details     Patient should have biannual skin checks with a general dermatologist due to Hx of NMSCs.    2.   Honey crusted plaque on the L melomental fold.  Impetigo vs. sBCC    Mupirocin recommended to be applied BID for a week or two. Pt has some at home. Follow-up in a month and we will reevaluate.       Follow-up in 1 month.        Staff Involved:    Scribe Disclosure  I, Nava Rashid, am serving as a scribe to document services personally performed by Dr. Dayton Tellez, based on data collection and the provider's statements to me.     Attending Attestation  I attest that the Scribe recorded the interview and exam that I personally performed.  I have reviewed the note and edited it as necessary.    Dayton Tellez M.D.  Professor  Director of Dermatologic Surgery  Department of Dermatology  [facility]

## 2020-10-26 ENCOUNTER — TELEPHONE (OUTPATIENT)
Dept: DERMATOLOGY | Facility: CLINIC | Age: 75
End: 2020-10-26

## 2020-10-26 NOTE — TELEPHONE ENCOUNTER
[facility] Call Center    Phone Message    May a detailed message be left on voicemail: yes     Reason for Call: Other: Pt called and would like to schedule a f/u with the provider. Please call her back to schedule. Thanks     Action Taken: Message routed to:  [facility] ([facility]): DERM    Travel Screening: Not Applicable

## 2020-11-09 ENCOUNTER — DOCUMENTATION ONLY (OUTPATIENT)
Dept: CARE COORDINATION | Facility: CLINIC | Age: 75
End: 2020-11-09

## 2020-12-08 ENCOUNTER — OFFICE VISIT (OUTPATIENT)
Dept: DERMATOLOGY | Facility: CLINIC | Age: 75
End: 2020-12-08
Payer: COMMERCIAL

## 2020-12-08 DIAGNOSIS — L81.4 SOLAR LENTIGO: ICD-10-CM

## 2020-12-08 DIAGNOSIS — Z85.828 HISTORY OF NONMELANOMA SKIN CANCER: Primary | ICD-10-CM

## 2020-12-08 DIAGNOSIS — L57.0 ACTINIC KERATOSIS: ICD-10-CM

## 2020-12-08 DIAGNOSIS — D48.5 NEOPLASM OF UNCERTAIN BEHAVIOR OF SKIN: ICD-10-CM

## 2020-12-08 DIAGNOSIS — D22.9 MULTIPLE BENIGN NEVI: ICD-10-CM

## 2020-12-08 DIAGNOSIS — L82.1 SEBORRHEIC KERATOSIS: ICD-10-CM

## 2020-12-08 PROCEDURE — 88305 TISSUE EXAM BY PATHOLOGIST: CPT | Performed by: PATHOLOGY

## 2020-12-08 PROCEDURE — 17000 DESTRUCT PREMALG LESION: CPT | Mod: XS | Performed by: DERMATOLOGY

## 2020-12-08 PROCEDURE — 99214 OFFICE O/P EST MOD 30 MIN: CPT | Mod: 25 | Performed by: DERMATOLOGY

## 2020-12-08 PROCEDURE — 17003 DESTRUCT PREMALG LES 2-14: CPT | Performed by: DERMATOLOGY

## 2020-12-08 PROCEDURE — 11102 TANGNTL BX SKIN SINGLE LES: CPT | Performed by: DERMATOLOGY

## 2020-12-08 PROCEDURE — 11103 TANGNTL BX SKIN EA SEP/ADDL: CPT | Performed by: DERMATOLOGY

## 2020-12-08 ASSESSMENT — PAIN SCALES - GENERAL
PAINLEVEL: NO PAIN (0)
PAINLEVEL: NO PAIN (0)

## 2020-12-08 NOTE — LETTER
"12/8/2020       RE: Connie J Frankenberg  [address]  [facility] [address]     Dear Colleague,    Thank you for referring your patient, Connie J Frankenberg, to the [facility] DERMATOLOGY CLINIC [place] [facility]. Please see a copy of my visit note below.    [facility] Dermatology Note      Dermatology Problem List:  1. Hx NMSC              - BCC, L forehead, s/p bx 10/24/19, s/p MMS 11/5/19                  - BCC, central chest              - SCC, right upper cutaneous lip, s/p MMS 2014  2. AKs. LiqN2.  3. SKs, lentigenes, dermal nevi.   4. NUB x 3. L cheek medial x 1, L cheek lateral x 1, R arm x 1. BCC vs. Other. S/p shave bx 12/8/2020.     CC:   Chief Complaint   Patient presents with     Skin Check     Concha is here today for a skin check. She states \" I have two areas on my left temple that concern me today\"\"     Encounter Date: Dec 8, 2020    History of Present Illness:  Ms. Connie J Frankenberg is a 75 year old female who presents as a follow-up for FBSE. Today, she reports a few new spots on the left temple described as slightly raised, brown rough spot. No pain, tenderness or bleeding. Patient reports a history of numerous BCCs. Has been using sunscreen regularly and avoid outdoor sun exposure.The patient otherwise denies any new or concerning lesions. No bleeding, painful, pruritic, or changing lesions. Health otherwise stable. No other skin concerns.     Past Medical History:   Patient Active Problem List   Diagnosis     Skin cancer, basal cell     Hyperlipidemia     Classical migraine     Insomnia     Arthritis of knee     Hemorrhoids     Vasovagal syncope     BCC (basal cell carcinoma), face     Swelling of lower extremity     S/P total knee arthroplasty     Vertigo, benign positional, bilateral     Status post total right knee replacement     Osteopenia     Disorder of bone and cartilage     Past Medical History: " "  Diagnosis Date     Arthritis of knee     right     Basal cell carcinoma      BPPV (benign paroxysmal positional vertigo)      Depressive disorder 1985     Disorder of bone and cartilage     (osteopenia)     Hemorrhoids      History of PID      Hyperlipidemia      Insomnia      Migraines, classic     sparkly aura     Osteopenia      Plantar fasciitis, bilateral      PONV (postoperative nausea and vomiting)      Skin cancer, basal cell      Squamous cell carcinoma      Swelling of the ankle, feet, or leg      Vasovagal syncope     is \"fainter\" with blood draws, injections-NEEDS TO BE LAYING DOWN     Past Surgical History:   Procedure Laterality Date     ARTHROPLASTY KNEE Right 4/13/2018    Procedure: ARTHROPLASTY KNEE;  Right Total Knee Replacement;  Surgeon: Apollo Nguyen MD;  Location:  OR     C STOMACH SURGERY PROCEDURE UNLISTED      laparoscopy for infertility     CATARACT IOL, RT/LT       LASIK BILATERAL       MOHS MICROGRAPHIC PROCEDURE       ZZC NONSPECIFIC PROCEDURE      laparoscopy,     ZZC NONSPECIFIC PROCEDURE      laser surg basal cell skin cancer       Social History:  Patient reports that she has never smoked. She has never used smokeless tobacco. She reports current alcohol use. She reports that she does not use drugs.    Family History:  Family History   Problem Relation Age of Onset     Neurologic Disorder Mother         headaches/narcolepsy     Cancer Mother         face skin     Cerebrovascular Disease Mother         multifocal infarcts to brain     Thyroid Disease Mother         hypothyroid     Seizure Disorder Mother      Migraines Mother      Neurologic Disorder Sister         headaches     Cancer Father         Skin, lung     C.A.D. Father         MI     Coronary Artery Disease Father      Cerebrovascular Disease Father         ,, ,     Alcoholism Father      C.A.D. Brother         CABG     Thyroid Disease Sister         hypothyroid     Cancer Brother         skin cancer     Coronary " Artery Disease Brother      Hyperlipidemia Brother      Alcoholism Brother      Hypertension Sister      Thyroid Disease Sister      Migraines Sister      Substance Abuse Brother      Cancer Brother      Thyroid Disease Sister        Medications:  Current Outpatient Medications   Medication Sig Dispense Refill     amoxicillin (AMOXIL) 500 MG capsule Take 4 capsules (2000 mg) by mouth, one hour prior to dental work 12 capsule 1     atorvastatin (LIPITOR) 80 MG tablet Take 1 tablet (80 mg) by mouth daily 90 tablet 3     butalbital-aspirin-caffeine (FIORINAL) -40 MG per capsule Take 1 capsule by mouth daily as needed for headaches 30 capsule 2     COMPRESSION STOCKINGS 2 each daily 2 each 1     IBUPROFEN PO Take 600 mg by mouth every 6 hours as needed for moderate pain       metroNIDAZOLE (METROGEL) 0.75 % external gel Apply twice daily as needed to face for rosacea. 45 g 11     VITAMIN D, CHOLECALCIFEROL, PO Take 2,000 Units by mouth daily as needed       aspirin (ASA) 81 MG tablet Take 1 tablet (81 mg) by mouth daily 90 tablet 3     naproxen sodium (ANAPROX) 220 MG tablet Take 220 mg by mouth 2 times daily (with meals)       Allergies   Allergen Reactions     Codeine GI Disturbance     Percocet [Oxycodone-Acetaminophen] Nausea and Vomiting         Review of Systems:  -As per HPI  -Constitutional: Otherwise feeling well today, in usual state of health.  -Skin: As above in HPI. No additional skin concerns.    Physical exam:  Vitals: There were no vitals taken for this visit.  GEN: This is a well developed, well-nourished female in no acute distress, in a pleasant mood.    SKIN: Full skin, which includes the head/face, both arms, chest, back, abdomen,both legs, genitalia and/or groin buttocks, digits and/or nails, was examined.  -De [place] Rosa skin type: I/I-  - Pink gritty papules on the left temple x 2, right temple x 1, right upper eyelid x 1.   - Well healed scars at prior NMSC surgical sites without evidence of  recurrence, except as below on right arm.  - Brown macules on sun exposed areas  - Brown waxy, stuck-on appearing papules on trunk and extremities, face.   - Brown macules and papules on trunk and extremities without concerning dermoscopy features.  - Red vascular papules on trunk.  - Pink shiny papules with non-specific dilated blood vessels on L cheek medial x 1, L cheek lateral x 1, and on the R arm (in edge of prior NMSC biopsy versus surgical scar).   -No other lesions of concern on areas examined.             Labs:  None    Impression/Plan:    1. Neoplasm of uncertain behavior on the left cheek medial x 1, left cheek lateral x 1, right arm x 1. The differential diagnosis includes BCC versus BLK versus AK.   - Shave biopsy:  After discussion of benefits and risks including but not limited to bleeding/bruising, pain/swelling, infection, scar, incomplete removal, nerve damage/numbness, recurrence, and non-diagnostic biopsy, written consent, verbal consent and photographs were obtained. Time-out was performed. The area x 3 was cleaned with isopropyl alcohol. 0.5ml of 1% lidocaine with 1:100,000 epinephrine was injected to obtain adequate anesthesia. A shave biopsy was performed. Hemostasis was achieved with aluminium chloride. Vaseline and a sterile dressing were applied. The patient tolerated the procedure and no complications were noted. The patient was provided with verbal and written post care instructions.    2. Actinic keratosis  - Cryotherapy procedure note: After verbal consent and discussion of risks and benefits including but no limited to dyspigmentation/scar, blister, and pain, 4 lesions was(were) treated with 1-2mm freeze border for 2 cycles with liquid nitrogen. Post cryotherapy instructions were provided.    3. History of nonmelanoma skin cancer, no clincial evidence of recurrence  - Sun precaution was advised including the use of sun screens of SPF 30 or higher, sun protective clothing, and  avoidance of tanning beds.  - Follow-up skin check in 6 months.     4. Benign lesions: Multiple benign nevi, solar lentigos, seborrheic keratoses, cherry angiomas. Explained to patient benign nature of lesion. No treatment is necessary at this time unless the lesion changes or becomes symptomatic.     - ABCDs of melanoma were discussed and self skin checks were advised.  - Sun precaution was advised including the use of sun screens of SPF 30 or higher, sun protective clothing, and avoidance of tanning beds.    Follow-up in 6 months, earlier for new or changing lesions.     Staff Involved:  Staff Only    Jason Gao MD  Pronouns: he/him/his    Department of Dermatology  [facility] [place]: Phone: [phone], Fax:[phone]  [facility]: Phone: [phone] Fax: [phone]

## 2020-12-08 NOTE — NURSING NOTE
"Chief Complaint   Patient presents with     Skin Check     Concah is here today for a skin check. She states \" I have two areas on my left temple that concern me today\"\"     Angela Myers, VIVIENNE  "

## 2020-12-08 NOTE — PROGRESS NOTES
"[facility] Dermatology Note      Dermatology Problem List:  1. Hx NMSC              - BCC, L forehead, s/p bx 10/24/19, s/p MMS 11/5/19                  - BCC, central chest              - SCC, right upper cutaneous lip, s/p MMS 2014  2. AKs. LiqN2.  3. SKs, lentigenes, dermal nevi.   4. NUB x 3. L cheek medial x 1, L cheek lateral x 1, R arm x 1. BCC vs. Other. S/p shave bx 12/8/2020.     CC:   Chief Complaint   Patient presents with     Skin Check     Concha is here today for a skin check. She states \" I have two areas on my left temple that concern me today\"\"     Encounter Date: Dec 8, 2020    History of Present Illness:  Ms. Connie J Frankenberg is a 75 year old female who presents as a follow-up for FBSE. Today, she reports a few new spots on the left temple described as slightly raised, brown rough spot. No pain, tenderness or bleeding. Patient reports a history of numerous BCCs. Has been using sunscreen regularly and avoid outdoor sun exposure.The patient otherwise denies any new or concerning lesions. No bleeding, painful, pruritic, or changing lesions. Health otherwise stable. No other skin concerns.     Past Medical History:   Patient Active Problem List   Diagnosis     Skin cancer, basal cell     Hyperlipidemia     Classical migraine     Insomnia     Arthritis of knee     Hemorrhoids     Vasovagal syncope     BCC (basal cell carcinoma), face     Swelling of lower extremity     S/P total knee arthroplasty     Vertigo, benign positional, bilateral     Status post total right knee replacement     Osteopenia     Disorder of bone and cartilage     Past Medical History:   Diagnosis Date     Arthritis of knee     right     Basal cell carcinoma      BPPV (benign paroxysmal positional vertigo)      Depressive disorder 1985     Disorder of bone and cartilage     (osteopenia)     Hemorrhoids      History of PID      Hyperlipidemia      Insomnia      Migraines, classic     sparkly aura     " "Osteopenia      Plantar fasciitis, bilateral      PONV (postoperative nausea and vomiting)      Skin cancer, basal cell      Squamous cell carcinoma      Swelling of the ankle, feet, or leg      Vasovagal syncope     is \"fainter\" with blood draws, injections-NEEDS TO BE LAYING DOWN     Past Surgical History:   Procedure Laterality Date     ARTHROPLASTY KNEE Right 4/13/2018    Procedure: ARTHROPLASTY KNEE;  Right Total Knee Replacement;  Surgeon: Apollo Nguyen MD;  Location: [facility] OR     C STOMACH SURGERY PROCEDURE UNLISTED      laparoscopy for infertility     CATARACT IOL, RT/LT       LASIK BILATERAL       MOHS MICROGRAPHIC PROCEDURE       ZZC NONSPECIFIC PROCEDURE      laparoscopy,     ZZC NONSPECIFIC PROCEDURE      laser surg basal cell skin cancer       Social History:  Patient reports that she has never smoked. She has never used smokeless tobacco. She reports current alcohol use. She reports that she does not use drugs.    Family History:  Family History   Problem Relation Age of Onset     Neurologic Disorder Mother         headaches/narcolepsy     Cancer Mother         face skin     Cerebrovascular Disease Mother         multifocal infarcts to brain     Thyroid Disease Mother         hypothyroid     Seizure Disorder Mother      Migraines Mother      Neurologic Disorder Sister         headaches     Cancer Father         Skin, lung     C.A.D. Father         MI     Coronary Artery Disease Father      Cerebrovascular Disease Father         ,, ,     Alcoholism Father      C.A.D. Brother         CABG     Thyroid Disease Sister         hypothyroid     Cancer Brother         skin cancer     Coronary Artery Disease Brother      Hyperlipidemia Brother      Alcoholism Brother      Hypertension Sister      Thyroid Disease Sister      Migraines Sister      Substance Abuse Brother      Cancer Brother      Thyroid Disease Sister        Medications:  Current Outpatient Medications   Medication Sig Dispense Refill     " amoxicillin (AMOXIL) 500 MG capsule Take 4 capsules (2000 mg) by mouth, one hour prior to dental work 12 capsule 1     atorvastatin (LIPITOR) 80 MG tablet Take 1 tablet (80 mg) by mouth daily 90 tablet 3     butalbital-aspirin-caffeine (FIORINAL) -40 MG per capsule Take 1 capsule by mouth daily as needed for headaches 30 capsule 2     COMPRESSION STOCKINGS 2 each daily 2 each 1     IBUPROFEN PO Take 600 mg by mouth every 6 hours as needed for moderate pain       metroNIDAZOLE (METROGEL) 0.75 % external gel Apply twice daily as needed to face for rosacea. 45 g 11     VITAMIN D, CHOLECALCIFEROL, PO Take 2,000 Units by mouth daily as needed       aspirin (ASA) 81 MG tablet Take 1 tablet (81 mg) by mouth daily 90 tablet 3     naproxen sodium (ANAPROX) 220 MG tablet Take 220 mg by mouth 2 times daily (with meals)       Allergies   Allergen Reactions     Codeine GI Disturbance     Percocet [Oxycodone-Acetaminophen] Nausea and Vomiting         Review of Systems:  -As per HPI  -Constitutional: Otherwise feeling well today, in usual state of health.  -Skin: As above in HPI. No additional skin concerns.    Physical exam:  Vitals: There were no vitals taken for this visit.  GEN: This is a well developed, well-nourished female in no acute distress, in a pleasant mood.    SKIN: Full skin, which includes the head/face, both arms, chest, back, abdomen,both legs, genitalia and/or groin buttocks, digits and/or nails, was examined.  -De [place] Rosa skin type: I/I-  - Pink gritty papules on the left temple x 2, right temple x 1, right upper eyelid x 1.   - Well healed scars at prior NMSC surgical sites without evidence of recurrence, except as below on right arm.  - Brown macules on sun exposed areas  - Brown waxy, stuck-on appearing papules on trunk and extremities, face.   - Brown macules and papules on trunk and extremities without concerning dermoscopy features.  - Red vascular papules on trunk.  - Pink shiny papules with  non-specific dilated blood vessels on L cheek medial x 1, L cheek lateral x 1, and on the R arm (in edge of prior NMSC biopsy versus surgical scar).   -No other lesions of concern on areas examined.             Labs:  None    Impression/Plan:    1. Neoplasm of uncertain behavior on the left cheek medial x 1, left cheek lateral x 1, right arm x 1. The differential diagnosis includes BCC versus BLK versus AK.   - Shave biopsy:  After discussion of benefits and risks including but not limited to bleeding/bruising, pain/swelling, infection, scar, incomplete removal, nerve damage/numbness, recurrence, and non-diagnostic biopsy, written consent, verbal consent and photographs were obtained. Time-out was performed. The area x 3 was cleaned with isopropyl alcohol. 0.5ml of 1% lidocaine with 1:100,000 epinephrine was injected to obtain adequate anesthesia. A shave biopsy was performed. Hemostasis was achieved with aluminium chloride. Vaseline and a sterile dressing were applied. The patient tolerated the procedure and no complications were noted. The patient was provided with verbal and written post care instructions.    2. Actinic keratosis  - Cryotherapy procedure note: After verbal consent and discussion of risks and benefits including but no limited to dyspigmentation/scar, blister, and pain, 4 lesions was(were) treated with 1-2mm freeze border for 2 cycles with liquid nitrogen. Post cryotherapy instructions were provided.    3. History of nonmelanoma skin cancer, no clincial evidence of recurrence  - Sun precaution was advised including the use of sun screens of SPF 30 or higher, sun protective clothing, and avoidance of tanning beds.  - Follow-up skin check in 6 months.     4. Benign lesions: Multiple benign nevi, solar lentigos, seborrheic keratoses, cherry angiomas. Explained to patient benign nature of lesion. No treatment is necessary at this time unless the lesion changes or becomes symptomatic.     - ABCDs of  melanoma were discussed and self skin checks were advised.  - Sun precaution was advised including the use of sun screens of SPF 30 or higher, sun protective clothing, and avoidance of tanning beds.    Follow-up in 6 months, earlier for new or changing lesions.     Staff Involved:  Staff Only    Jason Gao MD  Pronouns: he/him/his    Department of Dermatology  [facility]: Phone: [phone], Fax:[phone]  [facility]: Phone: [phone] Fax: [phone]

## 2020-12-08 NOTE — NURSING NOTE
Lidocaine-epinephrine 1-1:994837 % injection   0.5mL once for one use, starting 12/8/2020 ending 12/8/2020,  2mL disp, R-0, injection  Injected by Aisha Farris RN.

## 2020-12-08 NOTE — PATIENT INSTRUCTIONS
Preventive Care:    Colorectal Cancer Screening: During our visit today, we discussed that it is recommended you receive colorectal cancer screening. Please call or make an appointment with your primary care provider to discuss this. You may also call the  [facility] scheduling line ([phone]) to set up a colonoscopy appointment.      Wound Care After a Biopsy    What is a skin biopsy?  A skin biopsy allows the doctor to examine a very small piece of tissue under the microscope to determine the diagnosis and the best treatment for the skin condition. A local anesthetic (numbing medicine)  is injected with a very small needle into the skin area to be tested. A small piece of skin is taken from the area. Sometimes a suture (stitch) is used.     What are the risks of a skin biopsy?  I will experience scar, bleeding, swelling, pain, crusting and redness. I may experience incomplete removal or recurrence. Risks of this procedure are excessive bleeding, bruising, infection, nerve damage, numbness, thick (hypertrophic or keloidal) scar and non-diagnostic biopsy.    How should I care for my wound for the first 24 hours?    Keep the wound dry and covered for 24 hours    If it bleeds, hold direct pressure on the area for 15 minutes. If bleeding does not stop then go to the emergency room    Avoid strenuous exercise the first 1-2 days or as your doctor instructs you    How should I care for the wound after 24 hours?    After 24 hours, remove the bandage    You may bathe or shower as normal    If you had a scalp biopsy, you can shampoo as usual and can use shower water to clean the biopsy site daily    Clean the wound twice a day with gentle soap and water    Do not scrub, be gentle    Apply white petroleum/Vaseline after cleaning the wound with a cotton swab or a clean finger, and keep the site covered with a Bandaid /bandage. Bandages are not necessary with a scalp biopsy    If you are unable to cover the site with a  Bandaid /bandage, re-apply ointment 2-3 times a day to keep the site moist. Moisture will help with healing    Avoid strenuous activity for first 1-2 days    Avoid lakes, rivers, pools, and oceans until the stitches are removed or the site is healed    How do I clean my wound?    Wash hands thoroughly with soap or use hand  before all wound care    Clean the wound with gentle soap and water    Apply white petroleum/Vaseline  to wound after it is clean    Replace the Bandaid /bandage to keep the wound covered for the first few days or as instructed by your doctor    If you had a scalp biopsy, warm shower water to the area on a daily basis should suffice    What should I use to clean my wound?     Cotton-tipped applicators (Qtips )    White petroleum jelly (Vaseline ). Use a clean new container and use Q-tips to apply.    Bandaids   as needed    Gentle soap     How should I care for my wound long term?    Do not get your wound dirty    Keep up with wound care for one week or until the area is healed.    A small scab will form and fall off by itself when the area is completely healed. The area will be red and will become pink in color as it heals. Sun protection is very important for how your scar will turn out. Sunscreen with an SPF 30 or greater is recommended once the area is healed.    If you have stitches, stitches need to be removed in 14 days. You may return to our clinic for this or you may have it done locally at your doctor s office.    You should have some soreness but it should be mild and slowly go away over several days. Talk to your doctor about using tylenol for pain,    When should I call my doctor?  If you have increased:     Pain or swelling    Pus or drainage (clear or slightly yellow drainage is ok)    Temperature over 100F    Spreading redness or warmth around wound    When will I hear about my results?  The biopsy results can take 2-3 weeks to come back. The clinic will call you with the  results, send you a [employer] message, or have you schedule a follow-up clinic or phone time to discuss the results. Contact our clinics if you do not hear from us in 3 weeks.     Who should I call with questions?    [facility]: [phone]     [facility]: [phone]    For urgent needs outside of business hours call the [facility] at [phone] and ask for the dermatology resident on call

## 2020-12-09 LAB — COPATH REPORT: NORMAL

## 2020-12-10 NOTE — RESULT ENCOUNTER NOTE
Can we call patient and let her know:    (1)  Left cheek (medial) was a pre-skin cancer. We'll need to just monitor this site.  (2)  Left cheek (lateral) was a SCCis. This will need MMS surgery.    (3)  Right arm - BCC, superficial type. Given this was in an edge of a prior scar, recommend excision for this spot. This can be performed as same day as MMS surgery.    Can you place derm surg referral for MMS x 1 and excision x 1 as above and schedule?  I'd also like her to follow-up in 3 months with me, rather than 6 months, so we can check on the spot on the left cheek medial to make sure it doesn't recur.     Thanks!    Jason Gao MD  Pronouns: he/him/his    Department of Dermatology  [facility]: Phone: [phone], Fax:[phone]  [facility]: Phone: [phone] Fax: [phone]

## 2020-12-21 DIAGNOSIS — D04.39 SQUAMOUS CELL CARCINOMA IN SITU (SCCIS) OF SKIN OF CHEEK: Primary | ICD-10-CM

## 2020-12-21 DIAGNOSIS — C44.612 BCC (BASAL CELL CARCINOMA), ARM, RIGHT: ICD-10-CM

## 2021-01-04 ENCOUNTER — OFFICE VISIT (OUTPATIENT)
Dept: DERMATOLOGY | Facility: CLINIC | Age: 76
End: 2021-01-04
Payer: COMMERCIAL

## 2021-01-04 VITALS — HEART RATE: 72 BPM | SYSTOLIC BLOOD PRESSURE: 138 MMHG | DIASTOLIC BLOOD PRESSURE: 86 MMHG

## 2021-01-04 DIAGNOSIS — D04.39 SQUAMOUS CELL CARCINOMA IN SITU (SCCIS) OF SKIN OF CHEEK: ICD-10-CM

## 2021-01-04 DIAGNOSIS — C44.612 BCC (BASAL CELL CARCINOMA), ARM, RIGHT: ICD-10-CM

## 2021-01-04 PROCEDURE — 13132 CMPLX RPR F/C/C/M/N/AX/G/H/F: CPT | Mod: GC | Performed by: DERMATOLOGY

## 2021-01-04 PROCEDURE — 17311 MOHS 1 STAGE H/N/HF/G: CPT | Mod: GC | Performed by: DERMATOLOGY

## 2021-01-04 PROCEDURE — 12032 INTMD RPR S/A/T/EXT 2.6-7.5: CPT | Mod: GC | Performed by: DERMATOLOGY

## 2021-01-04 PROCEDURE — 11602 EXC TR-EXT MAL+MARG 1.1-2 CM: CPT | Mod: XS | Performed by: DERMATOLOGY

## 2021-01-04 PROCEDURE — 88305 TISSUE EXAM BY PATHOLOGIST: CPT | Performed by: PATHOLOGY

## 2021-01-04 ASSESSMENT — PAIN SCALES - GENERAL: PAINLEVEL: NO PAIN (0)

## 2021-01-04 NOTE — NURSING NOTE
Chief Complaint   Patient presents with     Derm Problem     Concha is here to remove SCCiS from left cheek and BCC from right arm.      Amarilis Griffith LPN

## 2021-01-04 NOTE — LETTER
1/4/2021       RE: Connie J Frankenberg  [address]  [facility] [address]     Dear Colleague,    Thank you for referring your patient, Connie J Frankenberg, to the [facility] DERMATOLOGIC SURGERY CLINIC [place] at [facility]. Please see a copy of my visit note below.    [facility] Mohs Dermatologic Surgery Procedure Note      Date of Service:  Jan 4, 2021  Surgery: Mohs micrographic surgery    Case 1  Repair Type: Complex linear repair   Repair Size: 4.1 cm  Suture Material: 4-0 Monocryl and 5-0 Fast absorbing gut  Tumor Type: SCCis - Squamous cell carcinoma in situ  Location: Left lateral cheek   Derm-Path Accession #: [id number]   PreOp Size: 1.0 x 0.8 cm  PostOp Size: 2.3 x 1.3 cm  Mohs Accession #:   Level of Defect: Fat      Procedure:  We discussed the principles of treatment and most likely complications including scarring, bleeding, infection, swelling, pain, crusting, nerve damage, large wound,  incomplete excision, wound dehiscence,  nerve damage, recurrence, and a second procedure may be recommended to obtain the best cosmetic or functional result.    Informed consent was obtained and the patient underwent the procedure as follows:  The patient was placed supine on the operating table.  The cancer was identified, outlined with a marker, and verified by the patient.  The entire surgical field was prepped with Hibiclens.  The surgical site was anesthetized using Lidocaine 1% with epi 1:100,000.      The area of clinically apparent tumor was debulked. The layer of tissue was then surgically excised using a #15 blade and was then transferred onto a specimen sheet maintaining the orientation of the specimen. Hemostasis was obtained using heat cautery. The wound site was then covered with a dressing while the tissue samples were processed for examination.    The excised tissue was transported to the Mohs histology laboratory  maintaining the tissue orientation.  The tissue specimen was relaxed so that the entire surgical margin was in a a single horizontal plane for sectioning and inked for precise mapping.  A precise reference map was drawn to reflect the sectioning of the specimen, colored inking of the margins, and orientation on the patient. The tissue was processed using horizontal sectioning of the base and continuous peripheral margins.  The histopathologic sections were reviewed in conjunction with the reference map.    Total blocks: 1    Total slides:  2    There were no cancer cells visualized on examination, therefore Mohs surgery was complete.    REPAIR:     A complex layered linear closure was selected as the procedure which would maximally preserve both function and cosmesis and for the following reasons: 1) the defect was widely undermined; and 2) given the wound size, depth, tension, and location.     After the excision of the tumor, the area was extensively and carefully undermined using blunt Metzenbaum scissors. Hemostasis was obtained with spot electrocautery and ligation of vessels where necessary. The subcutaneous and dermal layers were then closed with 4-0 Monocryl sutures. The epidermis was then carefully approximated along the length of the wound using 5-0 Fast absorbing gut simple running sutures.       Estimated blood loss was less than 10 ml for all surgical sites. A sterile pressure dressing was applied and wound care instructions, with a written handout, were given. The patient was discharged from the Dermatologic Surgery Center alert and ambulatory.    Dr. Dayton Tellez staffed the patient and was present for the key portions of the above procedure.       DERMATOLOGY EXCISION PROCEDURE NOTE    Dermatology Problem List:  1. Hx NMSC              - BCC, L forehead, s/p bx 10/24/19, s/p MMS 11/5/19                  - BCC, central chest              - SCC, right upper cutaneous lip, s/p MMS 2014   - SCCis, L cheek,  lateral, s/p MMS 1/4/2021   - BCC, R arm, s/p excision 1/4/2021  2. AKs. LiqN2.  3. SKs, lentigenes, dermal nevi.     NAME OF PROCEDURE: Excision intermediate layered linear closure  Staff surgeon: Dayton Tellez  Resident: Ana  Scrub Nurse: Amarilis Griffith    PRE-OPERATIVE DIAGNOSIS:  Basal Cell Carcinoma  POST-OPERATIVE DIAGNOSIS: Same   LOCATION: R arm  FINAL EXCISION SIZE(DEFECT SIZE): 1.7 x 1.6 cm  MARGIN: 0.4 cm  FINAL REPAIR LENGTH: 5.2 cm   ANESTHESIA: 1% lidocaine with epinephrine    INDICATIONS: This patient presented with a 0.9 x 0.8 cm Basal Cell Carcinoma. Excision was indicated. We discussed the principles of treatment and most likely complications including scarring, bleeding, infection, incomplete excision, wound dehiscence, pain, nerve damage, and recurrence. Informed consent was obtained and the patient underwent the procedure as follows:    PROCEDURE: The patient was taken to the operative suite. Time-out was performed.  The treatment area was anesthetized with 1% lidocaine with epinephrine. The area was prepped with Chlorhexidine and rinsed with sterile saline and draped with sterile towels. The lesion was delineated and excised down to subcutaneous fat in a elliptical manner. Hemostasis was obtained by electrocoagulation.     REPAIR: An intermediate layered linear closure was selected as the procedure which would maximally preserve both function and cosmesis.    After the excision of the tumor, the area was carefully undermined. Hemostasis was obtained with electrocoagulation.  Closure was oriented so that the wound was in the patient's natural skin tension lines. The subcutaneous and dermal layers were then closed with 4-0 Monocryl sutures. The epidermis was then carefully approximated along the length of the wound using 5-0 Fast absorbing gut simple running sutures.     Estimated blood loss was less than 10 ml for all surgical sites. A sterile pressure dressing was applied and wound care  instructions, with a written handout, were given. The patient was discharged from the Dermatologic Surgery Center alert and ambulatory.    Follow-up in 2 weeks for virtual follow up    Dr. Dayton Tellez was immediately available for the entire surgery and was physicially present for the key portions of the procedure.    Anatomic Pathology Results: pending    Clinical Follow-Up: 4-6 months for regular skin exam     Staff Involved:  Fellow/Staff    Marcelo Perez MD  PGY-6    Micrographic Surgery and Dermatologic Oncology Fellow  January 4, 2021              Attestation signed by Dayton Tellez MD at 1/5/2021  3:40 PM:    Attending attestation:  I was present for key elements of the procedure and immediately available for all other portions of the procedure.  I have reviewed the note and edited it as necessary.    Dayton Tellez M.D.  Professor  Director of Dermatologic Surgery  Department of Dermatology  [facility]    Dermatology Surgery Clinic  [facility]  [address] [place] [id number]

## 2021-01-04 NOTE — PATIENT INSTRUCTIONS
Wound Care Instructions  I will experience scar, altered skin color, bleeding, swelling, pain, crusting and redness. I may experience altered sensation. Risks are excessive bleeding, infection, muscle weakness, thick (hypertrophic or keloidal) scar, and recurrence,. A second procedure may be recommended to obtain the best cosmetic or functional result.  Possible complications of any surgical procedure are bleeding, infection, scarring, alteration in skin color and sensation, muscle weakness in the area, wound dehiscence or seperation, or recurrence of the lesion or disease. On occasion, after healing, a secondary procedure or revision may be recommended in order to obtain the best cosmetic or functional result.   After your surgery, a pressure bandage will be placed over the area that has sutures. This will help prevent bleeding.   For the First 48 hours After Surgery:  1. Leave the pressure bandage on and keep it dry. If it should come loose, you may retape it, but do not take it off.  2. Relax and take it easy. Do not do any vigorous exercise, heavy lifting, or bending forward. This could cause the wound to bleed.  3. Post-operative pain is usually mild. You may take plain or extra strength Tylenol every 4 hours as needed (do not take more than 4,000mg in one day). Do not take any medicine that contains aspirin, ibuprofen or motrin unless you have been recommended these by a doctor.  Avoid alcohol and vitamin E as these may increase your tendency to bleed.  4. You may put an ice pack around the bandaged area for 20 minutes every 2-3 hours. This may help reduce swelling, bruising, and pain. Make sure the ice pack is waterproof so that the pressure bandage does not get wet.   5. You may see a small amount of drainage or blood on your pressure bandage. This is normal. However, if drainage or bleeding continues or saturates the bandage, you will need to apply firm pressure over the bandage with a washcloth for 15  minutes. If bleeding continues after applying pressure for 15 minutes then go to the nearest emergency room.  48 Hours After Surgery  Carefully remove the bandage and start daily wound care and dressing changes. You may also now shower and get the wound wet. Wash wound with a mild soap and water.  Use caution when washing the wound. Be gentle and do not let the forceful shower stream hit the wound directly.  PAT dry.  Daily Wound Care:  1. Wash wound with a mild soap and water.  Use caution when washing the wound, be gentle and do not let the forceful shower stream hit the wound directly.  2. PAT DRY.  3. Apply Vaseline (from a new container or tube) over the suture line with a Q-tip. It is very important to keep the wound continuously moist, as wounds heal best in a moist environment.  4.  Keep the site covered until sutures are removed, you can cover it with a Telfa (non-stick) dressing and tape or a band-aid.    5. If you are unable to keep wound covered, you must apply Vaseline every 2 - 3 hours (while awake) to ensure it is being kept moist for optimal healing. A dressing overnight is recommended to keep the area moist.   Call Us If:  1. You have pain that is not controlled with Tylenol.  2. You have signs or symptoms of an infection, such as: fever over 100 degrees F, redness, warmth, or foul-smelling or yellow/creamy drainage from the wound.  Who should I call with questions?    [facility]: [phone]     [facility]: [phone]    For urgent needs outside of business hours call the [facility] at [phone] and ask for the dermatology resident on call

## 2021-01-04 NOTE — PROGRESS NOTES
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    Informed consent was obtained and the patient underwent the procedure as follows:  The patient was placed supine on the operating table.  The cancer was identified, outlined with a marker, and verified by the patient.  The entire surgical field was prepped with Hibiclens.  The surgical site was anesthetized using Lidocaine 1% with epi 1:100,000.      The area of clinically apparent tumor was debulked. The layer of tissue was then surgically excised using a #15 blade and was then transferred onto a specimen sheet maintaining the orientation of the specimen. Hemostasis was obtained using heat cautery. The wound site was then covered with a dressing while the tissue samples were processed for examination.    The excised tissue was transported to the Mohs histology laboratory maintaining the tissue orientation.  The tissue specimen was relaxed so that the entire surgical margin was in a a single horizontal plane for sectioning and inked for precise mapping.  A precise reference map was drawn to reflect the sectioning of the specimen, colored inking of the margins, and orientation on the patient. The  tissue was processed using horizontal sectioning of the base and continuous peripheral margins.  The histopathologic sections were reviewed in conjunction with the reference map.    Total blocks: 1    Total slides:  2    There were no cancer cells visualized on examination, therefore Mohs surgery was complete.    REPAIR:     A complex layered linear closure was selected as the procedure which would maximally preserve both function and cosmesis and for the following reasons: 1) the defect was widely undermined; and 2) given the wound size, depth, tension, and location.     After the excision of the tumor, the area was extensively and carefully undermined using blunt Metzenbaum scissors. Hemostasis was obtained with spot electrocautery and ligation of vessels where necessary. The subcutaneous and dermal layers were then closed with 4-0 Monocryl sutures. The epidermis was then carefully approximated along the length of the wound using 5-0 Fast absorbing gut simple running sutures.       Estimated blood loss was less than 10 ml for all surgical sites. A sterile pressure dressing was applied and wound care instructions, with a written handout, were given. The patient was discharged from the Dermatologic Surgery Center alert and ambulatory.    Dr. Dayton Tellez staffed the patient and was present for the key portions of the above procedure.       DERMATOLOGY EXCISION PROCEDURE NOTE    Dermatology Problem List:  1. Hx NMSC              - BCC, L forehead, s/p bx 10/24/19, s/p MMS 11/5/19                  - BCC, central chest              - SCC, right upper cutaneous lip, s/p MMS 2014   - SCCis, L cheek, lateral, s/p MMS 1/4/2021   - BCC, R arm, s/p excision 1/4/2021  2. AKs. LiqN2.  3. SKs, lentigenes, dermal nevi.     NAME OF PROCEDURE: Excision intermediate layered linear closure  Staff surgeon: Dayton Tellez  Resident: Ana  Scrub Nurse: Amarilis Griffith    PRE-OPERATIVE DIAGNOSIS:  Basal Cell Carcinoma  POST-OPERATIVE DIAGNOSIS:  Same   LOCATION: R arm  FINAL EXCISION SIZE(DEFECT SIZE): 1.7 x 1.6 cm  MARGIN: 0.4 cm  FINAL REPAIR LENGTH: 5.2 cm   ANESTHESIA: 1% lidocaine with epinephrine    INDICATIONS: This patient presented with a 0.9 x 0.8 cm Basal Cell Carcinoma. Excision was indicated. We discussed the principles of treatment and most likely complications including scarring, bleeding, infection, incomplete excision, wound dehiscence, pain, nerve damage, and recurrence. Informed consent was obtained and the patient underwent the procedure as follows:    PROCEDURE: The patient was taken to the operative suite. Time-out was performed.  The treatment area was anesthetized with 1% lidocaine with epinephrine. The area was prepped with Chlorhexidine and rinsed with sterile saline and draped with sterile towels. The lesion was delineated and excised down to subcutaneous fat in a elliptical manner. Hemostasis was obtained by electrocoagulation.     REPAIR: An intermediate layered linear closure was selected as the procedure which would maximally preserve both function and cosmesis.    After the excision of the tumor, the area was carefully undermined. Hemostasis was obtained with electrocoagulation.  Closure was oriented so that the wound was in the patient's natural skin tension lines. The subcutaneous and dermal layers were then closed with 4-0 Monocryl sutures. The epidermis was then carefully approximated along the length of the wound using 5-0 Fast absorbing gut simple running sutures.     Estimated blood loss was less than 10 ml for all surgical sites. A sterile pressure dressing was applied and wound care instructions, with a written handout, were given. The patient was discharged from the Dermatologic Surgery Center alert and ambulatory.    Follow-up in 2 weeks for virtual follow up    Dr. Dayton Tellez was immediately available for the entire surgery and was physicially present for the key portions of the procedure.    Anatomic Pathology  Results: pending    Clinical Follow-Up: 4-6 months for regular skin exam     Staff Involved:  Fellow/Staff    Marcelo Perez MD  PGY-6    Micrographic Surgery and Dermatologic Oncology Fellow  January 4, 2021

## 2021-01-05 NOTE — PROGRESS NOTES
Pre chartin:00-8:20 a.m.  Total time 20 min  Date of pre chartin20  This effort id essential to preparing for upcoming service directly affecting ongoing primary care management and coordination of care for this patient for the same day office visit.  Person performing pre charting work:  Dr. Beltre  This non face-to-face clinical work included review/documentation of medical history, medications, vital sign trends,  routine health care maintenance, specialist notes, laboratory, procedures, imaging).    CC:  Routine physical    Other health care team members:  Derm/Mohs Dr Tellez  Derm Dr. Gao    HPI:  Connie J Frankenberg is a 75 year old female with a history of right knee arthritis s/p arthroplasty 2018, squamous and basal  cell skin cancer, rosacea, osteopenia L spine (aka disorder of bone and cartilage), BPPV, hyperlipidemia, vasovagal syncope, depressive disorder, vitamin D deficiency and migraines who presents for a routine physical.    Routine health care maintenance reviewed in detail:  Most recent, pertinent labs:    Component      Latest Ref Rng & Units 2019   Cholesterol      <200 mg/dL 179   Triglycerides      <150 mg/dL 90   HDL Cholesterol      >49 mg/dL 67   LDL Cholesterol Calculated      <100 mg/dL 93   Non HDL Cholesterol      <130 mg/dL 111     Component      Latest Ref Rng & Units 2019   Vitamin D Deficiency screening      20 - 75 ug/L 28     Component      Latest Ref Rng & Units 2018   TSH      0.40 - 4.00 mU/L 2.42     Component      Latest Ref Rng & Units 4/15/2018   Sodium      133 - 144 mmol/L 141   Potassium      3.4 - 5.3 mmol/L 4.1   Chloride      94 - 109 mmol/L 108   Carbon Dioxide      20 - 32 mmol/L 26   Anion Gap      3 - 14 mmol/L 7   Glucose      70 - 99 mg/dL 138 (H)   Urea Nitrogen      7 - 30 mg/dL 13   Creatinine      0.52 - 1.04 mg/dL 0.62   GFR Estimate      >60 mL/min/1.7m2 >90   GFR Estimate If Black      >60 mL/min/1.7m2 >90   Calcium       8.5 - 10.1 mg/dL 8.2 (L)     Component      Latest Ref Rng & Units 3/29/2018   WBC      4.0 - 11.0 10e9/L 6.9   RBC Count      3.8 - 5.2 10e12/L 4.69   Hemoglobin      11.7 - 15.7 g/dL 14.2   Hematocrit      35.0 - 47.0 % 43.6   MCV      78 - 100 fl 93   MCH      26.5 - 33.0 pg 30.3   MCHC      31.5 - 36.5 g/dL 32.6   RDW      10.0 - 15.0 % 13.8   Platelet Count      150 - 450 10e9/L 271     Mammogram pending  Referred for colonoscopy 12/7/19  DXA 5/3/17 T -2.3 L spine  Immunizations up to date except for Shingrix  Mammogram already scheduled for this month    Overall, Concha is doing will, no acute complaints today.      Current Outpatient Medications   Medication Sig Dispense Refill     amoxicillin (AMOXIL) 500 MG capsule Take 4 capsules (2000 mg) by mouth, one hour prior to dental work 12 capsule 1     aspirin (ASA) 81 MG tablet Take 1 tablet (81 mg) by mouth daily 90 tablet 3     atorvastatin (LIPITOR) 80 MG tablet Take 1 tablet (80 mg) by mouth daily 90 tablet 3     butalbital-aspirin-caffeine (FIORINAL) -40 MG per capsule Take 1 capsule by mouth daily as needed for headaches 30 capsule 2     COMPRESSION STOCKINGS 2 each daily 2 each 1     IBUPROFEN PO Take 600 mg by mouth every 6 hours as needed for moderate pain       metroNIDAZOLE (METROGEL) 0.75 % external gel Apply twice daily as needed to face for rosacea. 45 g 11     naproxen sodium (ANAPROX) 220 MG tablet Take 220 mg by mouth 2 times daily (with meals)       VITAMIN D, CHOLECALCIFEROL, PO Take 2,000 Units by mouth daily as needed       /87 (BP Location: Left arm, Patient Position: Sitting, Cuff Size: Adult Regular)   Pulse 72   Wt 74.8 kg (165 lb)   SpO2 98%   BMI 30.67 kg/m    Physical Examination:    General:  Conversant, generally healthy appearing, no acute distress  Head: atraumatic  Eyes:  Pupils 2-3 mm, sclera white, EOM's full, conjunctiva moist, no periorbital swelling    Ears:  TM's normal, EAC's patent, clear of cerumen on  left, only a mild amount of cerumen on right.  Nose:  Nasal passages clear, turbinates not swollen  Throat/Mouth:  No pharyngeal erythema, exudate, ulcers, oral mucosa and tongue moist, normal hard and soft palate  Neck:  Trachea midline, Full AROM, supple, thyroid smooth, symmetric, not enlarged, no nodules, no neck lymphadenopathy  Lungs:  Clear to auscultation throughout, no wheezes, rhonchi or rales. Normal respiratory effort and no intercostal retractions.  C/V:  Regular rate and rhythm, no murmurs, rubs or gallops.  No JVD. Radial and DP pulses 2+, equal and regular.  Abdomen:  Not distended.  Bowel sounds active.  No tenderness, no hepatosplenomegaly or masses.  No CVA tenderness or masses.  Lymph:  No cervical lymph nodes.  Neuro: Alert and oriented, face symmetric. Able to get on/off exam table without assistance.  Strength grossly intact. No tremor.  Gait steady.   M/S:   No joint deformities noted.  No joint swelling.  Skin:   Normal temperature., turgor and texture. No rashes, suspicious lesions, or jaundice on exposed skin surfaces. Has bandages in place over areas of recent skin biopsies/procedures  Extremities:  No peripheral edema, no digital cyanosis  Psych:  Alert and oriented. Appropriate affect.  Not psychomotor slowed.  No signs of anxiety or agitation.    Concha was seen today for physical.    Diagnoses and all orders for this visit:    Health care maintenance    Mixed hyperlipidemia  -     Lipid panel reflex to direct LDL Fasting; Future  -     Comprehensive metabolic panel; Future    Vitamin D deficiency  -     Vitamin D Deficiency; Future    Disorder of bone and cartilage  -     Dexa hip/pelvis/spine*; Future    Need for vaccination    Asymptomatic menopausal state  -     Dexa hip/pelvis/spine*; Future    F/U one year and as needed.    Total encounter time:  20 min  Precharting time: 20 min    Sammi Beltre M.D.  Internal Medicine  Primary Care Center     Patients: if you have questions or  concerns about this progress note, please discuss them with the provider at a future office visit.

## 2021-01-06 ENCOUNTER — OFFICE VISIT (OUTPATIENT)
Dept: INTERNAL MEDICINE | Facility: CLINIC | Age: 76
End: 2021-01-06
Payer: COMMERCIAL

## 2021-01-06 VITALS
OXYGEN SATURATION: 98 % | SYSTOLIC BLOOD PRESSURE: 134 MMHG | WEIGHT: 165 LBS | DIASTOLIC BLOOD PRESSURE: 87 MMHG | HEART RATE: 72 BPM | BODY MASS INDEX: 30.67 KG/M2

## 2021-01-06 DIAGNOSIS — Z23 NEED FOR VACCINATION: ICD-10-CM

## 2021-01-06 DIAGNOSIS — E55.9 VITAMIN D DEFICIENCY: ICD-10-CM

## 2021-01-06 DIAGNOSIS — M94.9 DISORDER OF BONE AND CARTILAGE: ICD-10-CM

## 2021-01-06 DIAGNOSIS — E78.2 MIXED HYPERLIPIDEMIA: ICD-10-CM

## 2021-01-06 DIAGNOSIS — Z00.00 HEALTH CARE MAINTENANCE: Primary | ICD-10-CM

## 2021-01-06 DIAGNOSIS — M89.9 DISORDER OF BONE AND CARTILAGE: ICD-10-CM

## 2021-01-06 DIAGNOSIS — Z78.0 ASYMPTOMATIC MENOPAUSAL STATE: ICD-10-CM

## 2021-01-06 LAB
ALBUMIN SERPL-MCNC: 4 G/DL (ref 3.4–5)
ALP SERPL-CCNC: 90 U/L (ref 40–150)
ALT SERPL W P-5'-P-CCNC: 41 U/L (ref 0–50)
ANION GAP SERPL CALCULATED.3IONS-SCNC: 3 MMOL/L (ref 3–14)
AST SERPL W P-5'-P-CCNC: 21 U/L (ref 0–45)
BILIRUB SERPL-MCNC: 0.8 MG/DL (ref 0.2–1.3)
BUN SERPL-MCNC: 18 MG/DL (ref 7–30)
CALCIUM SERPL-MCNC: 9.9 MG/DL (ref 8.5–10.1)
CHLORIDE SERPL-SCNC: 107 MMOL/L (ref 94–109)
CHOLEST SERPL-MCNC: 190 MG/DL
CO2 SERPL-SCNC: 30 MMOL/L (ref 20–32)
CREAT SERPL-MCNC: 0.82 MG/DL (ref 0.52–1.04)
DEPRECATED CALCIDIOL+CALCIFEROL SERPL-MC: 40 UG/L (ref 20–75)
GFR SERPL CREATININE-BSD FRML MDRD: 70 ML/MIN/{1.73_M2}
GLUCOSE SERPL-MCNC: 93 MG/DL (ref 70–99)
HDLC SERPL-MCNC: 62 MG/DL
LDLC SERPL CALC-MCNC: 100 MG/DL
NONHDLC SERPL-MCNC: 128 MG/DL
POTASSIUM SERPL-SCNC: 4.1 MMOL/L (ref 3.4–5.3)
PROT SERPL-MCNC: 7.5 G/DL (ref 6.8–8.8)
SODIUM SERPL-SCNC: 140 MMOL/L (ref 133–144)
TRIGL SERPL-MCNC: 139 MG/DL

## 2021-01-06 PROCEDURE — 99000 SPECIMEN HANDLING OFFICE-LAB: CPT | Performed by: PATHOLOGY

## 2021-01-06 PROCEDURE — 80061 LIPID PANEL: CPT | Performed by: PATHOLOGY

## 2021-01-06 PROCEDURE — 36415 COLL VENOUS BLD VENIPUNCTURE: CPT | Performed by: PATHOLOGY

## 2021-01-06 PROCEDURE — 99397 PER PM REEVAL EST PAT 65+ YR: CPT | Performed by: INTERNAL MEDICINE

## 2021-01-06 PROCEDURE — 82306 VITAMIN D 25 HYDROXY: CPT | Mod: 90 | Performed by: PATHOLOGY

## 2021-01-06 PROCEDURE — 80053 COMPREHEN METABOLIC PANEL: CPT | Performed by: PATHOLOGY

## 2021-01-06 NOTE — PATIENT INSTRUCTIONS
Primary Care Center Medication Refill Request Information:  * Please contact your pharmacy regarding ANY request for medication refills.  ** [facility] Prescription Fax = [phone]  * Please allow 3 business days for routine medication refills.  * Please allow 5 business days for controlled substance medication refills.     Primary Care Center Test Result notification information:  *You will be notified with in 7-10 days of your appointment day regarding the results of your test.  If you are on MyChart you will be notified as soon as the provider has reviewed the results and signed off on them.    [facility]: [phone]     Radiology:  [phone] [facility], [facility] and [place]  [phone] [facility]  [phone] [facility]

## 2021-01-06 NOTE — NURSING NOTE
Chief Complaint   Patient presents with     Physical     physical        Page Quan MA, at 8:44 AM on 1/6/2021.

## 2021-01-06 NOTE — Clinical Note
Please review coding.  She wanted a routine physical primarily (covered by insurance per her report). We also reviewed her Medicare health risk assessment.  Thanks.  SB

## 2021-01-06 NOTE — PROGRESS NOTES
Medicare Annual Wellness Visit Previsit note    This 75 year old year old female presents for a  Medicare Wellness Exam.           Medical Care     Have you been to an ER or a hospital in the last year? No     What other specialists or organizations are involved in your medical care?  Dermatology [facility]  Current providers sharing in care for this patient include:  Patient Care Team       Relationship Specialty Notifications Start End    Sammi Beltre MD PCP - General Internal Medicine  9/17/12     Phone: [phone] Fax: [phone]         [address] [place] [address] [address] [facility] [address]    Nehemias Jhaveri MD MD Family Medicine - Sports Medicine  10/5/15     Phone: [phone] Fax: [phone]         [address]  [facility] [address]    Vinay Morfin MD MD Family Practice  4/7/16     Phone: [phone] Fax: [phone]         [address] [facility] [address]    Apollo Nguyen MD MD Orthopedics  11/20/17     Phone: [phone] Fax: [phone]         [address] [facility] [address] [facility] [address]    Jason Gao MD MD Dermatology  10/14/19     Phone: [phone] Fax: [phone]         [address] [address]    Sammi Beltre MD Assigned PCP   6/21/20     Phone: [phone] Fax: [phone]         [address] [place] [address] [address] [facility] [address]    Jason Gao MD Assigned Surgical Provider   12/13/20     Phone: [phone] Fax: [phone]         [address] [address]                 Social History     Marital Status:  Who lives in your household? Ebony Ma  Does your home have any of the following safety concerns? Loose rugs in the hallway, no grab bars in the bathroom, no handrails on the stairs or have poorly lit areas?  No  Do you feel threatened or controlled by a partner, ex-partner or anyone in your life? No  Has anyone hurt you physically, for example  by pushing, hitting, slapping or kicking you   or forcing you to have sex? No  Do you need help with the phone, transportation, shopping, preparing meals, housework, laundry, medications or managing money? No  Have you noticed any hearing difficulties? No      Risk Behaviors and Healthy Habits     How many servings of fruits and vegetables do you eat a day? 3-4  How often do you exercise and what do you do? 30-60 minutes 7 days a week  Do you frequently ride without a seatbelt? No  Do you use tobacco? No  Do you use any other drugs? No     Do you use alcohol? Yes, Number of drinks per day 0, Number of drinking days a week 2 or more      Sexual Health     Are you sexually active? Yes  If yes, with men, women, or both? Patient did not answer.  If yes, do you more than one current partner? No  If yes, do you use condoms? Patient did not answer.  Have you had any sexually transmitted infections? No  Any sexual concerns? No      FOR WOMEN ONLY  What year did you stop having periods? Late 32 y.o  Any vaginal bleeding in the last year? No  Have you ever had an abnormal Pap smear? No      MEDICARE HEALTH RISK ASSESSMENT RESPONSES WERE REVIEWED WITH THE PATIENT TODAY  Sammi Beltre M.D.  Internal Medicine  [facility]

## 2021-01-07 LAB — COPATH REPORT: NORMAL

## 2021-01-08 NOTE — RESULT ENCOUNTER NOTE
FRANKY Team -     I am sending the patient the [facility] The BCC of her right arm was completely excised. No further treatment is needed.     Thanks,     Anil

## 2021-01-13 ENCOUNTER — ANCILLARY PROCEDURE (OUTPATIENT)
Dept: MAMMOGRAPHY | Facility: CLINIC | Age: 76
End: 2021-01-13
Attending: INTERNAL MEDICINE
Payer: COMMERCIAL

## 2021-01-13 DIAGNOSIS — Z12.31 SCREENING MAMMOGRAM, ENCOUNTER FOR: ICD-10-CM

## 2021-01-13 PROCEDURE — 77067 SCR MAMMO BI INCL CAD: CPT | Performed by: STUDENT IN AN ORGANIZED HEALTH CARE EDUCATION/TRAINING PROGRAM

## 2021-01-19 ENCOUNTER — VIRTUAL VISIT (OUTPATIENT)
Dept: DERMATOLOGY | Facility: CLINIC | Age: 76
End: 2021-01-19
Payer: COMMERCIAL

## 2021-01-19 DIAGNOSIS — Z51.89 VISIT FOR WOUND CHECK: Primary | ICD-10-CM

## 2021-01-19 PROCEDURE — 99024 POSTOP FOLLOW-UP VISIT: CPT | Mod: 95 | Performed by: DERMATOLOGY

## 2021-01-19 ASSESSMENT — PAIN SCALES - GENERAL: PAINLEVEL: NO PAIN (0)

## 2021-01-19 NOTE — NURSING NOTE
Chief Complaint   Patient presents with     Derm Problem     Cocnha has telephone visit to discuss follow up of left cheek and right arm.      Amarilis Griffith LPN

## 2021-01-19 NOTE — LETTER
1/19/2021       RE: Connie J Frankenberg  [address]  [facility] [address]     Dear Colleague,    Thank you for referring your patient, Connie J Frankenberg, to the [facility] [place] at [facility]. Please see a copy of my visit note below.    [facility] Dermatology Note  Encounter Date: Jan 19, 2021  Store-and-Forward and Telephone ([phone] ). Location of teledermatologist: [facility] [place].  Start time: 1520. End time: 1530.    Dermatology Problem List:  1. 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 AKs. LiqN2.  3. SKs, lentigenes, dermal nevi.     ____________________________________________    Assessment & Plan:  1) History of SCCis of the left lateral cheek, s/p MMS 1/4/2021  - Healing well, may continue Vaseline for several more days  - Consider scar massage in 2-3 weeks.     2) History of BCC of the right arm, s/p excision 1/4/2021  - Healing well, remains everted.   - Consider scar massage in 2-3 weeks.     Procedures Performed:    None    Follow-up: 3/17/2020 for [facility]    Staff and Fellow:     Marcelo Perez MD  PGY-6    Micrographic Surgery and Dermatologic Oncology Fellow  January 19, 2021      ____________________________________________    CC: Derm Problem (Concha has telephone visit to discuss follow up of left cheek and right arm. )      HPI:  Ms. Connie J Frankenberg is a(n) 75 year old female who presents today for follow-up  after MMS and excision on 1/4/2021    The patient is healing well and reports no pain, drainage, expanding erythema. The area on the right arm remains hyper-everted. She is very happy with how the left cheek is  healing.     Patient is otherwise feeling well, without additional concerns.    Labs:  NA    Physical Exam:  Vitals: There were no vitals taken for this visit.  SKIN: Teledermatology photos were reviewed; image quality and interpretability: acceptable. Image date: 1/19/21 see upload photos.  - Over the left lateral cheek is a well healing, linear incision line with minimal crusting  - Over the right arm is a hyper-everted and slightly erythematous incision line.     - No other lesions of concern on areas examined.     Medications:  Current Outpatient Medications   Medication     amoxicillin (AMOXIL) 500 MG capsule     aspirin (ASA) 81 MG tablet     atorvastatin (LIPITOR) 80 MG tablet     butalbital-aspirin-caffeine (FIORINAL) -40 MG per capsule     COMPRESSION STOCKINGS     IBUPROFEN PO     metroNIDAZOLE (METROGEL) 0.75 % external gel     naproxen sodium (ANAPROX) 220 MG tablet     VITAMIN D, CHOLECALCIFEROL, PO     No current facility-administered medications for this visit.       Past Medical/Surgical History:   Patient Active Problem List   Diagnosis     Skin cancer, basal cell     Hyperlipidemia     Classical migraine     Insomnia     Arthritis of knee     Hemorrhoids     Vasovagal syncope     BCC (basal cell carcinoma), face     Swelling of lower extremity     S/P total knee arthroplasty     Vertigo, benign positional, bilateral     Status post total right knee replacement     Osteopenia     Disorder of bone and cartilage     Past Medical History:   Diagnosis Date     Arthritis of knee     right     Basal cell carcinoma      BPPV (benign paroxysmal positional vertigo)      Depressive disorder 1985     Disorder of bone and cartilage     (osteopenia)     Hemorrhoids      History of PID      Hyperlipidemia      Insomnia      Migraines, classic     sparkly aura     Osteopenia      Plantar fasciitis, bilateral      PONV (postoperative nausea and vomiting)      Skin cancer, basal cell      Squamous cell  "carcinoma      Swelling of the ankle, feet, or leg      Vasovagal syncope     is \"fainter\" with blood draws, injections-NEEDS TO BE LAYING DOWN       CC Referred Shashi, MD  No address on file on close of this encounter.           Attestation signed by Dayton Tellez MD at 1/26/2021  9:15 AM:  Attending Attestation  I attest that the Fellow recorded the interview and exam that I personally performed.  I have reviewed the note and edited it as necessary.    Dayton Tellez M.D.  Professor  Director of Dermatologic Surgery  Department of Dermatology  [facility]      "

## 2021-01-19 NOTE — PATIENT INSTRUCTIONS
[facility] Dermatology Visit    Thank you for allowing us to participate in your care. Your findings, instructions and follow-up plan are as follows:         When should I call my doctor?    If you are worsening or not improving, please, contact us or seek urgent care as noted below.     Who should I call with questions (adults)?    [facility] (adult and pediatric): [phone]     [facility] (adult): [phone]    For urgent needs outside of business hours call the [facility] at [phone] and ask for the dermatology resident on call    If this is a medical emergency and you are unable to reach an ER, Call 911      Who should I call with questions (pediatric)?  [facility]- Pediatric Dermatology  Dr. Meche Almanza, Dr. Merritt Canales, Dr. Richelle Nguyen, Debbie Sheriff, PA  Dr. Odette Salcedo, Dr. Angela Aguillon & Dr. Noble Hernández  Non Urgent  Nurse Triage Line; [phone]- Suze and Kayla RN Care Coordinators   Christal (/Complex ) [phone]    If you need a prescription refill, please contact your pharmacy. Refills are approved or denied by our Physicians during normal business hours, Monday through Fridays  Per office policy, refills will not be granted if you have not been seen within the past year (or sooner depending on your child's condition)    Scheduling Information:  Pediatric Appointment Scheduling and Call Center [phone]  Radiology Scheduling- [phone]  Sedation Unit Scheduling- [phone]  [place] Scheduling- General [phone]; Pediatric Dermatology [phone]  Main  Services: [phone]  Macedonian: [phone]  [place]: [phone]  Hmong/Occitan/Armenian: [phone]  Preadmission Nursing Department Fax Number: [phone] (Fax all pre-operative paperwork to this number)    For urgent matters arising during evenings,  weekends, or holidays that cannot wait for normal business hours please call [phone] and ask for the Dermatology Resident On-Call to be paged.

## 2021-01-19 NOTE — PROGRESS NOTES
[facility] Dermatology Note  Encounter Date: Jan 19, 2021  Store-and-Forward and Telephone ([phone] ). Location of teledermatologist: [facility] [place].  Start time: 1520. End time: 1530.    Dermatology Problem List:  1. 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 AKs. LiqN2.  3. SKs, lentigenes, dermal nevi.     ____________________________________________    Assessment & Plan:  1) History of SCCis of the left lateral cheek, s/p MMS 1/4/2021  - Healing well, may continue Vaseline for several more days  - Consider scar massage in 2-3 weeks.     2) History of BCC of the right arm, s/p excision 1/4/2021  - Healing well, remains everted.   - Consider scar massage in 2-3 weeks.     Procedures Performed:    None    Follow-up: 3/17/2020 for [facility]    Staff and Fellow:     Marcelo Perez MD  PGY-6    Micrographic Surgery and Dermatologic Oncology Fellow  January 19, 2021      ____________________________________________    CC: Derm Problem (Concha has telephone visit to discuss follow up of left cheek and right arm. )      HPI:  Ms. Connie J Frankenberg is a(n) 75 year old female who presents today for follow-up  after MMS and excision on 1/4/2021    The patient is healing well and reports no pain, drainage, expanding erythema. The area on the right arm remains hyper-everted. She is very happy with how the left cheek is healing.     Patient is otherwise feeling well, without additional concerns.    Labs:  NA    Physical Exam:  Vitals: There were no vitals taken for this visit.  SKIN: Teledermatology photos were reviewed; image quality and interpretability: acceptable. Image date: 1/19/21 see upload photos.  - Over the left lateral cheek is a well  "healing, linear incision line with minimal crusting  - Over the right arm is a hyper-everted and slightly erythematous incision line.     - No other lesions of concern on areas examined.     Medications:  Current Outpatient Medications   Medication     amoxicillin (AMOXIL) 500 MG capsule     aspirin (ASA) 81 MG tablet     atorvastatin (LIPITOR) 80 MG tablet     butalbital-aspirin-caffeine (FIORINAL) -40 MG per capsule     COMPRESSION STOCKINGS     IBUPROFEN PO     metroNIDAZOLE (METROGEL) 0.75 % external gel     naproxen sodium (ANAPROX) 220 MG tablet     VITAMIN D, CHOLECALCIFEROL, PO     No current facility-administered medications for this visit.       Past Medical/Surgical History:   Patient Active Problem List   Diagnosis     Skin cancer, basal cell     Hyperlipidemia     Classical migraine     Insomnia     Arthritis of knee     Hemorrhoids     Vasovagal syncope     BCC (basal cell carcinoma), face     Swelling of lower extremity     S/P total knee arthroplasty     Vertigo, benign positional, bilateral     Status post total right knee replacement     Osteopenia     Disorder of bone and cartilage     Past Medical History:   Diagnosis Date     Arthritis of knee     right     Basal cell carcinoma      BPPV (benign paroxysmal positional vertigo)      Depressive disorder 1985     Disorder of bone and cartilage     (osteopenia)     Hemorrhoids      History of PID      Hyperlipidemia      Insomnia      Migraines, classic     sparkly aura     Osteopenia      Plantar fasciitis, bilateral      PONV (postoperative nausea and vomiting)      Skin cancer, basal cell      Squamous cell carcinoma      Swelling of the ankle, feet, or leg      Vasovagal syncope     is \"fainter\" with blood draws, injections-NEEDS TO BE LAYING DOWN       CC Referred Self, MD  No address on file on close of this encounter.         "

## 2021-02-08 DIAGNOSIS — E78.2 MIXED HYPERLIPIDEMIA: ICD-10-CM

## 2021-02-08 RX ORDER — ATORVASTATIN CALCIUM 80 MG/1
80 TABLET, FILM COATED ORAL DAILY
Qty: 90 TABLET | Refills: 3 | Status: SHIPPED | OUTPATIENT
Start: 2021-02-08 | End: 2021-12-29

## 2021-03-17 ENCOUNTER — OFFICE VISIT (OUTPATIENT)
Dept: DERMATOLOGY | Facility: CLINIC | Age: 76
End: 2021-03-17
Payer: COMMERCIAL

## 2021-03-17 DIAGNOSIS — D18.01 CHERRY ANGIOMA: ICD-10-CM

## 2021-03-17 DIAGNOSIS — L81.4 SOLAR LENTIGO: ICD-10-CM

## 2021-03-17 DIAGNOSIS — Z85.828 HISTORY OF NONMELANOMA SKIN CANCER: ICD-10-CM

## 2021-03-17 DIAGNOSIS — L21.9 DERMATITIS, SEBORRHEIC: ICD-10-CM

## 2021-03-17 DIAGNOSIS — D22.9 MULTIPLE BENIGN NEVI: ICD-10-CM

## 2021-03-17 DIAGNOSIS — D48.5 NEOPLASM OF UNCERTAIN BEHAVIOR OF SKIN: Primary | ICD-10-CM

## 2021-03-17 DIAGNOSIS — L82.1 SEBORRHEIC KERATOSIS: ICD-10-CM

## 2021-03-17 PROCEDURE — 11102 TANGNTL BX SKIN SINGLE LES: CPT | Mod: GC | Performed by: DERMATOLOGY

## 2021-03-17 PROCEDURE — 99213 OFFICE O/P EST LOW 20 MIN: CPT | Mod: 25 | Performed by: DERMATOLOGY

## 2021-03-17 PROCEDURE — 88305 TISSUE EXAM BY PATHOLOGIST: CPT | Performed by: DERMATOLOGY

## 2021-03-17 RX ORDER — NIACINAMIDE 500 MG
500 TABLET ORAL 2 TIMES DAILY WITH MEALS
Qty: 180 TABLET | Refills: 3 | Status: SHIPPED | OUTPATIENT
Start: 2021-03-17 | End: 2021-12-29

## 2021-03-17 ASSESSMENT — PAIN SCALES - GENERAL: PAINLEVEL: NO PAIN (0)

## 2021-03-17 NOTE — PROGRESS NOTES
[facility] Dermatology Note  Encounter Date: Mar 17, 2021  Office Visit     Dermatology Problem List:  1. 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 AKs. LiqN2.  3. SKs, lentigenes, dermal nevi.   4. NUB, R chest. Dx BCC vs. Other.   ____________________________________________    Assessment & Plan:     # Neoplasm of uncertain behavior of skin, right chest. The differential diagnosis includes BCC versus BLK versus AK.    - Shave biopsy as below    # Benign lesions: Multiple benign nevi, solar lentigos, seborrheic keratoses, cherry angiomas. Explained to patient benign nature of lesion. No treatment is necessary at this time unless the lesion changes or becomes symptomatic.     - ABCDs of melanoma were discussed and self skin checks were advised.  - Sun precaution was advised including the use of sun screens of SPF 30 or higher, sun protective clothing, and avoidance of tanning beds.    # History of nonmelanoma skin cancer, no clincial evidence of recurrence  - Sun precaution was advised including the use of sun screens of SPF 30 or higher, sun protective clothing, and avoidance of tanning beds.  - Follow-up skin check in 6 months.     # Seborrheic dermatitis. Recommended starting OTC T-sal shampoo.     Procedures Performed:   - Shave biopsy procedure note, location(s): right chest. After discussion of benefits and risks including but not limited to bleeding, infection, scar, incomplete removal, recurrence, and non-diagnostic biopsy, written consent and photographs were obtained. The area was cleaned with isopropyl alcohol. 0.5mL of 1% lidocaine with epinephrine was injected to obtain adequate anesthesia of lesion(s). Shave biopsy at site(s) performed. Hemostasis  was achieved with aluminium chloride. Petrolatum ointment and a sterile dressing were applied. The patient tolerated the procedure and no complications were noted. The patient was provided with verbal and written post care instructions.     Follow-up: pending path results    Staff:     Jason Gao MD  Pronouns: he/him/his    Department of Dermatology  [facility]: Phone: [phone], Fax:[phone]  [facility]: Phone: [phone] Fax: [phone]    ____________________________________________    CC: Derm Problem (Concha states she is here for a recheck on her post surgeries, face and a new area of concern on her scalp.)    HPI:  Ms. Connie J Frankenberg is a(n) 75 year old female who presents today as a return patient for follow-up skin check and follow-up AK on the left cheek. Last seen 1/4/21 when had MMS for SCC on the L cheek and excision of BCC on the forearm. She also had an AK on the left cheek (medial lesion) and recommended clinical follow-up.     She reports a few flaky areas of scalp, particularly on the frontal scalp. Not currently using any over-the-counter dandruff shampoos.     The patient otherwise denies any new or concerning lesions. No bleeding, painful, pruritic, or changing lesions. Health otherwise stable. No other skin concerns.     Patient is otherwise feeling well, without additional skin concerns.     Labs Reviewed:  N/A    Physical Exam:  Vitals: There were no vitals taken for this visit.  SKIN: Full skin, which includes the head/face, both arms, chest, back, abdomen,both legs, genitalia and/or groin buttocks, digits and/or nails, was examined.  - Brown macules on sun exposed areas  - Brown waxy, stuck-on appearing papules on face, trunk, and extremities.   - Brown macules and papules on trunk and extremities without concerning dermoscopy features  - Red  "vascular papules on face, trunk and extremities.   - Well healed scars at prior skin cancer surgical sites without evidence of recurrence.   - On the right chest, there is a 6-7 mm pink shiny papule with non-specific dilated blood vessels and white polarizing lines appreciated under dermoscopy.  - Mild white flaky scale on the bilateral scalp.    - No other lesions of concern on areas examined.          Medications:  Current Outpatient Medications   Medication     amoxicillin (AMOXIL) 500 MG capsule     aspirin (ASA) 81 MG tablet     atorvastatin (LIPITOR) 80 MG tablet     butalbital-aspirin-caffeine (FIORINAL) -40 MG per capsule     COMPRESSION STOCKINGS     IBUPROFEN PO     metroNIDAZOLE (METROGEL) 0.75 % external gel     naproxen sodium (ANAPROX) 220 MG tablet     VITAMIN D, CHOLECALCIFEROL, PO     No current facility-administered medications for this visit.       Past Medical History:   Patient Active Problem List   Diagnosis     Skin cancer, basal cell     Hyperlipidemia     Classical migraine     Insomnia     Arthritis of knee     Hemorrhoids     Vasovagal syncope     BCC (basal cell carcinoma), face     Swelling of lower extremity     S/P total knee arthroplasty     Vertigo, benign positional, bilateral     Status post total right knee replacement     Osteopenia     Disorder of bone and cartilage     Past Medical History:   Diagnosis Date     Arthritis of knee     right     Basal cell carcinoma      BPPV (benign paroxysmal positional vertigo)      Depressive disorder 1985     Disorder of bone and cartilage     (osteopenia)     Hemorrhoids      History of PID      Hyperlipidemia      Insomnia      Migraines, classic     sparkly aura     Osteopenia      Plantar fasciitis, bilateral      PONV (postoperative nausea and vomiting)      Skin cancer, basal cell      Squamous cell carcinoma      Swelling of the ankle, feet, or leg      Vasovagal syncope     is \"fainter\" with blood draws, injections-NEEDS TO BE " LAYING DOWN       CC Sammi Beltre MD  [address] [facility] [address]  [place] [address] on close of this encounter.

## 2021-03-17 NOTE — LETTER
3/17/2021       RE: Connie J Frankenberg  [address]  [facility] [address]     Dear Colleague,    Thank you for referring your patient, Connie J Frankenberg, to the [facility] DERMATOLOGY CLINIC [place] [facility]. Please see a copy of my visit note below.    [facility] Dermatology Note  Encounter Date: Mar 17, 2021  Office Visit     Dermatology Problem List:  1. 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 AKs. LiqN2.  3. SKs, lentigenes, dermal nevi.   4. NUB, R chest. Dx BCC vs. Other.   ____________________________________________    Assessment & Plan:     # Neoplasm of uncertain behavior of skin, right chest. The differential diagnosis includes BCC versus BLK versus AK.    - Shave biopsy as below    # Benign lesions: Multiple benign nevi, solar lentigos, seborrheic keratoses, cherry angiomas. Explained to patient benign nature of lesion. No treatment is necessary at this time unless the lesion changes or becomes symptomatic.     - ABCDs of melanoma were discussed and self skin checks were advised.  - Sun precaution was advised including the use of sun screens of SPF 30 or higher, sun protective clothing, and avoidance of tanning beds.    # History of nonmelanoma skin cancer, no clincial evidence of recurrence  - Sun precaution was advised including the use of sun screens of SPF 30 or higher, sun protective clothing, and avoidance of tanning beds.  - Follow-up skin check in 6 months.     # Seborrheic dermatitis. Recommended starting OTC T-sal shampoo.     Procedures Performed:   - Shave biopsy procedure note, location(s): right chest. After discussion of benefits and risks including but not  limited to bleeding, infection, scar, incomplete removal, recurrence, and non-diagnostic biopsy, written consent and photographs were obtained. The area was cleaned with isopropyl alcohol. 0.5mL of 1% lidocaine with epinephrine was injected to obtain adequate anesthesia of lesion(s). Shave biopsy at site(s) performed. Hemostasis was achieved with aluminium chloride. Petrolatum ointment and a sterile dressing were applied. The patient tolerated the procedure and no complications were noted. The patient was provided with verbal and written post care instructions.     Follow-up: pending path results    Staff:     Jason Gao MD  Pronouns: he/him/his    Department of Dermatology  [facility]: Phone: [phone], Fax:[phone]  [facility]: Phone: [phone] Fax: [phone]    ____________________________________________    CC: Derm Problem (Concha states she is here for a recheck on her post surgeries, face and a new area of concern on her scalp.)    HPI:  Ms. Connie J Frankenberg is a(n) 75 year old female who presents today as a return patient for follow-up skin check and follow-up AK on the left cheek. Last seen 1/4/21 when had MMS for SCC on the L cheek and excision of BCC on the forearm. She also had an AK on the left cheek (medial lesion) and recommended clinical follow-up.     She reports a few flaky areas of scalp, particularly on the frontal scalp. Not currently using any over-the-counter dandruff shampoos.     The patient otherwise denies any new or concerning lesions. No bleeding, painful, pruritic, or changing lesions. Health otherwise stable. No other skin concerns.     Patient is otherwise feeling well, without additional skin concerns.     Labs Reviewed:  N/A    Physical Exam:  Vitals: There were no vitals taken for this visit.  SKIN: Full skin, which includes the  head/face, both arms, chest, back, abdomen,both legs, genitalia and/or groin buttocks, digits and/or nails, was examined.  - Brown macules on sun exposed areas  - Brown waxy, stuck-on appearing papules on face, trunk, and extremities.   - Brown macules and papules on trunk and extremities without concerning dermoscopy features  - Red vascular papules on face, trunk and extremities.   - Well healed scars at prior skin cancer surgical sites without evidence of recurrence.   - On the right chest, there is a 6-7 mm pink shiny papule with non-specific dilated blood vessels and white polarizing lines appreciated under dermoscopy.  - Mild white flaky scale on the bilateral scalp.    - No other lesions of concern on areas examined.          Medications:  Current Outpatient Medications   Medication     amoxicillin (AMOXIL) 500 MG capsule     aspirin (ASA) 81 MG tablet     atorvastatin (LIPITOR) 80 MG tablet     butalbital-aspirin-caffeine (FIORINAL) -40 MG per capsule     COMPRESSION STOCKINGS     IBUPROFEN PO     metroNIDAZOLE (METROGEL) 0.75 % external gel     naproxen sodium (ANAPROX) 220 MG tablet     VITAMIN D, CHOLECALCIFEROL, PO     No current facility-administered medications for this visit.       Past Medical History:   Patient Active Problem List   Diagnosis     Skin cancer, basal cell     Hyperlipidemia     Classical migraine     Insomnia     Arthritis of knee     Hemorrhoids     Vasovagal syncope     BCC (basal cell carcinoma), face     Swelling of lower extremity     S/P total knee arthroplasty     Vertigo, benign positional, bilateral     Status post total right knee replacement     Osteopenia     Disorder of bone and cartilage     Past Medical History:   Diagnosis Date     Arthritis of knee     right     Basal cell carcinoma      BPPV (benign paroxysmal positional vertigo)      Depressive disorder 1985     Disorder of bone and cartilage     (osteopenia)     Hemorrhoids      History of PID       "Hyperlipidemia      Insomnia      Migraines, classic     sparkly aura     Osteopenia      Plantar fasciitis, bilateral      PONV (postoperative nausea and vomiting)      Skin cancer, basal cell      Squamous cell carcinoma      Swelling of the ankle, feet, or leg      Vasovagal syncope     is \"fainter\" with blood draws, injections-NEEDS TO BE LAYING DOWN       CC Sammi Beltre MD  [address]  [place] [address] on close of this encounter.    "

## 2021-03-17 NOTE — NURSING NOTE
Dermatology Rooming Note    Connie J Frankenberg's goals for this visit include:   Chief Complaint   Patient presents with     Derm Problem     Concha states she is here for a recheck on her post surgeries, face and a new area of concern on her scalp.     Noris Moreno, [facility]

## 2021-03-17 NOTE — PATIENT INSTRUCTIONS

## 2021-03-17 NOTE — NURSING NOTE
Lidocaine-epinephrine 1-1:717681 % injection   1.5mL once for one use, starting 3/17/2021 ending 3/17/2021,  2mL disp, R-0, injection  Injected by Dr. Gao

## 2021-03-21 LAB — COPATH REPORT: NORMAL

## 2021-03-23 NOTE — RESULT ENCOUNTER NOTE
Can we call patient and let her know the spot on the right chest was a BCC. 2 options for removal.     1. ED&C / scrape and burn procedure. This is about 95% effective at getting rid of superficial BCCs - though may result in a slightly larger, oval-shaped scar as compared to excision.   2. Excision. This is about 98-99% effective at getting rid of superficial BCCs. This does require stitches and she would have a line scar.     If #1- can schedule with me in 15-minute visit slot.   If #2- place derm surg referral and schedule for excision.     She should otherwise follow-up for another skin check in about 6 months. Can you schedule? Thanks!    Jason Gao MD  Pronouns: he/him/his    Department of Dermatology  [facility]: Phone: [phone], Fax:[phone]  [facility]: Phone: [phone] Fax: [phone]

## 2021-04-21 ENCOUNTER — OFFICE VISIT (OUTPATIENT)
Dept: DERMATOLOGY | Facility: CLINIC | Age: 76
End: 2021-04-21
Payer: COMMERCIAL

## 2021-04-21 DIAGNOSIS — C44.519 BASAL CELL CARCINOMA OF TRUNCAL SKIN: Primary | ICD-10-CM

## 2021-04-21 PROCEDURE — 17262 DSTRJ MAL LES T/A/L 1.1-2.0: CPT | Performed by: DERMATOLOGY

## 2021-04-21 ASSESSMENT — PAIN SCALES - GENERAL: PAINLEVEL: NO PAIN (0)

## 2021-04-21 NOTE — LETTER
4/21/2021       RE: Connie J Frankenberg  [address]  [facility] [address]     Dear Colleague,    Thank you for referring your patient, Connie J Frankenberg, to the [facility] DERMATOLOGY CLINIC [facility]. Please see a copy of my visit note below.    See procedure ntoe.       Again, thank you for allowing me to participate in the care of your patient.      Sincerely,    Jason Gao MD

## 2021-04-21 NOTE — PROCEDURES
Dermatology Procedure Note: Electrodesiccation and Curettage    Dermatology Problem List:  1. Hx NMSC   - chemoprevention: niacinamide 500 mg PO BID   - sBCC, R chest, s/p ED&C 4/21/21              - SCCis, L lateral cheek, s/p MMS 1/4/2021              - BCC, R arm, s/p excision 1/4/2021              - BCC, L forehead, s/p bx 10/24/19, s/p MMS 11/5/19                  - BCC, central chest              - SCC, right upper cutaneous lip, s/p MMS 2014  2. AKs. LiqN2.  3. SKs, lentigenes, dermal nevi.     PREOPERATIVE DIAGNOSIS: ED&C    POSTOPERATIVE DIAGNOSIS: same    LOCATION: Right chest    SIZE: 1.2 cm     Treatment options including electrodessiccation and curettage (ED and C), excision and topicals were reviewed.  The expected cure rates, healing times and anticipated scars of each option were discussed and the patient elects to proceed with ED and C.     The risks and benefits of the procedure were described to the patient.  These include but are not limited to bleeding, infection, scar, incomplete removal, and recurrence. Written informed consent was obtained. Time-out was performed. The above site was cleansed with and injected with 1.0 mL1% lidocaine with epinephrine. Once anesthesia was obtained, the site was prepped with Alcohol. The lesion was curetted with  in 3 directions with a 2 mm margin and this was followed by electrodessication.  This process was repeated three times. The defect measured 1.2 cm. Vaseline and a bandage were applied to the wound. The patient tolerated the procedure well and was given post care instructions.    Clinical Follow-up: 5 months for FBSE.     Staff Involved:  Staff Only    Jason Gao MD  Pronouns: he/him/his    Department of Dermatology  [facility]: Phone: [phone], Fax:[phone]  [facility]: Phone: [phone] Fax: [phone]

## 2021-04-21 NOTE — PATIENT INSTRUCTIONS
How should I care for my wound for the first 24 hours?    Keep the wound dry and covered for 24 hours    If it bleeds, hold direct pressure on the area for 15 minutes. If bleeding does not stop then go to the emergency room    Avoid strenuous exercise the first 1-2 days or as your doctor instructs you    How should I care for the wound after 24 hours?    After 24 hours, remove the bandage    You may bathe or shower as normal    If you had a scalp biopsy, you can shampoo as usual and can use shower water to clean the biopsy site daily    Clean the wound twice a day with gentle soap and water    Do not scrub, be gentle    Apply white petroleum/Vaseline after cleaning the wound with a cotton swab or a clean finger, and keep the site covered with a Bandaid /bandage. Bandages are not necessary with a scalp biopsy    If you are unable to cover the site with a Bandaid /bandage, re-apply ointment 2-3 times a day to keep the site moist. Moisture will help with healing    Avoid strenuous activity for first 1-2 days    Avoid lakes, rivers, pools, and oceans until the stitches are removed or the site is healed    How do I clean my wound?    Wash hands thoroughly with soap or use hand  before all wound care    Clean the wound with gentle soap and water    Apply white petroleum/Vaseline  to wound after it is clean    Replace the Bandaid /bandage to keep the wound covered for the first few days or as instructed by your doctor    If you had a scalp biopsy, warm shower water to the area on a daily basis should suffice    What should I use to clean my wound?     Cotton-tipped applicators (Qtips )    White petroleum jelly (Vaseline ). Use a clean new container and use Q-tips to apply.    Bandaids   as needed    Gentle soap     How should I care for my wound long term?    Do not get your wound dirty    Keep up with wound care for one week or until the area is healed.    A small scab will form and fall off by itself when  the area is completely healed. The area will be red and will become pink in color as it heals. Sun protection is very important for how your scar will turn out. Sunscreen with an SPF 30 or greater is recommended once the area is healed.    If you have stitches, stitches need to be removed in 14 days. You may return to our clinic for this or you may have it done locally at your doctor s office.    You should have some soreness but it should be mild and slowly go away over several days. Talk to your doctor about using tylenol for pain,    When should I call my doctor?  If you have increased:     Pain or swelling    Pus or drainage (clear or slightly yellow drainage is ok)    Temperature over 100F    Spreading redness or warmth around wound    When will I hear about my results?  The biopsy results can take 2-3 weeks to come back. The clinic will call you with the results, send you a In[employer]pa message, or have you schedule a follow-up clinic or phone time to discuss the results. Contact our clinics if you do not hear from us in 3 weeks.     Who should I call with questions?    [facility]: [phone]     [facility]: [phone]    For urgent needs outside of business hours call the [facility] at [phone] and ask for the dermatology resident on call

## 2021-04-21 NOTE — NURSING NOTE
Dermatology Rooming Note    Concha Laudonya's goals for this visit include:   Chief Complaint   Patient presents with     Derm Problem     Concha is here today for a follow up regarding her biopsy. She states that she expects a procedure to be done today. She reports pain at the site of the biopsy.      Azam Nielsen, EMT

## 2021-04-23 NOTE — ANESTHESIA PREPROCEDURE EVALUATION
Anesthesia Evaluation     . Pt has had prior anesthetic. Type: General and MAC    History of anesthetic complications   - PONV        ROS/MED HX    ENT/Pulmonary:  - neg pulmonary ROS     Neurologic:     (+)migraines,     Cardiovascular: Comment: Vasovagal syncope/BPPV. Patient faints easily, triggered by smells, fear, and blood draws, etc. Should lay down in preop.    (+) Dyslipidemia, -Peripheral Vascular Disease (Lower legs have been studied, no intervention needed)-- Other, --. Taking blood thinners Pt has received instructions: Instructions Given to patient: ASA has been on hold [facility] e 3/15/18. . fainting (syncope). :. . Previous cardiac testing date:results:Stress Testdate:2013 results:Normal stress echoECG reviewed date:2013 results:SR date: results:          METS/Exercise Tolerance: Comment: Swims, bikes 4 - Raking leaves, gardening   Hematologic:  - neg hematologic  ROS       Musculoskeletal:   (+) arthritis, , , -       GI/Hepatic:  - neg GI/hepatic ROS       Renal/Genitourinary:  - ROS Renal section negative       Endo:  - neg endo ROS       Psychiatric:     (+) psychiatric history depression      Infectious Disease:  - neg infectious disease ROS       Malignancy:   (+) Malignancy History of Skin  Skin CA Remission status post Surgery,         Other:    (+) C-spine cleared: N/A, H/O Chronic Pain,no other significant disability                    Physical Exam  Normal systems: dental    Airway   Mallampati: II  TM distance: >3 FB  Neck ROM: full    Dental     Cardiovascular   Rhythm and rate: regular and normal      Pulmonary    breath sounds clear to auscultation    Other findings: 2013 Stress echo  Conclusions:  This  is a normal stress  Echocardiogram    Patient was dizzy briefly when she was supine on exam table.     For further details of assessment, testing, and physical exam please see H and P completed on same date.           PAC Discussion and Assessment    ASA Classification: 2  Case is  suitable for: [facility]  Anesthetic techniques and relevant risks discussed: GA and GA with regional block for post-op pain control  Invasive monitoring and risk discussed: No  Types:   Possibility and Risk of blood transfusion discussed: Yes  NPO instructions given:   Additional anesthetic preparation and risks discussed:   Needs early admission to pre-op area:   Other:     PAC Resident/NP Anesthesia Assessment:  Connie J Frankenberg is a 72 year old female scheduled to undergo Right total knee replacement with Dr. Nguyen on 4/10/18. She has the following specific operative considerations:   - RCRI : No serious cardiac risks.   - VTE risk: 0.26%  - RIAZ # of risks 1/8 = Low risk  - Risk of PONV score = 4.  If > 2, anti-emetic intervention recommended. If 3 or > anti emetic intervention recommended with two or more meds    Last procedure 10 years ago for cataracts with sedation.     --Right knee arthritis and pain. Above procedure now planned.    --PONV. Significant history. Final decisions regarding prophylaxis by Anesthesia on DOS.   --HLD. Atorvastatin. No other cardiac history or symptoms. Normal stress echo above. ASA 81 mg with planned 7 day hold for surgery. PVD with symptoms of leg swelling and skin redness. Lower extremities have been studied but no intervention needed. Good exercise tolerance; swimming, biking.   --BPPV and vasovagal syncope. Patient becomes dizzy for brief periods with position changes. Also faints easily, triggered by smells, fear, and venipuncture. FALL RISK. Patient should be lying down for all blood draws, IV sticks.   --Nonsmoker. No pulmonary history or symptoms.   --Migraines. Fiorinal prn   --Pain management. Discussed possibility of nerve block if appropriate for patient's procedure. Final counseling and decisions by regional team on DOS.  Type and screen drawn today.       Patient was discussed with Dr Somers.      Reviewed and Signed by PAC Mid-Level Provider/Resident  Mid-Level  Provider/Resident: VANESA Martin, CNS  Date: 3/29/18  Time: 8:08am    Attending Anesthesiologist Anesthesia Assessment:  Ms Frankenberg presents for Right total knee replacement with Dr. Nguyen on 4/10/18.   PONV with previous anesthetics  Patient has multiple syncopal episodes with medical interventions but recovers quickly, recommend monitoring during IV placement  Discussed GA and possible local anesthesia with anesthesia provider vs orthopedic surgeon.   Discussed with MIRACLE and Dr. Somers.     Johnna Rubin MD CA-1    Reviewed and Signed by PAC Anesthesiologist  Anesthesiologist: bruna  Date: 3/29/2018  Time:   Pass/Fail: Pass  Disposition:     PAC Pharmacist Assessment:        Pharmacist:   Date:   Time:      Anesthesia Plan      History & Physical Review  History and physical reviewed and following examination; no interval change.    ASA Status:  2 .    NPO Status:  > 8 hours    Plan for Spinal with Intravenous and Propofol induction. Maintenance will be TIVA.    PONV prophylaxis:  Ondansetron (or other 5HT-3)       Postoperative Care  Postoperative pain management:  IV analgesics, Oral pain medications and Multi-modal analgesia.      Consents  Anesthetic plan, risks, benefits and alternatives discussed with:  Patient..                          .   yes

## 2021-10-24 ENCOUNTER — HEALTH MAINTENANCE LETTER (OUTPATIENT)
Age: 76
End: 2021-10-24

## 2021-11-12 ENCOUNTER — TELEPHONE (OUTPATIENT)
Dept: FAMILY MEDICINE | Facility: CLINIC | Age: 76
End: 2021-11-12
Payer: COMMERCIAL

## 2021-11-12 NOTE — TELEPHONE ENCOUNTER
[facility] Call Center    Phone Message    May a detailed message be left on voicemail: yes     Reason for Call: Pt calling stating that she was told to call and talk to Ilda Yuan's nurse about seeing Dr Yuan as a new patient. She was formerly Dr Beltre's patient.    Action Taken: Message routed to:  [facility] ([facility] PCC    Travel Screening: Not Applicable

## 2021-11-16 NOTE — TELEPHONE ENCOUNTER
RN left voice message, letting patient know that Dr. Yuan OK'd taking patient as a new patient.  RN gave first available date for establish care visit of 12/29/21, and gave PCC scheduling number.    Ilda Ramon RN on 11/16/2021 at 8:57 AM

## 2021-12-19 ASSESSMENT — ACTIVITIES OF DAILY LIVING (ADL)
IN_THE_PAST_7_DAYS,_DID_YOU_NEED_HELP_FROM_OTHERS_TO_PERFORM_EVERYDAY_ACTIVITIES_SUCH_AS_EATING,_GETTING_DRESSED,_GROOMING,_BATHING,_WALKING,_OR_USING_THE_TOILET: N
IN_THE_PAST_7_DAYS,_DID_YOU_NEED_HELP_FROM_OTHERS_TO_TAKE_CARE_OF_THINGS_SUCH_AS_LAUNDRY_AND_HOUSEKEEPING,_BANKING,_SHOPPING,_USING_THE_TELEPHONE,_FOOD_PREPARATION,_TRANSPORTATION,_OR_TAKING_YOUR_OWN_MEDICATIONS?: N

## 2021-12-29 ENCOUNTER — LAB (OUTPATIENT)
Dept: LAB | Facility: CLINIC | Age: 76
End: 2021-12-29
Payer: COMMERCIAL

## 2021-12-29 ENCOUNTER — OFFICE VISIT (OUTPATIENT)
Dept: FAMILY MEDICINE | Facility: CLINIC | Age: 76
End: 2021-12-29
Payer: COMMERCIAL

## 2021-12-29 VITALS
BODY MASS INDEX: 31.28 KG/M2 | DIASTOLIC BLOOD PRESSURE: 83 MMHG | HEART RATE: 69 BPM | HEIGHT: 62 IN | WEIGHT: 170 LBS | SYSTOLIC BLOOD PRESSURE: 135 MMHG | RESPIRATION RATE: 16 BRPM | OXYGEN SATURATION: 96 %

## 2021-12-29 DIAGNOSIS — E78.5 HYPERLIPIDEMIA LDL GOAL <70: ICD-10-CM

## 2021-12-29 DIAGNOSIS — R93.89 ABNORMAL FINDINGS ON DIAGNOSTIC IMAGING OF OTHER SPECIFIED BODY STRUCTURES: ICD-10-CM

## 2021-12-29 DIAGNOSIS — Z76.89 ENCOUNTER TO ESTABLISH CARE WITH NEW DOCTOR: Primary | ICD-10-CM

## 2021-12-29 DIAGNOSIS — Z12.11 COLON CANCER SCREENING: ICD-10-CM

## 2021-12-29 DIAGNOSIS — I87.2 VENOUS (PERIPHERAL) INSUFFICIENCY: ICD-10-CM

## 2021-12-29 DIAGNOSIS — Z11.59 ENCOUNTER FOR HEPATITIS C SCREENING TEST FOR LOW RISK PATIENT: ICD-10-CM

## 2021-12-29 DIAGNOSIS — M85.89 OSTEOPENIA OF MULTIPLE SITES: ICD-10-CM

## 2021-12-29 DIAGNOSIS — M17.10 ARTHRITIS OF KNEE: ICD-10-CM

## 2021-12-29 DIAGNOSIS — Z23 ENCOUNTER FOR IMMUNIZATION: ICD-10-CM

## 2021-12-29 DIAGNOSIS — Z96.651 STATUS POST TOTAL RIGHT KNEE REPLACEMENT: ICD-10-CM

## 2021-12-29 DIAGNOSIS — G43.109 MIGRAINE WITH AURA AND WITHOUT STATUS MIGRAINOSUS, NOT INTRACTABLE: ICD-10-CM

## 2021-12-29 DIAGNOSIS — R55 VASOVAGAL SYNCOPE: ICD-10-CM

## 2021-12-29 DIAGNOSIS — H61.23 BILATERAL IMPACTED CERUMEN: ICD-10-CM

## 2021-12-29 DIAGNOSIS — E78.2 MIXED HYPERLIPIDEMIA: ICD-10-CM

## 2021-12-29 DIAGNOSIS — Z12.31 VISIT FOR SCREENING MAMMOGRAM: ICD-10-CM

## 2021-12-29 LAB
ALBUMIN SERPL-MCNC: 4 G/DL (ref 3.4–5)
ALP SERPL-CCNC: 83 U/L (ref 40–150)
ALT SERPL W P-5'-P-CCNC: 32 U/L (ref 0–50)
ANION GAP SERPL CALCULATED.3IONS-SCNC: 7 MMOL/L (ref 3–14)
AST SERPL W P-5'-P-CCNC: 18 U/L (ref 0–45)
BASOPHILS # BLD AUTO: 0.1 10E3/UL (ref 0–0.2)
BASOPHILS NFR BLD AUTO: 1 %
BILIRUB SERPL-MCNC: 0.8 MG/DL (ref 0.2–1.3)
BUN SERPL-MCNC: 23 MG/DL (ref 7–30)
CALCIUM SERPL-MCNC: 9.5 MG/DL (ref 8.5–10.1)
CHLORIDE BLD-SCNC: 109 MMOL/L (ref 94–109)
CHOLEST SERPL-MCNC: 164 MG/DL
CO2 SERPL-SCNC: 28 MMOL/L (ref 20–32)
CREAT SERPL-MCNC: 0.84 MG/DL (ref 0.52–1.04)
EOSINOPHIL # BLD AUTO: 0.1 10E3/UL (ref 0–0.7)
EOSINOPHIL NFR BLD AUTO: 2 %
ERYTHROCYTE [DISTWIDTH] IN BLOOD BY AUTOMATED COUNT: 13.5 % (ref 10–15)
FASTING STATUS PATIENT QL REPORTED: YES
GFR SERPL CREATININE-BSD FRML MDRD: 73 ML/MIN/1.73M2
GLUCOSE BLD-MCNC: 98 MG/DL (ref 70–99)
HCT VFR BLD AUTO: 46.1 % (ref 35–47)
HCV AB SERPL QL IA: NONREACTIVE
HDLC SERPL-MCNC: 68 MG/DL
HGB BLD-MCNC: 15 G/DL (ref 11.7–15.7)
IMM GRANULOCYTES # BLD: 0 10E3/UL
IMM GRANULOCYTES NFR BLD: 0 %
LDLC SERPL CALC-MCNC: 77 MG/DL
LYMPHOCYTES # BLD AUTO: 2.5 10E3/UL (ref 0.8–5.3)
LYMPHOCYTES NFR BLD AUTO: 34 %
MCH RBC QN AUTO: 30.1 PG (ref 26.5–33)
MCHC RBC AUTO-ENTMCNC: 32.5 G/DL (ref 31.5–36.5)
MCV RBC AUTO: 93 FL (ref 78–100)
MONOCYTES # BLD AUTO: 0.6 10E3/UL (ref 0–1.3)
MONOCYTES NFR BLD AUTO: 9 %
NEUTROPHILS # BLD AUTO: 4 10E3/UL (ref 1.6–8.3)
NEUTROPHILS NFR BLD AUTO: 54 %
NONHDLC SERPL-MCNC: 96 MG/DL
NRBC # BLD AUTO: 0 10E3/UL
NRBC BLD AUTO-RTO: 0 /100
PLATELET # BLD AUTO: 248 10E3/UL (ref 150–450)
POTASSIUM BLD-SCNC: 4.8 MMOL/L (ref 3.4–5.3)
PROT SERPL-MCNC: 7.2 G/DL (ref 6.8–8.8)
RBC # BLD AUTO: 4.98 10E6/UL (ref 3.8–5.2)
SODIUM SERPL-SCNC: 144 MMOL/L (ref 133–144)
TRIGL SERPL-MCNC: 96 MG/DL
TSH SERPL DL<=0.005 MIU/L-ACNC: 1.43 MU/L (ref 0.4–4)
WBC # BLD AUTO: 7.3 10E3/UL (ref 4–11)

## 2021-12-29 PROCEDURE — 80061 LIPID PANEL: CPT | Performed by: PATHOLOGY

## 2021-12-29 PROCEDURE — 80053 COMPREHEN METABOLIC PANEL: CPT | Performed by: PATHOLOGY

## 2021-12-29 PROCEDURE — 86803 HEPATITIS C AB TEST: CPT | Mod: 90 | Performed by: PATHOLOGY

## 2021-12-29 PROCEDURE — G0008 ADMIN INFLUENZA VIRUS VAC: HCPCS | Performed by: FAMILY MEDICINE

## 2021-12-29 PROCEDURE — 99000 SPECIMEN HANDLING OFFICE-LAB: CPT | Performed by: PATHOLOGY

## 2021-12-29 PROCEDURE — 90662 IIV NO PRSV INCREASED AG IM: CPT | Performed by: FAMILY MEDICINE

## 2021-12-29 PROCEDURE — 99204 OFFICE O/P NEW MOD 45 MIN: CPT | Mod: 25 | Performed by: FAMILY MEDICINE

## 2021-12-29 PROCEDURE — 36415 COLL VENOUS BLD VENIPUNCTURE: CPT | Performed by: PATHOLOGY

## 2021-12-29 PROCEDURE — 85025 COMPLETE CBC W/AUTO DIFF WBC: CPT | Performed by: PATHOLOGY

## 2021-12-29 PROCEDURE — 84443 ASSAY THYROID STIM HORMONE: CPT | Performed by: PATHOLOGY

## 2021-12-29 RX ORDER — BUTALBITAL/ASPIRIN/CAFFEINE 50-325-40
1 CAPSULE ORAL DAILY PRN
Qty: 30 CAPSULE | Refills: 2 | Status: SHIPPED | OUTPATIENT
Start: 2021-12-29 | End: 2023-05-15

## 2021-12-29 RX ORDER — ATORVASTATIN CALCIUM 80 MG/1
80 TABLET, FILM COATED ORAL DAILY
Qty: 90 TABLET | Refills: 3 | Status: SHIPPED | OUTPATIENT
Start: 2021-12-29 | End: 2023-04-26

## 2021-12-29 SDOH — SOCIAL STABILITY: SOCIAL INSECURITY: WITHIN THE LAST YEAR, HAVE YOU BEEN AFRAID OF YOUR PARTNER OR EX-PARTNER?: NO

## 2021-12-29 SDOH — HEALTH STABILITY: MENTAL HEALTH: HOW MANY STANDARD DRINKS CONTAINING ALCOHOL DO YOU HAVE ON A TYPICAL DAY?: 1 OR 2

## 2021-12-29 SDOH — ECONOMIC STABILITY: FOOD INSECURITY: WITHIN THE PAST 12 MONTHS, YOU WORRIED THAT YOUR FOOD WOULD RUN OUT BEFORE YOU GOT MONEY TO BUY MORE.: NEVER TRUE

## 2021-12-29 SDOH — SOCIAL STABILITY: SOCIAL NETWORK
IN A TYPICAL WEEK, HOW MANY TIMES DO YOU TALK ON THE PHONE WITH FAMILY, FRIENDS, OR NEIGHBORS?: MORE THAN THREE TIMES A WEEK

## 2021-12-29 SDOH — HEALTH STABILITY: PHYSICAL HEALTH: ON AVERAGE, HOW MANY DAYS PER WEEK DO YOU ENGAGE IN MODERATE TO STRENUOUS EXERCISE (LIKE A BRISK WALK)?: 7 DAYS

## 2021-12-29 SDOH — SOCIAL STABILITY: SOCIAL NETWORK
DO YOU BELONG TO ANY CLUBS OR ORGANIZATIONS SUCH AS CHURCH GROUPS UNIONS, FRATERNAL OR ATHLETIC GROUPS, OR SCHOOL GROUPS?: YES

## 2021-12-29 SDOH — SOCIAL STABILITY: SOCIAL INSECURITY
WITHIN THE LAST YEAR, HAVE YOU BEEN KICKED, HIT, SLAPPED, OR OTHERWISE PHYSICALLY HURT BY YOUR PARTNER OR EX-PARTNER?: NO

## 2021-12-29 SDOH — HEALTH STABILITY: MENTAL HEALTH
STRESS IS WHEN SOMEONE FEELS TENSE, NERVOUS, ANXIOUS, OR CAN'T SLEEP AT NIGHT BECAUSE THEIR MIND IS TROUBLED. HOW STRESSED ARE YOU?: ONLY A LITTLE

## 2021-12-29 SDOH — ECONOMIC STABILITY: INCOME INSECURITY: IN THE LAST 12 MONTHS, WAS THERE A TIME WHEN YOU WERE NOT ABLE TO PAY THE MORTGAGE OR RENT ON TIME?: NO

## 2021-12-29 SDOH — HEALTH STABILITY: MENTAL HEALTH: HOW OFTEN DO YOU HAVE 6 OR MORE DRINKS ON ONE OCCASION?: NEVER

## 2021-12-29 SDOH — SOCIAL STABILITY: SOCIAL INSECURITY: WITHIN THE LAST YEAR, HAVE YOU BEEN HUMILIATED OR EMOTIONALLY ABUSED IN OTHER WAYS BY YOUR PARTNER OR EX-PARTNER?: NO

## 2021-12-29 SDOH — ECONOMIC STABILITY: HOUSING INSECURITY
IN THE LAST 12 MONTHS, WAS THERE A TIME WHEN YOU DID NOT HAVE A STEADY PLACE TO SLEEP OR SLEPT IN A SHELTER (INCLUDING NOW)?: NO

## 2021-12-29 SDOH — ECONOMIC STABILITY: TRANSPORTATION INSECURITY
IN THE PAST 12 MONTHS, HAS LACK OF TRANSPORTATION KEPT YOU FROM MEETINGS, WORK, OR FROM GETTING THINGS NEEDED FOR DAILY LIVING?: NO

## 2021-12-29 SDOH — HEALTH STABILITY: MENTAL HEALTH: HOW OFTEN DO YOU HAVE A DRINK CONTAINING ALCOHOL?: 2-4 TIMES A MONTH

## 2021-12-29 SDOH — SOCIAL STABILITY: SOCIAL NETWORK: HOW OFTEN DO YOU ATTENT MEETINGS OF THE CLUB OR ORGANIZATION YOU BELONG TO?: MORE THAN 4 TIMES PER YEAR

## 2021-12-29 SDOH — HEALTH STABILITY: PHYSICAL HEALTH: ON AVERAGE, HOW MANY MINUTES DO YOU ENGAGE IN EXERCISE AT THIS LEVEL?: 30 MIN

## 2021-12-29 SDOH — SOCIAL STABILITY: SOCIAL NETWORK: HOW OFTEN DO YOU ATTEND CHURCH OR RELIGIOUS SERVICES?: 1 TO 4 TIMES PER YEAR

## 2021-12-29 SDOH — SOCIAL STABILITY: SOCIAL NETWORK: HOW OFTEN DO YOU GET TOGETHER WITH FRIENDS OR RELATIVES?: MORE THAN THREE TIMES A WEEK

## 2021-12-29 SDOH — ECONOMIC STABILITY: FOOD INSECURITY: WITHIN THE PAST 12 MONTHS, THE FOOD YOU BOUGHT JUST DIDN'T LAST AND YOU DIDN'T HAVE MONEY TO GET MORE.: NEVER TRUE

## 2021-12-29 SDOH — SOCIAL STABILITY: SOCIAL NETWORK: ARE YOU MARRIED, WIDOWED, DIVORCED, SEPARATED, NEVER MARRIED, OR LIVING WITH A PARTNER?: MARRIED

## 2021-12-29 SDOH — SOCIAL STABILITY: SOCIAL INSECURITY
WITHIN THE LAST YEAR, HAVE TO BEEN RAPED OR FORCED TO HAVE ANY KIND OF SEXUAL ACTIVITY BY YOUR PARTNER OR EX-PARTNER?: NO

## 2021-12-29 SDOH — ECONOMIC STABILITY: INCOME INSECURITY: HOW HARD IS IT FOR YOU TO PAY FOR THE VERY BASICS LIKE FOOD, HOUSING, MEDICAL CARE, AND HEATING?: NOT HARD AT ALL

## 2021-12-29 SDOH — ECONOMIC STABILITY: TRANSPORTATION INSECURITY
IN THE PAST 12 MONTHS, HAS THE LACK OF TRANSPORTATION KEPT YOU FROM MEDICAL APPOINTMENTS OR FROM GETTING MEDICATIONS?: NO

## 2021-12-29 SDOH — ECONOMIC STABILITY: HOUSING INSECURITY: IN THE LAST 12 MONTHS, HOW MANY PLACES HAVE YOU LIVED?: 1

## 2021-12-29 ASSESSMENT — PAIN SCALES - GENERAL: PAINLEVEL: NO PAIN (0)

## 2021-12-29 ASSESSMENT — MIFFLIN-ST. JEOR: SCORE: 1216.42

## 2021-12-29 NOTE — NURSING NOTE
Concha Ma is a 76 year old female patient that presents today in clinic for the following:    Chief Complaint   Patient presents with     Establish Care     Patient comes to establish care.      The patient's allergies and medications were reviewed as noted. A set of vitals were recorded as noted without incident. The patient does not have any other questions for the provider.    Vasquez Shah, EMT at 9:25 AM on 12/29/2021

## 2021-12-29 NOTE — Clinical Note
Ask if she had ear irrigation, if not please assist with nurse visit for cerumen removal.  Best wishes,  Ko Yuan MD

## 2021-12-29 NOTE — PROGRESS NOTES
Assessment & Plan   Concha Ma 76 history of right knee arthritis s/p arthroplasty 2018, squamous and basal cell skin cancer, rosacea, osteopenia L spine (aka disorder of bone and cartilage), BPPV, hyperlipidemia, vasovagal syncope, depressive disorder, vitamin D deficiency and migraines who presents to re-establish primary care.    Encounter to establish care with new doctor  I reviewed my practice    Colon cancer screening  Due for colonoscopy ordered  - Adult Gastro Ref - Procedure Only    Visit for screening mammogram  Due for mammogram in January ordered  - Mammogram, screening w lee ann (3D)    Venous (peripheral) insufficiency  She uses compression stockings    Status post total right knee replacement  Arthritis of knee  Encouraged physical activity, Vit D through MVI such as gummies, calcium 1000 mg through nutrition ( she drinks milk)    Osteopenia of multiple sites  Due for dexa  - Dexa hip/pelvis/spine*    Encounter for immunization  Provided today, notified staff of precautions due to previous syncope with vaccine. Blood draw she will lie down for vaccine  - FLU VACCINE HIGH DOSE PRESERVATIVE FREE, AGE =>65 YR  Hyperlipidemia LDL goal <70  Refills provided due for labs  - Lipid panel reflex to direct LDL Fasting  - Comprehensive metabolic panel  - atorvastatin (LIPITOR) 80 MG tablet  Dispense: 90 tablet; Refill: 3    Migraine with aura and without status migrainosus, not intractable  Encouraged her to use aleve first, fiorinol sparingly uses only once or twice per month  - butalbital-aspirin-caffeine (FIORINAL) -40 MG per capsule  Dispense: 30 capsule; Refill: 2    Vasovagal syncope with vaccines or lab draw  Will check labs  - CBC with platelets differential  - TSH with free T4 reflex    Encounter for hepatitis C screening test for low risk patient  She agrees to screening low risk  - HCV Screen with Reflex    Bilateral impacted cerumen  Remove cerumen if time today otherwise nurse visit  -  "REMOVE IMPACTED CERUMEN               BMI:   Estimated body mass index is 31.6 kg/m  as calculated from the following:    Height as of this encounter: 1.562 m (5' 1.5\").    Weight as of this encounter: 77.1 kg (170 lb).   Weight management plan: Discussed healthy diet and exercise guidelines    58 minutes spent on the date of the encounter doing chart review, history, exam, diagnostics review, documentation, counseling and coordination of cares as noted.      Return in about 1 year (around 12/29/2022).    Ko Yuan MD  [facility] [place]    Rain Kern is a 76 year old who presents for the following health issues     HPI   Concha Acuna history of right knee arthritis s/p arthroplasty 2018, squamous and basal cell skin cancer, rosacea, osteopenia L spine (aka disorder of bone and cartilage), BPPV, hyperlipidemia, vasovagal syncope, depressive disorder, vitamin D deficiency and migraines who presents to re-establish primary care.  Arthritis   She feels the arthritis is well controlled after knee replacement right. She .  Ear irrigation  Has cerumen bilaterally  Migraines  Has used butalbital in the past and tried other treatment. Uses once monthly. Has some aleve most often effective  Mood  Mood occassionally down during the holidays due to pandemic disagreement. Some tylenol pm twice weekly in order to sleep.  She is active Dancing, Biking, not able to walk very well due to arthritis and venous insufficiency.  HCM   Due for flu vaccine ( sometimes has syncope therefore needs to lie down for vaccine)  Social History     Socioeconomic History     Marital status:      Spouse name: Not on file     Number of children: Not on file     Years of education: Not on file     Highest education level: Not on file   Occupational History     Occupation: EEG tech     Employer: [employer]   Tobacco Use     Smoking status: Never Smoker     Smokeless tobacco: " Never Used   Vaping Use     Vaping Use: Never used   Substance and Sexual Activity     Alcohol use: Yes     Comment: Very little     Drug use: No     Sexual activity: Not Currently     Partners: Male     Birth control/protection: Post-menopausal   Other Topics Concern     Parent/sibling w/ CABG, MI or angioplasty before 65F 55M? Not Asked   Social History Narrative    Social History: Retired in healthcare  EEG tech, , remarried to Ignacio 4/2021  One son Clinton Billy. Two grandchildren Rebekah 2010, Jon 2008     Social Determinants of Health     Financial Resource Strain: Low Risk      Difficulty of Paying Living Expenses: Not hard at all   Food Insecurity: No Food Insecurity     Worried About Running Out of Food in the Last Year: Never true     Ran Out of Food in the Last Year: Never true   Transportation Needs: No Transportation Needs     Lack of Transportation (Medical): No     Lack of Transportation (Non-Medical): No   Physical Activity: Sufficiently Active     Days of Exercise per Week: 7 days     Minutes of Exercise per Session: 30 min   Stress: No Stress Concern Present     Feeling of Stress : Only a little   Social Connections: Socially Integrated     Frequency of Communication with Friends and Family: More than three times a week     Frequency of Social Gatherings with Friends and Family: More than three times a week     Attends Hoahaoism Services: 1 to 4 times per year     Active Member of Clubs or Organizations: Yes     Attends Club or Organization Meetings: More than 4 times per year     Marital Status:    Intimate Partner Violence: Not At Risk     Fear of Current or Ex-Partner: No     Emotionally Abused: No     Physically Abused: No     Sexually Abused: No   Housing Stability: Low Risk      Unable to Pay for Housing in the Last Year: No     Number of Places Lived in the Last Year: 1     Unstable Housing in the Last Year: No     Labs reviewed in EPIC  BP Readings from Last 3 Encounters:    12/29/21 135/83   01/06/21 134/87   01/04/21 138/86    Wt Readings from Last 3 Encounters:   12/29/21 77.1 kg (170 lb)   01/06/21 74.8 kg (165 lb)   11/28/18 73.2 kg (161 lb 4.8 oz)            Immunization History   Administered Date(s) Administered     COVID-19,PF,Moderna 02/26/2021, 03/26/2021, 12/05/2021     HEPA 01/06/2016     HepA-Adult 01/06/2016, 01/06/2020     Influenza (High Dose) 3 valent vaccine 12/07/2019     Influenza (IIV3) PF 12/26/2011, 10/10/2013     Influenza Vaccine, 6+MO IM (QUADRIVALENT W/PRESERVATIVES) 11/11/2015     Influenza, Quad, High Dose, Pf, 65yr+ (Fluzone HD) 11/17/2020, 12/29/2021     Pneumo Conj 13-V (2010&after) 04/15/2017     Pneumococcal 23 valent 10/24/2012     TD (ADULT, 7+) 02/10/1995     TDAP Vaccine (Boostrix) 12/17/2014     Typhoid IM 01/06/2016     Yellow Fever 01/26/2016     Zoster vaccine recombinant adjuvanted (SHINGRIX) 05/18/2021, 08/17/2021           Patient Active Problem List   Diagnosis     Skin cancer, basal cell     Hyperlipidemia     Classical migraine     Insomnia     Arthritis of knee     Hemorrhoids     Vasovagal syncope     BCC (basal cell carcinoma), face     Swelling of lower extremity     S/P total knee arthroplasty     Vertigo, benign positional, bilateral     Status post total right knee replacement     Osteopenia     Disorder of bone and cartilage     Venous (peripheral) insufficiency     Past Surgical History:   Procedure Laterality Date     ARTHROPLASTY KNEE Right 4/13/2018    Procedure: ARTHROPLASTY KNEE;  Right Total Knee Replacement;  Surgeon: Apollo Nguyen MD;  Location: [facility] OR     CATARACT IOL, RT/LT       LASIK BILATERAL       MOHS MICROGRAPHIC PROCEDURE       ZZC NONSPECIFIC PROCEDURE      laparoscopy,     ZZC NONSPECIFIC PROCEDURE      laser surg basal cell skin cancer     ZZC STOMACH SURGERY PROCEDURE UNLISTED      laparoscopy for infertility       Social History     Tobacco Use     Smoking status: Never Smoker     Smokeless tobacco:  Never Used   Substance Use Topics     Alcohol use: Yes     Comment: Very little     Family History   Problem Relation Age of Onset     Neurologic Disorder Mother         headaches/narcolepsy     Cancer Mother         face skin     Cerebrovascular Disease Mother         multifocal infarcts to brain     Thyroid Disease Mother         hypothyroid     Seizure Disorder Mother         narcolepsy     Migraines Mother      Cancer Father         Skin, lung     C.A.D. Father         MI     Coronary Artery Disease Father      Cerebrovascular Disease Father         ,, ,     Alcoholism Father      Hypertension Sister      Thyroid Disease Sister      Migraines Sister      Chronic Obstructive Pulmonary Disease Brother      Substance Abuse Brother         heroin     Coronary Artery Disease Brother      Hyperlipidemia Brother      Alcoholism Brother      No Known Problems Son          Current Outpatient Medications   Medication Sig Dispense Refill     atorvastatin (LIPITOR) 80 MG tablet Take 1 tablet (80 mg) by mouth daily 90 tablet 3     butalbital-aspirin-caffeine (FIORINAL) -40 MG per capsule Take 1 capsule by mouth daily as needed for headaches 30 capsule 2     COMPRESSION STOCKINGS 2 each daily 2 each 1     IBUPROFEN PO Take 600 mg by mouth every 6 hours as needed for moderate pain       metroNIDAZOLE (METROGEL) 0.75 % external gel Apply twice daily as needed to face for rosacea. 45 g 11     naproxen sodium (ANAPROX) 220 MG tablet Take 220 mg by mouth 2 times daily (with meals)       VITAMIN D, CHOLECALCIFEROL, PO Take 2,000 Units by mouth daily as needed (Patient not taking: Reported on 12/29/2021)       Allergies   Allergen Reactions     Codeine GI Disturbance     Percocet [Oxycodone-Acetaminophen] Nausea and Vomiting     Recent Labs   Lab Test 12/29/21  1058 01/06/21  1025 12/07/19  1020 11/28/18  0852 04/19/18  0750 03/29/18  0937 04/15/17  1005 01/06/16  1110 01/06/16  1110   A1C  --   --   --   --   --   --  6.3*   "--  6.3*   LDL 77 100* 93 83  --   --  114*  --  110*   HDL 68 62 67 60  --   --  69  --  57   TRIG 96 139 90 79  --   --  97  --  143   ALT 32 41  --   --   --   --  35  --   --    CR 0.84 0.82  --   --  0.70   < > 0.92  --   --    GFRESTIMATED 73 70  --   --  >60   < > 60*  --   --    GFRESTBLACK  --  82  --   --  >60   < > 73  --   --    POTASSIUM 4.8 4.1  --   --  4.4   < > 4.5  --   --    TSH 1.43  --   --  2.42  --   --  1.53   < >  --     < > = values in this interval not displayed.      Review of Systems   Answers for HPI/ROS submitted by the patient on 12/19/2021  General Symptoms: No  Skin Symptoms: No  HENT Symptoms: No  EYE SYMPTOMS: No  HEART SYMPTOMS: No  LUNG SYMPTOMS: No  INTESTINAL SYMPTOMS: No  URINARY SYMPTOMS: No  GYNECOLOGIC SYMPTOMS: No  BREAST SYMPTOMS: No  SKELETAL SYMPTOMS: No  BLOOD SYMPTOMS: No  NERVOUS SYSTEM SYMPTOMS: No  MENTAL HEALTH SYMPTOMS: No    Problem list, PMH, Surgical HX, FH, SH, allergies, medications,immunizations reviewed and updated in Epic. 10 point ROS negative other than noted in HPI and ROS.          Objective    /83 (BP Location: Right arm, Patient Position: Sitting, Cuff Size: Adult Regular)   Pulse 69   Resp 16   Ht 1.562 m (5' 1.5\")   Wt 77.1 kg (170 lb)   SpO2 96%   BMI 31.60 kg/m    Body mass index is 31.6 kg/m .  Physical Exam   GENERAL: healthy, alert and no distress, BMI 31  EYES: Eyes grossly normal to inspection, PERRL and conjunctivae and sclerae normal  HENT: ear canals occluded with cerumen, mask removed briefly mouth without ulcers or lesions  NECK: no adenopathy, no asymmetry, masses, or scars and thyroid normal to palpation  RESP: lungs clear to auscultation - no rales, rhonchi or wheezes  CV: regular rate and rhythm, normal S1 S2, no S3 or S4, no murmur, click or rub  ABDOMEN: soft,obese nontender, no hepatosplenomegaly, no masses and bowel sounds normal  MS: arthritis knees  SKIN: scars in areas of previous skin biospy    NEURO: Normal " strength and tone, mentation intact and speech normal  PSYCH: mentation appears normal, affect normal/bright      PHQ-2 Score:     PHQ-2 ( 1999 Pfizer) 12/29/2021 1/6/2021   Q1: Little interest or pleasure in doing things 0 0   Q2: Feeling down, depressed or hopeless 0 0   PHQ-2 Score 0 0   PHQ-2 Total Score (12-17 Years)- Positive if 3 or more points; Administer PHQ-A if positive - 0

## 2021-12-30 NOTE — RESULT ENCOUNTER NOTE
Dear Concha Ma   Your thyroid, cholesterol, complete blood count including hemoglobin,white cell count for infection, kidney, liver, blood sugar,calcium, sodium and potassium were normal or within acceptable range.   Hep c screen negative good results.  Best wishes,  Ko Yuan MD

## 2022-02-02 ENCOUNTER — OFFICE VISIT (OUTPATIENT)
Dept: DERMATOLOGY | Facility: CLINIC | Age: 77
End: 2022-02-02
Payer: COMMERCIAL

## 2022-02-02 ENCOUNTER — ANCILLARY PROCEDURE (OUTPATIENT)
Dept: BONE DENSITY | Facility: CLINIC | Age: 77
End: 2022-02-02
Attending: FAMILY MEDICINE
Payer: COMMERCIAL

## 2022-02-02 ENCOUNTER — ANCILLARY PROCEDURE (OUTPATIENT)
Dept: MAMMOGRAPHY | Facility: CLINIC | Age: 77
End: 2022-02-02
Payer: COMMERCIAL

## 2022-02-02 DIAGNOSIS — L72.0 EIC (EPIDERMAL INCLUSION CYST): ICD-10-CM

## 2022-02-02 DIAGNOSIS — D18.01 CHERRY ANGIOMA: ICD-10-CM

## 2022-02-02 DIAGNOSIS — Z85.828 HISTORY OF NONMELANOMA SKIN CANCER: Primary | ICD-10-CM

## 2022-02-02 DIAGNOSIS — L57.0 ACTINIC KERATOSIS: ICD-10-CM

## 2022-02-02 DIAGNOSIS — D23.9 DERMATOFIBROMA: ICD-10-CM

## 2022-02-02 DIAGNOSIS — M85.89 OSTEOPENIA OF MULTIPLE SITES: ICD-10-CM

## 2022-02-02 DIAGNOSIS — Z12.31 VISIT FOR SCREENING MAMMOGRAM: ICD-10-CM

## 2022-02-02 DIAGNOSIS — L81.4 LENTIGINES: ICD-10-CM

## 2022-02-02 DIAGNOSIS — L82.1 SEBORRHEIC KERATOSIS: ICD-10-CM

## 2022-02-02 DIAGNOSIS — D22.9 MULTIPLE BENIGN NEVI: ICD-10-CM

## 2022-02-02 PROCEDURE — 99213 OFFICE O/P EST LOW 20 MIN: CPT | Mod: 25 | Performed by: DERMATOLOGY

## 2022-02-02 PROCEDURE — 17003 DESTRUCT PREMALG LES 2-14: CPT | Performed by: DERMATOLOGY

## 2022-02-02 PROCEDURE — 17000 DESTRUCT PREMALG LESION: CPT | Performed by: DERMATOLOGY

## 2022-02-02 PROCEDURE — 77063 BREAST TOMOSYNTHESIS BI: CPT | Performed by: RADIOLOGY

## 2022-02-02 PROCEDURE — 77067 SCR MAMMO BI INCL CAD: CPT | Performed by: RADIOLOGY

## 2022-02-02 PROCEDURE — 77080 DXA BONE DENSITY AXIAL: CPT | Performed by: INTERNAL MEDICINE

## 2022-02-02 ASSESSMENT — PAIN SCALES - GENERAL: PAINLEVEL: NO PAIN (0)

## 2022-02-02 NOTE — PROGRESS NOTES
[facility] Dermatology Note  Encounter Date: Feb 2, 2022  Office Visit     Dermatology Problem List:  1. Hx NMSC              - chemoprevention: niacinamide 500 mg PO BID              - sBCC, R chest, s/p ED&C 4/21/21              - SCCis, L lateral cheek, s/p MMS 1/4/2021              - BCC, R arm, s/p excision 1/4/2021              - BCC, L forehead, s/p bx 10/24/19, s/p MMS 11/5/19                  - BCC, central chest              - SCC, right upper cutaneous lip, s/p MMS 2014  2. AKs. LiqN2.  3. SKs, lentigenes, dermal nevi.   4. EIC, posterior neck    ____________________________________________    Assessment & Plan:    # AK, L temple x2, L nasal tip x1, R dorsal hand x1  - Cryotherapy today, see procedure note below    # EIC, posterior neck  Due to small size, offered punch biopsy for removal, however patient declined for time being.     # History of NMSC. No evidence of recurrent disease.  - Continue photoprotection - recommend SPF 30 or higher with frequent reapplication  - Continue yearly skin exams  - Advised to monitor for changing, non-healing, bleeding, painful, changing, or otherwise symptomatic lesions    # Benign lesions: Multiple benign nevi, solar lentigos, seborrheic keratoses, cherry angiomas, dermatofibroma. Explained to patient benign nature of lesion. No treatment is necessary at this time unless the lesion changes or becomes symptomatic.     - ABCDs of melanoma were discussed and self skin checks were advised.  - Sun precaution was advised including the use of sun screens of SPF 30 or higher, sun protective clothing, and avoidance of tanning beds.    Procedures Performed:   - Cryotherapy procedure note, location(s): See above. After verbal consent and discussion of risks and benefits including, but not limited to, dyspigmentation/scar, blister, and pain, 4 lesion(s) was(were) treated with 1-2 mm freeze border for 1-2 cycles with liquid nitrogen. Post cryotherapy  instructions were provided.    Follow-up: 1 year(s) in-person, or earlier for new or changing lesions    Staff and Scribe:     Scribe Disclosure:  I, Yanelis Gunter, am serving as a scribe to document services personally performed by Jason Gao MD based on data collection and the provider's statements to me.     Provider Disclosure:   The documentation recorded by the scribe accurately reflects the services I personally performed and the decisions made by me.    Jason Gao MD    Department of Dermatology  [facility]: Phone: [phone], Fax:[phone]  [facility]: Phone: [phone] Fax: [phone]  ____________________________________________    CC: Skin Check (pt states she is here for full body skin check, spot on upper right neck.)    HPI:  Ms. Concha Ma is a(n) 76 year old female who presents today as a return patient for [facility]. Last seen in dermatology by myself on 4/21/21 at which point a BCC on the R chest was treated with ED&C.     Today, the patient points to a lesion on her posterior neck that is sometimes bothersome. Patient recently traveled to [place] and tries to diligently wear sunscreen. Patient is otherwise feeling well, without additional skin concerns.    Labs Reviewed:  N/A    Physical Exam:  Vitals: There were no vitals taken for this visit.  SKIN: Full skin, which includes the head/face, both arms, chest, back, abdomen,both legs, genitalia and/or groin buttocks, digits and/or nails, was examined.  - There is a raised dome shaped nodule with a central punctum located on posterior neck.   - There are erythematous macules with overyling adherent scale on the L temple x2, L nasal tip x1, R dorsal hand x1.   - There are dome shaped bright red papules on the trunk and extremities.   - Multiple regular brown pigmented macules and papules are identified  "on the trunk and extremities.   - Scattered brown macules on sun exposed areas.  - There are waxy stuck on tan to brown papules on the trunk and extremities.   - There is a firm tan/flesh colored papule that dimples with lateral pressure on the R shin.   - No other lesions of concern on areas examined.     Medications:  Current Outpatient Medications   Medication     atorvastatin (LIPITOR) 80 MG tablet     butalbital-aspirin-caffeine (FIORINAL) -40 MG per capsule     COMPRESSION STOCKINGS     IBUPROFEN PO     metroNIDAZOLE (METROGEL) 0.75 % external gel     naproxen sodium (ANAPROX) 220 MG tablet     VITAMIN D, CHOLECALCIFEROL, PO     No current facility-administered medications for this visit.      Past Medical History:   Patient Active Problem List   Diagnosis     Skin cancer, basal cell     Hyperlipidemia     Classical migraine     Insomnia     Arthritis of knee     Hemorrhoids     Vasovagal syncope     BCC (basal cell carcinoma), face     Swelling of lower extremity     S/P total knee arthroplasty     Vertigo, benign positional, bilateral     Status post total right knee replacement     Osteopenia     Disorder of bone and cartilage     Venous (peripheral) insufficiency     Past Medical History:   Diagnosis Date     Arthritis of knee     right     Basal cell carcinoma      BPPV (benign paroxysmal positional vertigo)      Depressive disorder 1985     Disorder of bone and cartilage     (osteopenia)     Hemorrhoids      History of PID      Hyperlipidemia      Insomnia      Migraines, classic     sparkly aura     Osteopenia      Plantar fasciitis, bilateral      PONV (postoperative nausea and vomiting)      Skin cancer, basal cell      Squamous cell carcinoma      Swelling of the ankle, feet, or leg      Vasovagal syncope     is \"fainter\" with blood draws, injections-NEEDS TO BE LAYING DOWN        "

## 2022-02-02 NOTE — LETTER
2/2/2022       RE: Concha Ma  [address]  [facility] [address]     Dear Colleague,    Thank you for referring your patient, Concha Ma, to the [facility]ERMAT[facility] [place] [facility]. Please see a copy of my visit note below.    [facility] Dermatology Note  Encounter Date: Feb 2, 2022  Office Visit     Dermatology Problem List:  1. Hx NMSC              - chemoprevention: niacinamide 500 mg PO BID              - sBCC, R chest, s/p ED&C 4/21/21              - SCCis, L lateral cheek, s/p MMS 1/4/2021              - BCC, R arm, s/p excision 1/4/2021              - BCC, L forehead, s/p bx 10/24/19, s/p MMS 11/5/19                  - BCC, central chest              - SCC, right upper cutaneous lip, s/p MMS 2014  2. AKs. LiqN2.  3. SKs, lentigenes, dermal nevi.   4. EIC, posterior neck    ____________________________________________    Assessment & Plan:    # AK, L temple x2, L nasal tip x1, R dorsal hand x1  - Cryotherapy today, see procedure note below    # EIC, posterior neck  Due to small size, offered punch biopsy for removal, however patient declined for time being.     # History of NMSC. No evidence of recurrent disease.  - Continue photoprotection - recommend SPF 30 or higher with frequent reapplication  - Continue yearly skin exams  - Advised to monitor for changing, non-healing, bleeding, painful, changing, or otherwise symptomatic lesions    # Benign lesions: Multiple benign nevi, solar lentigos, seborrheic keratoses, cherry angiomas, dermatofibroma. Explained to patient benign nature of lesion. No treatment is necessary at this time unless the lesion changes or becomes symptomatic.     - ABCDs of melanoma were discussed and self skin checks were advised.  - Sun precaution was advised including the use of sun screens of SPF 30 or higher, sun protective clothing, and avoidance of tanning  beds.    Procedures Performed:   - Cryotherapy procedure note, location(s): See above. After verbal consent and discussion of risks and benefits including, but not limited to, dyspigmentation/scar, blister, and pain, 4 lesion(s) was(were) treated with 1-2 mm freeze border for 1-2 cycles with liquid nitrogen. Post cryotherapy instructions were provided.    Follow-up: 1 year(s) in-person, or earlier for new or changing lesions    Staff and Scribe:     Scribe Disclosure:  I, Yanelis Gunter, am serving as a scribe to document services personally performed by Jason Gao MD based on data collection and the provider's statements to me.     Provider Disclosure:   The documentation recorded by the scribe accurately reflects the services I personally performed and the decisions made by me.    Jason Gao MD    Department of Dermatology  [facility]: Phone: [phone], Fax:[phone]  [facility]: Phone: [phone] Fax: [phone]  ____________________________________________    CC: Skin Check (pt states she is here for full body skin check, spot on upper right neck.)    HPI:  Ms. Concha Ma is a(n) 76 year old female who [place] [facility] [place]. Last seen in dermatology by myself on 4/21/21 at which point a BCC on the R chest was treated with ED&C.     Today, the patient points to a lesion on her posterior neck that is sometimes bothersome. Patient recently traveled to [place] and tries to diligently wear sunscreen. Patient is otherwise feeling well, without additional skin concerns.    Labs Reviewed:  N/A    Physical Exam:  Vitals: There were no vitals taken for this visit.  SKIN: Full skin, which includes the head/face, both arms, chest, back, abdomen,both legs, genitalia and/or groin buttocks, digits and/or nails, was examined.  - There is a raised dome  shaped nodule with a central punctum located on posterior neck.   - There are erythematous macules with overyling adherent scale on the L temple x2, L nasal tip x1, R dorsal hand x1.   - There are dome shaped bright red papules on the trunk and extremities.   - Multiple regular brown pigmented macules and papules are identified on the trunk and extremities.   - Scattered brown macules on sun exposed areas.  - There are waxy stuck on tan to brown papules on the trunk and extremities.   - There is a firm tan/flesh colored papule that dimples with lateral pressure on the R shin.   - No other lesions of concern on areas examined.     Medications:  Current Outpatient Medications   Medication     atorvastatin (LIPITOR) 80 MG tablet     butalbital-aspirin-caffeine (FIORINAL) -40 MG per capsule     COMPRESSION STOCKINGS     IBUPROFEN PO     metroNIDAZOLE (METROGEL) 0.75 % external gel     naproxen sodium (ANAPROX) 220 MG tablet     VITAMIN D, CHOLECALCIFEROL, PO     No current facility-administered medications for this visit.      Past Medical History:   Patient Active Problem List   Diagnosis     Skin cancer, basal cell     Hyperlipidemia     Classical migraine     Insomnia     Arthritis of knee     Hemorrhoids     Vasovagal syncope     BCC (basal cell carcinoma), face     Swelling of lower extremity     S/P total knee arthroplasty     Vertigo, benign positional, bilateral     Status post total right knee replacement     Osteopenia     Disorder of bone and cartilage     Venous (peripheral) insufficiency     Past Medical History:   Diagnosis Date     Arthritis of knee     right     Basal cell carcinoma      BPPV (benign paroxysmal positional vertigo)      Depressive disorder 1985     Disorder of bone and cartilage     (osteopenia)     Hemorrhoids      History of PID      Hyperlipidemia      Insomnia      Migraines, classic     sparkly aura     Osteopenia      Plantar fasciitis, bilateral      PONV (postoperative  "nausea and vomiting)      Skin cancer, basal cell      Squamous cell carcinoma      Swelling of the ankle, feet, or leg      Vasovagal syncope     is \"fainter\" with blood draws, injections-NEEDS TO BE LAYING DOWN        "

## 2022-02-03 NOTE — RESULT ENCOUNTER NOTE
Dear Concha Ma   Congratulations. The result of your recent mammogram was normal.  Best wishes,  Ko Yuan MD

## 2022-02-11 NOTE — RESULT ENCOUNTER NOTE
Dear Concha Ma   Your bone density, DEXA scan shows osteoporosis. You may benefit from a medication such as Fosamax to assist with improved bone density.  Vitamin D and Calcium are also important for bone health.  Vitamin D is important in bone, muscle, joint health and may decrease the risk of certain cancers.  For maintenance, please take a multiple vitamin containing 800 IU (200%) of vitamin D daily or a vitamin D supplement up to 1000 IU daily.   Also important is calcium 1000 mg (100%) daily best in nutritional form such as low fat dairy (milk, cheese, yogurt) or fortified foods such as Total cereal or calcium fortified foods.   If you are not able to tolerate these foods a calcium supplement should be taken.   ( Calcium carbonate or calcium citrate if you also are taking a medication for stomach reflux).   Weight bearing Exercise at least 30 minutes daily is recommended for bone health. This includes walking or light weight training.    Please make a follow-up appointment to discuss further.  Ko Yuan MD

## 2022-02-13 ENCOUNTER — HEALTH MAINTENANCE LETTER (OUTPATIENT)
Age: 77
End: 2022-02-13

## 2022-05-19 ENCOUNTER — IMMUNIZATION (OUTPATIENT)
Dept: NURSING | Facility: CLINIC | Age: 77
End: 2022-05-19
Payer: COMMERCIAL

## 2022-05-19 PROCEDURE — 0064A COVID-19,PF,MODERNA (18+ YRS BOOSTER .25ML): CPT

## 2022-05-19 PROCEDURE — 91306 COVID-19,PF,MODERNA (18+ YRS BOOSTER .25ML): CPT

## 2022-09-13 ENCOUNTER — TELEPHONE (OUTPATIENT)
Dept: GASTROENTEROLOGY | Facility: CLINIC | Age: 77
End: 2022-09-13

## 2022-09-13 DIAGNOSIS — Z12.11 SPECIAL SCREENING FOR MALIGNANT NEOPLASMS, COLON: Primary | ICD-10-CM

## 2022-09-13 RX ORDER — BISACODYL 5 MG/1
TABLET, DELAYED RELEASE ORAL
Qty: 4 TABLET | Refills: 0 | Status: SHIPPED | OUTPATIENT
Start: 2022-09-13 | End: 2023-05-15

## 2022-09-13 NOTE — TELEPHONE ENCOUNTER
Attempted to contact patient regarding upcoming colonoscopy procedure on 9.20.22 for pre assessment questions. No answer.     Left message to return call to [phone] #[phone]    Kaity Reyna RN

## 2022-09-13 NOTE — TELEPHONE ENCOUNTER
Screening Questions    BlueKIND OF PREP RedLOCATION [review exclusion criteria] GreenSEDATION TYPE      1. Are you active on mychart? Y    2. What insurance is in the chart? UCARE/MEDICARE      3.   Ordering/Referring Provider: BRODERICK HOOK     4. BMI   (If greater than 40 review exclusion criteria [PAC APPT IF [MAC] @ [facility])  32.0  [If yes, BMI OVER 40-EXTENDED PREP]      **(Sedation review/consideration needed)**  Do you have a legal guardian or Medical Power of    and/or are you able to give consent for your medical care?     SELF     5. Have you had a positive covid test in the last 90 days?   N     6.  Are you currently on dialysis?   N [ If yes, G-PREP & HOSPITAL setting ONLY]     7.  Do you have chronic kidney disease?  N [ If yes, G-PREP ]    8.   Do you have a diagnosis of diabetes?   N   [ If yes, G-PREP ]    9.  On a regular basis do you go 3-5 days between bowel movements?   N   [ If yes, EXTENDED PREP]    10.  Are you taking any prescription pain medications on a routine schedule?    N [ If yes, EXTENDED PREP] [If yes, MAC]      11.   Do you have any chemical dependencies such as alcohol, street drugs, or methadone?    N [If yes, MAC]    12.   Do you have any history of post-traumatic stress syndrome, severe anxiety or history of psychosis?    N  [If yes, MAC]    13.  [FEMALES] Are you currently pregnant?     If yes, how many weeks?       Respiratory/Heart Screening:  [If yes to any of the following HOSPITAL setting only]     14. Do you have Pulmonary Hypertension [Lungs]?   N       15. Do you have UNCONTROLLED asthma?   N     16.  Do you use daily home oxygen?  N      17. Do you have mod to severe Obstructive Sleep Apnea?         (OKAY @  [facility]  SH  PH  RI  MG - if pt is not on OXYGEN)  N      18.   Have you had a heart or lung transplant?   N      19.   Have you had a stroke or Transient ischemic attack (TIA - aka  mini stroke ) within 6 months?  (If yes, please review exclusion  criteria)  N     20.   In the past 6 months, have you had any heart related issues including cardiomyopathy or heart attack?   N           If yes, did it require cardiac stenting or other implantable device?   N      21.   Do you have any implantable devices in your body (pacemaker, defib, LVAD)? (If yes, please review exclusion criteria)  N   22.  Do you take the medication Phentermine?  NO        23. Do you take nitroglycerin?   N           If yes, how often? N  (if yes, HOSPITAL setting ONLY)    24.  Are you currently taking any blood thinners?    [If yes, INFORM patient to follw up w/ ORDERING PROVIDER FOR BRIDGING INSTRUCTIONS]     N    25.   Do you transfer independently?                (If NO, please HOSPITAL setting ONLY)  Y    26.   Preferred LOCAL Pharmacy for Pre Prescription:        [facility] - [place], [place] - [address]    Scheduling Details  (Please ask for phone number if not scheduled by patient)      Caller : Concha Ma   Date of Procedure: 09/20/2022  Surgeon: JOEL   [place] [facility] [place]edation Type: MAC  l PER PROTOCOL   Conscious Sedation- Needs  for 6 hours after the procedure  MAC/General-Needs  for 24 hours after procedure    N :[Pre-op Required] at [facility]  SH  MG and OR for MAC sedation   (advise patient they will need a pre-op WITH IN 30 DAYS of procedure date)     Type of Procedure Scheduled:   Lower Endoscopy [Colonoscopy]    Which Colonoscopy Prep was Sent?:   S/g  - per recall     KHORUTS CF PATIENTS & GROEN'S PATIENTS NEEDS EXTENDED PREP       Informed patient they will need an adult  y  Cannot take any type of public or medical transportation alone    Pre-Procedure Covid test to be completed at [facility] or Externally: y  **INFORMED OF HOME TESTING & LAB OPTION**        Confirmed Nurse will call to complete assessment y    Additional comments: n

## 2022-09-13 NOTE — TELEPHONE ENCOUNTER
Patient scheduled for colonoscopy on 9.20.22.     Covid test scheduled ?. Discuss at home test option.     Arrival time: 0910    Facility location[place] [facility]    Sedation type: MAC    Indication for procedure: Screening    Anticoagulations? No     Bowel prep recommendation: Standard golytely d/t mag citrate recall    golytely prep sent to [facility] pharmacy. Prep instructions sent via [employer]    Pre visit planning completed.    Kaity Reyna RN

## 2022-09-14 NOTE — TELEPHONE ENCOUNTER
Patient returned call.     Pre assessment questions completed for upcoming colonoscopy  procedure scheduled on 9/20/22    COVID policy reviewed. Patient to complete rapid antigen test one to two days before their scheduled procedure. Patient to bring photo of the results when they come in for their procedure.    Reviewed procedural arrival time 0910 and facility location [facility].    Designated  policy reviewed. Instructed to have someone stay 24 hours post procedure.     Reviewed Golytely  prep instructions. No fiber/iron supplements or foods that contain nuts/seeds 7 days prior to procedure.     Patient unable to find prep instructions sent via Fetch MD by scheduling. Does not appear they were resent with pre assessment call. Resent Golytely instructions via Beam.t.     Patient verbalized understanding and had no questions or concerns at this time.    Janeth Graff RN

## 2022-09-16 ENCOUNTER — LAB (OUTPATIENT)
Dept: LAB | Facility: CLINIC | Age: 77
End: 2022-09-16
Payer: COMMERCIAL

## 2022-09-16 DIAGNOSIS — Z20.822 ENCOUNTER FOR LABORATORY TESTING FOR COVID-19 VIRUS: ICD-10-CM

## 2022-09-16 LAB — SARS-COV-2 RNA RESP QL NAA+PROBE: NEGATIVE

## 2022-09-16 PROCEDURE — U0003 INFECTIOUS AGENT DETECTION BY NUCLEIC ACID (DNA OR RNA); SEVERE ACUTE RESPIRATORY SYNDROME CORONAVIRUS 2 (SARS-COV-2) (CORONAVIRUS DISEASE [COVID-19]), AMPLIFIED PROBE TECHNIQUE, MAKING USE OF HIGH THROUGHPUT TECHNOLOGIES AS DESCRIBED BY CMS-2020-01-R: HCPCS | Mod: 90 | Performed by: PATHOLOGY

## 2022-09-16 PROCEDURE — 99000 SPECIMEN HANDLING OFFICE-LAB: CPT | Performed by: PATHOLOGY

## 2022-09-16 PROCEDURE — U0005 INFEC AGEN DETEC AMPLI PROBE: HCPCS | Mod: 90 | Performed by: PATHOLOGY

## 2022-09-20 ENCOUNTER — ANESTHESIA EVENT (OUTPATIENT)
Dept: SURGERY | Facility: AMBULATORY SURGERY CENTER | Age: 77
End: 2022-09-20
Payer: COMMERCIAL

## 2022-09-20 ENCOUNTER — HOSPITAL ENCOUNTER (OUTPATIENT)
Facility: AMBULATORY SURGERY CENTER | Age: 77
Discharge: HOME | End: 2022-09-20
Attending: INTERNAL MEDICINE | Admitting: INTERNAL MEDICINE
Payer: COMMERCIAL

## 2022-09-20 ENCOUNTER — ANESTHESIA (OUTPATIENT)
Dept: SURGERY | Facility: AMBULATORY SURGERY CENTER | Age: 77
End: 2022-09-20
Payer: COMMERCIAL

## 2022-09-20 VITALS
RESPIRATION RATE: 17 BRPM | BODY MASS INDEX: 31.47 KG/M2 | SYSTOLIC BLOOD PRESSURE: 125 MMHG | DIASTOLIC BLOOD PRESSURE: 89 MMHG | OXYGEN SATURATION: 96 % | WEIGHT: 171 LBS | TEMPERATURE: 97.7 F | HEIGHT: 62 IN | HEART RATE: 84 BPM

## 2022-09-20 VITALS — HEART RATE: 66 BPM

## 2022-09-20 LAB — COLONOSCOPY: NORMAL

## 2022-09-20 PROCEDURE — 88305 TISSUE EXAM BY PATHOLOGIST: CPT | Mod: 26 | Performed by: PATHOLOGY

## 2022-09-20 PROCEDURE — 45380 COLONOSCOPY AND BIOPSY: CPT | Mod: PT

## 2022-09-20 PROCEDURE — 88305 TISSUE EXAM BY PATHOLOGIST: CPT | Mod: TC | Performed by: INTERNAL MEDICINE

## 2022-09-20 RX ORDER — NALOXONE HYDROCHLORIDE 0.4 MG/ML
0.2 INJECTION, SOLUTION INTRAMUSCULAR; INTRAVENOUS; SUBCUTANEOUS
Status: DISCONTINUED | OUTPATIENT
Start: 2022-09-20 | End: 2022-09-21 | Stop reason: HOSPADM

## 2022-09-20 RX ORDER — PROCHLORPERAZINE MALEATE 5 MG
5 TABLET ORAL EVERY 6 HOURS PRN
Status: DISCONTINUED | OUTPATIENT
Start: 2022-09-20 | End: 2022-09-21 | Stop reason: HOSPADM

## 2022-09-20 RX ORDER — SODIUM CHLORIDE, SODIUM LACTATE, POTASSIUM CHLORIDE, CALCIUM CHLORIDE 600; 310; 30; 20 MG/100ML; MG/100ML; MG/100ML; MG/100ML
INJECTION, SOLUTION INTRAVENOUS CONTINUOUS PRN
Status: DISCONTINUED | OUTPATIENT
Start: 2022-09-20 | End: 2022-09-20

## 2022-09-20 RX ORDER — PROPOFOL 10 MG/ML
INJECTION, EMULSION INTRAVENOUS CONTINUOUS PRN
Status: DISCONTINUED | OUTPATIENT
Start: 2022-09-20 | End: 2022-09-20

## 2022-09-20 RX ORDER — ONDANSETRON 4 MG/1
4 TABLET, ORALLY DISINTEGRATING ORAL EVERY 6 HOURS PRN
Status: DISCONTINUED | OUTPATIENT
Start: 2022-09-20 | End: 2022-09-21 | Stop reason: HOSPADM

## 2022-09-20 RX ORDER — FLUMAZENIL 0.1 MG/ML
0.2 INJECTION, SOLUTION INTRAVENOUS
Status: ACTIVE | OUTPATIENT
Start: 2022-09-20 | End: 2022-09-20

## 2022-09-20 RX ORDER — NALOXONE HYDROCHLORIDE 0.4 MG/ML
0.4 INJECTION, SOLUTION INTRAMUSCULAR; INTRAVENOUS; SUBCUTANEOUS
Status: DISCONTINUED | OUTPATIENT
Start: 2022-09-20 | End: 2022-09-21 | Stop reason: HOSPADM

## 2022-09-20 RX ORDER — ONDANSETRON 2 MG/ML
4 INJECTION INTRAMUSCULAR; INTRAVENOUS EVERY 6 HOURS PRN
Status: DISCONTINUED | OUTPATIENT
Start: 2022-09-20 | End: 2022-09-21 | Stop reason: HOSPADM

## 2022-09-20 RX ORDER — LIDOCAINE 40 MG/G
CREAM TOPICAL
Status: DISCONTINUED | OUTPATIENT
Start: 2022-09-20 | End: 2022-09-20 | Stop reason: HOSPADM

## 2022-09-20 RX ORDER — GLYCOPYRROLATE 0.2 MG/ML
INJECTION, SOLUTION INTRAMUSCULAR; INTRAVENOUS PRN
Status: DISCONTINUED | OUTPATIENT
Start: 2022-09-20 | End: 2022-09-20

## 2022-09-20 RX ORDER — ONDANSETRON 2 MG/ML
4 INJECTION INTRAMUSCULAR; INTRAVENOUS
Status: DISCONTINUED | OUTPATIENT
Start: 2022-09-20 | End: 2022-09-20 | Stop reason: HOSPADM

## 2022-09-20 RX ORDER — PROPOFOL 10 MG/ML
INJECTION, EMULSION INTRAVENOUS PRN
Status: DISCONTINUED | OUTPATIENT
Start: 2022-09-20 | End: 2022-09-20

## 2022-09-20 RX ORDER — LIDOCAINE HYDROCHLORIDE 20 MG/ML
INJECTION, SOLUTION INFILTRATION; PERINEURAL PRN
Status: DISCONTINUED | OUTPATIENT
Start: 2022-09-20 | End: 2022-09-20

## 2022-09-20 RX ADMIN — PROPOFOL 150 MCG/KG/MIN: 10 INJECTION, EMULSION INTRAVENOUS at 09:59

## 2022-09-20 RX ADMIN — PROPOFOL 80 MG: 10 INJECTION, EMULSION INTRAVENOUS at 10:02

## 2022-09-20 RX ADMIN — GLYCOPYRROLATE 0.2 MG: 0.2 INJECTION, SOLUTION INTRAMUSCULAR; INTRAVENOUS at 09:58

## 2022-09-20 RX ADMIN — LIDOCAINE HYDROCHLORIDE 80 MG: 20 INJECTION, SOLUTION INFILTRATION; PERINEURAL at 09:59

## 2022-09-20 RX ADMIN — SODIUM CHLORIDE, SODIUM LACTATE, POTASSIUM CHLORIDE, CALCIUM CHLORIDE: 600; 310; 30; 20 INJECTION, SOLUTION INTRAVENOUS at 09:30

## 2022-09-20 NOTE — H&P
"Concha Ma  [id number]  female  77 year old      Reason for procedure/surgery: colon cancer screening    Patient Active Problem List   Diagnosis     Skin cancer, basal cell     Hyperlipidemia     Classical migraine     Insomnia     Arthritis of knee     Hemorrhoids     Vasovagal syncope     BCC (basal cell carcinoma), face     Swelling of lower extremity     S/P total knee arthroplasty     Vertigo, benign positional, bilateral     Status post total right knee replacement     Osteopenia     Disorder of bone and cartilage     Venous (peripheral) insufficiency       Past Surgical History:    Past Surgical History:   Procedure Laterality Date     ARTHROPLASTY KNEE Right 4/13/2018    Procedure: ARTHROPLASTY KNEE;  Right Total Knee Replacement;  Surgeon: Apollo Nguyen MD;  Location: [facility] OR     CATARACT IOL, RT/LT       LASIK BILATERAL       MOHS MICROGRAPHIC PROCEDURE       Z NONSPECIFIC PROCEDURE      laparoscopy,     Z NONSPECIFIC PROCEDURE      laser surg basal cell skin cancer     Z STOMACH SURGERY PROCEDURE UNLISTED      laparoscopy for infertility       Past Medical History:   Past Medical History:   Diagnosis Date     Arthritis of knee     right     Basal cell carcinoma      BPPV (benign paroxysmal positional vertigo)      Depressive disorder 1985     Disorder of bone and cartilage     (osteopenia)     Hemorrhoids      History of PID      Hyperlipidemia      Insomnia      Migraines, classic     sparkly aura     Osteopenia      Plantar fasciitis, bilateral      PONV (postoperative nausea and vomiting)      Skin cancer, basal cell      Squamous cell carcinoma      Swelling of the ankle, feet, or leg      Vasovagal syncope     is \"fainter\" with blood draws, injections-NEEDS TO BE LAYING DOWN       Social History:   Social History     Tobacco Use     Smoking status: Never Smoker     Smokeless tobacco: Never Used   Substance Use Topics     Alcohol use: Yes     Comment: Very little       Family History: "   Family History   Problem Relation Age of Onset     Neurologic Disorder Mother         headaches/narcolepsy     Cancer Mother         face skin     Cerebrovascular Disease Mother         multifocal infarcts to brain     Thyroid Disease Mother         hypothyroid     Seizure Disorder Mother         narcolepsy     Migraines Mother      Cancer Father         Skin, lung     C.A.D. Father         MI     Coronary Artery Disease Father      Cerebrovascular Disease Father         ,, ,     Alcoholism Father      Hypertension Sister      Thyroid Disease Sister      Migraines Sister      Chronic Obstructive Pulmonary Disease Brother      Substance Abuse Brother         heroin     Coronary Artery Disease Brother      Hyperlipidemia Brother      Alcoholism Brother      No Known Problems Son        Allergies:   Allergies   Allergen Reactions     Codeine GI Disturbance     Percocet [Oxycodone-Acetaminophen] Nausea and Vomiting       Active Medications:   Current Outpatient Medications   Medication Sig Dispense Refill     atorvastatin (LIPITOR) 80 MG tablet Take 1 tablet (80 mg) by mouth daily 90 tablet 3     bisacodyl (DULCOLAX) 5 MG EC tablet Take as directed. One day before exam take 2 tablets at 3 PM. Take 2 tablets at 11 PM. 4 tablet 0     IBUPROFEN PO Take 600 mg by mouth every 6 hours as needed for moderate pain       metroNIDAZOLE (METROGEL) 0.75 % external gel Apply twice daily as needed to face for rosacea. 45 g 11     naproxen sodium (ANAPROX) 220 MG tablet Take 220 mg by mouth 2 times daily (with meals)       polyethylene glycol (GOLYTELY) 236 g suspension Take as directed. One day before exam fill the jug with water. Cover and shake until well mixed. At 6 PM start drinking an 8oz glass of mixture every 15 minutes until jug is 1/2 empty. Store remainder in the refrigerator.  At 11 PM Start drinking the other half of the Golytely jug. Drink one 8-ounce glass every 15 minutes until the jug is empty. 4000 mL 0     VITAMIN  "D, CHOLECALCIFEROL, PO Take 2,000 Units by mouth daily as needed       butalbital-aspirin-caffeine (FIORINAL) -40 MG per capsule Take 1 capsule by mouth daily as needed for headaches 30 capsule 2     COMPRESSION STOCKINGS 2 each daily 2 each 1       Systemic Review:   CONSTITUTIONAL: NEGATIVE for fever, chills, change in weight  ENT/MOUTH: NEGATIVE for ear, mouth and throat problems  RESP: NEGATIVE for significant cough or SOB  CV: NEGATIVE for chest pain, palpitations or peripheral edema    Physical Examination:   Vital Signs: BP (!) 138/90 (BP Location: Right arm)   Pulse 73   Temp 97.6  F (36.4  C) (Temporal)   Resp 20   Ht 1.575 m (5' 2\")   Wt 77.6 kg (171 lb)   SpO2 96%   BMI 31.28 kg/m    GENERAL: healthy, alert and no distress  NECK: no adenopathy, no asymmetry, masses, or scars  RESP: lungs clear to auscultation - no rales, rhonchi or wheezes  CV: regular rate and rhythm, normal S1 S2, no S3 or S4, no murmur, click or rub, no peripheral edema and peripheral pulses strong  ABDOMEN: soft, nontender, no hepatosplenomegaly, no masses and bowel sounds normal  MS: no gross musculoskeletal defects noted, no edema    Plan: Appropriate to proceed as scheduled.      Emery Sepulveda MD  9/20/2022    PCP:  Ko Yuan    "

## 2022-09-20 NOTE — ANESTHESIA POSTPROCEDURE EVALUATION
Patient: Concha Ma    Procedure: Procedure(s):  COLONOSCOPY, WITH POLYPECTOMY       Anesthesia Type:  MAC    Note:  Disposition: Outpatient   Postop Pain Control: Uneventful            Sign Out: Well controlled pain   PONV: No   Neuro/Psych: Uneventful            Sign Out: Acceptable/Baseline neuro status   Airway/Respiratory: Uneventful            Sign Out: Acceptable/Baseline resp. status   CV/Hemodynamics: Uneventful            Sign Out: Acceptable CV status; No obvious hypovolemia; No obvious fluid overload   Other NRE: NONE   DID A NON-ROUTINE EVENT OCCUR? No           Last vitals:  Vitals Value Taken Time   /89 09/20/22 1039   Temp 36.5  C (97.7  F) 09/20/22 1039   Pulse 84 09/20/22 1039   Resp 17 09/20/22 1039   SpO2 96 % 09/20/22 1039       Electronically Signed By: Nicky Catherine MD  September 20, 2022  11:09 AM

## 2022-09-20 NOTE — ANESTHESIA CARE TRANSFER NOTE
Patient: Concha Ma    Procedure: Procedure(s):  COLONOSCOPY, WITH POLYPECTOMY       Diagnosis: Colon cancer screening [Z12.11]  Diagnosis Additional Information: No value filed.    Anesthesia Type:   MAC     Note:    Oropharynx: oropharynx clear of all foreign objects  Level of Consciousness: awake  Oxygen Supplementation: room air    Independent Airway: airway patency satisfactory and stable  Dentition: dentition unchanged  Vital Signs Stable: post-procedure vital signs reviewed and stable  Report to RN Given: handoff report given  Patient transferred to: Phase II  Comments: VSS and WNL, comfortable, no PONV, report to Raymond DAHL  Handoff Report: Identifed the Patient, Identified the Reponsible Provider, Reviewed the pertinent medical history, Discussed the surgical course, Reviewed Intra-OP anesthesia mangement and issues during anesthesia, Set expectations for post-procedure period and Allowed opportunity for questions and acknowledgement of understanding      Vitals:  Vitals Value Taken Time   /79 09/20/22 1023   Temp 36.5  C (97.7  F) 09/20/22 1023   Pulse 65 09/20/22 1023   Resp 18 09/20/22 1023   SpO2 97 % 09/20/22 1023       Electronically Signed By: VANESA Broderick CRNA  September 20, 2022  10:24 AM

## 2022-09-20 NOTE — ANESTHESIA PREPROCEDURE EVALUATION
"Anesthesia Pre-Procedure Evaluation    Patient: Concha Ma   MRN: [id number] : 1945        Procedure : Procedure(s):  COLONOSCOPY          Past Medical History:   Diagnosis Date     Arthritis of knee     right     Basal cell carcinoma      BPPV (benign paroxysmal positional vertigo)      Depressive disorder      Disorder of bone and cartilage     (osteopenia)     Hemorrhoids      History of PID      Hyperlipidemia      Insomnia      Migraines, classic     sparkly aura     Osteopenia      Plantar fasciitis, bilateral      PONV (postoperative nausea and vomiting)      Skin cancer, basal cell      Squamous cell carcinoma      Swelling of the ankle, feet, or leg      Vasovagal syncope     is \"fainter\" with blood draws, injections-NEEDS TO BE LAYING DOWN      Past Surgical History:   Procedure Laterality Date     ARTHROPLASTY KNEE Right 2018    Procedure: ARTHROPLASTY KNEE;  Right Total Knee Replacement;  Surgeon: Apollo Nguyen MD;  Location: [facility] OR     CATARACT IOL, RT/LT       LASIK BILATERAL       MOHS MICROGRAPHIC PROCEDURE       ZZC NONSPECIFIC PROCEDURE      laparoscopy,     ZZC NONSPECIFIC PROCEDURE      laser surg basal cell skin cancer     ZZC STOMACH SURGERY PROCEDURE UNLISTED      laparoscopy for infertility      Allergies   Allergen Reactions     Codeine GI Disturbance     Percocet [Oxycodone-Acetaminophen] Nausea and Vomiting      Social History     Tobacco Use     Smoking status: Never Smoker     Smokeless tobacco: Never Used   Substance Use Topics     Alcohol use: Yes     Comment: Very little      Wt Readings from Last 1 Encounters:   22 77.6 kg (171 lb)        Anesthesia Evaluation            ROS/MED HX  ENT/Pulmonary:       Neurologic:     (+) migraines,     Cardiovascular:       METS/Exercise Tolerance:     Hematologic:       Musculoskeletal:       GI/Hepatic:       Renal/Genitourinary:       Endo:       Psychiatric/Substance Use:     (+) psychiatric history depression   "   Infectious Disease:       Malignancy:       Other:               OUTSIDE LABS:  CBC:   Lab Results   Component Value Date    WBC 7.3 12/29/2021    WBC 8.9 04/19/2018    HGB 15.0 12/29/2021    HGB 12.4 04/19/2018    HCT 46.1 12/29/2021    HCT 37.8 04/19/2018     12/29/2021     04/19/2018     BMP:   Lab Results   Component Value Date     12/29/2021     01/06/2021    POTASSIUM 4.8 12/29/2021    POTASSIUM 4.1 01/06/2021    CHLORIDE 109 12/29/2021    CHLORIDE 107 01/06/2021    CO2 28 12/29/2021    CO2 30 01/06/2021    BUN 23 12/29/2021    BUN 18 01/06/2021    CR 0.84 12/29/2021    CR 0.82 01/06/2021    GLC 98 12/29/2021    GLC 93 01/06/2021     COAGS:   Lab Results   Component Value Date    INR 2.50 (H) 05/04/2018     POC:   Lab Results   Component Value Date    BGM 96 04/13/2018     HEPATIC:   Lab Results   Component Value Date    ALBUMIN 4.0 12/29/2021    PROTTOTAL 7.2 12/29/2021    ALT 32 12/29/2021    AST 18 12/29/2021    ALKPHOS 83 12/29/2021    BILITOTAL 0.8 12/29/2021     OTHER:   Lab Results   Component Value Date    A1C 6.3 (H) 04/15/2017    NEL 9.5 12/29/2021    TSH 1.43 12/29/2021       Anesthesia Plan    ASA Status:  2      Anesthesia Type: MAC.      Maintenance: TIVA.        Consents    Anesthesia Plan(s) and associated risks, benefits, and realistic alternatives discussed. Questions answered and patient/representative(s) expressed understanding.    - Discussed:     - Discussed with:  Patient         Postoperative Care    Pain management: Multi-modal analgesia.   PONV prophylaxis: Ondansetron (or other 5HT-3), Background Propofol Infusion     Comments:                Nicky Catherine MD

## 2022-09-21 LAB
PATH REPORT.COMMENTS IMP SPEC: NORMAL
PATH REPORT.COMMENTS IMP SPEC: NORMAL
PATH REPORT.FINAL DX SPEC: NORMAL
PATH REPORT.GROSS SPEC: NORMAL
PATH REPORT.MICROSCOPIC SPEC OTHER STN: NORMAL
PATH REPORT.RELEVANT HX SPEC: NORMAL
PHOTO IMAGE: NORMAL

## 2022-09-27 NOTE — RESULT ENCOUNTER NOTE
Dear Concha Ma    Review of your colonoscopy shows no concerning findings. You do not need further screening colonoscopies.  Best wishes,  Ko Yuan MD

## 2022-10-16 ENCOUNTER — HEALTH MAINTENANCE LETTER (OUTPATIENT)
Age: 77
End: 2022-10-16

## 2022-10-21 ENCOUNTER — LAB (OUTPATIENT)
Dept: URGENT CARE | Facility: URGENT CARE | Age: 77
End: 2022-10-21
Attending: FAMILY MEDICINE
Payer: COMMERCIAL

## 2022-10-21 DIAGNOSIS — Z20.822 SUSPECTED 2019 NOVEL CORONAVIRUS INFECTION: ICD-10-CM

## 2022-10-21 LAB — SARS-COV-2 RNA RESP QL NAA+PROBE: POSITIVE

## 2022-10-21 PROCEDURE — U0005 INFEC AGEN DETEC AMPLI PROBE: HCPCS

## 2022-10-21 PROCEDURE — U0003 INFECTIOUS AGENT DETECTION BY NUCLEIC ACID (DNA OR RNA); SEVERE ACUTE RESPIRATORY SYNDROME CORONAVIRUS 2 (SARS-COV-2) (CORONAVIRUS DISEASE [COVID-19]), AMPLIFIED PROBE TECHNIQUE, MAKING USE OF HIGH THROUGHPUT TECHNOLOGIES AS DESCRIBED BY CMS-2020-01-R: HCPCS

## 2022-10-24 ENCOUNTER — TELEPHONE (OUTPATIENT)
Dept: NURSING | Facility: CLINIC | Age: 77
End: 2022-10-24

## 2022-10-24 NOTE — TELEPHONE ENCOUNTER
Coronavirus (COVID-19) Notification    Caller Name (Patient, parent, daughter/son, grandparent, etc)  Patient    Reason for call  Notify of Positive Coronavirus (COVID-19) lab results, assess symptoms,  review [facility] recommendations    Lab Result    Lab test:  2019-nCoV rRt-PCR or SARS-CoV-2 PCR    Oropharyngeal AND/OR nasopharyngeal swabs is POSITIVE for 2019-nCoV RNA/SARS-COV-2 PCR (COVID-19 virus)      Gather patient reported symptoms   Assessment   Current Symptoms at time of phone call, reported by patient: (if no symptoms, document: No symptoms] Cough, sore throat    Date of symptom(s) onset (if applicable) 10/19/2022     If at time of call, Patients symptoms have worsened, the Patient should contact 911 or have someone drive them to Emergency Dept promptly:      If Patient calling 911, inform 911 personal that you have tested positive for the Coronavirus (COVID-19).  Place mask on and await 911 to arrive.    If Emergency Dept, If possible, please have another adult drive you to the Emergency Dept but you need to wear mask when in contact with other people.      Treatment Options:   Patient classified as COVID treatment eligible by Epic high risk algorithm: Yes  Is the patient symptomatic at the time of result notification? Yes. Was the onset of symptoms within the last 5 days? Yes.   There are now oral medications available for the treatment of COVID-19.  Taking one of these medications within the first five days of symptoms (when people may not yet feel severely ill) has been shown to make people feel better, prevent them from getting sicker, and preventing hospitalization and death.   Does the patient agree to have a visit with a provider to discuss treatment options? No.  Reason patient declined:  Not that sick and don't think I will get worse (save for people who possibly need it more)      Review information with Patient    Your result was positive. This means you have COVID-19  (coronavirus).    How can I protect others?    These guidelines are for isolating before returning to work, school or .    If you DO have symptoms    Stay home and away from others     For at least 5 days after your symptoms started, AND    You are fever free for 24 hours (with no medicine that reduces fever), AND    Your other symptoms are better    Wear a mask for 10 full days anytime you are around others    If you DON'T have symptoms    Stay home and away from others for at least 5 days after your positive test    Wear a mask for 10 full days anytime you are around others    There may be different guidelines for healthcare facilities.  Please check with the specific sites before arriving.    If you have been told by a doctor that you were severely ill with COVID-19 or are immunocompromised, you should isolate for at least 10 days.    You should not go back to work until you meet the guidelines above for ending your home isolation. You don't need to be retested for COVID-19 before going back to work--studies show that you won't spread the virus if it's been at least 10 days since your symptoms started (or 20 days, if you have a weak immune system).    Employers, schools, and daycares: This is an official notice for this person's medical guidelines for returning in-person.  They must meet the above guidelines before going back to work, school or  in person.    You will receive a positive COVID-19 letter via [employer] or the mail soon with additional self-care information.    Would you like me to review some of that information with you now?  No    If you were tested for an upcoming procedure, please contact your provider for next steps.    Pili Rashid

## 2022-11-01 ENCOUNTER — VIRTUAL VISIT (OUTPATIENT)
Dept: FAMILY MEDICINE | Facility: CLINIC | Age: 77
End: 2022-11-01
Payer: COMMERCIAL

## 2022-11-01 ENCOUNTER — LAB (OUTPATIENT)
Dept: URGENT CARE | Facility: URGENT CARE | Age: 77
End: 2022-11-01
Attending: STUDENT IN AN ORGANIZED HEALTH CARE EDUCATION/TRAINING PROGRAM
Payer: COMMERCIAL

## 2022-11-01 ENCOUNTER — ANCILLARY PROCEDURE (OUTPATIENT)
Dept: GENERAL RADIOLOGY | Facility: CLINIC | Age: 77
End: 2022-11-01
Attending: STUDENT IN AN ORGANIZED HEALTH CARE EDUCATION/TRAINING PROGRAM
Payer: COMMERCIAL

## 2022-11-01 DIAGNOSIS — J06.9 UPPER RESPIRATORY TRACT INFECTION, UNSPECIFIED TYPE: Primary | ICD-10-CM

## 2022-11-01 DIAGNOSIS — J06.9 UPPER RESPIRATORY TRACT INFECTION, UNSPECIFIED TYPE: ICD-10-CM

## 2022-11-01 LAB
DEPRECATED S PYO AG THROAT QL EIA: NEGATIVE
FLUAV AG SPEC QL IA: NEGATIVE
FLUBV AG SPEC QL IA: NEGATIVE
GROUP A STREP BY PCR: NOT DETECTED
SARS-COV-2 RNA RESP QL NAA+PROBE: NEGATIVE

## 2022-11-01 PROCEDURE — 87804 INFLUENZA ASSAY W/OPTIC: CPT | Performed by: STUDENT IN AN ORGANIZED HEALTH CARE EDUCATION/TRAINING PROGRAM

## 2022-11-01 PROCEDURE — U0003 INFECTIOUS AGENT DETECTION BY NUCLEIC ACID (DNA OR RNA); SEVERE ACUTE RESPIRATORY SYNDROME CORONAVIRUS 2 (SARS-COV-2) (CORONAVIRUS DISEASE [COVID-19]), AMPLIFIED PROBE TECHNIQUE, MAKING USE OF HIGH THROUGHPUT TECHNOLOGIES AS DESCRIBED BY CMS-2020-01-R: HCPCS

## 2022-11-01 PROCEDURE — U0005 INFEC AGEN DETEC AMPLI PROBE: HCPCS

## 2022-11-01 PROCEDURE — 87651 STREP A DNA AMP PROBE: CPT | Performed by: STUDENT IN AN ORGANIZED HEALTH CARE EDUCATION/TRAINING PROGRAM

## 2022-11-01 PROCEDURE — 71046 X-RAY EXAM CHEST 2 VIEWS: CPT | Mod: TC | Performed by: RADIOLOGY

## 2022-11-01 PROCEDURE — 99213 OFFICE O/P EST LOW 20 MIN: CPT | Mod: CS | Performed by: STUDENT IN AN ORGANIZED HEALTH CARE EDUCATION/TRAINING PROGRAM

## 2022-11-01 NOTE — RESULT ENCOUNTER NOTE
Alvaro Ma,     It was a pleasure speaking with you the today. I have received and reviewed your lab results, and have the following recommendations:     Thank you so much for completing your blood work and imaging so fast!  Your lab results have come back negative for influenza and group A strep. Your x-ray findings are not consistent with pneumonia.  It will take roughly 1 to 2 days for your COVID results to come back, however at this time I still suspect that you have a viral infection that simply needs to run its course.  Please keep in mind the supportive care measures we discussed at today's visit, and if your symptoms worsen either come back to clinic or go to the ER or urgent care for immediate evaluation.       Sincerely,     Mary Beth Hess MD

## 2022-11-01 NOTE — PROGRESS NOTES
Concha is a 77 year old who is being evaluated via a billable video visit.      How would you like to obtain your AVS? MyChart  If the video visit is dropped, the invitation should be resent by: Text to cell phone: [phone]  Will anyone else be joining your video visit? No          1. Upper respiratory tract infection, unspecified type  - Streptococcus A Rapid Screen w/Reflex to PCR - Clinic Collect  - Symptomatic; Yes; 10/23/2022 COVID-19 Virus (Coronavirus) by PCR, although I doubt that this test will come back positive especially in light of her recent positive COVID test  - Influenza A & B Antigen - Clinic Collect, patient admits that she has not gotten her flu shot for this year  - XR Chest 2 Views; Future, to rule out COVID-related pneumonia which may need to be treated with azithromycin    Follow up plan:   -Doubt the patient has any bacterial infection especially since she does not endorse any constitutional symptoms such as fevers, chills, nausea, vomiting  -It is likely that she just has a viral URI which needs to run its course, supportive care was discussed with the patient  -If her chest x-ray does come back positive for pneumonia consider treatment with azithromycin as long as there is no contraindication because of her age    Mary Beth Hess MD       Subjective   Concha is a 77 year old, presenting for the following health issues:  Cough      HPI     Acute Illness  Acute illness concerns: cough, sore throat, swollen glands, some wheezing and shortness of breath when coughing. Feels like it is moving upwards towards her ears she says but denies ear pain currently.  Onset/Duration: 10/23 tested positive for Covid-19, has had ongoing cough since then that has been getting worse. Has had 2 negative Covid-19 tests this past week.  Symptoms:  Fever: No  Chills/Sweats: No  Headache (location?): No  Sinus Pressure: No  Conjunctivitis:  No  Ear Pain: no  Rhinorrhea: YES  Congestion: YES  Sore Throat: YES -  and swollen glands  Cough: YES-productive of clear sputum, with shortness of breath, worsening over time  Wheeze: YES  Decreased Appetite: No  Nausea: No  Vomiting: No  Diarrhea: No  Dysuria/Freq.: No  Dysuria or Hematuria: No  Fatigue/Achiness: No  Sick/Strep Exposure: No  Therapies tried and outcome: ibuprofen seems to help swollen glands, tylenol helps and cough medicine helps    Denies any fevers, chills, nausea, vomiting, diarrhea, her symptoms are a cough, sore, throat, and runny nose     Review of Systems   As above         Objective           Vitals:  No vitals were obtained today due to virtual visit.    Physical Exam   GENERAL: Healthy, alert and no distress  EYES: Eyes grossly normal to inspection.  No discharge or erythema, or obvious scleral/conjunctival abnormalities.  RESP: cough, but No audible wheeze, or visible cyanosis.  No visible retractions or increased work of breathing.    SKIN: Visible skin clear. No significant rash, abnormal pigmentation or lesions.  NEURO: Cranial nerves grossly intact.  Mentation and speech appropriate for age.  PSYCH: Mentation appears normal, affect normal/bright, judgement and insight intact, normal speech and appearance well-groomed.    Labs reviewed at today's visit, was covid positive on 10/21/22           Video-Visit Details    Video Start Time: 8:27    Type of service:  Video Visit    Video End Time:8:35 AM    Originating Location (pt. Location): Home        Distant Location (provider location):  On-site    Platform used for Video Visit: [employer]

## 2022-11-01 NOTE — PATIENT INSTRUCTIONS
Hello! It was a pleasure seeing you today. Just some things we discussed at today's visit:     For your cough  Call [facility] facility to complete lab work to check for flu, COVID, strep throat  Please get your x-ray done just to rule out any concern for pneumonia  Continue with supportive care such as warm water with honey and lemon, Tylenol and ibuprofen as needed for fever and pain control  Remember our discussions regarding emergency room precautions including significant worsening of symptoms, shortness of breath, difficulty breathing, confusion, lightheadedness, passing out etc.     Sincerely,     Mary Beth Hess MD

## 2022-11-02 NOTE — RESULT ENCOUNTER NOTE
Alvaro Kern,     The results of your covid test are negative. Per my previous message, based on the findings of your Chest XR and the duration of your symptoms it's likely you have a viral infection. However, if you notice that your symptoms are worsening and that they have been going on for more than 14 days, please make an appointment with me again or go to the emergency room or urgent care in the event that you have a bacterial infection and need antibiotics.     Sincerely,     Mary Beth Hess MD

## 2022-11-04 ENCOUNTER — TELEPHONE (OUTPATIENT)
Dept: FAMILY MEDICINE | Facility: CLINIC | Age: 77
End: 2022-11-04

## 2022-11-04 DIAGNOSIS — J06.9 UPPER RESPIRATORY TRACT INFECTION, UNSPECIFIED TYPE: Primary | ICD-10-CM

## 2022-11-04 RX ORDER — AZITHROMYCIN 250 MG/1
TABLET, FILM COATED ORAL
Qty: 6 TABLET | Refills: 0 | Status: SHIPPED | OUTPATIENT
Start: 2022-11-04 | End: 2022-11-09

## 2022-11-04 NOTE — TELEPHONE ENCOUNTER
Spoke with the pt over the phone. She did sound worse on the phone than she did when I spoke with her via video call at her scheduled visit. She denied any sinus pressure or sensation of sinus infection. Given that her symptoms have been lasting for longer than 14 days, I informed the pt that I sent an prescription for azithromycin to her preferred pharmacy. Strict ED/UC precautions were provided to the pt. Especially if she does not notice improvement in her symptoms with the antibiotic.     Mary Beth Hess MD

## 2022-11-04 NOTE — TELEPHONE ENCOUNTER
Forwarding to provider.  Would you like to send a cough medication or antibiotic for patient, or revisit?  No clinic appointments today, so would need to go to urgent care, which patient is resistant to.    Patient calls with ongoing URI symptoms.  She had a virtual visit on 11/1/22 post-COVID, with negative strep, COVID and influenza testing that date, as well as negative chest x-ray.  She reports her symptoms are persistent with worse cough, then some ear and throat discomfort, likely from cough. Reports clear phlegm, denies fever or chills or pain with breathing.    Patient states provider discussed possible antibiotics should symptoms last 14 days, and patient reports they began about 2 weeks ago.  She is wondering if provider would prescribe something for her, as she does not want to go to urgent care, waited a long time when she was last there. RN notes there are no clinic appts within the [place].  Patient prefers [facility].     Hilary Brown RN  [facility]

## 2023-03-26 ENCOUNTER — HEALTH MAINTENANCE LETTER (OUTPATIENT)
Age: 78
End: 2023-03-26

## 2023-04-24 DIAGNOSIS — E78.2 MIXED HYPERLIPIDEMIA: ICD-10-CM

## 2023-04-24 DIAGNOSIS — E78.5 HYPERLIPIDEMIA LDL GOAL <70: ICD-10-CM

## 2023-04-26 RX ORDER — ATORVASTATIN CALCIUM 80 MG/1
80 TABLET, FILM COATED ORAL DAILY
Qty: 90 TABLET | Refills: 0 | Status: SHIPPED | OUTPATIENT
Start: 2023-04-26 | End: 2023-05-15

## 2023-04-26 NOTE — TELEPHONE ENCOUNTER
atorvastatin (LIPITOR) 80 MG tablet      Last Written Prescription Date:  12/29/21  Last Fill Quantity: 90,   # refills: 3  Last Office Visit : 12/29/21  Future Office visit:  NONE    Routing refill request to provider for review/approval because:  Overdue for office visit and LDL    90d melody refill sent, routed to clinic staff for follow up

## 2023-04-27 DIAGNOSIS — M94.9 DISORDER OF BONE AND CARTILAGE: ICD-10-CM

## 2023-04-27 DIAGNOSIS — M89.9 DISORDER OF BONE AND CARTILAGE: ICD-10-CM

## 2023-04-27 DIAGNOSIS — E78.5 HYPERLIPIDEMIA LDL GOAL <100: Primary | ICD-10-CM

## 2023-05-03 ENCOUNTER — LAB (OUTPATIENT)
Dept: LAB | Facility: CLINIC | Age: 78
End: 2023-05-03
Payer: COMMERCIAL

## 2023-05-03 DIAGNOSIS — M94.9 DISORDER OF BONE AND CARTILAGE: ICD-10-CM

## 2023-05-03 DIAGNOSIS — E78.5 HYPERLIPIDEMIA LDL GOAL <100: ICD-10-CM

## 2023-05-03 DIAGNOSIS — M89.9 DISORDER OF BONE AND CARTILAGE: ICD-10-CM

## 2023-05-03 LAB
ALBUMIN SERPL BCG-MCNC: 4.4 G/DL (ref 3.5–5.2)
ALP SERPL-CCNC: 81 U/L (ref 35–104)
ALT SERPL W P-5'-P-CCNC: 18 U/L (ref 10–35)
ANION GAP SERPL CALCULATED.3IONS-SCNC: 11 MMOL/L (ref 7–15)
AST SERPL W P-5'-P-CCNC: 21 U/L (ref 10–35)
BILIRUB SERPL-MCNC: 0.6 MG/DL
BUN SERPL-MCNC: 18.6 MG/DL (ref 8–23)
CALCIUM SERPL-MCNC: 10.2 MG/DL (ref 8.8–10.2)
CHLORIDE SERPL-SCNC: 104 MMOL/L (ref 98–107)
CHOLEST SERPL-MCNC: 173 MG/DL
CREAT SERPL-MCNC: 0.88 MG/DL (ref 0.51–0.95)
DEPRECATED CALCIDIOL+CALCIFEROL SERPL-MC: 32 UG/L (ref 20–75)
DEPRECATED HCO3 PLAS-SCNC: 25 MMOL/L (ref 22–29)
GFR SERPL CREATININE-BSD FRML MDRD: 68 ML/MIN/1.73M2
GLUCOSE SERPL-MCNC: 96 MG/DL (ref 70–99)
HDLC SERPL-MCNC: 58 MG/DL
LDLC SERPL CALC-MCNC: 97 MG/DL
NONHDLC SERPL-MCNC: 115 MG/DL
POTASSIUM SERPL-SCNC: 4.6 MMOL/L (ref 3.4–5.3)
PROT SERPL-MCNC: 7 G/DL (ref 6.4–8.3)
SODIUM SERPL-SCNC: 140 MMOL/L (ref 136–145)
TRIGL SERPL-MCNC: 88 MG/DL

## 2023-05-03 PROCEDURE — 80053 COMPREHEN METABOLIC PANEL: CPT

## 2023-05-03 PROCEDURE — 82306 VITAMIN D 25 HYDROXY: CPT

## 2023-05-03 PROCEDURE — 36415 COLL VENOUS BLD VENIPUNCTURE: CPT

## 2023-05-03 PROCEDURE — 80061 LIPID PANEL: CPT

## 2023-05-04 NOTE — RESULT ENCOUNTER NOTE
Dear Concha Ma   Labs are in excellent range, continue current cares.We will review at your appointment.  Best wishes,  Ko Yuan MD

## 2023-05-08 ASSESSMENT — ENCOUNTER SYMPTOMS
INSOMNIA: 1
NERVOUS/ANXIOUS: 1
PANIC: 0
DECREASED CONCENTRATION: 1

## 2023-05-14 NOTE — PROGRESS NOTES
SUBJECTIVE:   Concha is a 77 year old who presents for Preventive Visit.    Patient has been advised of split billing requirements and indicates understanding: Yes  Are you in the first 12 months of your Medicare coverage?  No    HPI  Concha Ma 77 history of right knee arthritis s/p arthroplasty 2018, squamous and basal cell skin cancer, rosacea, osteopenia L spine (aka disorder of bone and cartilage), BPPV, hyperlipidemia, vasovagal syncope, depressive disorder, vitamin D deficiency and migraines who presents for medicare wellness.    Situational anxiety related to brother she is his guardian/ executor . He is in hospice care and is having change of personality    SH:  in 2021 brother with liver cancer and Hep C in Hospice.  Have you ever done Advance Care Planning? (For example, a Health Directive, POLST, or a discussion with a medical provider or your loved ones about your wishes): Yes, advance care planning is on file. She wants to update as she  in 2021.     Fall risk  Fallen 2 or more times in the past year?: No  Any fall with injury in the past year?: No    Cognitive Screening Intact        Reviewed and updated as needed this visit by clinical staff   Tobacco  Allergies  Meds  Problems  Med Hx  Surg Hx  Fam Hx          Reviewed and updated as needed this visit by Provider   Tobacco  Allergies  Meds  Problems  Med Hx  Surg Hx  Fam Hx         Social History     Tobacco Use     Smoking status: Never     Smokeless tobacco: Never   Vaping Use     Vaping status: Never Used     Passive vaping exposure: Yes   Substance Use Topics     Alcohol use: Yes     Alcohol/week: 1.0 - 2.0 standard drink of alcohol     Types: 1 - 2 Standard drinks or equivalent per week     Comment: once/wk or less   Do you have a current opioid prescription? No  Do you use any other controlled substances or medications that are not prescribed by a provider? None      Current providers sharing in care for this  patient include:Patient Care Team:  Ko Yuan MD as PCP - General (Family Medicine)  Nehemias Jhaveri MD as MD (Family Medicine - Sports Medicine)  Vinay Morfin MD as MD (Family Practice)  Apollo Nguyen MD as MD (Orthopedics)  Jason Gao MD as MD (Dermatology)  Jason Gao MD as Assigned Surgical Provider  Ko Yuan MD as Assigned PCP    The following health maintenance items are reviewed in Epic and correct as of today:  Health Maintenance   Topic Date Due     COVID-19 Vaccine (5 - Moderna series) 07/14/2022     INFLUENZA VACCINE (1) 09/01/2022     MAMMO SCREENING  02/02/2023     MEDICARE ANNUAL WELLNESS VISIT  05/15/2024     FALL RISK ASSESSMENT  05/15/2024     DTAP/TDAP/TD IMMUNIZATION (2 - Td or Tdap) 12/17/2024     LIPID  05/03/2028     ADVANCE CARE PLANNING  05/15/2028     COLORECTAL CANCER SCREENING  09/20/2032     DEXA  02/02/2037     HEPATITIS C SCREENING  Completed     PHQ-2 (once per calendar year)  Completed     Pneumococcal Vaccine: 65+ Years  Completed     ZOSTER IMMUNIZATION  Completed     IPV IMMUNIZATION  Aged Out     MENINGITIS IMMUNIZATION  Aged Out     Labs reviewed in EPIC  BP Readings from Last 3 Encounters:   05/15/23 138/89   09/20/22 125/89   12/29/21 135/83    Wt Readings from Last 3 Encounters:   05/15/23 77.5 kg (170 lb 14.4 oz)   09/20/22 77.6 kg (171 lb)   12/29/21 77.1 kg (170 lb)               Immunization History   Administered Date(s) Administered     COVID-19 Monovalent 18+ (Moderna) 02/26/2021, 03/26/2021, 12/05/2021     COVID-19 Monovalent Booster 18+ (Moderna) 05/19/2022     HEPA 01/06/2016     Hepatitis A (ADULT 19+) 01/06/2016, 01/06/2020     Influenza (High Dose) 3 valent vaccine 12/07/2019     Influenza (IIV3) PF 12/26/2011, 10/10/2013     Influenza Vaccine 65+ (Fluzone HD) 11/17/2020, 12/29/2021     Influenza Vaccine, 6+MO IM (QUADRIVALENT W/PRESERVATIVES) 11/11/2015     Pneumo Conj 13-V (2010&after) 04/15/2017      Pneumococcal 23 valent 10/24/2012     TD,PF 7+ (Tenivac) 02/10/1995     TDAP Vaccine (Boostrix) 12/17/2014     Typhoid IM 01/06/2016     Yellow Fever 01/26/2016     Zoster recombinant adjuvanted (SHINGRIX) 05/18/2021, 08/17/2021        Patient Active Problem List   Diagnosis     Skin cancer, basal cell     Hyperlipidemia     Classical migraine     Insomnia     Arthritis of knee     Hemorrhoids     Vasovagal syncope     BCC (basal cell carcinoma), face     Swelling of lower extremity     S/P total knee arthroplasty     Vertigo, benign positional, bilateral     Status post total right knee replacement     Osteopenia     Disorder of bone and cartilage     Venous (peripheral) insufficiency     Past Surgical History:   Procedure Laterality Date     ARTHROPLASTY KNEE Right 04/13/2018    Procedure: ARTHROPLASTY KNEE;  Right Total Knee Replacement;  Surgeon: Apollo Nguyen MD;  Location: [facility] OR     CATARACT IOL, RT/LT       COLONOSCOPY N/A 09/20/2022    Procedure: COLONOSCOPY, WITH POLYPECTOMY;  Surgeon: Emery Sepulveda MD;  Location: [facility] OR     JOINT REPLACEMENT  Knee 2018     LASIK BILATERAL       MOHS MICROGRAPHIC PROCEDURE       ZZC NONSPECIFIC PROCEDURE      laparoscopy,     ZZC NONSPECIFIC PROCEDURE      laser surg basal cell skin cancer     ZZC STOMACH SURGERY PROCEDURE UNLISTED      laparoscopy for infertility       Social History     Tobacco Use     Smoking status: Never     Smokeless tobacco: Never   Vaping Use     Vaping status: Never Used     Passive vaping exposure: Yes   Substance Use Topics     Alcohol use: Yes     Alcohol/week: 1.0 - 2.0 standard drink of alcohol     Types: 1 - 2 Standard drinks or equivalent per week     Comment: once/wk or less     Family History   Problem Relation Age of Onset     Neurologic Disorder Mother         headaches/narcolepsy     Cancer Mother         Skin     Cerebrovascular Disease Mother         muti focal inracts to brain     Thyroid Disease Mother          hypothyroid     Seizure Disorder Mother         narcolepsy     Migraines Mother      Dementia Mother      Depression Mother      Cancer Father         Skin, lung     C.A.D. Father         MI     Coronary Artery Disease Father      Cerebrovascular Disease Father         ,, ,     Alcoholism Father      Lung Cancer Father      Hypertension Sister      Thyroid Disease Sister      Migraines Sister      Chronic Obstructive Pulmonary Disease Brother      Substance Abuse Brother         heroin     Coronary Artery Disease Brother         Bipass     Hyperlipidemia Brother      Alcoholism Brother      Cancer Brother         Skin     Heart Disease Brother      Obesity Brother      Substance Abuse Brother      No Known Problems Son      Depression Sister      Hypertension Sister      Hyperlipidemia Sister      Obesity Sister      Substance Abuse Brother      Substance Abuse Niece      Uterine Cancer Niece          Current Outpatient Medications   Medication Sig Dispense Refill     atorvastatin (LIPITOR) 80 MG tablet Take 1 tablet (80 mg) by mouth daily 90 tablet 3     butalbital-aspirin-caffeine (FIORINAL) -40 MG per capsule Take 1 capsule by mouth daily as needed for headaches 30 capsule 1     IBUPROFEN PO Take 600 mg by mouth every 6 hours as needed for moderate pain       metroNIDAZOLE (METROGEL) 0.75 % external gel Apply twice daily as needed to face for rosacea. 45 g 11     VITAMIN D, CHOLECALCIFEROL, PO Take 2,000 Units by mouth daily as needed       COMPRESSION STOCKINGS 2 each daily 2 each 1     naproxen sodium (ANAPROX) 220 MG tablet Take 220 mg by mouth 2 times daily (with meals) (Patient not taking: Reported on 5/15/2023)       Allergies   Allergen Reactions     Morphine And Related GI Disturbance     Percocet [Oxycodone-Acetaminophen] Nausea and Vomiting     Recent Labs   Lab Test 05/03/23  0935 12/29/21  1058 01/06/21  1025 12/07/19  1020 11/28/18  0852 04/19/18  0750 03/29/18  0937 04/15/17  1005  "01/06/16  1110 01/06/16  1110   A1C  --   --   --   --   --   --   --  6.3*  --  6.3*   LDL 97 77 100*   < > 83  --   --  114*  --  110*   HDL 58 68 62   < > 60  --   --  69  --  57   TRIG 88 96 139   < > 79  --   --  97  --  143   ALT 18 32 41  --   --   --   --  35   < >  --    CR 0.88 0.84 0.82  --   --  0.70   < > 0.92  --   --    GFRESTIMATED 68 73 70  --   --  >60   < > 60*  --   --    GFRESTBLACK  --   --  82  --   --  >60   < > 73  --   --    POTASSIUM 4.6 4.8 4.1  --   --  4.4   < > 4.5  --   --    TSH  --  1.43  --   --  2.42  --   --  1.53   < >  --     < > = values in this interval not displayed.          Mammogram Screening - Patient over age 75, has elected to continue with screening.  Pertinent mammograms are reviewed under the imaging tab.    Review of Systems   Problem list, PMH, Surgical HX, FH, SH, allergies, medications,immunizations reviewed and updated in Epic. ROS noted in HPI.     Answers for HPI/ROS submitted by the patient on 5/8/2023  General Symptoms: No  Skin Symptoms: No  HENT Symptoms: No  EYE SYMPTOMS: No  HEART SYMPTOMS: No  LUNG SYMPTOMS: No  INTESTINAL SYMPTOMS: No  URINARY SYMPTOMS: No  GYNECOLOGIC SYMPTOMS: No  BREAST SYMPTOMS: No  SKELETAL SYMPTOMS: No  BLOOD SYMPTOMS: No  NERVOUS SYSTEM SYMPTOMS: No  MENTAL HEALTH SYMPTOMS: Yes  Trouble sleeping: Yes  Panic attacks: No          OBJECTIVE:   /89 (BP Location: Right arm, Patient Position: Sitting, Cuff Size: Adult Regular)   Pulse 71   Temp 98.2  F (36.8  C)   Resp 20   Ht 1.555 m (5' 1.22\")   Wt 77.5 kg (170 lb 14.4 oz)   SpO2 95%   BMI 32.06 kg/m   Estimated body mass index is 32.06 kg/m  as calculated from the following:    Height as of this encounter: 1.555 m (5' 1.22\").    Weight as of this encounter: 77.5 kg (170 lb 14.4 oz).  Physical Exam   GENERAL: healthy, alert and no distress, BMI 32  EYES: Eyes grossly normal to inspection, PERRL and conjunctivae and sclerae normal, glasses  HENT: ear canals  minimal " cerumen bilateral tympanic membranes normal, mouth without ulcers or lesions  NECK: no adenopathy, no asymmetry, masses, or scars and thyroid normal to palpation  RESP: lungs clear to auscultation - no rales, rhonchi or wheezes  Breast exam; bilateral skin tags more on the right breast than the left under each breast and right breast near nipple, no dominant masses no axillary adenopathy  CV: regular rate and rhythm, normal S1 S2, no S3 or S4, no murmur, click or rub  ABDOMEN: soft,obese nontender, no hepatosplenomegaly, no masses and bowel sounds normal  MS: arthritis knees, right knee replacement scar  SKIN:  Scattered seborrheic keratoses on back scars in areas of previous skin biospies, multiple skin tags    NEURO: Normal strength and tone, mentation intact and speech normal  PSYCH: mentation appears normal, affect normal/bright   PHQ-2 Score:         5/15/2023     3:22 PM 2022     2:04 PM   PHQ-2 (  [employer])   Q1: Little interest or pleasure in doing things 1 0   Q2: Feeling down, depressed or hopeless 1 0   PHQ-2 Score 2 0   She feels her symptoms are reactive related to her brother does not want medication declined    Latest Reference Range & Units 23 09:35   Sodium 136 - 145 mmol/L 140   Potassium 3.4 - 5.3 mmol/L 4.6   Chloride 98 - 107 mmol/L 104   Carbon Dioxide (CO2) 22 - 29 mmol/L 25   Urea Nitrogen 8.0 - 23.0 mg/dL 18.6   Creatinine 0.51 - 0.95 mg/dL 0.88   GFR Estimate >60 mL/min/1.73m2 68   Calcium 8.8 - 10.2 mg/dL 10.2   Anion Gap 7 - 15 mmol/L 11   Albumin 3.5 - 5.2 g/dL 4.4   Protein Total 6.4 - 8.3 g/dL 7.0   Alkaline Phosphatase 35 - 104 U/L 81   ALT 10 - 35 U/L 18   AST 10 - 35 U/L 21   Bilirubin Total <=1.2 mg/dL 0.6   Cholesterol <200 mg/dL 173   Glucose 70 - 99 mg/dL 96   HDL Cholesterol >=50 mg/dL 58   LDL Cholesterol Calculated <=100 mg/dL 97   Non HDL Cholesterol <130 mg/dL 115   Triglycerides <150 mg/dL 88   Vitamin D Deficiency screening 20 - 75 ug/L 32     Above labs  "reviewed at visit    ASSESSMENT / PLAN:   Concha was seen today for physical.    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  Mixed hyperlipidemia  Hyperlipidemia LDL goal <70  Refills provided for annual renewal  -     atorvastatin (LIPITOR) 80 MG tablet; Take 1 tablet (80 mg) by mouth daily      Migraine with aura and without status migrainosus, not intractable  She uses sparingly requesting refill  -     butalbital-aspirin-caffeine (FIORINAL) -40 MG per capsule; Take 1 capsule by mouth daily as needed for headaches    Visit for screening mammogram  She would like to continue annual mammogram screening at this time  -     Mammogram, screening w lee ann (3D); Future        Patient has been advised of split billing requirements and indicates understanding: Yes      COUNSELING:  Reviewed preventive health counseling, as reflected in patient instructions       Regular exercise       Healthy diet/nutrition       Hearing screening       Dental care       Fall risk prevention       Immunizations    Immunizations reviewed reviewed obtain influenza vaccine each fall she wants to obtain her COVID bivalent booster in the fall or we will get it for her local pharmacy if increasing COVID in the community           Advanced Planning reviewed and provided her a copy today.       Mammogram screening reviewed she wishes to continue with annual screening at this time..      BMI:   Estimated body mass index is 32.06 kg/m  as calculated from the following:    Height as of this encounter: 1.555 m (5' 1.22\").    Weight as of this encounter: 77.5 kg (170 lb 14.4 oz).         She reports that she has never smoked. She has never used smokeless tobacco.      Appropriate preventive services were discussed with this patient, including applicable screening as appropriate " for cardiovascular disease, diabetes, osteopenia/osteoporosis, and glaucoma.  As appropriate for age/gender, discussed screening for colorectal cancer, prostate cancer, breast cancer, and cervical cancer. Checklist reviewing preventive services available has been given to the patient.    Reviewed patients plan of care and provided an AVS. The Basic Care Plan (routine screening as documented in Health Maintenance) for Concha meets the Care Plan requirement. This Care Plan has been established and reviewed with the Patient.          Ko Yuan MD  [facility]    Identified Health Risks: NOne

## 2023-05-15 ENCOUNTER — OFFICE VISIT (OUTPATIENT)
Dept: FAMILY MEDICINE | Facility: CLINIC | Age: 78
End: 2023-05-15
Payer: COMMERCIAL

## 2023-05-15 VITALS
HEART RATE: 71 BPM | DIASTOLIC BLOOD PRESSURE: 89 MMHG | TEMPERATURE: 98.2 F | OXYGEN SATURATION: 95 % | HEIGHT: 61 IN | WEIGHT: 170.9 LBS | BODY MASS INDEX: 32.27 KG/M2 | RESPIRATION RATE: 20 BRPM | SYSTOLIC BLOOD PRESSURE: 138 MMHG

## 2023-05-15 DIAGNOSIS — G43.109 MIGRAINE WITH AURA AND WITHOUT STATUS MIGRAINOSUS, NOT INTRACTABLE: ICD-10-CM

## 2023-05-15 DIAGNOSIS — E78.5 HYPERLIPIDEMIA LDL GOAL <70: ICD-10-CM

## 2023-05-15 DIAGNOSIS — Z12.31 VISIT FOR SCREENING MAMMOGRAM: ICD-10-CM

## 2023-05-15 DIAGNOSIS — E78.2 MIXED HYPERLIPIDEMIA: ICD-10-CM

## 2023-05-15 DIAGNOSIS — F41.8 SITUATIONAL ANXIETY: ICD-10-CM

## 2023-05-15 DIAGNOSIS — Z00.00 ENCOUNTER FOR MEDICARE ANNUAL WELLNESS EXAM: Primary | ICD-10-CM

## 2023-05-15 PROCEDURE — G0439 PPPS, SUBSEQ VISIT: HCPCS | Performed by: FAMILY MEDICINE

## 2023-05-15 RX ORDER — BUTALBITAL/ASPIRIN/CAFFEINE 50-325-40
1 CAPSULE ORAL DAILY PRN
Qty: 30 CAPSULE | Refills: 1 | Status: SHIPPED | OUTPATIENT
Start: 2023-05-15 | End: 2024-05-13

## 2023-05-15 RX ORDER — ATORVASTATIN CALCIUM 80 MG/1
80 TABLET, FILM COATED ORAL DAILY
Qty: 90 TABLET | Refills: 3 | Status: SHIPPED | OUTPATIENT
Start: 2023-05-15 | End: 2024-08-09

## 2023-05-15 NOTE — NURSING NOTE
"Concha Ma is a 77 year old female patient that presents today in clinic for the following:    Chief Complaint   Patient presents with     Physical     Pt here for physical and would like to discuss anxiety concerns.     The patient's allergies and medications were reviewed as noted. A set of vitals were recorded as noted without incident: /89 (BP Location: Right arm, Patient Position: Sitting, Cuff Size: Adult Regular)   Pulse 71   Temp 98.2  F (36.8  C)   Resp 20   Ht 1.555 m (5' 1.22\")   Wt 77.5 kg (170 lb 14.4 oz)   SpO2 95%   BMI 32.06 kg/m  . The patient does not have any other questions for the provider.    Fior Orozco, EMT at 3:12 PM on 5/15/2023  "

## 2023-05-15 NOTE — PROGRESS NOTES
Medicare Annual Wellness Questionnaire:  This 77 year old year old female presents for a Medicare Wellness Exam.    Fall Risk Assessment:  Have you fallen 2 or more times in the last year?  No    How many times were you injured due to a fall in the last year?  none    PHQ-2:  Over the last 2 weeks, how often have you been bothered by feeling down, depressed, or hopeless?  Several Days (1)     Over the last 2 weeks, how often have you had little interest or pleasure in doing things?  Several Days (1)    Social History:  What is your marital status?      Who lives in your household?   and Lobo    Does your home have loose rugs in the hallway:     Yes     Does your home have grab bars in the bathroom:    Yes     Does your home have handrails on the stairs?  Yes     Does your home have poorly lit areas?    No    Do you feel threatened or controlled by a partner, ex-partner or anyone in your life?   No    Has anyone hurt you physically, for example by pushing, hitting, slapping or kicking you or forcing you to have sex?   No    Do you need help with the phone, transportation, shopping, preparing meals, housework, laundry, medications or managing money?   No    Sexual Health:  Are you sexually active?    No    If yes, with men, women, or both?   N/A    If yes, how many partners?  N/A    If yes, are you using condoms?    N/A    Have you had any sexually transmitted infections in the last year?   No    Do you have any sexual concerns?    No    Women Only:  Women: What year did you stop having periods (approximate age)?  40 lisa    Women: Any vaginal bleeding in the last year?    No    Women: Have you ever had an abnormal Pap smear?    Yes, 1970s    General Health Assessment:  Have you noticed any hearing difficulties?   No    Do you wear hearing aids?   No    Have you seen a hearing professional such as an audiologist in the last 1 year?   No    Do you have vision difficulty?    Yes     Do you wear glasses or  contacts?   Yes     Have you seen an eye doctor in the last 1 year?   No    How many servings of fruits and vegetables do you eat a day?  Fruit: 1  Vegetables: 1    How often do you exercise in a week?  3-4    How long and what kind of exercise do you do?  Ride bike, in house bike recumbent, gardening    Tobacco and Alcohol History:  Do you use tobacco/nicotine products?    No    If yes, please list the method of use and average weekly consumption?  N/A    Do you use any other drugs?   No         Do you drink alcohol?   Yes     If you drink alcohol, how many drinks per week?  2    Advanced Directive:  Have you completed an Advance Directives document?  Yes     If yes, have you given a copy to the clinic?   Yes     Do you need information on Advance Directives?   Yes     Fior Orozco, EMT at 3:32 PM on 5/15/2023

## 2023-05-24 ENCOUNTER — ANCILLARY PROCEDURE (OUTPATIENT)
Dept: MAMMOGRAPHY | Facility: CLINIC | Age: 78
End: 2023-05-24
Attending: FAMILY MEDICINE
Payer: COMMERCIAL

## 2023-05-24 DIAGNOSIS — Z12.31 VISIT FOR SCREENING MAMMOGRAM: ICD-10-CM

## 2023-05-24 PROCEDURE — 77063 BREAST TOMOSYNTHESIS BI: CPT | Mod: GC | Performed by: STUDENT IN AN ORGANIZED HEALTH CARE EDUCATION/TRAINING PROGRAM

## 2023-05-24 PROCEDURE — 77067 SCR MAMMO BI INCL CAD: CPT | Mod: GC | Performed by: STUDENT IN AN ORGANIZED HEALTH CARE EDUCATION/TRAINING PROGRAM

## 2024-05-12 NOTE — PROGRESS NOTES
"Preventive Care Visit  [facility]  Ko Yuan MD, Family Medicine  May 13, 2024      Assessment & Plan     Encounter for Medicare annual wellness exam  She presents for Medicare wellness visit doing very well due for fasting labs is not fasting today will schedule another day  - Lipid panel reflex to direct LDL Fasting  - CBC with platelets and differential  - Comprehensive metabolic panel (BMP + Alb, Alk Phos, ALT, AST, Total. Bili, TP)  - TSH with free T4 reflex    Migraine with aura and without status migrainosus, not intractable  Refill provided for Fiorinal  - butalbital-aspirin-caffeine (FIORINAL) -40 MG per capsule  Dispense: 30 capsule; Refill: 1    Rosacea  Refill provided for MetroCream topical to face  - metroNIDAZOLE (METROCREAM) 0.75 % external cream  Dispense: 45 g; Refill: 2    Venous (peripheral) insufficiency  Venous stasis dermatitis  She has well-controlled venous stasis utilizing compression stockings.  We reviewed mechanism briefly as she had questions about how the pigmentation occurs.        BMI  Estimated body mass index is 31.71 kg/m  as calculated from the following:    Height as of this encounter: 1.588 m (5' 2.5\").    Weight as of this encounter: 79.9 kg (176 lb 3.2 oz).       Counseling  Appropriate preventive services were discussed with this patient, including applicable screening as appropriate for fall prevention, nutrition, physical activity, Tobacco-use cessation, weight loss and cognition.  Checklist reviewing preventive services available has been given to the patient.  Reviewed patient's diet, addressing concerns and/or questions.   She is at risk for lack of exercise and has been provided with information to increase physical activity for the benefit of her well-being.   Patient reported safety concerns were addressed today.        Annual visit earlier if concerns.  Ko Yuan MD     [facility] is a 78 " year old, presenting for the following:  Wellness Visit        2024     3:19 PM   Additional Questions   Roomed by ms emt         Health Care Directive  Patient has a Health Care Directive on file  HPI  Concha Ma 78 history of right knee arthritis s/p arthroplasty 2018, squamous and basal cell skin cancer, rosacea, osteopenia L spine (aka disorder of bone and cartilage), BPPV, hyperlipidemia, vasovagal syncope, depressive disorder, vitamin D deficiency and migraines who presents for medicare wellness.     She requested refill for Fiorinol for migraines previously has worked well for her she does not use excessively.  She has a follow-up with dermatology sun damaged skin.   She would like refill metronidazole cream for facial erythema rosacea. She has a history of venous stasis dermatitis and venous insufficiency for which she uses compression stockings.     SH:  in , brother had  liver cancer and Hep C last year was in Hospice  May .    HCM  Mammogram due 24  RSV through local pharmacy,    Annual Wellness Visit   Patient has been advised of split billing requirements and indicates understanding: Yes         2024   General Health   How would you rate your overall physical health? Good   Feel stress (tense, anxious, or unable to sleep) To some extent          2024   Nutrition   Diet: Regular (no restrictions)         2024   Exercise   Days per week of moderate/strenous exercise 3 days   Average minutes spent exercising at this level 30 min           2024   Social Factors   Frequency of gathering with friends or relatives Twice a week   Worry food won't last until get money to buy more No   Food not last or not have enough money for food? No   Do you have housing?  Yes   Are you worried about losing your housing? No   Lack of transportation? No   Unable to get utilities (heat,electricity)? No           2024   Activities of Daily Living- Home Safety   Needs  help with the following daily activites None of the above   Safety concerns in the home Throw rugs in the hallway    No handrails on the stairs         5/13/2024   Dental   Dentist two times every year? Yes         5/13/2024   Hearing Screening   Hearing concerns? None of the above         5/13/2024   Driving Risk Screening   Patient/family members have concerns about driving No         5/13/2024   General Alertness/Fatigue Screening   Have you been more tired than usual lately? No         5/13/2024   Urinary Incontinence Screening   Bothered by leaking urine in past 6 months No         5/13/2024   TB Screening   Were you born outside of the [place]? No       Social History     Tobacco Use    Smoking status: Never    Smokeless tobacco: Never   Vaping Use    Vaping status: Never Used   Substance Use Topics    Alcohol use: Yes     Alcohol/week: 1.0 - 2.0 standard drink of alcohol     Types: 1 - 2 Standard drinks or equivalent per week     Comment: once/wk or less    Drug use: No         Today's PHQ-2 Score:       5/13/2024    12:08 PM   PHQ-2 ( 1999 Pf[employer])   Q1: Little interest or pleasure in doing things 0   Q2: Feeling down, depressed or hopeless 0   PHQ-2 Score 0   Q1: Little interest or pleasure in doing things Not at all   Q2: Feeling down, depressed or hopeless Not at all   PHQ-2 Score 0           5/24/2023   LAST FHS-7 RESULTS   1st degree relative breast or ovarian cancer No   Any relative bilateral breast cancer No   Any male have breast cancer No   Any ONE woman have BOTH breast AND ovarian cancer No   Any woman with breast cancer before 50yrs No   2 or more relatives with breast AND/OR ovarian cancer No   2 or more relatives with breast AND/OR bowel cancer No   Mammogram Screening - After age 74- determine frequency with patient based on health status, life expectancy and patient goals Annual for now healthy.      History of abnormal Pap smear: NO - age 65 - see link Cervical Cytology Screening Guidelines         10/20/2010    12:00 AM   PAP / HPV   PAP (Historical) NIL      ASCVD Risk   The 10-year ASCVD risk score (Loyda DANG, et al., 2019) is: 20.5%    Values used to calculate the score:      Age: 78 years      Sex: Female      Is Non- : No      Diabetic: No      Tobacco smoker: No      Systolic Blood Pressure: 139 mmHg      Is BP treated: No      HDL Cholesterol: 58 mg/dL      Total Cholesterol: 173 mg/dL      Reviewed and updated as needed this visit by Provider   Tobacco  Allergies  Meds  Problems  Med Hx  Surg Hx  Fam Hx            Labs reviewed in EPIC  BP Readings from Last 3 Encounters:   05/13/24 139/83   05/15/23 138/89   09/20/22 125/89    Wt Readings from Last 3 Encounters:   05/13/24 79.9 kg (176 lb 3.2 oz)   05/15/23 77.5 kg (170 lb 14.4 oz)   09/20/22 77.6 kg (171 lb)                  Patient Active Problem List   Diagnosis    Skin cancer, basal cell    Hyperlipidemia    Classical migraine    Insomnia    Arthritis of knee    Hemorrhoids    Vasovagal syncope    BCC (basal cell carcinoma), face    Swelling of lower extremity    S/P total knee arthroplasty    Vertigo, benign positional, bilateral    Status post total right knee replacement    Osteopenia    Disorder of bone and cartilage    Venous (peripheral) insufficiency    Venous stasis dermatitis     Past Surgical History:   Procedure Laterality Date    ARTHROPLASTY KNEE Right 04/13/2018    Procedure: ARTHROPLASTY KNEE;  Right Total Knee Replacement;  Surgeon: Apollo Nguyen MD;  Location: [facility] OR    CATARACT IOL, RT/LT      COLONOSCOPY N/A 09/20/2022    Procedure: COLONOSCOPY, WITH POLYPECTOMY;  Surgeon: Emery Sepulveda MD;  Location: [facility] OR    JOINT REPLACEMENT  Knee 2018    LASIK BILATERAL      MOHS MICROGRAPHIC PROCEDURE      ZZC NONSPECIFIC PROCEDURE      laparoscopy,    ZZC NONSPECIFIC PROCEDURE      laser surg basal cell skin cancer    ZZC STOMACH SURGERY PROCEDURE UNLISTED      laparoscopy for  infertility       Social History     Tobacco Use    Smoking status: Never    Smokeless tobacco: Never   Substance Use Topics    Alcohol use: Yes     Alcohol/week: 1.0 - 2.0 standard drink of alcohol     Types: 1 - 2 Standard drinks or equivalent per week     Comment: once/wk or less     Family History   Problem Relation Age of Onset    Neurologic Disorder Mother         headaches/narcolepsy    Cancer Mother         Skin    Cerebrovascular Disease Mother         muti focal inracts to brain    Thyroid Disease Mother         hypothyroid    Seizure Disorder Mother         narcolepsy    Migraines Mother     Dementia Mother     Depression Mother     Cancer Father         Skin, lung    C.A.D. Father         MI    Coronary Artery Disease Father     Cerebrovascular Disease Father         ,, ,    Alcoholism Father     Lung Cancer Father     Hypertension Sister     Thyroid Disease Sister     Migraines Sister     Chronic Obstructive Pulmonary Disease Brother     Substance Abuse Brother         heroin    Coronary Artery Disease Brother         Bipass    Hyperlipidemia Brother     Alcoholism Brother     Cancer Brother         Skin    Heart Disease Brother     Obesity Brother     Substance Abuse Brother     No Known Problems Son     Depression Sister     Hypertension Sister     Hyperlipidemia Sister     Obesity Sister     Substance Abuse Brother     Substance Abuse Niece     Uterine Cancer Niece          Current Outpatient Medications   Medication Sig Dispense Refill    atorvastatin (LIPITOR) 80 MG tablet Take 1 tablet (80 mg) by mouth daily 90 tablet 3    butalbital-aspirin-caffeine (FIORINAL) -40 MG per capsule Take 1 capsule by mouth daily as needed for headaches 30 capsule 1    COMPRESSION STOCKINGS 2 each daily 2 each 1    IBUPROFEN PO Take 600 mg by mouth every 6 hours as needed for moderate pain      metroNIDAZOLE (METROCREAM) 0.75 % external cream Apply topically daily as needed (rosacea) 45 g 2    metroNIDAZOLE  (METROGEL) 0.75 % external gel Apply twice daily as needed to face for rosacea. 45 g 11    naproxen sodium (ANAPROX) 220 MG tablet Take 220 mg by mouth 2 times daily (with meals)      VITAMIN D, CHOLECALCIFEROL, PO Take 2,000 Units by mouth daily as needed (Patient not taking: Reported on 5/13/2024)       Allergies   Allergen Reactions    Morphine And Codeine GI Disturbance    Percocet [Oxycodone-Acetaminophen] Nausea and Vomiting     Recent Labs   Lab Test 05/03/23  0935 12/29/21  1058 01/06/21  1025 12/07/19  1020 11/28/18  0852 04/19/18  0750 03/29/18  0937 04/15/17  1005   A1C  --   --   --   --   --   --   --  6.3*   LDL 97 77 100*   < > 83  --   --  114*   HDL 58 68 62   < > 60  --   --  69   TRIG 88 96 139   < > 79  --   --  97   ALT 18 32 41  --   --   --   --  35   CR 0.88 0.84 0.82  --   --  0.70   < > 0.92   GFRESTIMATED 68 73 70  --   --  >60   < > 60*   GFRESTBLACK  --   --  82  --   --  >60   < > 73   POTASSIUM 4.6 4.8 4.1  --   --  4.4   < > 4.5   TSH  --  1.43  --   --  2.42  --   --  1.53    < > = values in this interval not displayed.        Current providers sharing in care for this patient include:  Patient Care Team:  Ko Yuan MD as PCP - General (Family Medicine)  Nehemias Jhaveri MD as MD (Family Medicine - Sports Medicine)  Vinay Morfin MD as MD (Family Practice)  Apollo Nguyen MD as MD (Orthopedics)  Jason Gao MD as MD (Dermatology)  Ko Yuan MD as Assigned PCP  Nya Mccord APRN CNP as Nurse Practitioner (Dermatology)    The following health maintenance items are reviewed in Epic and correct as of today:  Health Maintenance   Topic Date Due    RSV VACCINE (Pregnancy & 60+) (1 - 1-dose 60+ series) Never done    COVID-19 Vaccine (5 - 2023-24 season) 09/01/2023    ANNUAL REVIEW OF HM ORDERS  11/01/2023    LIPID  05/03/2024    MAMMO SCREENING  05/24/2024    INFLUENZA VACCINE (Season Ended) 09/01/2024    DTAP/TDAP/TD IMMUNIZATION (2 -  "Td or Tdap) 12/17/2024    MEDICARE ANNUAL WELLNESS VISIT  05/13/2025    FALL RISK ASSESSMENT  05/13/2025    GLUCOSE  05/03/2026    ADVANCE CARE PLANNING  05/15/2028    COLORECTAL CANCER SCREENING  09/20/2032    DEXA  02/02/2037    HEPATITIS C SCREENING  Completed    PHQ-2 (once per calendar year)  Completed    Pneumococcal Vaccine: 65+ Years  Completed    ZOSTER IMMUNIZATION  Completed    IPV IMMUNIZATION  Aged Out    HPV IMMUNIZATION  Aged Out    MENINGITIS IMMUNIZATION  Aged Out    RSV MONOCLONAL ANTIBODY  Aged Out       Appropriate preventive services were discussed with this patient, including applicable screening as appropriate for fall prevention, nutrition, physical activity, Tobacco-use cessation, weight loss and cognition.  Checklist reviewing preventive services available has been given to the patient.         Social History     Tobacco Use    Smoking status: Never    Smokeless tobacco: Never   Vaping Use    Vaping status: Never Used   Substance Use Topics    Alcohol use: Yes     Alcohol/week: 1.0 - 2.0 standard drink of alcohol     Types: 1 - 2 Standard drinks or equivalent per week     Comment: once/wk or less    Drug use: No     Review of Systems  Problem list, PMH, Surgical HX, FH, SH, allergies, medications,immunizations reviewed and updated in Epic. ROS  noted in HPI and ROS,staff / patient questionnaires reviewed by me.        Objective    Exam  /83 (BP Location: Right arm, Patient Position: Sitting, Cuff Size: Adult Regular)   Pulse 73   Temp 98  F (36.7  C) (Oral)   Resp 14   Ht 1.588 m (5' 2.5\")   Wt 79.9 kg (176 lb 3.2 oz)   SpO2 94%   Breastfeeding No   BMI 31.71 kg/m     Estimated body mass index is 31.71 kg/m  as calculated from the following:    Height as of this encounter: 1.588 m (5' 2.5\").    Weight as of this encounter: 79.9 kg (176 lb 3.2 oz).    Physical Exam  GENERAL: healthy, alert and no distress  EYES: Eyes grossly normal to inspection, PERRL and conjunctivae and " sclerae normal  HENT: ear canals  cerumen Visible TM normal, mouth without ulcers or lesions  NECK: no adenopathy, no asymmetry, masses, or scars and thyroid normal to palpation  RESP: lungs clear to auscultation - no rales, rhonchi or wheezes  CV: regular rate and rhythm, normal S1 S2, no S3 or S4, no murmur, click or rub  Breast exam : Bilateral no masses or tenderness. No axillary adenopathy  ABDOMEN: soft,obese nontender, no hepatosplenomegaly, no masses, and bowel sounds normal  MS: arthritis knees  SKIN: scars in areas of previous skin biopsy, Multiple scattered lentigo, several rough, raised skin lesions scattered around neck face requiring derm assessment ( Already scheduled), mild stasis dermatitis at ankles  NEURO: Normal strength and tone, mentation intact and speech normal  PSYCH: mentation appears normal, affect normal         5/13/2024   Mini Cog   Clock Draw Score 2 Normal   3 Item Recall 3 objects recalled   Mini Cog Total Score 5              Signed Electronically by: Ko Yuan MD

## 2024-05-13 ENCOUNTER — OFFICE VISIT (OUTPATIENT)
Dept: FAMILY MEDICINE | Facility: CLINIC | Age: 79
End: 2024-05-13
Payer: COMMERCIAL

## 2024-05-13 VITALS
OXYGEN SATURATION: 94 % | DIASTOLIC BLOOD PRESSURE: 83 MMHG | WEIGHT: 176.2 LBS | TEMPERATURE: 98 F | HEIGHT: 63 IN | BODY MASS INDEX: 31.22 KG/M2 | RESPIRATION RATE: 14 BRPM | HEART RATE: 73 BPM | SYSTOLIC BLOOD PRESSURE: 139 MMHG

## 2024-05-13 DIAGNOSIS — L71.9 ROSACEA: ICD-10-CM

## 2024-05-13 DIAGNOSIS — G43.109 MIGRAINE WITH AURA AND WITHOUT STATUS MIGRAINOSUS, NOT INTRACTABLE: ICD-10-CM

## 2024-05-13 DIAGNOSIS — I87.2 VENOUS STASIS DERMATITIS: ICD-10-CM

## 2024-05-13 DIAGNOSIS — I87.2 VENOUS (PERIPHERAL) INSUFFICIENCY: ICD-10-CM

## 2024-05-13 DIAGNOSIS — Z00.00 ENCOUNTER FOR MEDICARE ANNUAL WELLNESS EXAM: Primary | ICD-10-CM

## 2024-05-13 PROCEDURE — G0439 PPPS, SUBSEQ VISIT: HCPCS | Performed by: FAMILY MEDICINE

## 2024-05-13 RX ORDER — BUTALBITAL/ASPIRIN/CAFFEINE 50-325-40
1 CAPSULE ORAL DAILY PRN
Qty: 30 CAPSULE | Refills: 1 | Status: SHIPPED | OUTPATIENT
Start: 2024-05-13

## 2024-05-13 SDOH — HEALTH STABILITY: PHYSICAL HEALTH: ON AVERAGE, HOW MANY MINUTES DO YOU ENGAGE IN EXERCISE AT THIS LEVEL?: 30 MIN

## 2024-05-13 SDOH — HEALTH STABILITY: PHYSICAL HEALTH: ON AVERAGE, HOW MANY DAYS PER WEEK DO YOU ENGAGE IN MODERATE TO STRENUOUS EXERCISE (LIKE A BRISK WALK)?: 3 DAYS

## 2024-05-13 ASSESSMENT — SOCIAL DETERMINANTS OF HEALTH (SDOH): HOW OFTEN DO YOU GET TOGETHER WITH FRIENDS OR RELATIVES?: TWICE A WEEK

## 2024-05-13 NOTE — PATIENT INSTRUCTIONS
"Preventive Care Advice   This is general advice we often give to help people stay healthy. Your care team may have specific advice just for you. Please talk to your care team about your own preventive care needs.  Lifestyle  Exercise at least 150 minutes each week (30 minutes a day, 5 days a week).  Do muscle strengthening activities 2 days a week. These help control your weight and prevent disease.  No smoking.  Wear sunscreen to prevent skin cancer.  Have your home tested for radon every 2 to 5 years. Radon is a colorless, odorless gas that can harm your lungs. To learn more, go to www.health.[facility].mn. and search for \"Radon in Homes.\"  Keep guns unloaded and locked up in a safe place like a safe or gun vault, or, use a gun lock and hide the keys. Always lock away bullets separately. To learn more, visit [employer]Web.[place].gov and search for \"safe gun storage.\"  Nutrition  Eat 5 or more servings of fruits and vegetables each day.  Try wheat bread, brown rice and whole grain pasta (instead of white bread, rice, and pasta).  Get enough calcium and vitamin D. Check the label on foods and aim for 100% of the RDA (recommended daily allowance).  Regular exams  Have a dental exam and cleaning every 6 months.  See your health care team every year to talk about:  Any changes in your health.  Any medicines your care team has prescribed.  Preventive care, family planning, and ways to prevent chronic diseases.  Shots (vaccines)   HPV shots (up to age 26), if you've never had them before.  Hepatitis B shots (up to age 59), if you've never had them before.  COVID-19 shot: Get this shot when it's due.  Flu shot: Get a flu shot every year.  Tetanus shot: Get a tetanus shot every 10 years.  Pneumococcal, hepatitis A, and RSV shots: Ask your care team if you need these based on your risk.  Shingles shot (for age 50 and up).  General health tests  Diabetes screening:  Starting at age 35, Get screened for diabetes at least every 3 years.  If " you are younger than age 35, ask your care team if you should be screened for diabetes.  Cholesterol test: At age 39, start having a cholesterol test every 5 years, or more often if advised.  Bone density scan (DEXA): At age 50, ask your care team if you should have this scan for osteoporosis (brittle bones).  Hepatitis C: Get tested at least once in your life.  Abdominal aortic aneurysm screening: Talk to your doctor about having this screening if you:  Have ever smoked; and  Are biologically male; and  Are between the ages of 65 and 75.  STIs (sexually transmitted infections)  Before age 24: Ask your care team if you should be screened for STIs.  After age 24: Get screened for STIs if you're at risk. You are at risk for STIs (including HIV) if:  You are sexually active with more than one person.  You don't use condoms every time.  You or a partner was diagnosed with a sexually transmitted infection.  If you are at risk for HIV, ask about PrEP medicine to prevent HIV.  Get tested for HIV at least once in your life, whether you are at risk for HIV or not.  Cancer screening tests  Cervical cancer screening: If you have a cervix, begin getting regular cervical cancer screening tests at age 21. Most people who have regular screenings with normal results can stop after age 65. Talk about this with your provider.  Breast cancer scan (mammogram): If you've ever had breasts, begin having regular mammograms starting at age 40. This is a scan to check for breast cancer.  Colon cancer screening: It is important to start screening for colon cancer at age 45.  Have a colonoscopy test every 10 years (or more often if you're at risk) Or, ask your provider about stool tests like a FIT test every year or Cologuard test every 3 years.  To learn more about your testing options, visit: www.ASSET4/469393.pdf.  For help making a decision, visit: dejon/yz75382.  Prostate cancer screening test: If you have a prostate and are age 55  to 69, ask your provider if you would benefit from a yearly prostate cancer screening test.  Lung cancer screening: If you are a current or former smoker age 50 to 80, ask your care team if ongoing lung cancer screenings are right for you.  For informational purposes only. Not to replace the advice of your health care provider. Copyright   2023 [facility]. All rights reserved. Clinically reviewed by the [facility]itions [facility]. Stroho 119155 - REV 04/24.    Learning About Activities of Daily Living  What are activities of daily living?     Activities of daily living (ADLs) are the basic self-care tasks you do every day. These include eating, bathing, dressing, and moving around.  As you age, and if you have health problems, you may find that it's harder to do some of these tasks. If so, your doctor can suggest ideas that may help.  To measure what kind of help you may need, your doctor will ask how well you are able to do ADLs. Let your doctor know if there are any tasks that you are having trouble doing. This is an important first step to getting help. And when you have the help you need, you can stay as independent as possible.  How will a doctor assess your ADLs?  Asking about ADLs is part of a routine health checkup your doctor will likely do as you age. Your health check might be done in a doctor's office, in your home, or at a hospital. The goal is to find out if you are having any problems that could make it hard to care for yourself or that make it unsafe for you to be on your own.  To measure your ADLs, your doctor will ask how hard it is for you to do routine tasks. Your doctor may also want to know if you have changed the way you do a task because of a health problem. Your doctor may watch how you:  Walk back and forth.  Keep your balance while you stand or walk.  Move from sitting to standing or from a bed to a chair.  Button or unbutton a shirt or sweater.  Remove and put  on your shoes.  It's common to feel a little worried or anxious if you find you can't do all the things you used to be able to do. Talking with your doctor about ADLs is a way to make sure you're as safe as possible and able to care for yourself as well as you can. You may want to bring a caregiver, friend, or family member to your checkup. They can help you talk to your doctor.  Follow-up care is a key part of your treatment and safety. Be sure to make and go to all appointments, and call your doctor if you are having problems. It's also a good idea to know your test results and keep a list of the medicines you take.  Current as of: October 24, 2023               Content Version: 14.0    2606-8778 [employer].   Care instructions adapted under license by your healthcare professional. If you have questions about a medical condition or this instruction, always ask your healthcare professional. [employer] disclaims any warranty or liability for your use of this information.      Learning About Stress  What is stress?     Stress is your body's response to a hard situation. Your body can have a physical, emotional, or mental response. Stress is a fact of life for most people, and it affects everyone differently. What causes stress for you may not be stressful for someone else.  A lot of things can cause stress. You may feel stress when you go on a job interview, take a test, or run a race. This kind of short-term stress is normal and even useful. It can help you if you need to work hard or react quickly. For example, stress can help you finish an important job on time.  Long-term stress is caused by ongoing stressful situations or events. Examples of long-term stress include long-term health problems, ongoing problems at work, or conflicts in your family. Long-term stress can harm your health.  How does stress affect your health?  When you are stressed, your body responds as though you are in  danger. It makes hormones that speed up your heart, make you breathe faster, and give you a burst of energy. This is called the fight-or-flight stress response. If the stress is over quickly, your body goes back to normal and no harm is done.  But if stress happens too often or lasts too long, it can have bad effects. Long-term stress can make you more likely to get sick, and it can make symptoms of some diseases worse. If you tense up when you are stressed, you may develop neck, shoulder, or low back pain. Stress is linked to high blood pressure and heart disease.  Stress also harms your emotional health. It can make you carias, tense, or depressed. Your relationships may suffer, and you may not do well at work or school.  What can you do to manage stress?  You can try these things to help manage stress:   Do something active. Exercise or activity can help reduce stress. Walking is a great way to get started. Even everyday activities such as housecleaning or yard work can help.  Try yoga or servando chi. These techniques combine exercise and meditation. You may need some training at first to learn them.  Do something you enjoy. For example, listen to music or go to a movie. Practice your hobby or do volunteer work.  Meditate. This can help you relax, because you are not worrying about what happened before or what may happen in the future.  Do guided imagery. Imagine yourself in any setting that helps you feel calm. You can use online videos, books, or a teacher to guide you.  Do breathing exercises. For example:  From a standing position, bend forward from the waist with your knees slightly bent. Let your arms dangle close to the floor.  Breathe in slowly and deeply as you return to a standing position. Roll up slowly and lift your head last.  Hold your breath for just a few seconds in the standing position.  Breathe out slowly and bend forward from the waist.  Let your feelings out. Talk, laugh, cry, and express anger  "when you need to. Talking with supportive friends or family, a counselor, or a gonzalo leader about your feelings is a healthy way to relieve stress. Avoid discussing your feelings with people who make you feel worse.  Write. It may help to write about things that are bothering you. This helps you find out how much stress you feel and what is causing it. When you know this, you can find better ways to cope.  What can you do to prevent stress?  You might try some of these things to help prevent stress:  Manage your time. This helps you find time to do the things you want and need to do.  Get enough sleep. Your body recovers from the stresses of the day while you are sleeping.  Get support. Your family, friends, and community can make a difference in how you experience stress.  Limit your news feed. Avoid or limit time on social media or news that may make you feel stressed.  Do something active. Exercise or activity can help reduce stress. Walking is a great way to get started.  Where can you learn more?  Go to https://www.iTiffin.net/patiented  Enter N032 in the search box to learn more about \"Learning About Stress.\"  Current as of: October 24, 2023               Content Version: 14.0    4008-7626 [employer]   Care instructions adapted under license by your healthcare professional. If you have questions about a medical condition or this instruction, always ask your healthcare professional. [employer] disclaims any warranty or liability for your use of this information.      Rsv vaccine through local pharmacy     "

## 2024-05-16 ENCOUNTER — OFFICE VISIT (OUTPATIENT)
Dept: DERMATOLOGY | Facility: CLINIC | Age: 79
End: 2024-05-16
Payer: COMMERCIAL

## 2024-05-16 DIAGNOSIS — L82.1 SEBORRHEIC KERATOSES: ICD-10-CM

## 2024-05-16 DIAGNOSIS — Z85.828 HISTORY OF SKIN CANCER: ICD-10-CM

## 2024-05-16 DIAGNOSIS — D48.9 NEOPLASM OF UNCERTAIN BEHAVIOR: ICD-10-CM

## 2024-05-16 DIAGNOSIS — L57.0 AK (ACTINIC KERATOSIS): ICD-10-CM

## 2024-05-16 DIAGNOSIS — D18.01 CHERRY ANGIOMA: ICD-10-CM

## 2024-05-16 DIAGNOSIS — D22.9 MULTIPLE BENIGN NEVI: Primary | ICD-10-CM

## 2024-05-16 DIAGNOSIS — L81.4 LENTIGINES: ICD-10-CM

## 2024-05-16 DIAGNOSIS — Z12.83 ENCOUNTER FOR SCREENING FOR MALIGNANT NEOPLASM OF SKIN: ICD-10-CM

## 2024-05-16 PROCEDURE — 88305 TISSUE EXAM BY PATHOLOGIST: CPT | Performed by: DERMATOLOGY

## 2024-05-16 PROCEDURE — 17003 DESTRUCT PREMALG LES 2-14: CPT | Performed by: NURSE PRACTITIONER

## 2024-05-16 PROCEDURE — 11103 TANGNTL BX SKIN EA SEP/ADDL: CPT | Performed by: NURSE PRACTITIONER

## 2024-05-16 PROCEDURE — 99213 OFFICE O/P EST LOW 20 MIN: CPT | Mod: 25 | Performed by: NURSE PRACTITIONER

## 2024-05-16 PROCEDURE — 11102 TANGNTL BX SKIN SINGLE LES: CPT | Performed by: NURSE PRACTITIONER

## 2024-05-16 PROCEDURE — 17000 DESTRUCT PREMALG LESION: CPT | Mod: XS | Performed by: NURSE PRACTITIONER

## 2024-05-16 NOTE — PROGRESS NOTES
[facility] Dermatology Note  Encounter Date: May 16, 2024  Office Visit     Reviewed patients past medical history and pertinent chart review prior to patients visit today.     Dermatology Problem List:  0. NUB left medial cheek and right nasal sidewall, shave biopsy 05/16/24 .   1. Hx NMSC              - chemoprevention: niacinamide 500 mg PO BID (reported that she takes sporatically)              - sBCC, R chest, s/p ED&C 4/21/21              - SCCis, L lateral cheek, s/p MMS 1/4/2021              - BCC, R arm, s/p excision 1/4/2021              - BCC, L forehead, s/p bx 10/24/19, s/p MMS 11/5/19                  - BCC, central chest              - SCC, right upper cutaneous lip, s/p MMS 2014  2. AKs. LiqN2.  3. SKs, lentigenes, dermal nevi.   ____________________________________________    Assessment & Plan:     # History of NMSC, Well healed scar without signs of recurrent malignancy.     # Neoplasm of uncertain behavior:  left medial cheek and right nasal sidewall  DDx includes BCC vs SCC. Shave biopsy today.    Procedure Note: Biopsy by shave technique  The risks and benefits of the procedure were described to the patient. These include but are not limited to bleeding, infection, scar, incomplete removal, and non-diagnostic biopsy. Verbal informed consent was obtained. The above site(s) was cleansed with an alcohol pad and injected with 1% lidocaine with epinephrine. Once anesthesia was obtained, a biopsy(ies) was performed with Gilette blade. The tissue(s) was placed in a labeled container(s) with formalin and sent to pathology. Hemostasis was achieved with aluminum chloride. Vaseline and a bandage were applied to the wound(s). The patient tolerated the procedure well and was given post biopsy care instructions.     # Actinic keratosis, face. Premalignant nature discussed with patient. Treatment options discussed with patient today including no treatment, topical treatment, and  cryotherapy. Patient elects to treat visible lesions today with cryotherapy. After verbal consent and discussion of risks and benefits including but no limited to dyspigmentation/scar, blister, and pain. A total of 4 actinic keratoses were treated with 1-2mm freeze border for 2 cycle with liquid nitrogen. Post cryotherapy instructions were provided.     # Cyst. Benign, no further treatment needed. Discussed excision if patient is bothered by the lesion due to irritation. Patient declines referral for removal    # Benign skin findings including: seborrheic keratoses, cherry angioma, lentigines and benign nevi.   - No further intervention required. Patient to report changes.   - Patient reassured of the benign nature of these lesions.    #Signs and Symptoms of non-melanoma skin cancer and ABCDEs of melanoma reviewed with patient. Patient encouraged to perform monthly self skin exams and educated on how to perform them. UV precautions reviewed with patient. Patient was asked about new or changing moles/lesions on body.     #Reviewed Sunscreen: Apply 20 minutes prior to going outdoors and reapply every two hours, when wet or sweating. We recommend using an SPF 30 or higher, and to use one that is water resistant.       Follow-up:  6-12 months for follow up full body skin exam, prn for new or changing lesions or new concerns    Radha Mccord CNP  Dermatology     ____________________________________________    CC: Skin Check (Strong HX of Skin Cancer/Lesion to left cheek that TX's with cryo but remains/Cyst like growth to back of neck)    HPI:  Ms. Concha Ma is a(n) 78 year old female who presents today as a return patient for a full body skin cancer screening. Patient has concerns today about a spot on her left cheek that hasn't been healing and a cyst at the back of the neck.     Patient is otherwise feeling well, without additional skin concerns.     Physical Exam:  Vitals: There were no vitals taken for this  visit.  SKIN: Total skin excluding the genitalia areas was performed. The exam included the head/face, neck, both arms, chest, back, abdomen, both legs, digits, mons pubis, buttock and nails.   -right posterior neck, about a 1 cm white firm papule with central punctum  -right nasal sidewall, 3 mm pink shiny papule with arborizing vessels  -left medial cheek, 6 mm pink papule with central erosion and vessels at periphery  -left cheek x2, left chin, mid upper forehead, erythematous macules/thin papules with overlying adherent scale   -several 1-2mm red dome shaped symmetric papules scattered on the trunk  -multiple tan/brown flat round macules and raised papules scattered throughout trunk, extremities and head. No worrisome features for malignancy noted on examination.  -scattered tan, homogenous macules scattered on sun exposed areas of trunk, extremities and face.   -scattered waxy, stuck on tan/brown papules and patches on the trunk     - No other lesions of concern on areas examined.           Medications:  Current Outpatient Medications   Medication Sig Dispense Refill    metroNIDAZOLE (METROCREAM) 0.75 % external cream Apply topically daily as needed (rosacea) 45 g 2    atorvastatin (LIPITOR) 80 MG tablet Take 1 tablet (80 mg) by mouth daily 90 tablet 3    butalbital-aspirin-caffeine (FIORINAL) -40 MG per capsule Take 1 capsule by mouth daily as needed for headaches 30 capsule 1    COMPRESSION STOCKINGS 2 each daily 2 each 1    IBUPROFEN PO Take 600 mg by mouth every 6 hours as needed for moderate pain      metroNIDAZOLE (METROGEL) 0.75 % external gel Apply twice daily as needed to face for rosacea. (Patient not taking: Reported on 5/16/2024) 45 g 11    naproxen sodium (ANAPROX) 220 MG tablet Take 220 mg by mouth 2 times daily (with meals)      VITAMIN D, CHOLECALCIFEROL, PO Take 2,000 Units by mouth daily as needed (Patient not taking: Reported on 5/13/2024)       No current facility-administered  "medications for this visit.      Past Medical History:   Patient Active Problem List   Diagnosis    Skin cancer, basal cell    Hyperlipidemia    Classical migraine    Insomnia    Arthritis of knee    Hemorrhoids    Vasovagal syncope    BCC (basal cell carcinoma), face    Swelling of lower extremity    S/P total knee arthroplasty    Vertigo, benign positional, bilateral    Status post total right knee replacement    Osteopenia    Disorder of bone and cartilage    Venous (peripheral) insufficiency    Venous stasis dermatitis     Past Medical History:   Diagnosis Date    Anxiety 2019    Arthritis of knee     right    Basal cell carcinoma     BPPV (benign paroxysmal positional vertigo)     Cataract     Depressive disorder 1985    Disorder of bone and cartilage     (osteopenia)    Hemorrhoids     History of PID     Hyperlipidemia     Insomnia     Migraines 1980s    Migraines, classic     sparkly aura    Osteopenia     Peripheral vascular disease (H24) 2018    Plantar fasciitis, bilateral     PONV (postoperative nausea and vomiting)     Skin cancer, basal cell     Squamous cell carcinoma     Swelling of the ankle, feet, or leg     Vasovagal syncope     is \"fainter\" with blood draws, injections-NEEDS TO BE LAYING DOWN       CC Referred Self, MD  No address on file on close of this encounter.  "

## 2024-05-16 NOTE — PATIENT INSTRUCTIONS
Wound Care After a Biopsy    What is a skin biopsy?  A skin biopsy allows the doctor to examine a very small piece of tissue under the microscope to determine the diagnosis and the best treatment for the skin condition. A local anesthetic (numbing medicine) is injected with a very small needle into the skin area to be tested. A small piece of skin is taken from the area. Sometimes a suture (stitch) is used.     What are the risks of a skin biopsy?  I will experience scar, bleeding, swelling, pain, crusting and redness. I may experience incomplete removal or recurrence. Risks of this procedure are excessive bleeding, bruising, infection, nerve damage, numbness, thick (hypertrophic or keloidal) scar and non-diagnostic biopsy.    How should I care for my wound for the first 24 hours?  Keep the wound dry and covered for 24 hours  If it bleeds, hold direct pressure on the area for 15 minutes. If bleeding does not stop then go to the emergency room  Avoid strenuous exercise the first 1-2 days or as your doctor instructs you.    How should I care for the wound after 24 hours?  After 24 hours, remove the bandage  You may bathe or shower as normal  If you had a scalp biopsy, you can shampoo as usual and can use shower water to clean the biopsy site daily  Clean the wound twice a day with gentle soap and water  Do not scrub, be gentle  Apply white petroleum/Vaseline after cleaning the wound with a cotton swab or a clean finger, and keep the site covered with a Bandaid /bandage. Bandages are not necessary with a scalp biopsy  If you are unable to cover the site with a Bandaid /bandage, re-apply ointment 2-3 times a day to keep the site moist. Moisture will help with healing  Avoid strenuous activity for first 1-2 days  Avoid lakes, rivers, pools, and oceans until the stitches are removed or the site is healed    How do I clean my wound?  Wash hands thoroughly with soap or use hand  before all wound care  Clean the  wound with gentle soap and water  Apply white petroleum/Vaseline  to wound after it is clean  Replace the Bandaid /bandage to keep the wound covered for the first few days or as instructed by your doctor  If you had a scalp biopsy, warm shower water to the area on a daily basis should suffice    What should I use to clean my wound?   Cotton-tipped applicators (Qtips )  White petroleum jelly (Vaseline ). Use a clean new container and use Q-tips to apply.  Bandaids  as needed  Gentle soap     How should I care for my wound long term?  Do not get your wound dirty  Keep up with wound care for one week or until the area is healed.  A small scab will form and fall off by itself when the area is completely healed. The area will be red and will become pink in color as it heals. Sun protection is very important for how your scar will turn out. Sunscreen with an SPF 30 or greater is recommended once the area is healed.  If you have stitches, stitches need to be removed in 7 days on the face/neck, or 10-14 days on the body days. You may return to our clinic for this or you may have it done locally at your doctor's office.  You should have some soreness but it should be mild and slowly go away over several days. Talk to your doctor about using tylenol for pain,    When should I call my doctor?  If you have increased:   Pain or swelling  Pus or drainage (clear or slightly yellow drainage is ok)  Temperature over 100F  Spreading redness or warmth around wound    What to expect for wound healing?  One week after surgery a pink / red halo will form around the outside of the wound.   This is new skin.  The center of the wound will appear yellowish white and produce some drainage.  The pink halo will slowly migrate in toward the center of the wound until the wound is covered with new shiny pink skin.  There will be no more drainage when the wound is completely healed.    It will take six months to one year for the redness to  fade.  The scar may be itchy, tight, and sensitive to extreme temperatures for a year after the surgery.  Massaging the area several times a day for several minutes after the wound is completely healed will help the scar soften and normalize faster. Begin massage only after healing is complete.    When will I hear about my results?  The biopsy results can take 2 weeks to come back.  Your results will automatically release to [email or web address] before your provider has even reviewed them.  The clinic will call you with the results, send you a [employer] message, or have you schedule a follow-up clinic or phone time to discuss the results.  Contact our clinics if you do not hear from us in 2 weeks.    Who should I call with questions?  [facility]: [phone]   Please call or send a [employer] message for questions or concerns.  If sending a [employer] message, please attach a photo of the wound to your message, if possible. This will help us better assist you.     For urgent needs outside of business hours call the [facility] at [phone] and ask for the dermatology resident on call         Cryotherapy    What is it?  Use of a very cold liquid, such as liquid nitrogen, to freeze and destroy abnormal skin cells that need to be removed.    What should I expect?  Tenderness and redness  A small blister that might grow and fill with dark purple blood. There may be crusting.  More than one treatment may be needed if the lesions do not go away.    How do I care for the treated area?  Gently wash the area with your hands when bathing.  Use a thin layer of Vaseline or Aquaphor to help with healing. You may use a Band-Aid.   The area should heal within 7-10 days and may leave behind a pink or lighter color.   Do not use an antibiotic or Neosporin ointment.   You may take acetaminophen (Tylenol) for pain.     Call your doctor if you have:  Questions or concerns  Severe pain  Signs of infection  Increasing:   Pain or  swelling  Pus or drainage (clear or slightly yellow drainage is ok)  Temperature over 100F  Spreading redness or warmth around wound    Call the clinic as soon as possible if experiencing any of the symptoms listed.        Who should I call with questions?  [facility]: [phone]    Please call or send a MyChart message for questions or concerns.  If sending a MyChart message, please attach a photo of the wound to your message, if possible. This will help us better assist you.         For urgent needs outside of business hours call the [facility] at [phone] and ask for the dermatology resident on call         Patient Education       Proper skin care from [facility]:    -Eliminate harsh soaps as they strip the natural oils from the skin, often resulting in dry itchy skin ( i.e. Dial, Zest, Cinthia Spring)  -Use mild soaps such as Cetaphil or Dove Sensitive Skin in the shower. You do not need to use soap on arms, legs, and trunk every time you shower unless visibly soiled.   -Avoid hot or cold showers.  -After showering, lightly dry off and apply moisturizing within 2-3 minutes. This will help trap moisture in the skin.   -Aggressive use of a moisturizer at least 1-2 times a day to the entire body (including -Vanicream, Cetaphil, Aquaphor or Cerave) and moisturize hands after every washing.  -We recommend using moisturizers that come in a tub that needs to be scooped out, not a pump. This has more of an oil base. It will hold moisture in your skin much better than a water base moisturizer. The above recommended are non-pore clogging.      Wear a sunscreen with at least SPF 30 on your face, ears, neck and V of the chest daily. Wear sunscreen on other areas of the body if those areas are exposed to the sun throughout the day. Sunscreens can contain physical and/or chemical blockers. Physical blockers are less likely to clog pores, these include zinc oxide and titanium dioxide.  Reapply every two hour and after swimming.     Sunscreen examples: https://www.ewg.org/sunscreen/    UV radiation  UVA radiation remains constant throughout the day and throughout the year. It is a longer wavelength than UVB and therefore penetrates deeper into the skin leading to immediate and delayed tanning, photoaging, and skin cancer. 70-80% of UVA and UVB radiation occurs between the hours of 10am-2pm.  UVB radiation  UVB radiation causes the most harmful effects and is more significant during the summer months. However, snow and ice can reflect UVB radiation leading to skin damage during the winter months as well. UVB radiation is responsible for tanning, burning, inflammation, delayed erythema (pinkness), pigmentation (brown spots), and skin cancer.     I recommend self monthly full body exams and yearly full body exams with a dermatology provider. If you develop a new or changing lesion please follow up for examination. Most skin cancers are pink and scaly or pink and pearly. However, we do see blue/brown/black skin cancers.  Consider the ABCDEs of melanoma when giving yourself your monthly full body exam ( don't forget the groin, buttocks, feet, toes, etc). A-asymmetry, B-borders, C-color, D-diameter, E-elevation or evolving. If you see any of these changes please follow up in clinic. If you cannot see your back I recommend purchasing a hand held mirror to use with a larger wall mirror.       Checking for Skin Cancer  You can find cancer early by checking your skin each month. There are 3 kinds of skin cancer. They are melanoma, basal cell carcinoma, and squamous cell carcinoma. Doing monthly skin checks is the best way to find new marks or skin changes. Follow the instructions below for checking your skin.   The ABCDEs of checking moles for melanoma   Check your moles or growths for signs of melanoma using ABCDE:   Asymmetry: the sides of the mole or growth don t match  Border: the edges are ragged,  notched, or blurred  Color: the color within the mole or growth varies  Diameter: the mole or growth is larger than 6 mm (size of a pencil eraser)  Evolving: the size, shape, or color of the mole or growth is changing (evolving is not shown in the images below)    Checking for other types of skin cancer  Basal cell carcinoma or squamous cell carcinoma have symptoms such as:     A spot or mole that looks different from all other marks on your skin  Changes in how an area feels, such as itching, tenderness, or pain  Changes in the skin's surface, such as oozing, bleeding, or scaliness  A sore that does not heal  New swelling or redness beyond the border of a mole    Who s at risk?  Anyone can get skin cancer. But you are at greater risk if you have:   Fair skin, light-colored hair, or light-colored eyes  Many moles or abnormal moles on your skin  A history of sunburns from sunlight or tanning beds  A family history of skin cancer  A history of exposure to radiation or chemicals  A weakened immune system  If you have had skin cancer in the past, you are at risk for recurring skin cancer.   How to check your skin  Do your monthly skin checkups in front of a full-length mirror. Check all parts of your body, including your:   Head (ears, face, neck, and scalp)  Torso (front, back, and sides)  Arms (tops, undersides, upper, and lower armpits)  Hands (palms, backs, and fingers, including under the nails)  Buttocks and genitals  Legs (front, back, and sides)  Feet (tops, soles, toes, including under the nails, and between toes)  If you have a lot of moles, take digital photos of them each month. Make sure to take photos both up close and from a distance. These can help you see if any moles change over time.   Most skin changes are not cancer. But if you see any changes in your skin, call your doctor right away. Only he or she can diagnose a problem. If you have skin cancer, seeing your doctor can be the first step toward  getting the treatment that could save your life.   [employer] last reviewed this educational content on 4/1/2019 2000-2020 [employer] [address] [place] [address], [place] [address]. All rights reserved. This information is not intended as a substitute for professional medical care. Always follow your healthcare professional's instructions.       When should I call my doctor?  If you are worsening or not improving, please, contact us or seek urgent care as noted below.     Who should I call with questions (adults)?  [facility] (adult and pediatric): [phone]  [facility] (adult): [phone]  [facility] ([facility] [place]) [phone]  For urgent needs outside of business hours call the [facility] at [phone] and ask for the dermatology resident on call to be paged  If this is a medical emergency and you are unable to reach an ER, Call [phone]      If you need a prescription refill, please contact your pharmacy. Refills are approved or denied by our Physicians during normal business hours, Monday through Fridays  Per office policy, refills will not be granted if you have not been seen within the past year (or sooner depending on your child's condition)

## 2024-05-16 NOTE — LETTER
5/16/2024         RE: Concha Ma  [address]  [facility] [address]        Dear Colleague,    Thank you for referring your patient, Concha Ma, to the [facility]. Please see a copy of my visit note below.    [facility] Dermatology Note  Encounter Date: May 16, 2024  Office Visit     Reviewed patients past medical history and pertinent chart review prior to patients visit today.     Dermatology Problem List:  0. NUB left medial cheek and right nasal sidewall, shave biopsy 05/16/24 .   1. Hx NMSC              - chemoprevention: niacinamide 500 mg PO BID (reported that she takes sporatically)              - sBCC, R chest, s/p ED&C 4/21/21              - SCCis, L lateral cheek, s/p MMS 1/4/2021              - BCC, R arm, s/p excision 1/4/2021              - BCC, L forehead, s/p bx 10/24/19, s/p MMS 11/5/19                  - BCC, central chest              - SCC, right upper cutaneous lip, s/p MMS 2014  2. AKs. LiqN2.  3. SKs, lentigenes, dermal nevi.   ____________________________________________    Assessment & Plan:     # History of NMSC, Well healed scar without signs of recurrent malignancy.     # Neoplasm of uncertain behavior:  left medial cheek and right nasal sidewall  DDx includes BCC vs SCC. Shave biopsy today.    Procedure Note: Biopsy by shave technique  The risks and benefits of the procedure were described to the patient. These include but are not limited to bleeding, infection, scar, incomplete removal, and non-diagnostic biopsy. Verbal informed consent was obtained. The above site(s) was cleansed with an alcohol pad and injected with 1% lidocaine with epinephrine. Once anesthesia was obtained, a biopsy(ies) was performed with Gilette blade. The tissue(s) was placed in a labeled container(s) with formalin and sent to pathology. Hemostasis was achieved with aluminum chloride. Vaseline and a bandage were applied to the wound(s). The patient  tolerated the procedure well and was given post biopsy care instructions.     # Actinic keratosis, face. Premalignant nature discussed with patient. Treatment options discussed with patient today including no treatment, topical treatment, and cryotherapy. Patient elects to treat visible lesions today with cryotherapy. After verbal consent and discussion of risks and benefits including but no limited to dyspigmentation/scar, blister, and pain. A total of 4 actinic keratoses were treated with 1-2mm freeze border for 2 cycle with liquid nitrogen. Post cryotherapy instructions were provided.     # Cyst. Benign, no further treatment needed. Discussed excision if patient is bothered by the lesion due to irritation. Patient declines referral for removal    # Benign skin findings including: seborrheic keratoses, cherry angioma, lentigines and benign nevi.   - No further intervention required. Patient to report changes.   - Patient reassured of the benign nature of these lesions.    #Signs and Symptoms of non-melanoma skin cancer and ABCDEs of melanoma reviewed with patient. Patient encouraged to perform monthly self skin exams and educated on how to perform them. UV precautions reviewed with patient. Patient was asked about new or changing moles/lesions on body.     #Reviewed Sunscreen: Apply 20 minutes prior to going outdoors and reapply every two hours, when wet or sweating. We recommend using an SPF 30 or higher, and to use one that is water resistant.       Follow-up:  6-12 months for follow up full body skin exam, prn for new or changing lesions or new concerns    Radha Mccord CNP  Dermatology     ____________________________________________    CC: Skin Check (Strong HX of Skin Cancer/Lesion to left cheek that TX's with cryo but remains/Cyst like growth to back of neck)    HPI:  Ms. Concha Ma is a(n) 78 year old female who presents today as a return patient for a full body skin cancer screening. Patient has  concerns today about a spot on her left cheek that hasn't been healing and a cyst at the back of the neck.     Patient is otherwise feeling well, without additional skin concerns.     Physical Exam:  Vitals: There were no vitals taken for this visit.  SKIN: Total skin excluding the genitalia areas was performed. The exam included the head/face, neck, both arms, chest, back, abdomen, both legs, digits, mons pubis, buttock and nails.   -right posterior neck, about a 1 cm white firm papule with central punctum  -right nasal sidewall, 3 mm pink shiny papule with arborizing vessels  -left medial cheek, 6 mm pink papule with central erosion and vessels at periphery  -left cheek x2, left chin, mid upper forehead, erythematous macules/thin papules with overlying adherent scale   -several 1-2mm red dome shaped symmetric papules scattered on the trunk  -multiple tan/brown flat round macules and raised papules scattered throughout trunk, extremities and head. No worrisome features for malignancy noted on examination.  -scattered tan, homogenous macules scattered on sun exposed areas of trunk, extremities and face.   -scattered waxy, stuck on tan/brown papules and patches on the trunk     - No other lesions of concern on areas examined.           Medications:  Current Outpatient Medications   Medication Sig Dispense Refill     metroNIDAZOLE (METROCREAM) 0.75 % external cream Apply topically daily as needed (rosacea) 45 g 2     atorvastatin (LIPITOR) 80 MG tablet Take 1 tablet (80 mg) by mouth daily 90 tablet 3     butalbital-aspirin-caffeine (FIORINAL) -40 MG per capsule Take 1 capsule by mouth daily as needed for headaches 30 capsule 1     COMPRESSION STOCKINGS 2 each daily 2 each 1     IBUPROFEN PO Take 600 mg by mouth every 6 hours as needed for moderate pain       metroNIDAZOLE (METROGEL) 0.75 % external gel Apply twice daily as needed to face for rosacea. (Patient not taking: Reported on 5/16/2024) 45 g 11      "naproxen sodium (ANAPROX) 220 MG tablet Take 220 mg by mouth 2 times daily (with meals)       VITAMIN D, CHOLECALCIFEROL, PO Take 2,000 Units by mouth daily as needed (Patient not taking: Reported on 5/13/2024)       No current facility-administered medications for this visit.      Past Medical History:   Patient Active Problem List   Diagnosis     Skin cancer, basal cell     Hyperlipidemia     Classical migraine     Insomnia     Arthritis of knee     Hemorrhoids     Vasovagal syncope     BCC (basal cell carcinoma), face     Swelling of lower extremity     S/P total knee arthroplasty     Vertigo, benign positional, bilateral     Status post total right knee replacement     Osteopenia     Disorder of bone and cartilage     Venous (peripheral) insufficiency     Venous stasis dermatitis     Past Medical History:   Diagnosis Date     Anxiety 2019     Arthritis of knee     right     Basal cell carcinoma      BPPV (benign paroxysmal positional vertigo)      Cataract      Depressive disorder 1985     Disorder of bone and cartilage     (osteopenia)     Hemorrhoids      History of PID      Hyperlipidemia      Insomnia      Migraines 1980s     Migraines, classic     sparkly aura     Osteopenia      Peripheral vascular disease (H24) 2018     Plantar fasciitis, bilateral      PONV (postoperative nausea and vomiting)      Skin cancer, basal cell      Squamous cell carcinoma      Swelling of the ankle, feet, or leg      Vasovagal syncope     is \"fainter\" with blood draws, injections-NEEDS TO BE LAYING DOWN       CC Referred Self, MD  No address on file on close of this encounter.      Again, thank you for allowing me to participate in the care of your patient.        Sincerely,        VANESA Crews CNP  "

## 2024-05-18 ENCOUNTER — LAB (OUTPATIENT)
Dept: LAB | Facility: CLINIC | Age: 79
End: 2024-05-18
Payer: COMMERCIAL

## 2024-05-18 DIAGNOSIS — Z00.00 ENCOUNTER FOR MEDICARE ANNUAL WELLNESS EXAM: ICD-10-CM

## 2024-05-18 LAB
ALBUMIN SERPL BCG-MCNC: 4.6 G/DL (ref 3.5–5.2)
ALP SERPL-CCNC: 75 U/L (ref 40–150)
ALT SERPL W P-5'-P-CCNC: 19 U/L (ref 0–50)
ANION GAP SERPL CALCULATED.3IONS-SCNC: 10 MMOL/L (ref 7–15)
AST SERPL W P-5'-P-CCNC: 23 U/L (ref 0–45)
BASOPHILS # BLD AUTO: 0.1 10E3/UL (ref 0–0.2)
BASOPHILS NFR BLD AUTO: 1 %
BILIRUB SERPL-MCNC: 0.7 MG/DL
BUN SERPL-MCNC: 29.6 MG/DL (ref 8–23)
CALCIUM SERPL-MCNC: 9.9 MG/DL (ref 8.8–10.2)
CHLORIDE SERPL-SCNC: 109 MMOL/L (ref 98–107)
CHOLEST SERPL-MCNC: 165 MG/DL
CREAT SERPL-MCNC: 0.91 MG/DL (ref 0.51–0.95)
DEPRECATED HCO3 PLAS-SCNC: 24 MMOL/L (ref 22–29)
EGFRCR SERPLBLD CKD-EPI 2021: 65 ML/MIN/1.73M2
EOSINOPHIL # BLD AUTO: 0.2 10E3/UL (ref 0–0.7)
EOSINOPHIL NFR BLD AUTO: 3 %
ERYTHROCYTE [DISTWIDTH] IN BLOOD BY AUTOMATED COUNT: 13.6 % (ref 10–15)
FASTING STATUS PATIENT QL REPORTED: YES
FASTING STATUS PATIENT QL REPORTED: YES
GLUCOSE SERPL-MCNC: 89 MG/DL (ref 70–99)
HCT VFR BLD AUTO: 44.3 % (ref 35–47)
HDLC SERPL-MCNC: 53 MG/DL
HGB BLD-MCNC: 14.6 G/DL (ref 11.7–15.7)
IMM GRANULOCYTES # BLD: 0 10E3/UL
IMM GRANULOCYTES NFR BLD: 0 %
LDLC SERPL CALC-MCNC: 96 MG/DL
LYMPHOCYTES # BLD AUTO: 2.2 10E3/UL (ref 0.8–5.3)
LYMPHOCYTES NFR BLD AUTO: 38 %
MCH RBC QN AUTO: 30 PG (ref 26.5–33)
MCHC RBC AUTO-ENTMCNC: 33 G/DL (ref 31.5–36.5)
MCV RBC AUTO: 91 FL (ref 78–100)
MONOCYTES # BLD AUTO: 0.5 10E3/UL (ref 0–1.3)
MONOCYTES NFR BLD AUTO: 9 %
NEUTROPHILS # BLD AUTO: 2.9 10E3/UL (ref 1.6–8.3)
NEUTROPHILS NFR BLD AUTO: 49 %
NONHDLC SERPL-MCNC: 112 MG/DL
PLATELET # BLD AUTO: 251 10E3/UL (ref 150–450)
POTASSIUM SERPL-SCNC: 5 MMOL/L (ref 3.4–5.3)
PROT SERPL-MCNC: 6.9 G/DL (ref 6.4–8.3)
RBC # BLD AUTO: 4.86 10E6/UL (ref 3.8–5.2)
SODIUM SERPL-SCNC: 143 MMOL/L (ref 135–145)
TRIGL SERPL-MCNC: 79 MG/DL
TSH SERPL DL<=0.005 MIU/L-ACNC: 1.31 UIU/ML (ref 0.3–4.2)
WBC # BLD AUTO: 5.9 10E3/UL (ref 4–11)

## 2024-05-18 PROCEDURE — 80053 COMPREHEN METABOLIC PANEL: CPT

## 2024-05-18 PROCEDURE — 36415 COLL VENOUS BLD VENIPUNCTURE: CPT

## 2024-05-18 PROCEDURE — 84443 ASSAY THYROID STIM HORMONE: CPT | Mod: GZ

## 2024-05-18 PROCEDURE — 80061 LIPID PANEL: CPT

## 2024-05-18 PROCEDURE — 85025 COMPLETE CBC W/AUTO DIFF WBC: CPT

## 2024-05-19 NOTE — RESULT ENCOUNTER NOTE
Your thyroid, Lipids, complete blood count including hemoglobin,white cell count for infection, kidney, liver, blood sugar,calcium, sodium and potassium were normal or within acceptable range.   Best wishes,  Ko Yuan MD

## 2024-05-21 LAB
PATH REPORT.COMMENTS IMP SPEC: ABNORMAL
PATH REPORT.COMMENTS IMP SPEC: ABNORMAL
PATH REPORT.COMMENTS IMP SPEC: YES
PATH REPORT.FINAL DX SPEC: ABNORMAL
PATH REPORT.GROSS SPEC: ABNORMAL
PATH REPORT.MICROSCOPIC SPEC OTHER STN: ABNORMAL
PATH REPORT.RELEVANT HX SPEC: ABNORMAL

## 2024-05-22 ENCOUNTER — TELEPHONE (OUTPATIENT)
Dept: DERMATOLOGY | Facility: CLINIC | Age: 79
End: 2024-05-22
Payer: COMMERCIAL

## 2024-05-22 DIAGNOSIS — C44.91 BCC (BASAL CELL CARCINOMA OF SKIN): Primary | ICD-10-CM

## 2024-05-22 NOTE — TELEPHONE ENCOUNTER
Spoke with patient: notified and educated on test results.     Mohs procedure explained and all questions answered.    Appointment scheduled on Thursday August 8th, 2024 at 8:15 AM at  derm.      MOHS information mailed.    Cryo appt scheduled for sept 2024 (patient wanted to wait until after labor day)    Patient expressed understanding.     Citlalli GALINDO RN, BSN  [facility]  [phone]

## 2024-05-22 NOTE — TELEPHONE ENCOUNTER
----- Message from VANESA Stevens CNP sent at 5/22/2024  9:28 AM CDT -----  Please call and schedule a Mohs surgery for the left cheek, and a follow-up appointment with me to treat that AK on the nose in 2 to 3 months     Oracio Kern,     The biopsy on your nose showed a precancerous lesion called an actinic keratoses.  We should schedule another appointment with me to have this treated with liquid nitrogen after it is healed.  We can do this in about 2 to 3 months, I will have my nurse reach out to you to schedule that appointment.     The biopsy from the left cheek was in fact a basal cell skin cancer. I will refer you to our surgeons for MOHS surgery. One of their nurses will reach out to you to schedule this shortly. Let me know if you have any other questions.     Ways to reduce your risk of more skin cancer are to wear clothing and sunscreen when outdoors, and using a daily facial moisturizer with at least SPF 30 or higher on a daily basis on your face, ears and neck. Some examples of these are Cera Ve AM facial moisturizing lotion, Eucerin daily protectant facial lotion, La Roche Possay ultra light sunscreen fluid but there are many option at drug stores, [employer] etc.     Radha Mccord CNP  Dermatology   Written by VANESA Stevens CNP on 5/22/2024  9:28 AM CDT

## 2024-05-29 ENCOUNTER — ALLIED HEALTH/NURSE VISIT (OUTPATIENT)
Dept: INTERNAL MEDICINE | Facility: CLINIC | Age: 79
End: 2024-05-29
Payer: COMMERCIAL

## 2024-05-29 ENCOUNTER — ANCILLARY PROCEDURE (OUTPATIENT)
Dept: MAMMOGRAPHY | Facility: CLINIC | Age: 79
End: 2024-05-29
Attending: FAMILY MEDICINE
Payer: COMMERCIAL

## 2024-05-29 DIAGNOSIS — Z12.31 VISIT FOR SCREENING MAMMOGRAM: ICD-10-CM

## 2024-05-29 DIAGNOSIS — H61.23 EXCESSIVE CERUMEN IN EAR CANAL, BILATERAL: Primary | ICD-10-CM

## 2024-05-29 PROCEDURE — 77067 SCR MAMMO BI INCL CAD: CPT | Performed by: RADIOLOGY

## 2024-05-29 PROCEDURE — 69209 REMOVE IMPACTED EAR WAX UNI: CPT | Mod: 50

## 2024-05-29 PROCEDURE — 77063 BREAST TOMOSYNTHESIS BI: CPT | Performed by: RADIOLOGY

## 2024-05-29 NOTE — PROGRESS NOTES
Concha Ma presents in the primary clinic today at the request of Dr Yuan for an ear wash. The patient's ears were assessed for cerumen. After this, an ear wash was performed in which a considerate amount of impacted cerumen was removed from both ears. After the ear wash, the tympanic membrane was visible. The ear wash was provided today under the supervision of Dr Yuan who was present if help was needed.    Brigida Apodaca, EMT at 10:54 AM on 5/29/2024.  Primary care clinic: [phone]

## 2024-08-05 DIAGNOSIS — E78.2 MIXED HYPERLIPIDEMIA: ICD-10-CM

## 2024-08-05 DIAGNOSIS — E78.5 HYPERLIPIDEMIA LDL GOAL <70: ICD-10-CM

## 2024-08-08 ENCOUNTER — OFFICE VISIT (OUTPATIENT)
Dept: DERMATOLOGY | Facility: CLINIC | Age: 79
End: 2024-08-08
Payer: COMMERCIAL

## 2024-08-08 DIAGNOSIS — D23.9 DERMAL NEVUS: ICD-10-CM

## 2024-08-08 DIAGNOSIS — C44.319 BASAL CELL CARCINOMA (BCC) OF LEFT CHEEK: Primary | ICD-10-CM

## 2024-08-08 DIAGNOSIS — L82.1 SEBORRHEIC KERATOSES: ICD-10-CM

## 2024-08-08 DIAGNOSIS — L81.4 LENTIGO: ICD-10-CM

## 2024-08-08 DIAGNOSIS — D18.01 ANGIOMA OF SKIN: ICD-10-CM

## 2024-08-08 PROCEDURE — 13132 CMPLX RPR F/C/C/M/N/AX/G/H/F: CPT | Mod: 59 | Performed by: DERMATOLOGY

## 2024-08-08 PROCEDURE — 17311 MOHS 1 STAGE H/N/HF/G: CPT | Performed by: DERMATOLOGY

## 2024-08-08 PROCEDURE — 11102 TANGNTL BX SKIN SINGLE LES: CPT | Mod: 59 | Performed by: DERMATOLOGY

## 2024-08-08 PROCEDURE — 88331 PATH CONSLTJ SURG 1 BLK 1SPC: CPT | Mod: 59 | Performed by: DERMATOLOGY

## 2024-08-08 PROCEDURE — 99213 OFFICE O/P EST LOW 20 MIN: CPT | Mod: 25 | Performed by: DERMATOLOGY

## 2024-08-08 NOTE — LETTER
"8/8/2024      Concha Ma  [address]  [facility] [address]      Dear Colleague,    Thank you for referring your patient, Concha Ma, to the [facility]. Please see a copy of my visit note below.    Surgical Office Location:  [facility]  [address] [place] [address]      Concha Ma , a 78 year old year old female patient, I was asked to see by SHAKA Mccord for basal cell carcinoma on left cheek.  She notes spot on left inferior cheek treated with cryo and has not healed.  Patient has no other skin complaints today.  Remainder of the HPI, Meds, PMH, Allergies, FH, and SH was reviewed in chart.      Past Medical History:   Diagnosis Date     Anxiety 2019     Arthritis of knee     right     Basal cell carcinoma      BPPV (benign paroxysmal positional vertigo)      Cataract      Depressive disorder 1985     Disorder of bone and cartilage     (osteopenia)     Hemorrhoids      History of PID      Hyperlipidemia      Insomnia      Migraines 1980s     Migraines, classic     sparkly aura     Osteopenia      Peripheral vascular disease (H24) 2018     Plantar fasciitis, bilateral      PONV (postoperative nausea and vomiting)      Skin cancer, basal cell      Squamous cell carcinoma      Swelling of the ankle, feet, or leg      Vasovagal syncope     is \"fainter\" with blood draws, injections-NEEDS TO BE LAYING DOWN       Past Surgical History:   Procedure Laterality Date     ARTHROPLASTY KNEE Right 04/13/2018    Procedure: ARTHROPLASTY KNEE;  Right Total Knee Replacement;  Surgeon: Apollo Nguyen MD;  Location: [facility] OR     CATARACT IOL, RT/LT       COLONOSCOPY N/A 09/20/2022    Procedure: COLONOSCOPY, WITH POLYPECTOMY;  Surgeon: Emery Sepulveda MD;  Location: [facility] OR     JOINT REPLACEMENT  Knee 2018     LASIK BILATERAL       MOHS MICROGRAPHIC PROCEDURE       ZZC NONSPECIFIC PROCEDURE      laparoscopy,     ZZC NONSPECIFIC PROCEDURE      " laser surg basal cell skin cancer     ZZC STOMACH SURGERY PROCEDURE UNLISTED      laparoscopy for infertility        Family History   Problem Relation Age of Onset     Neurologic Disorder Mother         headaches/narcolepsy     Cancer Mother         Skin     Cerebrovascular Disease Mother         muti focal inracts to brain     Thyroid Disease Mother         hypothyroid     Seizure Disorder Mother         narcolepsy     Migraines Mother      Dementia Mother      Depression Mother      Skin Cancer Mother      Cancer Father         Skin, lung     C.A.D. Father         MI     Coronary Artery Disease Father      Cerebrovascular Disease Father         ,, ,     Alcoholism Father      Lung Cancer Father      Skin Cancer Father      Hypertension Sister      Thyroid Disease Sister      Migraines Sister      Depression Sister      Hypertension Sister      Hyperlipidemia Sister      Obesity Sister      Chronic Obstructive Pulmonary Disease Brother      Substance Abuse Brother         heroin     Coronary Artery Disease Brother         Bipass     Hyperlipidemia Brother      Alcoholism Brother      Cancer Brother         Skin     Heart Disease Brother      Obesity Brother      Substance Abuse Brother      Skin Cancer Brother      Substance Abuse Brother      No Known Problems Son      Substance Abuse Niece      Uterine Cancer Niece        Social History     Socioeconomic History     Marital status:      Spouse name: Ignacio     Number of children: 1     Years of education: Not on file     Highest education level: 12th grade   Occupational History     Occupation: [employer] tech     Employer: [employer]   Tobacco Use     Smoking status: Never     Smokeless tobacco: Never   Vaping Use     Vaping status: Never Used   Substance and Sexual Activity     Alcohol use: Yes     Alcohol/week: 1.0 - 2.0 standard drink of alcohol     Types: 1 - 2 Standard drinks or equivalent per week     Comment: once/wk or less     Drug use: No      Sexual activity: Not Currently     Partners: Male     Birth control/protection: Post-menopausal   Other Topics Concern     Parent/sibling w/ CABG, MI or angioplasty before 65F 55M? Not Asked   Social History Narrative    Social History: Retired in healthcare  EEG tech, , remarried to Ignacio 4/2021  One son Clinton Billy. Two grandchildren Rebekah 2010, Jon 2008     Social Determinants of Health     Financial Resource Strain: Low Risk  (5/13/2024)    Financial Resource Strain      Within the past 12 months, have you or your family members you live with been unable to get utilities (heat, electricity) when it was really needed?: No   Food Insecurity: Low Risk  (5/13/2024)    Food Insecurity      Within the past 12 months, did you worry that your food would run out before you got money to buy more?: No      Within the past 12 months, did the food you bought just not last and you didn t have money to get more?: No   Transportation Needs: Low Risk  (5/13/2024)    Transportation Needs      Within the past 12 months, has lack of transportation kept you from medical appointments, getting your medicines, non-medical meetings or appointments, work, or from getting things that you need?: No   Physical Activity: Insufficiently Active (5/13/2024)    Exercise Vital Sign      Days of Exercise per Week: 3 days      Minutes of Exercise per Session: 30 min   Stress: Stress Concern Present (5/13/2024)    [employer] - Occupational Stress Questionnaire      Feeling of Stress : To some extent   Social Connections: Unknown (5/13/2024)    Social Connection and Isolation Panel [NHANES]      Frequency of Communication with Friends and Family: Not on file      Frequency of Social Gatherings with Friends and Family: Twice a week      Attends Christian Services: Not on file      Active Member of Clubs or Organizations: Not on file      Attends Club or Organization Meetings: Not on file      Marital  Status: Not on file   Interpersonal Safety: Low Risk  (5/13/2024)    Interpersonal Safety      Do you feel physically and emotionally safe where you currently live?: Yes      Within the past 12 months, have you been hit, slapped, kicked or otherwise physically hurt by someone?: No      Within the past 12 months, have you been humiliated or emotionally abused in other ways by your partner or ex-partner?: No   Housing Stability: Low Risk  (5/13/2024)    Housing Stability      Do you have housing? : Yes      Are you worried about losing your housing?: No       Outpatient Encounter Medications as of 8/8/2024   Medication Sig Dispense Refill     atorvastatin (LIPITOR) 80 MG tablet Take 1 tablet (80 mg) by mouth daily 90 tablet 3     butalbital-aspirin-caffeine (FIORINAL) -40 MG per capsule Take 1 capsule by mouth daily as needed for headaches 30 capsule 1     COMPRESSION STOCKINGS 2 each daily 2 each 1     IBUPROFEN PO Take 600 mg by mouth every 6 hours as needed for moderate pain       metroNIDAZOLE (METROCREAM) 0.75 % external cream Apply topically daily as needed (rosacea) 45 g 2     metroNIDAZOLE (METROGEL) 0.75 % external gel Apply twice daily as needed to face for rosacea. (Patient not taking: Reported on 5/16/2024) 45 g 11     naproxen sodium (ANAPROX) 220 MG tablet Take 220 mg by mouth 2 times daily (with meals)       VITAMIN D, CHOLECALCIFEROL, PO Take 2,000 Units by mouth daily as needed (Patient not taking: Reported on 5/13/2024)       No facility-administered encounter medications on file as of 8/8/2024.             Review Of Systems  Skin: As above  Eyes: negative  Ears/Nose/Throat: negative  Respiratory: No shortness of breath, dyspnea on exertion, cough, or hemoptysis  Cardiovascular: negative  Gastrointestinal: negative  Genitourinary: negative  Musculoskeletal: negative  Neurologic: negative  Psychiatric: negative  Hematologic/Lymphatic/Immunologic: negative  Endocrine: negative      O:   NAD,  WDWN, Alert & Oriented, Mood & Affect wnl, Vitals stable   General appearance gm ii   Vitals stable   Alert, oriented and in no acute distress     L inf cheek 4mm scaly papule   L medial cheek 7mm scaly papule    Stuck on papules and brown macules on trunk and ext    Red papules on trunk   Flesh colored papules on trunk          Eyes: Conjunctivae/lids:Normal     ENT: Lips, mucosa: normal    MSK:Normal    Cardiovascular: peripheral edema none    Pulm: Breathing Normal    Lymph Nodes: No Head and Neck Lymphadenopathy     Neuro/Psych: Orientation:Normal; Mood/Affect:Normal      MICRO:   L inf cheek:Sharply demarcated exophytic epidermal growth composed of sheets of small cells basaloid cells with variable melanin pigmentation.  There are keratin-filled cysts throughout.  The dermis is overall unremarkable with a bland monomorphic inflammatory infiltrate.      A/P:  1. Seborrheic keratosis, lentigo, angioma, dermal nevus  2. L inferior cheek r/o basal cell carcinoma   TANGENTIAL BIOPSY IN HOUSE:  After consent, anesthesia with LEC and prep, tangential excision performed and dx above confirmed with frozen section histology.  No complications and routine wound care.  Patient is not on  anticoagulants and risk of bleeding discussed with patient.       I have personally reviewed all specimens and/or slides and used them with my medical judgement to determine or confirm the final diagnosis.     Patient told result inflamed seborrheic keratosis No further treatment  .      2. L medial cheek basal cell carcinoma   It was a pleasure speaking to Concha Ma today.  Previous clinic  notes and pertinent laboratory tests were reviewed prior to Concha Ma's visit.  Signs and Symptoms of skin cancer discussed with patient.  Patient encouraged to perform monthly skin exams.  UV precautions reviewed with patient.  Risks of non-melanoma skin cancer discussed with patient   Return to clinic 6 months    PROCEDURE NOTE  L  medial cheek basal cell carcinoma   MOHS:   Location    The rationale for Mohs surgery was discussed with the patient and consent was obtained.  The risks and benefits as well as alternatives to therapy were discussed, in detail.  Specifically, the risks of infection, scarring, bleeding, prolonged wound healing, incomplete removal, allergy to anesthesia, nerve injury and recurrence were addressed.  Indication for Mohs was Location. Prior to the procedure, the treatment site was clearly identified and, if available, confirmed with previous photos and confirmed by the patient   All components of the Universal Protocol/PAUSE rule were completed.  The Mohs surgeon operated in two distinct and integrated capacities as the surgeon and pathologist.      The area was prepped with Betasept.  A rim of normal appearing skin was marked circumferentially around the lesion.  The area was infiltrated with local anesthesia.  The tumor was first debulked to remove all clinically apparent tumor.  An incision following the standard Mohs approach was done and the specimen was oriented,mapped and placed in 1 block(s).  Each specimen was then chromacoded and processed in the Mohs laboratory using standard Mohs technique and submitted for frozen section histology.  Frozen section analysis showed no residual tumor but CLEAR MARGINS.      The tumor was excised using standard Mohs technique in 1 stages(s).  CLEAR MARGINS OBTAINED and Final defect size was 1.2 cm.     We discussed the options for wound management in full with the patient including risks/benefits/ possible outcomes.      REPAIR COMPLEX: Because of the tightness of the surrounding skin and Because of the size and full thickness nature of the defect, Because of the tightness of the surrounding skin, To maintain form and function, In order to avoid distortion, and Because of the proximity to the lid, a complex closure was planned. After LE anesthesia and prep, Burow's triangles  were excised in the relaxed skin tension lines. The wound edges were widely undermined greater than width of the defect on both sides ) by dissection in the subcutaneous plane until adequate tissue mobility was obtained. Hemostasis was obtained. The wound edges were closed in a layered fashion using Vicryl and Fast Absorbing Plain Gut sutures. Postoperative length was 4.2 cm.   EBL minimal; complications none; wound care routine.  The patient was discharged in good condition and will return in one week for wound evaluation.        Again, thank you for allowing me to participate in the care of your patient.        Sincerely,        Raymond Milligan MD

## 2024-08-08 NOTE — PATIENT INSTRUCTIONS
Sutured Wound Care     [facility]: [phone]    [facility]: [phone]          No strenuous activity for 48 hours. Resume moderate activity in 48 hours. No heavy exercising until you are seen for follow up in one week.     Take Tylenol as needed for discomfort.                         Do not drink alcoholic beverages for 48 hours.     Keep the pressure bandage in place for 24 hours. If the bandage becomes blood tinged or loose, reinforce it with gauze and tape.        (Refer to the reverse side of this page for management of bleeding).    Remove pressure bandage in 24 hours     Leave the flat bandage in place until your follow up appointment.    Keep the bandage dry. Wash around it carefully.    If the tape becomes soiled or starts to come off, reinforce it with additional paper tape.    Do not smoke for 3 weeks; smoking is detrimental to wound healing.    It is normal to have swelling and bruising around the surgical site. The bruising will fade in approximately 10-14 days. Elevate the area to reduce swelling.    Numbness, itchiness and sensitivity to temperature changes can occur after surgery and may take up to 18 months to normalize.      POSSIBLE COMPLICATIONS    BLEEDING:    Leave the bandage in place.  Use tightly rolled up gauze or a cloth to apply direct pressure over the bandage for 20   minutes.  Reapply pressure for an additional 20 minutes if necessary  Call the office or go to the nearest emergency room if pressure fails to stop the bleeding.  Use additional gauze and tape to maintain pressure once the bleeding has stopped.        PAIN:    Post operative pain should slowly get better, never worse.  A severe increase in pain may indicate a problem. Call the office if this occurs.    In case of emergency phone:Dr Milligan [phone]

## 2024-08-08 NOTE — PROGRESS NOTES
"Concha Ma , a 78 year old year old female patient, I was asked to see by HSAKA Mccord for basal cell carcinoma on left cheek.  She notes spot on left inferior cheek treated with cryo and has not healed.  Patient has no other skin complaints today.  Remainder of the HPI, Meds, PMH, Allergies, FH, and SH was reviewed in chart.      Past Medical History:   Diagnosis Date    Anxiety 2019    Arthritis of knee     right    Basal cell carcinoma     BPPV (benign paroxysmal positional vertigo)     Cataract     Depressive disorder 1985    Disorder of bone and cartilage     (osteopenia)    Hemorrhoids     History of PID     Hyperlipidemia     Insomnia     Migraines 1980s    Migraines, classic     sparkly aura    Osteopenia     Peripheral vascular disease (H24) 2018    Plantar fasciitis, bilateral     PONV (postoperative nausea and vomiting)     Skin cancer, basal cell     Squamous cell carcinoma     Swelling of the ankle, feet, or leg     Vasovagal syncope     is \"fainter\" with blood draws, injections-NEEDS TO BE LAYING DOWN       Past Surgical History:   Procedure Laterality Date    ARTHROPLASTY KNEE Right 04/13/2018    Procedure: ARTHROPLASTY KNEE;  Right Total Knee Replacement;  Surgeon: Apollo Nguyen MD;  Location: [facility] OR    CATARACT IOL, RT/LT      COLONOSCOPY N/A 09/20/2022    Procedure: COLONOSCOPY, WITH POLYPECTOMY;  Surgeon: Emery Sepulveda MD;  Location: [facility] OR    JOINT REPLACEMENT  Knee 2018    LASIK BILATERAL      MOHS MICROGRAPHIC PROCEDURE      ZZC NONSPECIFIC PROCEDURE      laparoscopy,    ZZC NONSPECIFIC PROCEDURE      laser surg basal cell skin cancer    ZZC STOMACH SURGERY PROCEDURE UNLISTED      laparoscopy for infertility        Family History   Problem Relation Age of Onset    Neurologic Disorder Mother         headaches/narcolepsy    Cancer Mother         Skin    Cerebrovascular Disease Mother         muti focal inracts to brain    Thyroid Disease Mother         hypothyroid    Seizure " Disorder Mother         narcolepsy    Migraines Mother     Dementia Mother     Depression Mother     Skin Cancer Mother     Cancer Father         Skin, lung    C.A.D. Father         MI    Coronary Artery Disease Father     Cerebrovascular Disease Father         ,, ,    Alcoholism Father     Lung Cancer Father     Skin Cancer Father     Hypertension Sister     Thyroid Disease Sister     Migraines Sister     Depression Sister     Hypertension Sister     Hyperlipidemia Sister     Obesity Sister     Chronic Obstructive Pulmonary Disease Brother     Substance Abuse Brother         heroin    Coronary Artery Disease Brother         Bipass    Hyperlipidemia Brother     Alcoholism Brother     Cancer Brother         Skin    Heart Disease Brother     Obesity Brother     Substance Abuse Brother     Skin Cancer Brother     Substance Abuse Brother     No Known Problems Son     Substance Abuse Niece     Uterine Cancer Niece        Social History     Socioeconomic History    Marital status:      Spouse name: Ignacio    Number of children: 1    Years of education: Not on file    Highest education level: 12th grade   Occupational History    Occupation: EEG tech     Employer: [employer]   Tobacco Use    Smoking status: Never    Smokeless tobacco: Never   Vaping Use    Vaping status: Never Used   Substance and Sexual Activity    Alcohol use: Yes     Alcohol/week: 1.0 - 2.0 standard drink of alcohol     Types: 1 - 2 Standard drinks or equivalent per week     Comment: once/wk or less    Drug use: No    Sexual activity: Not Currently     Partners: Male     Birth control/protection: Post-menopausal   Other Topics Concern    Parent/sibling w/ CABG, MI or angioplasty before 65F 55M? Not Asked   Social History Narrative    Social History: Retired in healthcare  EEG tech, , remarried to Ignacio 4/2021  One son Clinton Billy. Two grandchildren Rebekah 2010, Jon 2008     Social Determinants of Health     Financial  Resource Strain: Low Risk  (5/13/2024)    Financial Resource Strain     Within the past 12 months, have you or your family members you live with been unable to get utilities (heat, electricity) when it was really needed?: No   Food Insecurity: Low Risk  (5/13/2024)    Food Insecurity     Within the past 12 months, did you worry that your food would run out before you got money to buy more?: No     Within the past 12 months, did the food you bought just not last and you didn t have money to get more?: No   Transportation Needs: Low Risk  (5/13/2024)    Transportation Needs     Within the past 12 months, has lack of transportation kept you from medical appointments, getting your medicines, non-medical meetings or appointments, work, or from getting things that you need?: No   Physical Activity: Insufficiently Active (5/13/2024)    Exercise Vital Sign     Days of Exercise per Week: 3 days     Minutes of Exercise per Session: 30 min   Stress: Stress Concern Present (5/13/2024)    [employer] [place] [employer] - Occupational Stress Questionnaire     Feeling of Stress : To some extent   Social Connections: Unknown (5/13/2024)    Social Connection and Isolation Panel [NHANES]     Frequency of Communication with Friends and Family: Not on file     Frequency of Social Gatherings with Friends and Family: Twice a week     Attends Shinto Services: Not on file     Active Member of Clubs or Organizations: Not on file     Attends Club or Organization Meetings: Not on file     Marital Status: Not on file   Interpersonal Safety: Low Risk  (5/13/2024)    Interpersonal Safety     Do you feel physically and emotionally safe where you currently live?: Yes     Within the past 12 months, have you been hit, slapped, kicked or otherwise physically hurt by someone?: No     Within the past 12 months, have you been humiliated or emotionally abused in other ways by your partner or ex-partner?: No   Housing Stability: Low Risk   (5/13/2024)    Housing Stability     Do you have housing? : Yes     Are you worried about losing your housing?: No       Outpatient Encounter Medications as of 8/8/2024   Medication Sig Dispense Refill    atorvastatin (LIPITOR) 80 MG tablet Take 1 tablet (80 mg) by mouth daily 90 tablet 3    butalbital-aspirin-caffeine (FIORINAL) -40 MG per capsule Take 1 capsule by mouth daily as needed for headaches 30 capsule 1    COMPRESSION STOCKINGS 2 each daily 2 each 1    IBUPROFEN PO Take 600 mg by mouth every 6 hours as needed for moderate pain      metroNIDAZOLE (METROCREAM) 0.75 % external cream Apply topically daily as needed (rosacea) 45 g 2    metroNIDAZOLE (METROGEL) 0.75 % external gel Apply twice daily as needed to face for rosacea. (Patient not taking: Reported on 5/16/2024) 45 g 11    naproxen sodium (ANAPROX) 220 MG tablet Take 220 mg by mouth 2 times daily (with meals)      VITAMIN D, CHOLECALCIFEROL, PO Take 2,000 Units by mouth daily as needed (Patient not taking: Reported on 5/13/2024)       No facility-administered encounter medications on file as of 8/8/2024.             Review Of Systems  Skin: As above  Eyes: negative  Ears/Nose/Throat: negative  Respiratory: No shortness of breath, dyspnea on exertion, cough, or hemoptysis  Cardiovascular: negative  Gastrointestinal: negative  Genitourinary: negative  Musculoskeletal: negative  Neurologic: negative  Psychiatric: negative  Hematologic/Lymphatic/Immunologic: negative  Endocrine: negative      O:   NAD, WDWN, Alert & Oriented, Mood & Affect wnl, Vitals stable   General appearance gm ii   Vitals stable   Alert, oriented and in no acute distress     L inf cheek 4mm scaly papule   L medial cheek 7mm scaly papule    Stuck on papules and brown macules on trunk and ext    Red papules on trunk   Flesh colored papules on trunk          Eyes: Conjunctivae/lids:Normal     ENT: Lips, mucosa: normal    MSK:Normal    Cardiovascular: peripheral edema  none    Pulm: Breathing Normal    Lymph Nodes: No Head and Neck Lymphadenopathy     Neuro/Psych: Orientation:Normal; Mood/Affect:Normal      MICRO:   L inf cheek:Sharply demarcated exophytic epidermal growth composed of sheets of small cells basaloid cells with variable melanin pigmentation.  There are keratin-filled cysts throughout.  The dermis is overall unremarkable with a bland monomorphic inflammatory infiltrate.      A/P:  1. Seborrheic keratosis, lentigo, angioma, dermal nevus  2. L inferior cheek r/o basal cell carcinoma   TANGENTIAL BIOPSY IN HOUSE:  After consent, anesthesia with LEC and prep, tangential excision performed and dx above confirmed with frozen section histology.  No complications and routine wound care.  Patient is not on  anticoagulants and risk of bleeding discussed with patient.       I have personally reviewed all specimens and/or slides and used them with my medical judgement to determine or confirm the final diagnosis.     Patient told result inflamed seborrheic keratosis No further treatment  .      2. L medial cheek basal cell carcinoma   It was a pleasure speaking to Concha Ma today.  Previous clinic  notes and pertinent laboratory tests were reviewed prior to Concha Ma's visit.  Signs and Symptoms of skin cancer discussed with patient.  Patient encouraged to perform monthly skin exams.  UV precautions reviewed with patient.  Risks of non-melanoma skin cancer discussed with patient   Return to clinic 6 months    PROCEDURE NOTE  L medial cheek basal cell carcinoma   MOHS:   Location    The rationale for Mohs surgery was discussed with the patient and consent was obtained.  The risks and benefits as well as alternatives to therapy were discussed, in detail.  Specifically, the risks of infection, scarring, bleeding, prolonged wound healing, incomplete removal, allergy to anesthesia, nerve injury and recurrence were addressed.  Indication for Mohs was Location. Prior to  the procedure, the treatment site was clearly identified and, if available, confirmed with previous photos and confirmed by the patient   All components of the Universal Protocol/PAUSE rule were completed.  The Mohs surgeon operated in two distinct and integrated capacities as the surgeon and pathologist.      The area was prepped with Betasept.  A rim of normal appearing skin was marked circumferentially around the lesion.  The area was infiltrated with local anesthesia.  The tumor was first debulked to remove all clinically apparent tumor.  An incision following the standard Mohs approach was done and the specimen was oriented,mapped and placed in 1 block(s).  Each specimen was then chromacoded and processed in the Mohs laboratory using standard Mohs technique and submitted for frozen section histology.  Frozen section analysis showed no residual tumor but CLEAR MARGINS.      The tumor was excised using standard Mohs technique in 1 stages(s).  CLEAR MARGINS OBTAINED and Final defect size was 1.2 cm.     We discussed the options for wound management in full with the patient including risks/benefits/ possible outcomes.      REPAIR COMPLEX: Because of the tightness of the surrounding skin and Because of the size and full thickness nature of the defect, Because of the tightness of the surrounding skin, To maintain form and function, In order to avoid distortion, and Because of the proximity to the lid, a complex closure was planned. After LE anesthesia and prep, Burow's triangles were excised in the relaxed skin tension lines. The wound edges were widely undermined greater than width of the defect on both sides ) by dissection in the subcutaneous plane until adequate tissue mobility was obtained. Hemostasis was obtained. The wound edges were closed in a layered fashion using Vicryl and Fast Absorbing Plain Gut sutures. Postoperative length was 4.2 cm.   EBL minimal; complications none; wound care routine.  The patient  was discharged in good condition and will return in one week for wound evaluation.

## 2024-08-09 RX ORDER — ATORVASTATIN CALCIUM 80 MG/1
80 TABLET, FILM COATED ORAL DAILY
Qty: 90 TABLET | Refills: 2 | Status: SHIPPED | OUTPATIENT
Start: 2024-08-09

## 2024-08-09 NOTE — TELEPHONE ENCOUNTER
atorvastatin (LIPITOR) 80 MG tablet       Last Written Prescription Date:  5/15/23  Last Fill Quantity: 90,   # refills: 3  Last Office Visit : 5/13/24  Future Office visit:  None    Refilled per protocol    Ame BELTRE RN  P Central Nursing/Red Flag Triage & Med Refill Team

## 2024-08-14 ENCOUNTER — ALLIED HEALTH/NURSE VISIT (OUTPATIENT)
Dept: DERMATOLOGY | Facility: CLINIC | Age: 79
End: 2024-08-14
Payer: COMMERCIAL

## 2024-08-14 DIAGNOSIS — Z48.01 DRESSING CHANGE OR REMOVAL, SURGICAL WOUND: Primary | ICD-10-CM

## 2024-08-14 PROCEDURE — 99207 PR NO CHARGE NURSE ONLY: CPT

## 2024-08-14 NOTE — PATIENT INSTRUCTIONS
WOUND CARE INSTRUCTIONS  for  ONE WEEK AFTER SURGERY          Leave flat bandage on your skin for one week after today s bandage change.  In one week when you remove the bandage, you may resume your regular skin care routine, including washing with mild soap and water, applying moisturizer, make-up and sunscreen.    If there are any open or bleeding areas at the incision/graft site you should begin to cover the area with a bandage daily as follows:    Clean and dry the area with plain tap water using a Q-tip or sterile gauze pad.  Apply Polysporin or Bacitracin ointment to the open area.  Cover the wound with a band-aid or a sterile non-stick gauze pad and micropore paper tape.         SIGNS OF INFECTION  - If you notice any of these signs of infection, call your doctor right away: expanding redness around the wound.  - Yellow or greenish-colored pus or cloudy wound drainage.    - Red streaking spreading from the wound.  - Increased swelling, tenderness, or pain around the wound.   - Fever.    Please remember that yellow and clear drainage from a wound can be normal and related to normal wound healing.  Isolated drainage from a wound without a combination of the above features does not indicate infection.       *Once the bandages are removed, the scar will be red and firm (especially in the lip/chin area). This is normal and will fade in time. It might take 6-12 months for this to happen.     *Massaging the area will help the scar soften and fade quicker. Begin to massage the area one month after the bandages have been removed. To massage apply pressure directly and firmly over the scar with the fingertips and move in a circular motion. Massage the area for a few minutes several times a day. Continue to massage the site for several months.    *Approximately 6-8 weeks after surgery it is not uncommon to see the formation of  tender pimple-like  bump along the scar. This is normal. As the scar continues to mature and  the stitches underneath the skin begin to dissolve, this might occur. Do not pick or squeeze, this will resolve on it s own. Should one break open producing a small amount of drainage, apply Polysporin or Bacitracin ointment a few times a day until the wound is completely healed.    *Numbness in the surgical area is expected. It might take 12-18 months for the feeling to return to normal. During this time sensations of itchiness, tingling and occasional sharp pains might be noted. These feelings are normal and will subside once the nerves have completely healed.         IN CASE OF EMERGENCY: Dr Milligan [phone]     If you were seen in [place] call: [phone]    If you were seen in [place] call: [phone]

## 2024-08-14 NOTE — PROGRESS NOTES
Concha ANA MezaFrancesca comes into clinic today at the request of Dr. Milligan Ordering Provider for Dressing Change   .    This service provided today was under the supervising provider of the day Keeley Medrano PA-C, who was available if needed.    Please see providers note on 8/8/24 for orders  Patient returned to clinic for post surgery 1 week follow up bandage change. Patient has no complaints, denies pain.  Bandage removed from L cheek. Area cleansed with wound cleanser. Site is healing with no signs of infection, wound edges approximating well.   Reapplied new steri strips and paper tape.     Advised to watch for signs and symptons of infection; spreading redness, increasing pain, drainage, odor, fever.   Call or report promptly to clinic if any of these symptoms are present.    Wound care post op instructions given and discussed for 14 day bandage removal and continued incision/scar care.    Patient verbalized understanding.

## 2024-09-05 ENCOUNTER — OFFICE VISIT (OUTPATIENT)
Dept: DERMATOLOGY | Facility: CLINIC | Age: 79
End: 2024-09-05
Payer: COMMERCIAL

## 2024-09-05 DIAGNOSIS — L57.0 AK (ACTINIC KERATOSIS): Primary | ICD-10-CM

## 2024-09-05 PROCEDURE — 99214 OFFICE O/P EST MOD 30 MIN: CPT | Performed by: NURSE PRACTITIONER

## 2024-09-05 RX ORDER — FLUOROURACIL 50 MG/G
CREAM TOPICAL
Qty: 40 G | Refills: 1 | Status: SHIPPED | OUTPATIENT
Start: 2024-09-05

## 2024-09-05 RX ORDER — CALCIPOTRIENE 50 UG/G
CREAM TOPICAL
Qty: 60 G | Refills: 1 | Status: SHIPPED | OUTPATIENT
Start: 2024-09-05

## 2024-09-05 NOTE — LETTER
9/5/2024      Concha Ma  [address]  [facility] [address]      Dear Colleague,    Thank you for referring your patient, Concha Ma, to the [facility]. Please see a copy of my visit note below.    [facility] Dermatology Note  Encounter Date: Sep 5, 2024  Office Visit     Reviewed patients past medical history and pertinent chart review prior to patients visit today.     Dermatology Problem List:  History of BCC on the left medial cheek status post Mohs 8/8/2024  Diffuse actinic keratoses on the face, plans to treat with Efudex/calcipotriene fall 2024    ____________________________________________    Assessment & Plan:     # Actinic keratosis.  Discussed treatment options with patient including cryotherapy, Efudex, and Efudex plus calcipotriene.  Patient prefers to treat the face with Efudex plus calcipotriene.  Start Efudex calcipotriene twice daily x5-10 days. Counseled that patient should expect erythema and scale, but should discontinue this medication if significant oozing or pain greater than 5/10 is to occur. Recommend the patient wash hands after use or use gloves to apply. Keep medication away from pets. Handout provided with administration instructions.      Follow-up for full body skin cancer screening in the spring 2025    Radha Mccord CNP  Dermatology    _______________________________________    CC: Cryotherapy (Nose and check spot on right cheek )    HPI:  Ms. Concha Ma is a(n) 79 year old female who presents today as a return patient for recheck of face.  Will set her last on 5/16/2024 at which time I froze some actinic keratoses on the face and did a shave biopsy on her left medial cheek and right nasal sidewall.  The spot on her left medial cheek came back a basal cell carcinoma which she had Mohs surgery for.  The spot on the right nasal sidewall came back and actinic keratosis.  She can still feel several rough textured spots on  the face and does not know if these are the precancer spots or not.  They are not symptomatic.   Patient is otherwise feeling well, without additional skin concerns.      Physical Exam:  SKIN: Focused examination of Face and neck was performed.  - diffuse small pink scaly papules on face    - No other lesions of concern on areas examined.     Medications:  Current Outpatient Medications   Medication Sig Dispense Refill     calcipotriene (DOVONOX) 0.005 % external cream Mix with fluorouracil and apply BID to affected areas for 5-10 days until desired result is achieved. Wash hands after applying. Avoid sun 60 g 1     fluorouracil (EFUDEX) 5 % external cream Mix with calcipotriene and apply BID to affected areas for 5-10 days. 40 g 1     atorvastatin (LIPITOR) 80 MG tablet TAKE ONE TABLET BY MOUTH ONCE DAILY 90 tablet 2     butalbital-aspirin-caffeine (FIORINAL) -40 MG per capsule Take 1 capsule by mouth daily as needed for headaches 30 capsule 1     COMPRESSION STOCKINGS 2 each daily 2 each 1     IBUPROFEN PO Take 600 mg by mouth every 6 hours as needed for moderate pain       metroNIDAZOLE (METROCREAM) 0.75 % external cream Apply topically daily as needed (rosacea) 45 g 2     metroNIDAZOLE (METROGEL) 0.75 % external gel Apply twice daily as needed to face for rosacea. (Patient not taking: Reported on 5/16/2024) 45 g 11     naproxen sodium (ANAPROX) 220 MG tablet Take 220 mg by mouth 2 times daily (with meals)       VITAMIN D, CHOLECALCIFEROL, PO Take 2,000 Units by mouth daily as needed (Patient not taking: Reported on 5/13/2024)       No current facility-administered medications for this visit.      Past Medical History:   Patient Active Problem List   Diagnosis     Skin cancer, basal cell     Hyperlipidemia     Classical migraine     Insomnia     Arthritis of knee     Hemorrhoids     Vasovagal syncope     BCC (basal cell carcinoma), face     Swelling of lower extremity     S/P total knee arthroplasty      "Vertigo, benign positional, bilateral     Status post total right knee replacement     Osteopenia     Disorder of bone and cartilage     Venous (peripheral) insufficiency     Venous stasis dermatitis     Past Medical History:   Diagnosis Date     Anxiety 2019     Arthritis of knee     right     Basal cell carcinoma      BPPV (benign paroxysmal positional vertigo)      Cataract      Depressive disorder 1985     Disorder of bone and cartilage     (osteopenia)     Hemorrhoids      History of PID      Hyperlipidemia      Insomnia      Migraines 1980s     Migraines, classic     sparkly aura     Osteopenia      Peripheral vascular disease (H24) 2018     Plantar fasciitis, bilateral      PONV (postoperative nausea and vomiting)      Skin cancer, basal cell      Squamous cell carcinoma      Swelling of the ankle, feet, or leg      Vasovagal syncope     is \"fainter\" with blood draws, injections-NEEDS TO BE LAYING DOWN       CC No referring provider defined for this encounter. on close of this encounter.       Again, thank you for allowing me to participate in the care of your patient.        Sincerely,        VANESA Crews CNP  "

## 2024-09-05 NOTE — PROGRESS NOTES
[facility] Dermatology Note  Encounter Date: Sep 5, 2024  Office Visit     Reviewed patients past medical history and pertinent chart review prior to patients visit today.     Dermatology Problem List:  History of BCC on the left medial cheek status post Mohs 8/8/2024  Diffuse actinic keratoses on the face, plans to treat with Efudex/calcipotriene fall 2024    ____________________________________________    Assessment & Plan:     # Actinic keratosis.  Discussed treatment options with patient including cryotherapy, Efudex, and Efudex plus calcipotriene.  Patient prefers to treat the face with Efudex plus calcipotriene.  Start Efudex calcipotriene twice daily x5-10 days. Counseled that patient should expect erythema and scale, but should discontinue this medication if significant oozing or pain greater than 5/10 is to occur. Recommend the patient wash hands after use or use gloves to apply. Keep medication away from pets. Handout provided with administration instructions.      Follow-up for full body skin cancer screening in the spring 2025    Radha Mccord CNP  Dermatology    _______________________________________    CC: Cryotherapy (Nose and check spot on right cheek )    HPI:  Ms. Concha Ma is a(n) 79 year old female who presents today as a return patient for recheck of face.  Will set her last on 5/16/2024 at which time I froze some actinic keratoses on the face and did a shave biopsy on her left medial cheek and right nasal sidewall.  The spot on her left medial cheek came back a basal cell carcinoma which she had Mohs surgery for.  The spot on the right nasal sidewall came back and actinic keratosis.  She can still feel several rough textured spots on the face and does not know if these are the precancer spots or not.  They are not symptomatic.   Patient is otherwise feeling well, without additional skin concerns.      Physical Exam:  SKIN: Focused examination of Face and neck was  performed.  - diffuse small pink scaly papules on face    - No other lesions of concern on areas examined.     Medications:  Current Outpatient Medications   Medication Sig Dispense Refill    calcipotriene (DOVONOX) 0.005 % external cream Mix with fluorouracil and apply BID to affected areas for 5-10 days until desired result is achieved. Wash hands after applying. Avoid sun 60 g 1    fluorouracil (EFUDEX) 5 % external cream Mix with calcipotriene and apply BID to affected areas for 5-10 days. 40 g 1    atorvastatin (LIPITOR) 80 MG tablet TAKE ONE TABLET BY MOUTH ONCE DAILY 90 tablet 2    butalbital-aspirin-caffeine (FIORINAL) -40 MG per capsule Take 1 capsule by mouth daily as needed for headaches 30 capsule 1    COMPRESSION STOCKINGS 2 each daily 2 each 1    IBUPROFEN PO Take 600 mg by mouth every 6 hours as needed for moderate pain      metroNIDAZOLE (METROCREAM) 0.75 % external cream Apply topically daily as needed (rosacea) 45 g 2    metroNIDAZOLE (METROGEL) 0.75 % external gel Apply twice daily as needed to face for rosacea. (Patient not taking: Reported on 5/16/2024) 45 g 11    naproxen sodium (ANAPROX) 220 MG tablet Take 220 mg by mouth 2 times daily (with meals)      VITAMIN D, CHOLECALCIFEROL, PO Take 2,000 Units by mouth daily as needed (Patient not taking: Reported on 5/13/2024)       No current facility-administered medications for this visit.      Past Medical History:   Patient Active Problem List   Diagnosis    Skin cancer, basal cell    Hyperlipidemia    Classical migraine    Insomnia    Arthritis of knee    Hemorrhoids    Vasovagal syncope    BCC (basal cell carcinoma), face    Swelling of lower extremity    S/P total knee arthroplasty    Vertigo, benign positional, bilateral    Status post total right knee replacement    Osteopenia    Disorder of bone and cartilage    Venous (peripheral) insufficiency    Venous stasis dermatitis     Past Medical History:   Diagnosis Date    Anxiety 2019     "Arthritis of knee     right    Basal cell carcinoma     BPPV (benign paroxysmal positional vertigo)     Cataract     Depressive disorder 1985    Disorder of bone and cartilage     (osteopenia)    Hemorrhoids     History of PID     Hyperlipidemia     Insomnia     Migraines 1980s    Migraines, classic     sparkly aura    Osteopenia     Peripheral vascular disease (H24) 2018    Plantar fasciitis, bilateral     PONV (postoperative nausea and vomiting)     Skin cancer, basal cell     Squamous cell carcinoma     Swelling of the ankle, feet, or leg     Vasovagal syncope     is \"fainter\" with blood draws, injections-NEEDS TO BE LAYING DOWN       CC No referring provider defined for this encounter. on close of this encounter.   "

## 2024-09-05 NOTE — PATIENT INSTRUCTIONS
Efudex and calcipotriene combination Treatment  Today, you are being prescribed Fluorouracil (Efudex) a topical cream used for the treatment of Actinic Keratosis (AKs).  The medication is working to eliminate the unhealthy cells.  This treatment may be unattractive and somewhat uncomfortable.  Your treatment will last 5-10 days. You may experience some mild discomfort while being treated.  You will want to stop any other creams such as glycolic acid products, retin A, Tazorac, etc. to the area. You may use bland makeup/cover-up as long as it doesn't sting or cause you discomfort.  When using calcipotriene and Fluorouracil, apply the cream both morning and night for 5-10 days as your provider recommends. Use a Q-tip or use gloves if applying it with your fingertips. If applied with unprotected fingertips, it is important to wash your hands well after you apply this medicine. If you are using the Fluorouracil alone, apply only at night for 2-4 weeks.   Keep this medication away from pets.  We recommend avoiding excessive sun exposure to the treated area. Wear a sunscreen with SPF 50+ to the areas being treated prior to any sun exposure as you will be more sensitive to the sun and more prone to sunburn while being treated and afterwards during healing process.   You may use ointments such as vaseline or Aquaphor, or moisturizing creams such as Vanicream or Cetaphil cream over bothersome areas. If the reaction becomes itchy you may apply over the counter hydrocorisone 1% cream or ointment twice daily to itchy areas. If the reaction becomes too bothersome, please call the clinic as we may need to discontinue treatment or reevaluate in the clinic.     Potential Side Effects  Your treated areas may be unsightly during therapy.  This will improve slowly following the discontinuation of therapy.   During the beginning of treatment, mild inflammation may occur.   During the end of treatment, redness, and swelling may occur  with some crusting and burning.   Lesions resolve as the skin exfoliates.   Over 1 to 2 weeks, new skin grows into the treatment area.  Keep this medication away from pets as it can be very harmful to them.     Specific side effects that usually do not require medical attention (report to your doctor or health care professional if they continue or are bothersome) include:  Red or dark-colored skin   Mild erosion (loss of upper layer of skin)   Mild eye irritation including burning, itching, sensitivity, stinging, or watering   Increased sensitivity of the skin to sun and ultraviolet light   Pain and burning of the affected area   Dryness, scaling or swelling of the affected area   Skin rash, itching of the affected area   Tenderness   If you have severe pain, please, call the clinic immediately and indicate that you have pain.  Ask for a call from the RN.     Who should I call with questions?  Dermatology nurse line: [phone]

## 2024-10-07 ENCOUNTER — ALLIED HEALTH/NURSE VISIT (OUTPATIENT)
Dept: INTERNAL MEDICINE | Facility: CLINIC | Age: 79
End: 2024-10-07
Payer: COMMERCIAL

## 2024-10-07 DIAGNOSIS — H61.21 IMPACTED CERUMEN OF RIGHT EAR: Primary | ICD-10-CM

## 2024-10-07 PROCEDURE — 69209 REMOVE IMPACTED EAR WAX UNI: CPT | Mod: RT

## 2024-10-07 NOTE — PROGRESS NOTES
Concha Ma presents in the primary clinic today at the request of Dr. Yuan for an ear wash. The patient's ears were assessed for cerumen. After this, an ear wash was performed in which a mild amount of impacted cerumen was removed from the right ear. After the ear wash, the tympanic membrane was visible. The ear wash was provided today under the supervision of Dr Pritchard who was present if help was needed.    Kenneth Pino, EMT at 10:21 AM on 10/7/2024.

## 2024-11-01 ENCOUNTER — OFFICE VISIT (OUTPATIENT)
Dept: FAMILY MEDICINE | Facility: CLINIC | Age: 79
End: 2024-11-01
Payer: COMMERCIAL

## 2024-11-01 VITALS
BODY MASS INDEX: 33.7 KG/M2 | DIASTOLIC BLOOD PRESSURE: 91 MMHG | OXYGEN SATURATION: 97 % | HEART RATE: 76 BPM | HEIGHT: 61 IN | SYSTOLIC BLOOD PRESSURE: 145 MMHG | RESPIRATION RATE: 21 BRPM | TEMPERATURE: 97.4 F | WEIGHT: 178.5 LBS

## 2024-11-01 DIAGNOSIS — L03.221 CELLULITIS AND ABSCESS OF NECK: Primary | ICD-10-CM

## 2024-11-01 DIAGNOSIS — L02.11 CELLULITIS AND ABSCESS OF NECK: Primary | ICD-10-CM

## 2024-11-01 PROCEDURE — 99213 OFFICE O/P EST LOW 20 MIN: CPT | Mod: 25 | Performed by: FAMILY MEDICINE

## 2024-11-01 PROCEDURE — 10060 I&D ABSCESS SIMPLE/SINGLE: CPT | Performed by: FAMILY MEDICINE

## 2024-11-01 RX ORDER — CEPHALEXIN 500 MG/1
500 CAPSULE ORAL 3 TIMES DAILY
Qty: 21 CAPSULE | Refills: 0 | Status: SHIPPED | OUTPATIENT
Start: 2024-11-01 | End: 2024-11-08

## 2024-11-01 ASSESSMENT — PAIN SCALES - GENERAL: PAINLEVEL_OUTOF10: NO PAIN (0)

## 2024-11-01 NOTE — PROGRESS NOTES
"  Assessment & Plan     Cellulitis and abscess of neck  - REVIEW OF HEALTH MAINTENANCE PROTOCOL ORDERS  - cephALEXin (KEFLEX) 500 MG capsule; Take 1 capsule (500 mg) by mouth 3 times daily for 7 days.  - DRAIN SKIN ABSCESS SIMPLE/SINGLE          BMI  Estimated body mass index is 33.23 kg/m  as calculated from the following:    Height as of this encounter: 1.561 m (5' 1.46\").    Weight as of this encounter: 81 kg (178 lb 8 oz).       Procedure Note: Incision and Drainage of Posterior Neck Abscess  Procedure: Incision and Drainage of 3 cm Posterior Neck Abscess  Indications: 3 cm posterior neck abscess with signs of fluctuation and tenderness.    Anesthesia:  4 cc of 2% lidocaine with epinephrine was infiltrated into the incision site for local anesthesia.    Procedure:    The patient was positioned comfortably on left side with the neck exposed. The area over the posterior neck abscess was prepped and draped in a sterile manner.  A time-out was performed to confirm patient identity, procedure, and surgical site.  Using an 11 blade scalpel, an incision approximately 1 cm in length was made over the most fluctuant area of the abscess.  The incision was deepened to enter the abscess cavity, allowing for purulent material to drain.  The cavity was explored with sterile gauze to ensure complete drainage, and any necrotic tissue was removed.  A cotton ball and bandaid were applied for pressure to the area  Bleeding controlled.  Post-Procedure Care:  The patient was instructed on care of the incision site, signs of infection, and the need for follow-up. Pain management was discussed, and a prescription for antibiotics given due to cellulitis in the area.      Estimated Blood Loss: Minimal  Complications: None  Follow-up: return if worsening or not improving     Rain Kern is a 79 year old, presenting for the following health issues:  Abscess      11/1/2024     9:54 AM   Additional Questions   Roomed by Jeremy FLEMING" "    History of Present Illness       Reason for visit:  Abscess      Painful   Red   Uncomfortable   Has been there for a while but is getting bigger   Leaking a bit at times       Objective    BP (!) 145/91 (BP Location: Right arm, Patient Position: Sitting, Cuff Size: Adult Regular)   Pulse 76   Temp 97.4  F (36.3  C) (Temporal)   Resp 21   Ht 1.561 m (5' 1.46\")   Wt 81 kg (178 lb 8 oz)   LMP  (LMP Unknown)   SpO2 97%   BMI 33.23 kg/m    Body mass index is 33.23 kg/m .  Physical Exam  Constitutional:       General: She is not in acute distress.  Eyes:      General: No scleral icterus.  Pulmonary:      Effort: No respiratory distress.   Skin:     Comments: Posterior right neck, upper shoulder area there is a 3 cm indurated area with red skin and cystic structure.     Neurological:      Mental Status: She is alert.   Psychiatric:         Mood and Affect: Mood normal.         Behavior: Behavior normal.                Signed Electronically by: Vasquez Clayton DO    "

## 2024-11-21 ENCOUNTER — OFFICE VISIT (OUTPATIENT)
Dept: DERMATOLOGY | Facility: CLINIC | Age: 79
End: 2024-11-21
Attending: NURSE PRACTITIONER
Payer: COMMERCIAL

## 2024-11-21 DIAGNOSIS — D48.9 NEOPLASM OF UNCERTAIN BEHAVIOR: ICD-10-CM

## 2024-11-21 DIAGNOSIS — L82.0 INFLAMED SEBORRHEIC KERATOSIS: Primary | ICD-10-CM

## 2024-11-21 NOTE — PROGRESS NOTES
[facility] Dermatology Note  Encounter Date: Nov 21, 2024  Office Visit     Reviewed patients past medical history and pertinent chart review prior to patients visit today.     Dermatology Problem List:  NUB right nasal sidewall, shave biopsy 11/21/24 .   History of BCC on the left medial cheek status post Mohs 8/8/2024  Diffuse actinic keratoses on the face, plans to treat with Efudex/calcipotriene fall 2024  ISK, neckline cryo 11/21/2024   ____________________________________________    Assessment & Plan:     # Neoplasm of uncertain behavior:  right nasal sidewall  DDx includes BCC vs other. Shave biopsy today.    Procedure Note: Biopsy by shave technique  The risks and benefits of the procedure were described to the patient. These include but are not limited to bleeding, infection, scar, incomplete removal, and non-diagnostic biopsy. Verbal informed consent was obtained. The above site(s) was cleansed with an alcohol pad and injected with 1% lidocaine with epinephrine. Once anesthesia was obtained, a biopsy(ies) was performed with Gilette blade. The tissue(s) was placed in a labeled container(s) with formalin and sent to pathology. Hemostasis was achieved with aluminum chloride. Vaseline and a bandage were applied to the wound(s). The patient tolerated the procedure well and was given post biopsy care instructions.     # Irritated and inflamed Seborrheic Keratosis, left base of neck. Discussed treatment options with patient including no treatment, cryotherapy, and shave removal. Patient prefers cryotherapy today due to irritation and itching. After verbal consent and discussion of risks and benefits including but no limited to dyspigmentation/scar, blister, and pain, 10 was(were) treated with 1-2mm freeze border for 2 cycles with liquid nitrogen. Post cryotherapy instructions were provided.         Follow-up for full body skin cancer screening in the spring 2025    Radha Mccord CNP  Dermatology     _______________________________________    CC: Actinic Keratosis (Recheck Ak on right nasal side wall , BCC to left cheek with Mohs 8 2024. /Cyst to neck that saw Urgent care to wing and on ABX)    HPI:  Ms. Concha Ma is a(n) 79 year old female who presents today as a return patient for recheck of face.We treated some actinic keratosis on her face at her last visit she would like rechecked. She thinks they healed well. She also has some spots at the base of her left neck that have been rubbing and irritated and wonders if they can be treated as well.    Patient is otherwise feeling well, without additional skin concerns.      Physical Exam:  SKIN: Focused examination of Face and neck was performed.  - -waxy, stuck on tan/brown papules and patches on the neck  - right nasal sidewall, 2 mm pink shiny papule with arborizing vessels     - No other lesions of concern on areas examined.     Medications:  Current Outpatient Medications   Medication Sig Dispense Refill    metroNIDAZOLE (METROGEL) 0.75 % external gel Apply twice daily as needed to face for rosacea. 45 g 11    atorvastatin (LIPITOR) 80 MG tablet TAKE ONE TABLET BY MOUTH ONCE DAILY 90 tablet 2    butalbital-aspirin-caffeine (FIORINAL) -40 MG per capsule Take 1 capsule by mouth daily as needed for headaches 30 capsule 1    calcipotriene (DOVONOX) 0.005 % external cream Mix with fluorouracil and apply BID to affected areas for 5-10 days until desired result is achieved. Wash hands after applying. Avoid sun (Patient not taking: Reported on 11/1/2024) 60 g 1    COMPRESSION STOCKINGS 2 each daily 2 each 1    fluorouracil (EFUDEX) 5 % external cream Mix with calcipotriene and apply BID to affected areas for 5-10 days. (Patient not taking: Reported on 11/1/2024) 40 g 1    IBUPROFEN PO Take 600 mg by mouth every 6 hours as needed for moderate pain      metroNIDAZOLE (METROCREAM) 0.75 % external cream Apply topically daily as needed (rosacea) (Patient  "not taking: Reported on 11/21/2024) 45 g 2    naproxen sodium (ANAPROX) 220 MG tablet Take 220 mg by mouth 2 times daily (with meals) (Patient not taking: Reported on 11/1/2024)      VITAMIN D, CHOLECALCIFEROL, PO Take 2,000 Units by mouth daily as needed.       No current facility-administered medications for this visit.      Past Medical History:   Patient Active Problem List   Diagnosis    Skin cancer, basal cell    Hyperlipidemia    Classical migraine    Insomnia    Arthritis of knee    Hemorrhoids    Vasovagal syncope    BCC (basal cell carcinoma), face    Swelling of lower extremity    S/P total knee arthroplasty    Vertigo, benign positional, bilateral    Status post total right knee replacement    Osteopenia    Disorder of bone and cartilage    Venous (peripheral) insufficiency    Venous stasis dermatitis     Past Medical History:   Diagnosis Date    Anxiety 2019    Arthritis of knee     right    Basal cell carcinoma     BPPV (benign paroxysmal positional vertigo)     Cataract     Depressive disorder 1985    Disorder of bone and cartilage     (osteopenia)    Hemorrhoids     History of PID     Hyperlipidemia     Insomnia     Migraines 1980s    Migraines, classic     sparkly aura    Osteopenia     Peripheral vascular disease (H) 2018    Plantar fasciitis, bilateral     PONV (postoperative nausea and vomiting)     Skin cancer, basal cell     Squamous cell carcinoma     Swelling of the ankle, feet, or leg     Vasovagal syncope     is \"fainter\" with blood draws, injections-NEEDS TO BE LAYING DOWN       CC No referring provider defined for this encounter. on close of this encounter.   "

## 2024-11-21 NOTE — LETTER
11/21/2024      Concha Ma  [address]  [facility] [address]      Dear Colleague,    Thank you for referring your patient, Concha Ma, to the [facility]. Please see a copy of my visit note below.    [facility] Dermatology Note  Encounter Date: Nov 21, 2024  Office Visit     Reviewed patients past medical history and pertinent chart review prior to patients visit today.     Dermatology Problem List:  NUB right nasal sidewall, shave biopsy 11/21/24 .   History of BCC on the left medial cheek status post Mohs 8/8/2024  Diffuse actinic keratoses on the face, plans to treat with Efudex/calcipotriene fall 2024  ISK, neckline cryo 11/21/2024   ____________________________________________    Assessment & Plan:     # Neoplasm of uncertain behavior:  right nasal sidewall  DDx includes BCC vs other. Shave biopsy today.    Procedure Note: Biopsy by shave technique  The risks and benefits of the procedure were described to the patient. These include but are not limited to bleeding, infection, scar, incomplete removal, and non-diagnostic biopsy. Verbal informed consent was obtained. The above site(s) was cleansed with an alcohol pad and injected with 1% lidocaine with epinephrine. Once anesthesia was obtained, a biopsy(ies) was performed with Gilette blade. The tissue(s) was placed in a labeled container(s) with formalin and sent to pathology. Hemostasis was achieved with aluminum chloride. Vaseline and a bandage were applied to the wound(s). The patient tolerated the procedure well and was given post biopsy care instructions.     # Irritated and inflamed Seborrheic Keratosis, left base of neck. Discussed treatment options with patient including no treatment, cryotherapy, and shave removal. Patient prefers cryotherapy today due to irritation and itching. After verbal consent and discussion of risks and benefits including but no limited to dyspigmentation/scar,  blister, and pain, 10 was(were) treated with 1-2mm freeze border for 2 cycles with liquid nitrogen. Post cryotherapy instructions were provided.         Follow-up for full body skin cancer screening in the spring 2025    Radha Mccord CNP  Dermatology    _______________________________________    CC: Actinic Keratosis (Recheck Ak on right nasal side wall , BCC to left cheek with Mohs 8 2024. /Cyst to neck that saw Urgent care to wing and on ABX)    HPI:  Ms. Concha Ma is a(n) 79 year old female who presents today as a return patient for recheck of face.We treated some actinic keratosis on her face at her last visit she would like rechecked. She thinks they healed well. She also has some spots at the base of her left neck that have been rubbing and irritated and wonders if they can be treated as well.    Patient is otherwise feeling well, without additional skin concerns.      Physical Exam:  SKIN: Focused examination of Face and neck was performed.  - -waxy, stuck on tan/brown papules and patches on the neck  - right nasal sidewall, 2 mm pink shiny papule with arborizing vessels     - No other lesions of concern on areas examined.     Medications:  Current Outpatient Medications   Medication Sig Dispense Refill     metroNIDAZOLE (METROGEL) 0.75 % external gel Apply twice daily as needed to face for rosacea. 45 g 11     atorvastatin (LIPITOR) 80 MG tablet TAKE ONE TABLET BY MOUTH ONCE DAILY 90 tablet 2     butalbital-aspirin-caffeine (FIORINAL) -40 MG per capsule Take 1 capsule by mouth daily as needed for headaches 30 capsule 1     calcipotriene (DOVONOX) 0.005 % external cream Mix with fluorouracil and apply BID to affected areas for 5-10 days until desired result is achieved. Wash hands after applying. Avoid sun (Patient not taking: Reported on 11/1/2024) 60 g 1     COMPRESSION STOCKINGS 2 each daily 2 each 1     fluorouracil (EFUDEX) 5 % external cream Mix with calcipotriene and apply BID to affected  "areas for 5-10 days. (Patient not taking: Reported on 11/1/2024) 40 g 1     IBUPROFEN PO Take 600 mg by mouth every 6 hours as needed for moderate pain       metroNIDAZOLE (METROCREAM) 0.75 % external cream Apply topically daily as needed (rosacea) (Patient not taking: Reported on 11/21/2024) 45 g 2     naproxen sodium (ANAPROX) 220 MG tablet Take 220 mg by mouth 2 times daily (with meals) (Patient not taking: Reported on 11/1/2024)       VITAMIN D, CHOLECALCIFEROL, PO Take 2,000 Units by mouth daily as needed.       No current facility-administered medications for this visit.      Past Medical History:   Patient Active Problem List   Diagnosis     Skin cancer, basal cell     Hyperlipidemia     Classical migraine     Insomnia     Arthritis of knee     Hemorrhoids     Vasovagal syncope     BCC (basal cell carcinoma), face     Swelling of lower extremity     S/P total knee arthroplasty     Vertigo, benign positional, bilateral     Status post total right knee replacement     Osteopenia     Disorder of bone and cartilage     Venous (peripheral) insufficiency     Venous stasis dermatitis     Past Medical History:   Diagnosis Date     Anxiety 2019     Arthritis of knee     right     Basal cell carcinoma      BPPV (benign paroxysmal positional vertigo)      Cataract      Depressive disorder 1985     Disorder of bone and cartilage     (osteopenia)     Hemorrhoids      History of PID      Hyperlipidemia      Insomnia      Migraines 1980s     Migraines, classic     sparkly aura     Osteopenia      Peripheral vascular disease (H) 2018     Plantar fasciitis, bilateral      PONV (postoperative nausea and vomiting)      Skin cancer, basal cell      Squamous cell carcinoma      Swelling of the ankle, feet, or leg      Vasovagal syncope     is \"fainter\" with blood draws, injections-NEEDS TO BE LAYING DOWN       CC No referring provider defined for this encounter. on close of this encounter.       Again, thank you for allowing me " to participate in the care of your patient.        Sincerely,        VANESA Crews CNP

## 2024-11-25 ENCOUNTER — TELEPHONE (OUTPATIENT)
Dept: DERMATOLOGY | Facility: CLINIC | Age: 79
End: 2024-11-25
Payer: COMMERCIAL

## 2024-11-25 DIAGNOSIS — C44.311 BASAL CELL CARCINOMA (BCC) OF RIGHT NASAL SIDEWALL: Primary | ICD-10-CM

## 2024-11-25 NOTE — TELEPHONE ENCOUNTER
----- Message from Nya Mccord sent at 11/25/2024 11:49 AM CST -----  Please schedule patient for Mohs or place a dermatology surgery referral      A. Right nasal sidewall:  -  Basal cell carcinoma, nodular type

## 2024-11-25 NOTE — TELEPHONE ENCOUNTER
S/w pt and went over Radha's message about biopsy results. Pt states she read the results on my chart.  Explained and answered all questions about mohs procedure.  Scheduled with Dr. Milligan at [place] on Wednesday December 11th at 7:45 am.    Mohs letter and information sent via my chart and mailed home.    Shi LYNN RN  [facility] [place]  [phone]

## 2024-11-25 NOTE — LETTER
[facility]  [address] [place] [address]  [phone]      November 25, 2024    Concha Ma  [address] [address]      Dear Concha      You are scheduled for Mohs Surgery on Wednesday December 11th at 7:45 am.     Please check in at 2nd floor Dermatology Clinic.     Be sure to eat a good breakfast and bathe and wash your hair prior to Surgery. Please bring  with you if this is above your neck    If you are taking any anti-coagulants that are prescribed by your Doctor (such as Coumadin/warfarin, Plavix, Aspirin, Ibuprofen), please continue taking them.     However, If you are taking anti-coagulants over the counter without  a Doctor's order for a Medical condition, please discontinue them 10 days prior to Surgery.      Please wear loose comfortable clothing as it could possibly be 4-6 hours until your surgery is completed depending upon how many layers of tissue need to be removed.     If you need any mobility assistance (getting on the exam chair or toilet) please bring a caregiver, family member, or staff member to assist you. We are not equipped to transfer patients.    Thank you,    Raymond Milligan MD

## 2024-11-29 ENCOUNTER — TELEPHONE (OUTPATIENT)
Dept: OTHER | Facility: CLINIC | Age: 79
End: 2024-11-29
Payer: COMMERCIAL

## 2024-11-29 NOTE — TELEPHONE ENCOUNTER
Order/Referral Request    Who is requesting: Patient     Orders being requested: referral to vascular, if necessary, or if self-referral is sufficient    Reason service is needed/diagnosis: vein    When are orders needed by: maeve Clayton    Has this been discussed with Provider: No    Does patient have a preference on a Group/Provider/Facility? [facility]    Does patient have an appointment scheduled?: No    Where to send orders: Place orders within Epic    Could we send this information to you in [facility] or would you prefer to receive a phone call?:   Patient would prefer a phone call   Okay to leave a detailed message?: Yes at Cell number on file:    Telephone Information:   Mobile [phone]

## 2024-11-29 NOTE — TELEPHONE ENCOUNTER
Referral received via Phone on 11/29/24.    Referred by self (see below)     Previous imaging completed (pertinent to referral):  No recent imaging or labs in [facility]. Care Everywhere was also updated.    Routing to scheduling to coordinate the following:  NEW VASCULAR PATIENT consult with Vascular Medicine  Please schedule this at next available      Appt note:  Self referral for bulge on top of the vein on her right foot. Pt does have vascular insufficiency disease.     Janeth Parry RN  [facility] [place]

## 2024-11-29 NOTE — TELEPHONE ENCOUNTER
[facility]    Who is the name of the provider? Self Referral     What is the location you see this provider at/preferred location? [place]    Person calling / Facility: Concha    Phone number: [phone]    Nurse call back needed:     Reason for call: Patient has a bulge on top of the vein on her right foot. She actually walks on her heel to avoid bending her foot. Pt does have vascular insufficiency disease. No discoloration and not a lot of swelling. Pt does have blood pooling.      Pharmacy location: [address]     Outside Imaging: N/A    Can we leave a detailed message on this number? Y    Additional Info:

## 2024-12-05 ENCOUNTER — MYC MEDICAL ADVICE (OUTPATIENT)
Dept: DERMATOLOGY | Facility: CLINIC | Age: 79
End: 2024-12-05

## 2024-12-05 DIAGNOSIS — Z85.828 HISTORY OF SKIN CANCER: Primary | ICD-10-CM

## 2024-12-05 RX ORDER — DIAZEPAM 5 MG/1
TABLET ORAL
Qty: 1 TABLET | Refills: 0 | Status: SHIPPED | OUTPATIENT
Start: 2024-12-05

## 2024-12-05 NOTE — TELEPHONE ENCOUNTER
Patient has Mohs for BCC-right nasal sidewall scheduled for 12/11/24.    Patient is requesting a sedative for this appointment.    Pharmacy update, please advise.    Steffany Hernandez RN on 12/5/2024 at 10:17 AM

## 2024-12-11 ENCOUNTER — OFFICE VISIT (OUTPATIENT)
Dept: DERMATOLOGY | Facility: CLINIC | Age: 79
End: 2024-12-11
Payer: COMMERCIAL

## 2024-12-11 DIAGNOSIS — C44.311 BASAL CELL CARCINOMA (BCC) OF RIGHT NASAL SIDEWALL: ICD-10-CM

## 2024-12-11 PROCEDURE — 17311 MOHS 1 STAGE H/N/HF/G: CPT | Performed by: DERMATOLOGY

## 2024-12-11 PROCEDURE — 14061 TIS TRNFR E/N/E/L10.1-30SQCM: CPT | Performed by: DERMATOLOGY

## 2024-12-11 PROCEDURE — 17312 MOHS ADDL STAGE: CPT | Performed by: DERMATOLOGY

## 2024-12-11 ASSESSMENT — PAIN SCALES - GENERAL: PAINLEVEL_OUTOF10: NO PAIN (0)

## 2024-12-11 NOTE — LETTER
"12/11/2024      Concha Ma  [address]  [facility] [address]      Dear Colleague,    Thank you for referring your patient, Concha Ma, to the [facility]. Please see a copy of my visit note below.    Surgical Office Location :   [facility]  [address] [place] [address]      Concha Ma is an extremely pleasant 79 year old year old female patient here today for evaluation and managment of basal cell carcinoma on right nsw.  Patient has no other skin complaints today.  Remainder of the HPI, Meds, PMH, Allergies, FH, and SH was reviewed in chart.      Past Medical History:   Diagnosis Date     Anxiety 2019     Arthritis of knee     right     Basal cell carcinoma      BPPV (benign paroxysmal positional vertigo)      Cataract      Depressive disorder 1985     Disorder of bone and cartilage     (osteopenia)     Hemorrhoids      History of PID      Hyperlipidemia      Insomnia      Migraines 1980s     Migraines, classic     sparkly aura     Osteopenia      Peripheral vascular disease (H) 2018     Plantar fasciitis, bilateral      PONV (postoperative nausea and vomiting)      Skin cancer, basal cell      Squamous cell carcinoma      Swelling of the ankle, feet, or leg      Vasovagal syncope     is \"fainter\" with blood draws, injections-NEEDS TO BE LAYING DOWN       Past Surgical History:   Procedure Laterality Date     ARTHROPLASTY KNEE Right 04/13/2018    Procedure: ARTHROPLASTY KNEE;  Right Total Knee Replacement;  Surgeon: Apollo Nguyen MD;  Location: [facility] OR     CATARACT IOL, RT/LT       COLONOSCOPY N/A 09/20/2022    Procedure: COLONOSCOPY, WITH POLYPECTOMY;  Surgeon: Emery Sepulveda MD;  Location: [facility] OR     JOINT REPLACEMENT  Knee 2018     LASIK BILATERAL       MOHS MICROGRAPHIC PROCEDURE       ZZC NONSPECIFIC PROCEDURE      laparoscopy,     ZZC NONSPECIFIC PROCEDURE      laser surg basal cell skin cancer     ZZC STOMACH SURGERY " PROCEDURE UNLISTED      laparoscopy for infertility        Family History   Problem Relation Age of Onset     Neurologic Disorder Mother         headaches/narcolepsy     Cancer Mother         Skin     Cerebrovascular Disease Mother         muti focal inracts to brain     Thyroid Disease Mother         hypothyroid     Seizure Disorder Mother         narcolepsy     Migraines Mother      Dementia Mother      Depression Mother      Skin Cancer Mother      Cancer Father         Skin, lung     C.A.D. Father         MI     Coronary Artery Disease Father      Cerebrovascular Disease Father         ,, ,     Alcoholism Father      Lung Cancer Father      Skin Cancer Father      Hypertension Sister      Thyroid Disease Sister      Migraines Sister      Depression Sister      Hypertension Sister      Hyperlipidemia Sister      Obesity Sister      Chronic Obstructive Pulmonary Disease Brother      Substance Abuse Brother         heroin     Coronary Artery Disease Brother         Bipass     Hyperlipidemia Brother      Alcoholism Brother      Cancer Brother         Skin     Heart Disease Brother      Obesity Brother      Substance Abuse Brother      Skin Cancer Brother      Substance Abuse Brother      No Known Problems Son      Substance Abuse Niece      Uterine Cancer Niece        Social History     Socioeconomic History     Marital status:      Spouse name: Ignacio     Number of children: 1     Years of education: Not on file     Highest education level: 12th grade   Occupational History     Occupation: EEG tech     Employer: [employer]   Tobacco Use     Smoking status: Never     Smokeless tobacco: Never   Vaping Use     Vaping status: Never Used   Substance and Sexual Activity     Alcohol use: Yes     Alcohol/week: 1.0 - 2.0 standard drink of alcohol     Types: 1 - 2 Standard drinks or equivalent per week     Comment: once/wk or less     Drug use: No     Sexual activity: Not Currently     Partners: Male      Birth control/protection: Post-menopausal   Other Topics Concern     Parent/sibling w/ CABG, MI or angioplasty before 65F 55M? 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 Two grandchildren Rebekah 2010, Jon 2008     Social Drivers of Health     Financial Resource Strain: Low Risk  (5/13/2024)    Financial Resource Strain      Within the past 12 months, have you or your family members you live with been unable to get utilities (heat, electricity) when it was really needed?: No   Food Insecurity: Low Risk  (5/13/2024)    Food Insecurity      Within the past 12 months, did you worry that your food would run out before you got money to buy more?: No      Within the past 12 months, did the food you bought just not last and you didn t have money to get more?: No   Transportation Needs: Low Risk  (5/13/2024)    Transportation Needs      Within the past 12 months, has lack of transportation kept you from medical appointments, getting your medicines, non-medical meetings or appointments, work, or from getting things that you need?: No   Physical Activity: Insufficiently Active (5/13/2024)    Exercise Vital Sign      Days of Exercise per Week: 3 days      Minutes of Exercise per Session: 30 min   Stress: Stress Concern Present (5/13/2024)    [employer] [place] [employer] - Occupational Stress Questionnaire      Feeling of Stress : To some extent   Social Connections: Unknown (5/13/2024)    Social Connection and Isolation Panel [NHANES]      Frequency of Communication with Friends and Family: Not on file      Frequency of Social Gatherings with Friends and Family: Twice a week      Attends Voodoo Services: Not on file      Active Member of Clubs or Organizations: Not on file      Attends Club or Organization Meetings: Not on file      Marital Status: Not on file   Interpersonal Safety: Low Risk  (5/13/2024)     Interpersonal Safety      Do you feel physically and emotionally safe where you currently live?: Yes      Within the past 12 months, have you been hit, slapped, kicked or otherwise physically hurt by someone?: No      Within the past 12 months, have you been humiliated or emotionally abused in other ways by your partner or ex-partner?: No   Housing Stability: Low Risk  (5/13/2024)    Housing Stability      Do you have housing? : Yes      Are you worried about losing your housing?: No       Outpatient Encounter Medications as of 12/11/2024   Medication Sig Dispense Refill     atorvastatin (LIPITOR) 80 MG tablet TAKE ONE TABLET BY MOUTH ONCE DAILY 90 tablet 2     butalbital-aspirin-caffeine (FIORINAL) -40 MG per capsule Take 1 capsule by mouth daily as needed for headaches 30 capsule 1     calcipotriene (DOVONOX) 0.005 % external cream Mix with fluorouracil and apply BID to affected areas for 5-10 days until desired result is achieved. Wash hands after applying. Avoid sun (Patient not taking: Reported on 11/1/2024) 60 g 1     COMPRESSION STOCKINGS 2 each daily 2 each 1     diazepam (VALIUM) 5 MG tablet 1/2 hour prior to appt 1 tablet 0     fluorouracil (EFUDEX) 5 % external cream Mix with calcipotriene and apply BID to affected areas for 5-10 days. (Patient not taking: Reported on 11/1/2024) 40 g 1     IBUPROFEN PO Take 600 mg by mouth every 6 hours as needed for moderate pain       metroNIDAZOLE (METROCREAM) 0.75 % external cream Apply topically daily as needed (rosacea) (Patient not taking: Reported on 11/21/2024) 45 g 2     metroNIDAZOLE (METROGEL) 0.75 % external gel Apply twice daily as needed to face for rosacea. 45 g 11     naproxen sodium (ANAPROX) 220 MG tablet Take 220 mg by mouth 2 times daily (with meals) (Patient not taking: Reported on 11/1/2024)       VITAMIN D, CHOLECALCIFEROL, PO Take 2,000 Units by mouth daily as needed.       No facility-administered encounter medications on file as of  12/11/2024.             O:   NAD, WDWN, Alert & Oriented, Mood & Affect wnl, Vitals stable   General appearance normal   Vitals stable   Alert, oriented and in no acute distress     R NSW 5mm pink pearly papule       Eyes: Conjunctivae/lids:Normal     ENT: Lips, mucosa: normal    MSK:Normal    Cardiovascular: peripheral edema none    Pulm: Breathing Normal    Neuro/Psych: Orientation:Alert and Orientedx3 ; Mood/Affect:normal       A/P:  R NSW basal cell carcinoma   MOHS:   Location    The rationale for Mohs surgery was discussed with the patient and consent was obtained.  The risks and benefits as well as alternatives to therapy were discussed, in detail.  Specifically, the risks of infection, scarring, bleeding, prolonged wound healing, incomplete removal, allergy to anesthesia, nerve injury and recurrence were addressed.  Indication for Mohs was Location. Prior to the procedure, the treatment site was clearly identified and, if available, confirmed with previous photos and confirmed by the patient   All components of the Universal Protocol/PAUSE rule were completed.  The Mohs surgeon operated in two distinct and integrated capacities as the surgeon and pathologist.      The area was prepped with Betasept.  A rim of normal appearing skin was marked circumferentially around the lesion.  The area was infiltrated with local anesthesia.  The tumor was first debulked to remove all clinically apparent tumor.  An incision following the standard Mohs approach was done and the specimen was oriented,mapped and placed in 2 block(s).  Each specimen was then chromacoded and processed in the Mohs laboratory using standard Mohs technique and submitted for frozen section histology.  Frozen section analysis showed  residual tumor but CLEAR MARGINS.    1st stage:Orthokeratosis of epidermis with a proliferation of nests of basaloid cells, with peripheral palisading and a haphazard arrangement in the center extending into the dermis,  forming nodules.  The tumor cells have hyperchromatic nuclei. Poor cytoplasm and intercellular bridging.      The tumor was excised using standard Mohs technique in 2 stages(s).  CLEAR MARGINS OBTAINED and Final defect size was 1.2 x 1 cm.     We discussed the options for wound management in full with the patient including risks/benefits/ possible outcomes.      REPAIR RHOMBIC: Due to geometric and functional constraints, a flap reconstruction was performed to reconstruct the defect, moreover, adjacent tissue was incised and carried over to close the defect in the following manner, further, Because of the size and full-thickness nature of the defect, and in order to maintain form and function while avoiding distortion of the nearby nasal tip and ala, a rhombic transposition flap was planned. After anesthesia and prep, the rhombic flap was carefully incised superior to the defect; Burow's triangle was excised laterally, just superior to the alar groove. The flap was raised and the wound edges were all undermined in the subcutaneous plane (above the nasalis muscle). After hemostasis, the flap was transposed into the defect and sutured in a layered fashion using Vicryl and Fast Absorbing Plain Gut sutures. Postoperative size was 3.7 x 3.6 cm.  EBL minimal; complications none; wound care routine.  The patient was discharged in good condition and will return in one week for wound evaluation.    It was a pleasure speaking to Concha aM today.  Previous clinic notes and pertinent laboratory tests were reviewed prior to Concha Ma's visit.  Signs and Symptoms of skin cancer discussed with patient.  Patient encouraged to perform monthly skin exams.  UV precautions reviewed with patient.  Risks of non-melanoma skin cancer discussed with patient   Return to clinic 3 months        Again, thank you for allowing me to participate in the care of your patient.        Sincerely,        Raymond Milligan MD

## 2024-12-11 NOTE — PROGRESS NOTES
"Concha Ma is an extremely pleasant 79 year old year old female patient here today for evaluation and managment of basal cell carcinoma on right nsw.  Patient has no other skin complaints today.  Remainder of the HPI, Meds, PMH, Allergies, FH, and SH was reviewed in chart.      Past Medical History:   Diagnosis Date    Anxiety 2019    Arthritis of knee     right    Basal cell carcinoma     BPPV (benign paroxysmal positional vertigo)     Cataract     Depressive disorder 1985    Disorder of bone and cartilage     (osteopenia)    Hemorrhoids     History of PID     Hyperlipidemia     Insomnia     Migraines 1980s    Migraines, classic     sparkly aura    Osteopenia     Peripheral vascular disease (H) 2018    Plantar fasciitis, bilateral     PONV (postoperative nausea and vomiting)     Skin cancer, basal cell     Squamous cell carcinoma     Swelling of the ankle, feet, or leg     Vasovagal syncope     is \"fainter\" with blood draws, injections-NEEDS TO BE LAYING DOWN       Past Surgical History:   Procedure Laterality Date    ARTHROPLASTY KNEE Right 04/13/2018    Procedure: ARTHROPLASTY KNEE;  Right Total Knee Replacement;  Surgeon: Apollo Nguyen MD;  Location: [facility] OR    CATARACT IOL, RT/LT      COLONOSCOPY N/A 09/20/2022    Procedure: COLONOSCOPY, WITH POLYPECTOMY;  Surgeon: Emery Sepulveda MD;  Location: [facility] OR    JOINT REPLACEMENT  Knee 2018    LASIK BILATERAL      MOHS MICROGRAPHIC PROCEDURE      ZZC NONSPECIFIC PROCEDURE      laparoscopy,    ZZC NONSPECIFIC PROCEDURE      laser surg basal cell skin cancer    ZZC STOMACH SURGERY PROCEDURE UNLISTED      laparoscopy for infertility        Family History   Problem Relation Age of Onset    Neurologic Disorder Mother         headaches/narcolepsy    Cancer Mother         Skin    Cerebrovascular Disease Mother         muti focal inracts to brain    Thyroid Disease Mother         hypothyroid    Seizure Disorder Mother         narcolepsy    Migraines Mother  "    Dementia Mother     Depression Mother     Skin Cancer Mother     Cancer Father         Skin, lung    C.A.D. Father         MI    Coronary Artery Disease Father     Cerebrovascular Disease Father         ,, ,    Alcoholism Father     Lung Cancer Father     Skin Cancer Father     Hypertension Sister     Thyroid Disease Sister     Migraines Sister     Depression Sister     Hypertension Sister     Hyperlipidemia Sister     Obesity Sister     Chronic Obstructive Pulmonary Disease Brother     Substance Abuse Brother         heroin    Coronary Artery Disease Brother         Bipass    Hyperlipidemia Brother     Alcoholism Brother     Cancer Brother         Skin    Heart Disease Brother     Obesity Brother     Substance Abuse Brother     Skin Cancer Brother     Substance Abuse Brother     No Known Problems Son     Substance Abuse Niece     Uterine Cancer Niece        Social History     Socioeconomic History    Marital status:      Spouse name: Ignacio    Number of children: 1    Years of education: Not on file    Highest education level: 12th grade   Occupational History    Occupation: EEG tech     Employer: [employer]   Tobacco Use    Smoking status: Never    Smokeless tobacco: Never   Vaping Use    Vaping status: Never Used   Substance and Sexual Activity    Alcohol use: Yes     Alcohol/week: 1.0 - 2.0 standard drink of alcohol     Types: 1 - 2 Standard drinks or equivalent per week     Comment: once/wk or less    Drug use: No    Sexual activity: Not Currently     Partners: Male     Birth control/protection: Post-menopausal   Other Topics Concern    Parent/sibling w/ CABG, MI or angioplasty before 65F 55M? 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 Two grandchildren Rebekah 2010, Jon 2008     Social Drivers of Health     Financial Resource Strain: Low Risk  (5/13/2024)    Financial Resource Strain      Within the past 12 months, have you or your family members you live with been unable to get utilities (heat, electricity) when it was really needed?: No   Food Insecurity: Low Risk  (5/13/2024)    Food Insecurity     Within the past 12 months, did you worry that your food would run out before you got money to buy more?: No     Within the past 12 months, did the food you bought just not last and you didn t have money to get more?: No   Transportation Needs: Low Risk  (5/13/2024)    Transportation Needs     Within the past 12 months, has lack of transportation kept you from medical appointments, getting your medicines, non-medical meetings or appointments, work, or from getting things that you need?: No   Physical Activity: Insufficiently Active (5/13/2024)    Exercise Vital Sign     Days of Exercise per Week: 3 days     Minutes of Exercise per Session: 30 min   Stress: Stress Concern Present (5/13/2024)    [place] [employer] - Occupational Stress Questionnaire     Feeling of Stress : To some extent   Social Connections: Unknown (5/13/2024)    Social Connection and Isolation Panel [NHANES]     Frequency of Communication with Friends and Family: Not on file     Frequency of Social Gatherings with Friends and Family: Twice a week     Attends [employer] Services: Not on file     Active Member of Clubs or Organizations: Not on file     Attends Club or Organization Meetings: Not on file     Marital Status: Not on file   Interpersonal Safety: Low Risk  (5/13/2024)    Interpersonal Safety     Do you feel physically and emotionally safe where you currently live?: Yes     Within the past 12 months, have you been hit, slapped, kicked or otherwise physically hurt by someone?: No     Within the past 12 months, have you been humiliated or emotionally abused in other ways by your partner or ex-partner?: No   Housing Stability: Low Risk  (5/13/2024)    Housing Stability     Do you have housing? : Yes     Are  you worried about losing your housing?: No       Outpatient Encounter Medications as of 12/11/2024   Medication Sig Dispense Refill    atorvastatin (LIPITOR) 80 MG tablet TAKE ONE TABLET BY MOUTH ONCE DAILY 90 tablet 2    butalbital-aspirin-caffeine (FIORINAL) -40 MG per capsule Take 1 capsule by mouth daily as needed for headaches 30 capsule 1    calcipotriene (DOVONOX) 0.005 % external cream Mix with fluorouracil and apply BID to affected areas for 5-10 days until desired result is achieved. Wash hands after applying. Avoid sun (Patient not taking: Reported on 11/1/2024) 60 g 1    COMPRESSION STOCKINGS 2 each daily 2 each 1    diazepam (VALIUM) 5 MG tablet 1/2 hour prior to appt 1 tablet 0    fluorouracil (EFUDEX) 5 % external cream Mix with calcipotriene and apply BID to affected areas for 5-10 days. (Patient not taking: Reported on 11/1/2024) 40 g 1    IBUPROFEN PO Take 600 mg by mouth every 6 hours as needed for moderate pain      metroNIDAZOLE (METROCREAM) 0.75 % external cream Apply topically daily as needed (rosacea) (Patient not taking: Reported on 11/21/2024) 45 g 2    metroNIDAZOLE (METROGEL) 0.75 % external gel Apply twice daily as needed to face for rosacea. 45 g 11    naproxen sodium (ANAPROX) 220 MG tablet Take 220 mg by mouth 2 times daily (with meals) (Patient not taking: Reported on 11/1/2024)      VITAMIN D, CHOLECALCIFEROL, PO Take 2,000 Units by mouth daily as needed.       No facility-administered encounter medications on file as of 12/11/2024.             O:   NAD, WDWN, Alert & Oriented, Mood & Affect wnl, Vitals stable   General appearance normal   Vitals stable   Alert, oriented and in no acute distress     R NSW 5mm pink pearly papule       Eyes: Conjunctivae/lids:Normal     ENT: Lips, mucosa: normal    MSK:Normal    Cardiovascular: peripheral edema none    Pulm: Breathing Normal    Neuro/Psych: Orientation:Alert and Orientedx3 ; Mood/Affect:normal       A/P:  R NSW basal cell  carcinoma   MOHS:   Location    The rationale for Mohs surgery was discussed with the patient and consent was obtained.  The risks and benefits as well as alternatives to therapy were discussed, in detail.  Specifically, the risks of infection, scarring, bleeding, prolonged wound healing, incomplete removal, allergy to anesthesia, nerve injury and recurrence were addressed.  Indication for Mohs was Location. Prior to the procedure, the treatment site was clearly identified and, if available, confirmed with previous photos and confirmed by the patient   All components of the Universal Protocol/PAUSE rule were completed.  The Mohs surgeon operated in two distinct and integrated capacities as the surgeon and pathologist.      The area was prepped with Betasept.  A rim of normal appearing skin was marked circumferentially around the lesion.  The area was infiltrated with local anesthesia.  The tumor was first debulked to remove all clinically apparent tumor.  An incision following the standard Mohs approach was done and the specimen was oriented,mapped and placed in 2 block(s).  Each specimen was then chromacoded and processed in the Mohs laboratory using standard Mohs technique and submitted for frozen section histology.  Frozen section analysis showed  residual tumor but CLEAR MARGINS.    1st stage:Orthokeratosis of epidermis with a proliferation of nests of basaloid cells, with peripheral palisading and a haphazard arrangement in the center extending into the dermis, forming nodules.  The tumor cells have hyperchromatic nuclei. Poor cytoplasm and intercellular bridging.      The tumor was excised using standard Mohs technique in 2 stages(s).  CLEAR MARGINS OBTAINED and Final defect size was 1.2 x 1 cm.     We discussed the options for wound management in full with the patient including risks/benefits/ possible outcomes.      REPAIR RHOMBIC: Due to geometric and functional constraints, a flap reconstruction was  performed to reconstruct the defect, moreover, adjacent tissue was incised and carried over to close the defect in the following manner, further, Because of the size and full-thickness nature of the defect, and in order to maintain form and function while avoiding distortion of the nearby nasal tip and ala, a rhombic transposition flap was planned. After anesthesia and prep, the rhombic flap was carefully incised superior to the defect; Burow's triangle was excised laterally, just superior to the alar groove. The flap was raised and the wound edges were all undermined in the subcutaneous plane (above the nasalis muscle). After hemostasis, the flap was transposed into the defect and sutured in a layered fashion using Vicryl and Fast Absorbing Plain Gut sutures. Postoperative size was 3.7 x 3.6 cm.  EBL minimal; complications none; wound care routine.  The patient was discharged in good condition and will return in one week for wound evaluation.    It was a pleasure speaking to Concha Ma today.  Previous clinic notes and pertinent laboratory tests were reviewed prior to Concha Ma's visit.  Signs and Symptoms of skin cancer discussed with patient.  Patient encouraged to perform monthly skin exams.  UV precautions reviewed with patient.  Risks of non-melanoma skin cancer discussed with patient   Return to clinic 3 months

## 2024-12-11 NOTE — PATIENT INSTRUCTIONS
Sutured Wound Care     [facility]: [phone]    [facility]: [phone]          No strenuous activity for 48 hours. Resume moderate activity in 48 hours. No heavy exercising until you are seen for follow up in one week.     Take Tylenol as needed for discomfort.                         Do not drink alcoholic beverages for 48 hours.     Keep the pressure bandage in place for 24 hours. If the bandage becomes blood tinged or loose, reinforce it with gauze and tape.        (Refer to the reverse side of this page for management of bleeding).    Remove pressure bandage in 24 hours     Leave the flat bandage in place until your follow up appointment.    Keep the bandage dry. Wash around it carefully.    If the tape becomes soiled or starts to come off, reinforce it with additional paper tape.    Do not smoke for 3 weeks; smoking is detrimental to wound healing.    It is normal to have swelling and bruising around the surgical site. The bruising will fade in approximately 10-14 days. Elevate the area to reduce swelling.    Numbness, itchiness and sensitivity to temperature changes can occur after surgery and may take up to 18 months to normalize.      POSSIBLE COMPLICATIONS    BLEEDING:    Leave the bandage in place.  Use tightly rolled up gauze or a cloth to apply direct pressure over the bandage for 20   minutes.  Reapply pressure for an additional 20 minutes if necessary  Call the office or go to the nearest emergency room if pressure fails to stop the bleeding.  Use additional gauze and tape to maintain pressure once the bleeding has stopped.        PAIN:    Post operative pain should slowly get better, never worse.  A severe increase in pain may indicate a problem. Call the office if this occurs.    In case of emergency phone:Dr Milligan [phone]

## 2024-12-11 NOTE — NURSING NOTE
Chief Complaint   Patient presents with    Derm Problem     Mohs- R [facility]       There were no vitals filed for this visit.  Wt Readings from Last 1 Encounters:   11/01/24 81 kg (178 lb 8 oz)       Amarilis Griffith LPN .................12/11/2024

## 2024-12-18 ENCOUNTER — ALLIED HEALTH/NURSE VISIT (OUTPATIENT)
Dept: DERMATOLOGY | Facility: CLINIC | Age: 79
End: 2024-12-18
Payer: COMMERCIAL

## 2024-12-18 DIAGNOSIS — Z48.01 DRESSING CHANGE OR REMOVAL, SURGICAL WOUND: Primary | ICD-10-CM

## 2024-12-18 PROCEDURE — 99207 PR NO CHARGE NURSE ONLY: CPT

## 2024-12-18 NOTE — PROGRESS NOTES
Concha Ma comes into clinic today at the request of Dr. Milligan Ordering Provider for Dressing Change   .    This service provided today was under the supervising provider of the day Dr. Tellez, who was available if needed.    Please see providers note on 12/12/24 for orders  Patient returned to clinic for post surgery 1 week follow up bandage change. Patient has no complaints, denies pain.  Bandage removed from R NS. Area cleansed with wound cleanser. Site is healing with no signs of infection, wound edges approximating well.   Reapplied new steri strips and paper tape.     Advised to watch for signs and symptons of infection; spreading redness, increasing pain, drainage, odor, fever.   Call or report promptly to clinic if any of these symptoms are present.    Wound care post op instructions given and discussed for 14 day bandage removal and continued incision/scar care.    Patient verbalized understanding.

## 2024-12-18 NOTE — PATIENT INSTRUCTIONS
WOUND CARE INSTRUCTIONS  for  ONE WEEK AFTER SURGERY          Leave flat bandage on your skin for one week after today s bandage change.  In one week when you remove the bandage, you may resume your regular skin care routine, including washing with mild soap and water, applying moisturizer, make-up and sunscreen.    If there are any open or bleeding areas at the incision/graft site you should begin to cover the area with a bandage daily as follows:    Clean and dry the area with plain tap water using a Q-tip or sterile gauze pad.  Apply Polysporin or Bacitracin ointment to the open area.  Cover the wound with a band-aid or a sterile non-stick gauze pad and micropore paper tape.         SIGNS OF INFECTION  - If you notice any of these signs of infection, call your doctor right away: expanding redness around the wound.  - Yellow or greenish-colored pus or cloudy wound drainage.    - Red streaking spreading from the wound.  - Increased swelling, tenderness, or pain around the wound.   - Fever.    Please remember that yellow and clear drainage from a wound can be normal and related to normal wound healing.  Isolated drainage from a wound without a combination of the above features does not indicate infection.       *Once the bandages are removed, the scar will be red and firm (especially in the lip/chin area). This is normal and will fade in time. It might take 6-12 months for this to happen.     *Massaging the area will help the scar soften and fade quicker. Begin to massage the area one month after the bandages have been removed. To massage apply pressure directly and firmly over the scar with the fingertips and move in a circular motion. Massage the area for a few minutes several times a day. Continue to massage the site for several months.    *Approximately 6-8 weeks after surgery it is not uncommon to see the formation of  tender pimple-like  bump along the scar. This is normal. As the scar continues to mature and  the stitches underneath the skin begin to dissolve, this might occur. Do not pick or squeeze, this will resolve on it s own. Should one break open producing a small amount of drainage, apply Polysporin or Bacitracin ointment a few times a day until the wound is completely healed.    *Numbness in the surgical area is expected. It might take 12-18 months for the feeling to return to normal. During this time sensations of itchiness, tingling and occasional sharp pains might be noted. These feelings are normal and will subside once the nerves have completely healed.         IN CASE OF EMERGENCY: Dr Milligan [phone]     If you were seen in [place] call: [phone]    If you were seen in [place] call: [phone]

## 2024-12-30 ENCOUNTER — OFFICE VISIT (OUTPATIENT)
Dept: OTHER | Facility: CLINIC | Age: 79
End: 2024-12-30
Attending: INTERNAL MEDICINE
Payer: COMMERCIAL

## 2024-12-30 VITALS
DIASTOLIC BLOOD PRESSURE: 85 MMHG | HEART RATE: 76 BPM | BODY MASS INDEX: 33.43 KG/M2 | OXYGEN SATURATION: 95 % | SYSTOLIC BLOOD PRESSURE: 138 MMHG | WEIGHT: 179.6 LBS

## 2024-12-30 DIAGNOSIS — I87.2 VENOUS (PERIPHERAL) INSUFFICIENCY: Primary | ICD-10-CM

## 2024-12-30 PROCEDURE — 99204 OFFICE O/P NEW MOD 45 MIN: CPT | Performed by: INTERNAL MEDICINE

## 2024-12-30 PROCEDURE — G0463 HOSPITAL OUTPT CLINIC VISIT: HCPCS | Performed by: INTERNAL MEDICINE

## 2024-12-30 PROCEDURE — G2211 COMPLEX E/M VISIT ADD ON: HCPCS | Performed by: INTERNAL MEDICINE

## 2024-12-30 NOTE — PROGRESS NOTES
[facility]        Patient is here for a consult.    Pt is currently taking Statin.    /85 (BP Location: Left arm, Patient Position: Chair, Cuff Size: Adult Regular)   Pulse 76   Wt 179 lb 9.6 oz (81.5 kg)   LMP  (LMP Unknown)   SpO2 95%   BMI 33.43 kg/m      The provider has been notified that the patient has no concerns.     Questions patient would like addressed today are: N/A.    Refills are needed: N/A    Has homecare services and agency name:  Nadja Salazar MA

## 2024-12-30 NOTE — PROGRESS NOTES
INITIAL VASCULAR MEDICAL ASSESSMENT  REFERRAL SOURCE: Self   REASON FOR CONSULT: bulge on vein of right foot.       A/P:      (I87.2) Venous (peripheral) insufficiency  (primary encounter diagnosis)  Comment: Sh was advised to wear thigh or waist high compression hosiery as much as possible. RCT six months prn dissatisfaction with results with the above. Check venous comp study and consider surgical referral based upon said results.   Plan: Compression Sleeve/Stocking Order for DME -         ONLY FOR DME, Compression Sleeve/Stocking Order        for DME - ONLY FOR DME              The longitudinal care of plan for Concha was addressed during this visit. Due to added complexity of care, we will continue to support Concha and the subsequent management of this condition and with ongoing continuity of care for this condition.     49 minutes total medical care on today's date.          HPI: Concha Ma is a 79 year old female with a h/o venous insufficiency who has not been wearing compression hosiery maximally or to the correct anatomical level. She has intermittent leg swelling. She has venous insufficiency in the thighs bilaterally but has only worn knee high compression hosiery.     Review Of Systems  Skin: negative  Eyes: negative  Ears/Nose/Throat: negative  Respiratory: No shortness of breath, dyspnea on exertion, cough, or hemoptysis  Cardiovascular: negative  Gastrointestinal: negative  Genitourinary: negative  Musculoskeletal: as above  Neurologic: negative  Psychiatric: negative  Hematologic/Lymphatic/Immunologic: negative  Endocrine: negative      PAST MEDICAL HISTORY:                  Past Medical History:   Diagnosis Date    Anxiety 2019    Arthritis of knee     right    Basal cell carcinoma     BPPV (benign paroxysmal positional vertigo)     Cataract     Depressive disorder 1985    Disorder of bone and cartilage     (osteopenia)    Hemorrhoids     History of PID     Hyperlipidemia     Insomnia      "Migraines 1980s    Migraines, classic     sparkly aura    Osteopenia     Peripheral vascular disease (H) 2018    Plantar fasciitis, bilateral     PONV (postoperative nausea and vomiting)     Skin cancer, basal cell     Squamous cell carcinoma     Swelling of the ankle, feet, or leg     Vasovagal syncope     is \"fainter\" with blood draws, injections-NEEDS TO BE LAYING DOWN       PAST SURGICAL HISTORY:                  Past Surgical History:   Procedure Laterality Date    ARTHROPLASTY KNEE Right 04/13/2018    Procedure: ARTHROPLASTY KNEE;  Right Total Knee Replacement;  Surgeon: Apollo Nguyen MD;  Location: [facility]    CATARACT IOL, RT/LT      COLONOSCOPY N/A 09/20/2022    Procedure: COLONOSCOPY, WITH POLYPECTOMY;  Surgeon: Emery Sepulveda MD;  Location: [facility] OR    JOINT REPLACEMENT  Knee 2018    LASIK BILATERAL      MOHS MICROGRAPHIC PROCEDURE      ZZC NONSPECIFIC PROCEDURE      laparoscopy,    ZZC NONSPECIFIC PROCEDURE      laser surg basal cell skin cancer    ZZC STOMACH SURGERY PROCEDURE UNLISTED      laparoscopy for infertility       CURRENT MEDICATIONS:                  Current Outpatient Medications   Medication Sig Dispense Refill    atorvastatin (LIPITOR) 80 MG tablet TAKE ONE TABLET BY MOUTH ONCE DAILY 90 tablet 2    butalbital-aspirin-caffeine (FIORINAL) -40 MG per capsule Take 1 capsule by mouth daily as needed for headaches 30 capsule 1    calcipotriene (DOVONOX) 0.005 % external cream Mix with fluorouracil and apply BID to affected areas for 5-10 days until desired result is achieved. Wash hands after applying. Avoid sun (Patient not taking: Reported on 11/1/2024) 60 g 1    COMPRESSION STOCKINGS 2 each daily 2 each 1    diazepam (VALIUM) 5 MG tablet 1/2 hour prior to appt 1 tablet 0    fluorouracil (EFUDEX) 5 % external cream Mix with calcipotriene and apply BID to affected areas for 5-10 days. (Patient not taking: Reported on 11/1/2024) 40 g 1    IBUPROFEN PO Take 600 mg by mouth every 6 " hours as needed for moderate pain      metroNIDAZOLE (METROCREAM) 0.75 % external cream Apply topically daily as needed (rosacea) (Patient not taking: Reported on 11/21/2024) 45 g 2    metroNIDAZOLE (METROGEL) 0.75 % external gel Apply twice daily as needed to face for rosacea. 45 g 11    naproxen sodium (ANAPROX) 220 MG tablet Take 220 mg by mouth 2 times daily (with meals) (Patient not taking: Reported on 11/1/2024)      VITAMIN D, CHOLECALCIFEROL, PO Take 2,000 Units by mouth daily as needed.         ALLERGIES:                  Allergies   Allergen Reactions    Morphine And Codeine GI Disturbance    Percocet [Oxycodone-Acetaminophen] Nausea and Vomiting       SOCIAL HISTORY:                  Social History     Socioeconomic History    Marital status:      Spouse name: Ignacio    Number of children: 1    Years of education: Not on file    Highest education level: 12th grade   Occupational History    Occupation: EEG tech     Employer: [employer]   Tobacco Use    Smoking status: Never    Smokeless tobacco: Never   Vaping Use    Vaping status: Never Used   Substance and Sexual Activity    Alcohol use: Yes     Alcohol/week: 1.0 - 2.0 standard drink of alcohol     Types: 1 - 2 Standard drinks or equivalent per week     Comment: once/wk or less    Drug use: No    Sexual activity: Not Currently     Partners: Male     Birth control/protection: Post-menopausal   Other Topics Concern    Parent/sibling w/ CABG, MI or angioplasty before 65F 55M? 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 Two grandchildren Rebekah 2010, Jon 2008     Social Drivers of Health     Financial Resource Strain: Low Risk  (5/13/2024)    Financial Resource Strain     Within the past 12 months, have you or your family members you live with been unable to get utilities (heat, electricity) when it was really needed?: No   Food  Insecurity: Low Risk  (5/13/2024)    Food Insecurity     Within the past 12 months, did you worry that your food would run out before you got money to buy more?: No     Within the past 12 months, did the food you bought just not last and you didn t have money to get more?: No   Transportation Needs: Low Risk  (5/13/2024)    Transportation Needs     Within the past 12 months, has lack of transportation kept you from medical appointments, getting your medicines, non-medical meetings or appointments, work, or from getting things that you need?: No   Physical Activity: Insufficiently Active (5/13/2024)    Exercise Vital Sign     Days of Exercise per Week: 3 days     Minutes of Exercise per Session: 30 min   Stress: Stress Concern Present (5/13/2024)    [place] of Occupational Health - Occupational Stress Questionnaire     Feeling of Stress : To some extent   Social Connections: Unknown (5/13/2024)    Social Connection and Isolation Panel [NHANES]     Frequency of Communication with Friends and Family: Not on file     Frequency of Social Gatherings with Friends and Family: Twice a week     Attends Faith Services: Not on file     Active Member of Clubs or Organizations: Not on file     Attends Club or Organization Meetings: Not on file     Marital Status: Not on file   Interpersonal Safety: Low Risk  (5/13/2024)    Interpersonal Safety     Do you feel physically and emotionally safe where you currently live?: Yes     Within the past 12 months, have you been hit, slapped, kicked or otherwise physically hurt by someone?: No     Within the past 12 months, have you been humiliated or emotionally abused in other ways by your partner or ex-partner?: No   Housing Stability: Low Risk  (5/13/2024)    Housing Stability     Do you have housing? : Yes     Are you worried about losing your housing?: No       FAMILY HISTORY:                   Family History   Problem Relation Age of Onset    Neurologic Disorder Mother          headaches/narcolepsy    Cancer Mother         Skin    Cerebrovascular Disease Mother         muti focal inracts to brain    Thyroid Disease Mother         hypothyroid    Seizure Disorder Mother         narcolepsy    Migraines Mother     Dementia Mother     Depression Mother     Skin Cancer Mother     Cancer Father         Skin, lung    C.A.D. Father         MI    Coronary Artery Disease Father     Cerebrovascular Disease Father         ,, ,    Alcoholism Father     Lung Cancer Father     Skin Cancer Father     Hypertension Sister     Thyroid Disease Sister     Migraines Sister     Depression Sister     Hypertension Sister     Hyperlipidemia Sister     Obesity Sister     Chronic Obstructive Pulmonary Disease Brother     Substance Abuse Brother         heroin    Coronary Artery Disease Brother         Bipass    Hyperlipidemia Brother     Alcoholism Brother     Cancer Brother         Skin    Heart Disease Brother     Obesity Brother     Substance Abuse Brother     Skin Cancer Brother     Substance Abuse Brother     No Known Problems Son     Substance Abuse Niece     Uterine Cancer Niece          Physical exam Reveals:    O/P: WNL  HEENT: WNL  NECK: No JVD, thyromegaly, or lymphadenopathy  HEART: RRR, no murmurs, gallops, or rubs  LUNGS: CTA bilaterally without rales, wheezes, or rhonchi  GI: NABS, nondistended, nontender, soft  EXT:without cyanosis, clubbing; one plus BLE edema, , CEAP C4 venous insufficiency  NEURO: nonfocal  : no flank tenderness         All relevant labs and imaging reviewed by myself on today's date.

## 2025-01-20 NOTE — LETTER
[facility]  [address]  [address]  [phone]        5/22/2024       Concha Ma  [address] [place] [id number]      Dear Concha:    You are scheduled for Mohs Surgery on: Thursday August 8th, 2024 at 8:15 AM.    Please check in at [facility], [address].     You don't need to arrive more than 5-10 minutes prior to your appointment time.     Be sure to eat a good breakfast and bathe and wash your hair prior to surgery.     If you are taking any anti-coagulants that are prescribed by your Doctor (such as Coumadin/Warfarin, Plavix, Aspirin, Ibuprofen), please continue taking them.     However, if you are taking anti-coagulants over the counter without a Doctor's order for a medical condition, please discontinue them 10 days prior to surgery.     Please wear loose comfortable clothing as it could possibly be 4-6 hours until your surgery is completed depending upon how many layers of tissue need to be removed.      Thank you,    PAL Milligan MD   Bed/Stretcher in lowest position, wheels locked, appropriate side rails in place/Call bell, personal items and telephone in reach/Instruct patient to call for assistance before getting out of bed/chair/stretcher/Non-slip footwear applied when patient is off stretcher/[place] to call system/Physically safe environment - no spills, clutter or unnecessary equipment/Purposeful proactive rounding/Room/bathroom lighting operational, light cord in reach

## 2025-03-24 ENCOUNTER — TELEPHONE (OUTPATIENT)
Dept: DERMATOLOGY | Facility: CLINIC | Age: 80
End: 2025-03-24
Payer: COMMERCIAL

## 2025-03-24 NOTE — TELEPHONE ENCOUNTER
S/w pt who states she noticed a new spot on the back of her left leg about 3 weeks ago.  She also has a spot on her right leg for several months.  Neither spot is causing pain or itching.  Not sure if either spot is growing.  Has an appt on Thursday March 27th with Dr. Milligan to look at nose. Advised he can look at the spots on her legs also and added to appt notes.  Pt states understanding.    Shi LYNN RN  [facility]  [phone]

## 2025-03-24 NOTE — TELEPHONE ENCOUNTER
[facility] Call Center    Phone Message    May a detailed message be left on voicemail: yes     Reason for Call: Appointment Intake    Referring Provider Name: Nya Mccord  Diagnosis and/or Symptoms: New lesion, Hx of skin cancer, Pt was told to call for a sooner visit if she gets new lesions.     Pt is currently scheduled for 5/31 for a full skin check.     Please call back at [phone]. Thank you.     Action Taken: Other: [facility]    Travel Screening: Not Applicable     Date of Service:

## 2025-03-27 ENCOUNTER — OFFICE VISIT (OUTPATIENT)
Dept: DERMATOLOGY | Facility: CLINIC | Age: 80
End: 2025-03-27
Payer: COMMERCIAL

## 2025-03-27 DIAGNOSIS — L57.0 AK (ACTINIC KERATOSIS): ICD-10-CM

## 2025-03-27 DIAGNOSIS — Z85.828 HISTORY OF SKIN CANCER: Primary | ICD-10-CM

## 2025-03-27 NOTE — PROGRESS NOTES
"Concha Ma is an extremely pleasant 79 year old year old female patient here today for wound check nose, well healed, she notes spots on legs and forehead.  Patient has no other skin complaints today.  Remainder of the HPI, Meds, PMH, Allergies, FH, and SH was reviewed in chart.      Past Medical History:   Diagnosis Date    Anxiety 2019    Arthritis of knee     right    Basal cell carcinoma     BPPV (benign paroxysmal positional vertigo)     Cataract     Depressive disorder 1985    Disorder of bone and cartilage     (osteopenia)    Hemorrhoids     History of PID     Hyperlipidemia     Insomnia     Migraines 1980s    Migraines, classic     sparkly aura    Osteopenia     Peripheral vascular disease 2018    Plantar fasciitis, bilateral     PONV (postoperative nausea and vomiting)     Skin cancer, basal cell     Squamous cell carcinoma     Swelling of the ankle, feet, or leg     Vasovagal syncope     is \"fainter\" with blood draws, injections-NEEDS TO BE LAYING DOWN       Past Surgical History:   Procedure Laterality Date    ARTHROPLASTY KNEE Right 04/13/2018    Procedure: ARTHROPLASTY KNEE;  Right Total Knee Replacement;  Surgeon: Apollo Nguyen MD;  Location: [facility]    CATARACT IOL, RT/LT      COLONOSCOPY N/A 09/20/2022    Procedure: COLONOSCOPY, WITH POLYPECTOMY;  Surgeon: Emery Sepulveda MD;  Location: [facility] OR    JOINT REPLACEMENT  Knee 2018    LASIK BILATERAL      MOHS MICROGRAPHIC PROCEDURE      ZZC NONSPECIFIC PROCEDURE      laparoscopy,    ZZC NONSPECIFIC PROCEDURE      laser surg basal cell skin cancer    ZZC STOMACH SURGERY PROCEDURE UNLISTED      laparoscopy for infertility        Family History   Problem Relation Age of Onset    Neurologic Disorder Mother         headaches/narcolepsy    Cancer Mother         Skin    Cerebrovascular Disease Mother         muti focal inracts to brain    Thyroid Disease Mother         hypothyroid    Seizure Disorder Mother         narcolepsy    Migraines Mother "     Dementia Mother     Depression Mother     Skin Cancer Mother     Cancer Father         Skin, lung    C.A.D. Father         MI    Coronary Artery Disease Father     Cerebrovascular Disease Father         ,, ,    Alcoholism Father     Lung Cancer Father     Skin Cancer Father     Hypertension Sister     Thyroid Disease Sister     Migraines Sister     Depression Sister     Hypertension Sister     Hyperlipidemia Sister     Obesity Sister     Chronic Obstructive Pulmonary Disease Brother     Substance Abuse Brother         heroin    Coronary Artery Disease Brother         Bipass    Hyperlipidemia Brother     Alcoholism Brother     Cancer Brother         Skin    Heart Disease Brother     Obesity Brother     Substance Abuse Brother     Skin Cancer Brother     Substance Abuse Brother     No Known Problems Son     Substance Abuse Niece     Uterine Cancer Niece        Social History     Socioeconomic History    Marital status:      Spouse name: Ignacio    Number of children: 1    Years of education: Not on file    Highest education level: 12th grade   Occupational History    Occupation: EEG tech     Employer: [employer]   Tobacco Use    Smoking status: Never    Smokeless tobacco: Never   Vaping Use    Vaping status: Never Used   Substance and Sexual Activity    Alcohol use: Yes     Alcohol/week: 1.0 - 2.0 standard drink of alcohol     Types: 1 - 2 Standard drinks or equivalent per week     Comment: once/wk or less    Drug use: No    Sexual activity: Not Currently     Partners: Male     Birth control/protection: Post-menopausal   Other Topics Concern    Parent/sibling w/ CABG, MI or angioplasty before 65F 55M? 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 Two grandchildren Rebekah 2010, Jon 2008     Social Drivers of Health     Financial Resource Strain: Low Risk  (5/13/2024)    Financial Resource  Strain     Within the past 12 months, have you or your family members you live with been unable to get utilities (heat, electricity) when it was really needed?: No   Food Insecurity: Low Risk  (5/13/2024)    Food Insecurity     Within the past 12 months, did you worry that your food would run out before you got money to buy more?: No     Within the past 12 months, did the food you bought just not last and you didn t have money to get more?: No   Transportation Needs: Low Risk  (5/13/2024)    Transportation Needs     Within the past 12 months, has lack of transportation kept you from medical appointments, getting your medicines, non-medical meetings or appointments, work, or from getting things that you need?: No   Physical Activity: Insufficiently Active (5/13/2024)    Exercise Vital Sign     Days of Exercise per Week: 3 days     Minutes of Exercise per Session: 30 min   Stress: Stress Concern Present (5/13/2024)    Citizen of [place] of Occupational Health - Occupational Stress Questionnaire     Feeling of Stress : To some extent   Social Connections: Unknown (5/13/2024)    Social Connection and Isolation Panel [NHANES]     Frequency of Communication with Friends and Family: Not on file     Frequency of Social Gatherings with Friends and Family: Twice a week     Attends Shinto Services: Not on file     Active Member of Clubs or Organizations: Not on file     Attends Club or Organization Meetings: Not on file     Marital Status: Not on file   Interpersonal Safety: Low Risk  (5/13/2024)    Interpersonal Safety     Do you feel physically and emotionally safe where you currently live?: Yes     Within the past 12 months, have you been hit, slapped, kicked or otherwise physically hurt by someone?: No     Within the past 12 months, have you been humiliated or emotionally abused in other ways by your partner or ex-partner?: No   Housing Stability: Low Risk  (5/13/2024)    Housing Stability     Do you have housing? : Yes      Are you worried about losing your housing?: No       Outpatient Encounter Medications as of 3/27/2025   Medication Sig Dispense Refill    atorvastatin (LIPITOR) 80 MG tablet TAKE ONE TABLET BY MOUTH ONCE DAILY 90 tablet 2    butalbital-aspirin-caffeine (FIORINAL) -40 MG per capsule Take 1 capsule by mouth daily as needed for headaches 30 capsule 1    COMPRESSION STOCKINGS 2 each daily 2 each 1    diazepam (VALIUM) 5 MG tablet 1/2 hour prior to appt 1 tablet 0    fluorouracil (EFUDEX) 5 % external cream Mix with calcipotriene and apply BID to affected areas for 5-10 days. 40 g 1    metroNIDAZOLE (METROCREAM) 0.75 % external cream Apply topically daily as needed (rosacea) 45 g 2    naproxen sodium (ANAPROX) 220 MG tablet Take 220 mg by mouth 2 times daily (with meals).      VITAMIN D, CHOLECALCIFEROL, PO Take 2,000 Units by mouth daily as needed.       No facility-administered encounter medications on file as of 3/27/2025.             O:   NAD, WDWN, Alert & Oriented, Mood & Affect wnl, Vitals stable   General appearance normal   Vitals stable   Alert, oriented and in no acute distress     Nose well healed  L and R leg and forehead gritty scaly papule       Eyes: Conjunctivae/lids:Normal     ENT: Lips, mucosa: normal    MSK:Normal    Cardiovascular: peripheral edema none    Pulm: Breathing Normal    Neuro/Psych: Orientation:Alert and Orientedx3 ; Mood/Affect:normal       A/P:  Hx of non-melanoma skin cancer well healed  2. Actinic keratosis   Forehead, R and L leg  LN2:  Treated with LN2 for 5s for 1-2 cycles. Warned risks of blistering, pain, pigment change, scarring, and incomplete resolution.  Advised patient to return if lesions do not completely resolve.  Wound care sheet given.  It was a pleasure speaking to Concha Ma today.  Previous clinic notes and pertinent laboratory tests were reviewed prior to Concha Ma's visit.  Patient encouraged to perform monthly skin exams.  UV precautions  reviewed with patient.  Risks of non-melanoma skin cancer discussed with patient   Return to clinic 6 months

## 2025-03-27 NOTE — LETTER
"3/27/2025      Concha Ma  [address]  [facility] [address]      Dear Colleague,    Thank you for referring your patient, Concha Ma, to the [facility]. Please see a copy of my visit note below.    Concha Ma is an extremely pleasant 79 year old year old female patient here today for wound check nose, well healed, she notes spots on legs and forehead.  Patient has no other skin complaints today.  Remainder of the HPI, Meds, PMH, Allergies, FH, and SH was reviewed in chart.      Past Medical History:   Diagnosis Date     Anxiety 2019     Arthritis of knee     right     Basal cell carcinoma      BPPV (benign paroxysmal positional vertigo)      Cataract      Depressive disorder 1985     Disorder of bone and cartilage     (osteopenia)     Hemorrhoids      History of PID      Hyperlipidemia      Insomnia      Migraines 1980s     Migraines, classic     sparkly aura     Osteopenia      Peripheral vascular disease 2018     Plantar fasciitis, bilateral      PONV (postoperative nausea and vomiting)      Skin cancer, basal cell      Squamous cell carcinoma      Swelling of the ankle, feet, or leg      Vasovagal syncope     is \"fainter\" with blood draws, injections-NEEDS TO BE LAYING DOWN       Past Surgical History:   Procedure Laterality Date     ARTHROPLASTY KNEE Right 04/13/2018    Procedure: ARTHROPLASTY KNEE;  Right Total Knee Replacement;  Surgeon: Apollo Nguyen MD;  Location: [facility] OR     CATARACT IOL, RT/LT       COLONOSCOPY N/A 09/20/2022    Procedure: COLONOSCOPY, WITH POLYPECTOMY;  Surgeon: Emery Sepulveda MD;  Location: [facility] OR     JOINT REPLACEMENT  Knee 2018     LASIK BILATERAL       MOHS MICROGRAPHIC PROCEDURE       ZZC NONSPECIFIC PROCEDURE      laparoscopy,     ZZC NONSPECIFIC PROCEDURE      laser surg basal cell skin cancer     ZZC STOMACH SURGERY PROCEDURE UNLISTED      laparoscopy for infertility        Family History   Problem Relation " Age of Onset     Neurologic Disorder Mother         headaches/narcolepsy     Cancer Mother         Skin     Cerebrovascular Disease Mother         muti focal inracts to brain     Thyroid Disease Mother         hypothyroid     Seizure Disorder Mother         narcolepsy     Migraines Mother      Dementia Mother      Depression Mother      Skin Cancer Mother      Cancer Father         Skin, lung     C.A.D. Father         MI     Coronary Artery Disease Father      Cerebrovascular Disease Father         ,, ,     Alcoholism Father      Lung Cancer Father      Skin Cancer Father      Hypertension Sister      Thyroid Disease Sister      Migraines Sister      Depression Sister      Hypertension Sister      Hyperlipidemia Sister      Obesity Sister      Chronic Obstructive Pulmonary Disease Brother      Substance Abuse Brother         heroin     Coronary Artery Disease Brother         Bipass     Hyperlipidemia Brother      Alcoholism Brother      Cancer Brother         Skin     Heart Disease Brother      Obesity Brother      Substance Abuse Brother      Skin Cancer Brother      Substance Abuse Brother      No Known Problems Son      Substance Abuse Niece      Uterine Cancer Niece        Social History     Socioeconomic History     Marital status:      Spouse name: Ignacio     Number of children: 1     Years of education: Not on file     Highest education level: 12th grade   Occupational History     Occupation: EEG tech     Employer: [employer]   Tobacco Use     Smoking status: Never     Smokeless tobacco: Never   Vaping Use     Vaping status: Never Used   Substance and Sexual Activity     Alcohol use: Yes     Alcohol/week: 1.0 - 2.0 standard drink of alcohol     Types: 1 - 2 Standard drinks or equivalent per week     Comment: once/wk or less     Drug use: No     Sexual activity: Not Currently     Partners: Male     Birth control/protection: Post-menopausal   Other Topics Concern     Parent/sibling w/ CABG,  MI or angioplasty before 65F 55M? Not Asked   Social History Narrative    Social History: Retired in healthcare  [employer], , remarried to Ignacio 4/2021  One son Clinton 1972. Two grandchildren Rebekah 2010, Jon 2008     Social Drivers of Health     Financial Resource Strain: Low Risk  (5/13/2024)    Financial Resource Strain      Within the past 12 months, have you or your family members you live with been unable to get utilities (heat, electricity) when it was really needed?: No   Food Insecurity: Low Risk  (5/13/2024)    Food Insecurity      Within the past 12 months, did you worry that your food would run out before you got money to buy more?: No      Within the past 12 months, did the food you bought just not last and you didn t have money to get more?: No   Transportation Needs: Low Risk  (5/13/2024)    Transportation Needs      Within the past 12 months, has lack of transportation kept you from medical appointments, getting your medicines, non-medical meetings or appointments, work, or from getting things that you need?: No   Physical Activity: Insufficiently Active (5/13/2024)    Exercise Vital Sign      Days of Exercise per Week: 3 days      Minutes of Exercise per Session: 30 min   Stress: Stress Concern Present (5/13/2024)    [employer] - Occupational Stress Questionnaire      Feeling of Stress : To some extent   Social Connections: Unknown (5/13/2024)    Social Connection and Isolation Panel [NHANES]      Frequency of Communication with Friends and Family: Not on file      Frequency of Social Gatherings with Friends and Family: Twice a week      Attends Quaker Services: Not on file      Active Member of Clubs or Organizations: Not on file      Attends Club or Organization Meetings: Not on file      Marital Status: Not on file   Interpersonal Safety: Low Risk  (5/13/2024)    Interpersonal Safety      Do you feel physically and emotionally safe where you currently  live?: Yes      Within the past 12 months, have you been hit, slapped, kicked or otherwise physically hurt by someone?: No      Within the past 12 months, have you been humiliated or emotionally abused in other ways by your partner or ex-partner?: No   Housing Stability: Low Risk  (5/13/2024)    Housing Stability      Do you have housing? : Yes      Are you worried about losing your housing?: No       Outpatient Encounter Medications as of 3/27/2025   Medication Sig Dispense Refill     atorvastatin (LIPITOR) 80 MG tablet TAKE ONE TABLET BY MOUTH ONCE DAILY 90 tablet 2     butalbital-aspirin-caffeine (FIORINAL) -40 MG per capsule Take 1 capsule by mouth daily as needed for headaches 30 capsule 1     COMPRESSION STOCKINGS 2 each daily 2 each 1     diazepam (VALIUM) 5 MG tablet 1/2 hour prior to appt 1 tablet 0     fluorouracil (EFUDEX) 5 % external cream Mix with calcipotriene and apply BID to affected areas for 5-10 days. 40 g 1     metroNIDAZOLE (METROCREAM) 0.75 % external cream Apply topically daily as needed (rosacea) 45 g 2     naproxen sodium (ANAPROX) 220 MG tablet Take 220 mg by mouth 2 times daily (with meals).       VITAMIN D, CHOLECALCIFEROL, PO Take 2,000 Units by mouth daily as needed.       No facility-administered encounter medications on file as of 3/27/2025.             O:   NAD, WDWN, Alert & Oriented, Mood & Affect wnl, Vitals stable   General appearance normal   Vitals stable   Alert, oriented and in no acute distress     Nose well healed  L and R leg and forehead gritty scaly papule       Eyes: Conjunctivae/lids:Normal     ENT: Lips, mucosa: normal    MSK:Normal    Cardiovascular: peripheral edema none    Pulm: Breathing Normal    Neuro/Psych: Orientation:Alert and Orientedx3 ; Mood/Affect:normal       A/P:  Hx of non-melanoma skin cancer well healed  2. Actinic keratosis   Forehead, R and L leg  LN2:  Treated with LN2 for 5s for 1-2 cycles. Warned risks of blistering, pain, pigment change,  scarring, and incomplete resolution.  Advised patient to return if lesions do not completely resolve.  Wound care sheet given.  It was a pleasure speaking to Concha Ma today.  Previous clinic notes and pertinent laboratory tests were reviewed prior to Concha Ma's visit.  Patient encouraged to perform monthly skin exams.  UV precautions reviewed with patient.  Risks of non-melanoma skin cancer discussed with patient   Return to clinic 6 months      Again, thank you for allowing me to participate in the care of your patient.        Sincerely,        Raymond Milligan MD    Electronically signed

## 2025-04-17 ENCOUNTER — OFFICE VISIT (OUTPATIENT)
Dept: ORTHOPEDICS | Facility: CLINIC | Age: 80
End: 2025-04-17
Payer: COMMERCIAL

## 2025-04-17 ENCOUNTER — PRE VISIT (OUTPATIENT)
Dept: ORTHOPEDICS | Facility: CLINIC | Age: 80
End: 2025-04-17

## 2025-04-17 ENCOUNTER — ANCILLARY PROCEDURE (OUTPATIENT)
Dept: GENERAL RADIOLOGY | Facility: CLINIC | Age: 80
End: 2025-04-17
Attending: FAMILY MEDICINE
Payer: COMMERCIAL

## 2025-04-17 DIAGNOSIS — G89.29 CHRONIC PAIN OF RIGHT ANKLE: Primary | ICD-10-CM

## 2025-04-17 DIAGNOSIS — M25.571 CHRONIC PAIN OF RIGHT ANKLE: Primary | ICD-10-CM

## 2025-04-17 NOTE — PATIENT INSTRUCTIONS
-Try using heel cup for pain relief when wearing shoes   -follow up with PT  -try using topical diclofenac on the heel and foot            WHAT IS AN ACHILLES TENDON INJURY?      An Achilles tendon injury is a problem with the tendon that connects your heel bone to the calf muscle of your lower leg. Tendons are strong bands of tissue that attach muscle to bone. You use the Achilles tendon when you point your toes up and down and when you walk, run, or jump.    Tendons can be injured suddenly or they may be slowly damaged over time. You can have tiny or partial tears in your tendon. If you have a complete tear of your tendon, it s called a rupture. Other tendon injuries may be called a strain, tendinosis, or tendonitis.    WHAT IS THE CAUSE?    Achilles tendon injuries can be caused by:    Overuse of the tendon, such as from lots of uphill running, intense exercise, or sports training or from doing a lot of work that causes you to bend at the knees and ankles  A sudden activity that twists or tears your tendon, such as jumping, starting to sprint, or falling    You are more likely to have an Achilles tendon problem if you:    Have tight calf muscles or a tight Achilles tendon  Change the type of running shoes you wear, or if you wear high heels most of the day and then switch to lower heeled shoes for exercise  Have a problem called over-pronation, which happens when your feet roll inward and flatten out more than normal when you walk or run  WHAT ARE THE SYMPTOMS?    Symptoms may include:    Pain, stiffness, weakness, or swelling in the back of your lower leg  Pain in the back of your leg or ankle when you rise up on your toes  Trouble moving your ankle in different directions  If the tendon is completely torn, you may have felt a pop at the time of the injury. You may not be able to lift your heel off the ground or point your toes.    HOW IS IT DIAGNOSED?    Your healthcare provider will ask about your symptoms,  activities, and medical history and examine you. Your provider may ask to watch you walk or run to see if your feet flatten more than normal. You may have tests such as X-rays or other scans.    HOW IS IT TREATED?    You will need to change or stop doing the activities that cause pain until your tendon has healed. For example, you may need to swim instead of run.    Your healthcare provider may recommend stretching and strengthening exercises to help you heal.    Special shoes or shoe inserts may help. If you have a severe injury, your healthcare provider may put your foot in a cast or splint for several weeks to keep it from moving while it heals.    If your tendon is torn, you may need surgery to repair the tendon.    The pain often gets better within a few weeks with self-care, but some injuries may take several months or longer to heal. It s important to follow all of your healthcare provider s instructions.    HOW CAN I TAKE CARE OF MYSELF?    To keep swelling down and help relieve pain:    Put an ice pack, gel pack, or package of frozen vegetables wrapped in a cloth on the injured area every 3 to 4 hours for up to 20 minutes at a time.  Do ice massage. To do this, freeze water in a Styrofoam cup, then peel the top of the cup away to expose the ice. Hold the bottom of the cup and rub the ice over the painful area for 5 to 10 minutes. Do this several times a day while you have pain.  Keep your foot up on pillows when you sit or lie down.    Take nonprescription pain medicine, such as acetaminophen, ibuprofen, or naproxen. Nonsteroidal anti-inflammatory medicines (NSAIDs), such as ibuprofen and naproxen, may cause stomach bleeding and other problems. These risks increase with age. Read the label and take as directed. Unless recommended by your healthcare provider, you should not take this medicine for more than 10 days.    Moist heat may help relieve pain, relax your muscles, and make it easier to use your ankle.  Put moist heat on the injured area for 10 to 15 minutes before you do warm-up and stretching exercises. Moist heat includes heat patches or moist heating pads that you can buy at most drugstores, a warm, wet washcloth, or a hot shower. To prevent burns to your skin, follow directions on the package and do not lie on any type of hot pad. Don t use heat if you have swelling.    HOW CAN I HELP PREVENT AN ACHILLES TENDON PROBLEM?     Warm-up exercises and stretching before activities can help prevent injuries. If you have tight Achilles tendons or calf muscles, stretch them twice a day whether or not you are doing any activities that day. If your leg or ankle hurts after exercise, putting ice on it may help keep it from getting injured.    Avoid running uphill if you tend to have Achilles tendon injuries.    Follow the safety rules and use the protective equipment recommended for your work or sport.    Achilles Tendonitis Exercises    You can do the towel stretch right away. When the towel stretch is easy, try the standing calf stretch, soleus stretch, and leg lift. When you no longer have sharp pain in your calf or tendon, you can do the step-up, heel raises, and static and balance and reach exercises.    Towel stretch: Sit on a hard surface with your injured leg stretched out in front of you. Loop a towel around your toes and the ball of your foot and pull the towel toward your body keeping your leg straight. Hold this position for 15 to 30 seconds and then relax. Repeat 3 times.    Standing calf stretch: Stand facing a wall with your hands on the wall at about eye level. Keep your injured leg back with your heel on the floor. Keep the other leg forward with the knee bent. Turn your back foot slightly inward (as if you were pigeon-toed). Slowly lean into the wall until you feel a stretch in the back of your calf. Hold the stretch for 15 to 30 seconds. Return to the starting position. Repeat 3 times. Do this exercise  several times each day.    Standing soleus stretch: Stand facing a wall with your hands on the wall at about chest height. Keep your injured leg back with your heel on the floor. Keep the other leg forward with the knee bent. Turn your back foot slightly inward (as if you were pigeon-toed). Bend your back knee slightly and gently lean into the wall until you feel a stretch in the lower calf of your injured leg. Hold the stretch for 15 to 30 seconds. Return to the starting position. Repeat 3 times.    Side-lying leg lift: Lie on your uninjured side. Tighten the front thigh muscles on your injured leg and lift that leg 8 to 10 inches (20 to 25 centimeters) away from the other leg. Keep the leg straight and lower it slowly. Do 2 sets of 15.    Step-up: Stand with the foot of your injured leg on a support 3 to 5 inches (8 to 13 centimeters) high --like a small step or block of wood. Keep your other foot flat on the floor. Shift your weight onto the injured leg on the support. Straighten your injured leg as the other leg comes off the floor. Return to the starting position by bending your injured leg and slowly lowering your uninjured leg back to the floor. Do 2 sets of 15.    Eccentric calf strengthening: Stand behind a chair or counter with your feet flat on the floor. Using the chair or counter as a support to help you, raise your body up onto your toes and hold for 5 seconds. Then slowly lower yourself down with your injured leg only. (It's OK to keep holding onto the support if you need to.) Repeat 15 times. Do 2 sets of 15. Rest 30 seconds between sets.    Single leg balance exercises: Stand next to a chair with your injured leg farther from the chair. The chair will provide support if you need it. Stand on the foot of your injured leg and bend your knee slightly. Try to raise the arch of this foot while keeping your big toe on the floor. Keep your foot in this position.    While keeping your arch raised, reach the  hand that is farther away from the chair across your body toward the chair. The farther you reach, the more challenging the exercise. Do 2 sets of 15.    Published by [employer].  Copyright  2014 [employer] and/or one of its subsidiaries. All rights reserved.

## 2025-04-17 NOTE — LETTER
4/17/2025      RE: Concha Ma  [address]  [facility] [address]     Dear Colleague,    Thank you for referring your patient, Concha Ma, to the [facility]. Please see a copy of my visit note below.      [facility]  SPORTS & ORTHOPEDIC CLINIC VISIT     Apr 17, 2025        ASSESSMENT & PLAN    80 yo with chronic achilles tendinopathy of the right heel    Reviewed imaging and assessment with patient in detail  Recommended trial of heel cup  Encouraged follow up with PT  Trial of topical diclofenac for pain on heel and foot  Re-evaluate in 6-8 weeks    Gato Zuniga MD  [facility]    The longitudinal plan of care for the diagnosis(es)/condition(s) as documented were addressed during this visit. Due to the added complexity in care, I will continue to support Concha in the subsequent management and with ongoing continuity of care.    -----  Chief Complaint   Patient presents with     Right Ankle - Pain       SUBJECTIVE  Concha Ma is a/an 79 year old female who is seen as a self referral for evaluation of  right achilles tendon pain.     The patient is seen by themselves.  The patient is Right handed    Onset: 3-4 month(s) ago. Reports insidious onset without acute precipitating event.  Location of Pain: right achilles tendon   Worsened by: Walking   Better with: NA  Treatments tried: no treatment tried to date  Associated symptoms: Stiffness, some swelling     Orthopedic/Surgical history: NO  Social History/Occupation: retired       REVIEW OF SYSTEMS:  Do you have fever, chills, weight loss? No  Do you have any vision problems? No  Do you have any chest pain or edema? No  Do you have any shortness of breath or wheezing?  No  Do you have stomach problems? No  Do you have any numbness or focal weakness? No  Do you have diabetes? No  Do you have problems with bleeding or clotting? No  Do  you have an rashes or other skin lesions? No    OBJECTIVE:  LMP  (LMP Unknown)      General: Alert, pleasant, no distress  Right ankle: warm, well perfused, SILT throughout     Inspection: no swelling, ecchymosis, deformity     ROM:some heel cord tightness noted. Pain with passive dorsiflexion     Strength:intact, some pain with plantar flexion against resistance     Palpation:ttp over mid achilles. No ttp at insertion,      Special Tests: none      RADIOLOGY:    3 view xrays of right ankle performed and reviewed independently demonstrating no acute fx. No djd. See EMR for formal radiology report.                Again, thank you for allowing me to participate in the care of your patient.      Sincerely,    Gato Zuniga MD

## 2025-04-17 NOTE — TELEPHONE ENCOUNTER
DIAGNOSIS: My right ankle / heel stiffness and pain. My left is not as bad. Also my right knee.     APPOINTMENT DATE: 4.17.25   NOTES STATUS DETAILS   MEDICATION LIST Internal

## 2025-04-17 NOTE — PROGRESS NOTES
[facility]  SPORTS & ORTHOPEDIC CLINIC VISIT     Apr 17, 2025        ASSESSMENT & PLAN    There are no diagnoses linked to this encounter.    Reviewed imaging and assessment with patient in detail      Gato Zuniga MD  [facility] [place]    -----  Chief Complaint   Patient presents with    Right Ankle - Pain       SUBJECTIVE  Concha Ma is a/an 79 year old female who is seen as a self referral for evaluation of  right achilles tendon pain.     The patient is seen by themselves.  The patient is Right handed    Onset: 3-4 month(s) ago. Reports insidious onset without acute precipitating event.  Location of Pain: right achilles tendon   Worsened by: Walking   Better with: NA  Treatments tried: no treatment tried to date  Associated symptoms: Stiffness, some swelling     Orthopedic/Surgical history: NO  Social History/Occupation: retired       REVIEW OF SYSTEMS:  Do you have fever, chills, weight loss? No  Do you have any vision problems? No  Do you have any chest pain or edema? No  Do you have any shortness of breath or wheezing?  No  Do you have stomach problems? No  Do you have any numbness or focal weakness? No  Do you have diabetes? No  Do you have problems with bleeding or clotting? No  Do you have an rashes or other skin lesions? No    OBJECTIVE:  LMP  (LMP Unknown)      ***    RADIOLOGY:    *** view xrays of *** performed and reviewed independently demonstrating ***. See EMR for formal radiology report.               Pain with passive dorsiflexion     Strength:intact, some pain with plantar flexion against resistance     Palpation:ttp over mid achilles. No ttp at insertion,      Special Tests: none      RADIOLOGY:    3 view xrays of right ankle performed and reviewed independently demonstrating no acute fx. No djd. See EMR for formal radiology report.

## 2025-05-08 NOTE — PROGRESS NOTES
COVID-19 PCR test completed. Patient handout For Patients Who Have Been Tested for Covid-19 (Coronavirus) was given to the patient, which includes test result notification process.     Medical Necessity Information: It is in your best interest to select a reason for this procedure from the list below. All of these items fulfill various CMS LCD requirements except the new and changing color options. Detail Level: Detailed Post-Care Instructions: I reviewed with the patient in detail post-care instructions. Patient is to wear sunprotection, and avoid picking at any of the treated lesions. Pt may apply Vaseline to crusted or scabbing areas. Show Spray Paint Technique Variable?: Yes Spray Paint Text: The liquid nitrogen was applied to the skin utilizing a spray paint frosting technique. Spray Paint Technique: No Medical Necessity Clause: This procedure was medically necessary because the lesions that were treated were: Consent: The patient's consent was obtained including but not limited to risks of crusting, scabbing, blistering, scarring, darker or lighter pigmentary change, recurrence, incomplete removal and infection.

## 2025-05-11 ASSESSMENT — ACTIVITIES OF DAILY LIVING (ADL)
SQUATTING: A LITTLE BIT OF DIFFICULTY
PUTTING_ON_YOUR_SHOES_OR_SOCKS: NO DIFFICULTY
ANY_OF_YOUR_USUAL_WORK,_HOUSEWORK_OR_SCHOOL_ACTIVITIES: A LITTLE BIT OF DIFFICULTY
PERFORMING_LIGHT_ACTIVITIES_AROUND_YOUR_HOME: NO DIFFICULTY
RUNNING_ON_UNEVEN_GROUND: EXTREME DIFFICULTY OR UNABLE TO PERFORM ACTIVITY
YOUR_USUAL_HOBBIES,_RECREATIONAL_OR_SPORTING_ACTIVITIES: A LITTLE BIT OF DIFFICULTY
WALKING_2_BLOCKS: MODERATE DIFFICULTY
GETTING_INTO_AND_OUT_OF_A_BATH: A LITTLE BIT OF DIFFICULTY
LEFS_SCORE(%): INCOMPLETE
WALKING_A_MILE: QUITE A BIT OF DIFFICULTY
SITTING_FOR_1_HOUR: NO DIFFICULTY
PLEASE_INDICATE_YOR_PRIMARY_REASON_FOR_REFERRAL_TO_THERAPY:: FOOT AND/OR ANKLE
WALKING_BETWEEN_ROOMS: NO DIFFICULTY
PERFORMING_HEAVY_ACTIVITIES_AROUND_YOUR_HOME: A LITTLE BIT OF DIFFICULTY
LIFTING_AN_OBJECT,_LIKE_A_BAG_OF_GROCERIES_FROM_THE_FLOOR: A LITTLE BIT OF DIFFICULTY
STANDING_FOR_1_HOUR: MODERATE DIFFICULTY
ROLLING_OVER_IN_BED: A LITTLE BIT OF DIFFICULTY
RUNNING_ON_EVEN_GROUND: EXTREME DIFFICULTY OR UNABLE TO PERFORM ACTIVITY
SHOPPING: EXTREME DIFFICULTY OR UNABLE TO PERFORM ACTIVITY
LEFS_RAW_SCORE: INCOMPLETE
MAKING_SHARP_TURNS_WHILE_RUNNING_FAST: EXTREME DIFFICULTY OR UNABLE TO PERFORM ACTIVITY
GOING_UP_OR_DOWN_10_STAIRS: MODERATE DIFFICULTY

## 2025-05-12 ENCOUNTER — THERAPY VISIT (OUTPATIENT)
Dept: PHYSICAL THERAPY | Facility: CLINIC | Age: 80
End: 2025-05-12
Attending: FAMILY MEDICINE
Payer: COMMERCIAL

## 2025-05-12 DIAGNOSIS — M25.571 CHRONIC PAIN OF RIGHT ANKLE: ICD-10-CM

## 2025-05-12 DIAGNOSIS — G89.29 CHRONIC PAIN OF RIGHT ANKLE: ICD-10-CM

## 2025-05-12 PROCEDURE — 97161 PT EVAL LOW COMPLEX 20 MIN: CPT | Mod: GP | Performed by: PHYSICAL THERAPIST

## 2025-05-12 PROCEDURE — 97110 THERAPEUTIC EXERCISES: CPT | Mod: GP | Performed by: PHYSICAL THERAPIST

## 2025-05-12 NOTE — PROGRESS NOTES
PHYSICAL THERAPY EVALUATION  Type of Visit: Evaluation        Fall Risk Screen:  Have you fallen 2 or more times in the past year?: No (one time thru screen and did not get injured)  Have you fallen and had an injury in the past year?: No    Subjective    Concha reports onset of  right ankle pain on February 15, 2025  while walking on trip in [place]. Since then, it got worse .  Ankle brace is helping a lot.  Worse with negiotating stairs ( up and down), walking too much, and stretching. Better with massaging foot and wrapping it.     She presently complains of of intermittent right medial cacalaneus and posterior and lateral right knee pain.  Numbness ball of foot and happening for the last few years.             Presenting condition or subjective complaint: Achilles tendinitis of right foot  Date of onset: 02/15/25    Relevant medical history:  low back Arthritis; Cancer; Dizziness; Implanted device; Overweight; Pain at night or rest , skin cancer- currently being treated, positional vertigo- will follow up with MD, no history of ankle sprains   Dates & types of surgery: Right knee replacement  . 2019    Prior diagnostic imaging/testing results: X-ray Impression:  1. No acute osseous abnormality.  2. No substantial degenerative change.       Prior therapy history for the same diagnosis, illness or injury: No      Prior Level of Function  Transfers: Independent  Ambulation: Independent  ADL: Independent  IADL: ascending stairs    Living Environment  Social support: With a significant other or spouse   Type of home: House   Stairs to enter the home: Yes 4 Is there a railing: Yes     Ramp: No   Stairs inside the home: Yes 15 Is there a railing: Yes     Help at home: None  Equipment owned: Straight Cane; Walker     Employment: No    Hobbies/Interests: Bicycling  swimming gardening cards    Patient goals for therapy: Walk better and have no pain    Pain assessment:  see subjective     Objective   FOOT/ANKLE  EVALUATION  PAIN:  see subjective   INTEGUMENTARY (edema, incisions):  na  POSTURE: right greater than left pes planus   GAIT:   Weightbearing Status:  na  Assistive Device(s):  na  Gait Deviations:  antalgic with increased weightbearing left lower extremity during stance phase   BALANCE/PROPRIOCEPTION:  4 second right and 10 seconds left  WEIGHT BEARING ALIGNMENT:  na   NON-WEIGHTBEARING ALIGNMENT:  na   ROM:   (Degrees) Left AROM Left PROM  Right AROM Right PROM   Ankle Dorsiflexion wnl  18    Ankle Plantarflexion 42  40    Ankle Inversion wnl  wnl    Ankle Eversion wnl  wnl    Great Toe Flexion       Great Toe Extension       Pain:   End feel:    na  STRENGTH:   Pain: - none + mild ++ moderate +++ severe  Strength Scale: 0-5/5 Left Right   Ankle Dorsiflexion 5 5   Ankle Plantarflexion     Ankle Inversion 5 4+   Ankle Eversion 5 4+   Great Toe Flexion  na  na   Great Toe Extension na na   Anterior Tibialis 5 5   Posterior Tibialis 5- 4   Peroneals 5 4+   Extensor Digitorum  na  na   Gastroc/Soleus 3 3, ++ (mod)   Mmt: gluteus medius: 3+/5 (B)  FLEXIBILITY: GS length: 0 degrees (L   SPECIAL TESTS:  na  FUNCTIONAL TESTS:  na  PALPATION: right medial posterior calcaneus, retrocalcaneal, and distal achilles tendon tenderness   JOINT MOBILITY:    Left Right   Tib-Fib Proximal  wnl  wnl   Tib-Fib Distal Hypomobile Hypomobile   Talocrural Hypomobile Hypomobile   Subtalar WNL WNL   Midtarsal Hypermobile    First Ray  na  na       Assessment & Plan   CLINICAL IMPRESSIONS  Medical Diagnosis: chronic right ankle pain    Treatment Diagnosis: chronic right ankle pain   Impression/Assessment: Patient is a 79 year old female with right ankle complaints.  The following significant findings have been identified: Pain, Decreased ROM/flexibility, Decreased strength, Impaired balance, Impaired muscle performance, and Decreased activity tolerance. These impairments interfere with their ability to perform stair negiotiation  as  compared to previous level of function.     Clinical Decision Making (Complexity):  Clinical Presentation: Stable/Uncomplicated  Clinical Presentation Rationale: based on medical and personal factors listed in PT evaluation  Clinical Decision Making (Complexity): Low complexity    PLAN OF CARE  Treatment Interventions:  Modalities: Cryotherapy, Hot Pack  Interventions: Manual Therapy, Neuromuscular Re-education, Therapeutic Activity, Therapeutic Exercise, Self-Care/Home Management    Long Term Goals     PT Goal 1  Goal Identifier: ascending stairs  Goal Description: ascending stairs holding railing with foot over foot and one at a time with railing with o/10 pain , pain level 7/10  Rationale:  (pain free stair negiotiation)  Target Date: 07/07/25      Frequency of Treatment: 1x/ week  Duration of Treatment: 8 weeks    Recommended Referrals to Other Professionals:  na  Education Assessment:        Risks and benefits of evaluation/treatment have been explained.   Patient/Family/caregiver agrees with Plan of Care.     Evaluation Time:     PT Eval, Low Complexity Minutes (69664): 25  Evaluation Only     Signing Clinician: Nya Irene, PT        [facility]                                                                                   OUTPATIENT PHYSICAL THERAPY      PLAN OF TREATMENT FOR OUTPATIENT REHABILITATION   Patient's Last Name, First Name, GURPREETWILBERTO  Concha Ma YOB: 1945   Provider's Name   [facility]   Medical Record No.  [id number]     Onset Date: 02/15/25  Start of Care Date: 05/12/25     Medical Diagnosis:  chronic right ankle pain      PT Treatment Diagnosis:  chronic right ankle pain Plan of Treatment  Frequency/Duration: 1x/ week/ 8 weeks    Certification date from 05/12/25 to 07/07/25         See note for plan of treatment details and functional goals     Nya Irene, PT                         I CERTIFY THE  NEED FOR THESE SERVICES FURNISHED UNDER        THIS PLAN OF TREATMENT AND WHILE UNDER MY CARE     (Physician attestation of this document indicates review and certification of the therapy plan).              Referring Provider:  Gato Zuniga    Initial Assessment  See Epic Evaluation- Start of Care Date: 05/12/25

## 2025-05-14 SDOH — HEALTH STABILITY: PHYSICAL HEALTH: ON AVERAGE, HOW MANY DAYS PER WEEK DO YOU ENGAGE IN MODERATE TO STRENUOUS EXERCISE (LIKE A BRISK WALK)?: 3 DAYS

## 2025-05-14 SDOH — HEALTH STABILITY: PHYSICAL HEALTH: ON AVERAGE, HOW MANY MINUTES DO YOU ENGAGE IN EXERCISE AT THIS LEVEL?: 30 MIN

## 2025-05-14 ASSESSMENT — SOCIAL DETERMINANTS OF HEALTH (SDOH): HOW OFTEN DO YOU GET TOGETHER WITH FRIENDS OR RELATIVES?: TWICE A WEEK

## 2025-05-20 ENCOUNTER — THERAPY VISIT (OUTPATIENT)
Dept: PHYSICAL THERAPY | Facility: CLINIC | Age: 80
End: 2025-05-20
Payer: COMMERCIAL

## 2025-05-20 ENCOUNTER — OFFICE VISIT (OUTPATIENT)
Dept: FAMILY MEDICINE | Facility: CLINIC | Age: 80
End: 2025-05-20
Payer: COMMERCIAL

## 2025-05-20 VITALS
BODY MASS INDEX: 31.91 KG/M2 | HEIGHT: 61 IN | WEIGHT: 169 LBS | HEART RATE: 65 BPM | OXYGEN SATURATION: 97 % | TEMPERATURE: 97.4 F | SYSTOLIC BLOOD PRESSURE: 145 MMHG | DIASTOLIC BLOOD PRESSURE: 87 MMHG

## 2025-05-20 DIAGNOSIS — Z00.00 ENCOUNTER FOR MEDICARE ANNUAL WELLNESS EXAM: Primary | ICD-10-CM

## 2025-05-20 DIAGNOSIS — M54.50 CHRONIC LOW BACK PAIN, UNSPECIFIED BACK PAIN LATERALITY, UNSPECIFIED WHETHER SCIATICA PRESENT: ICD-10-CM

## 2025-05-20 DIAGNOSIS — Z13.1 SCREENING FOR DIABETES MELLITUS: ICD-10-CM

## 2025-05-20 DIAGNOSIS — Z13.6 SCREENING FOR CARDIOVASCULAR CONDITION: ICD-10-CM

## 2025-05-20 DIAGNOSIS — H81.10 BENIGN PAROXYSMAL POSITIONAL VERTIGO, UNSPECIFIED LATERALITY: ICD-10-CM

## 2025-05-20 DIAGNOSIS — M81.0 OSTEOPOROSIS WITHOUT CURRENT PATHOLOGICAL FRACTURE, UNSPECIFIED OSTEOPOROSIS TYPE: ICD-10-CM

## 2025-05-20 DIAGNOSIS — G43.109 MIGRAINE WITH AURA AND WITHOUT STATUS MIGRAINOSUS, NOT INTRACTABLE: ICD-10-CM

## 2025-05-20 DIAGNOSIS — G89.29 CHRONIC PAIN OF RIGHT ANKLE: Primary | ICD-10-CM

## 2025-05-20 DIAGNOSIS — R73.01 IMPAIRED FASTING GLUCOSE: ICD-10-CM

## 2025-05-20 DIAGNOSIS — Z12.31 VISIT FOR SCREENING MAMMOGRAM: ICD-10-CM

## 2025-05-20 DIAGNOSIS — E78.5 HYPERLIPIDEMIA LDL GOAL <70: ICD-10-CM

## 2025-05-20 DIAGNOSIS — G89.29 CHRONIC LOW BACK PAIN, UNSPECIFIED BACK PAIN LATERALITY, UNSPECIFIED WHETHER SCIATICA PRESENT: ICD-10-CM

## 2025-05-20 DIAGNOSIS — R06.09 DOE (DYSPNEA ON EXERTION): ICD-10-CM

## 2025-05-20 DIAGNOSIS — M25.571 CHRONIC PAIN OF RIGHT ANKLE: Primary | ICD-10-CM

## 2025-05-20 LAB
ALBUMIN SERPL BCG-MCNC: 4.4 G/DL (ref 3.5–5.2)
ALP SERPL-CCNC: 91 U/L (ref 40–150)
ALT SERPL W P-5'-P-CCNC: 32 U/L (ref 0–50)
ANION GAP SERPL CALCULATED.3IONS-SCNC: 10 MMOL/L (ref 7–15)
AST SERPL W P-5'-P-CCNC: 30 U/L (ref 0–45)
BILIRUB SERPL-MCNC: 0.6 MG/DL
BUN SERPL-MCNC: 16.4 MG/DL (ref 8–23)
CALCIUM SERPL-MCNC: 10.4 MG/DL (ref 8.8–10.4)
CHLORIDE SERPL-SCNC: 106 MMOL/L (ref 98–107)
CHOLEST SERPL-MCNC: 156 MG/DL
CREAT SERPL-MCNC: 0.86 MG/DL (ref 0.51–0.95)
EGFRCR SERPLBLD CKD-EPI 2021: 69 ML/MIN/1.73M2
EST. AVERAGE GLUCOSE BLD GHB EST-MCNC: 120 MG/DL
FASTING STATUS PATIENT QL REPORTED: NO
FASTING STATUS PATIENT QL REPORTED: NO
GLUCOSE SERPL-MCNC: 100 MG/DL (ref 70–99)
HBA1C MFR BLD: 5.8 % (ref 0–5.6)
HCO3 SERPL-SCNC: 26 MMOL/L (ref 22–29)
HDLC SERPL-MCNC: 49 MG/DL
LDLC SERPL CALC-MCNC: 81 MG/DL
NONHDLC SERPL-MCNC: 107 MG/DL
POTASSIUM SERPL-SCNC: 4.3 MMOL/L (ref 3.4–5.3)
PROT SERPL-MCNC: 6.9 G/DL (ref 6.4–8.3)
SODIUM SERPL-SCNC: 142 MMOL/L (ref 135–145)
TRIGL SERPL-MCNC: 128 MG/DL

## 2025-05-20 PROCEDURE — 1125F AMNT PAIN NOTED PAIN PRSNT: CPT | Performed by: FAMILY MEDICINE

## 2025-05-20 PROCEDURE — 36415 COLL VENOUS BLD VENIPUNCTURE: CPT | Performed by: FAMILY MEDICINE

## 2025-05-20 PROCEDURE — G0439 PPPS, SUBSEQ VISIT: HCPCS | Performed by: FAMILY MEDICINE

## 2025-05-20 PROCEDURE — 80053 COMPREHEN METABOLIC PANEL: CPT | Performed by: FAMILY MEDICINE

## 2025-05-20 PROCEDURE — 80061 LIPID PANEL: CPT | Performed by: FAMILY MEDICINE

## 2025-05-20 PROCEDURE — 83036 HEMOGLOBIN GLYCOSYLATED A1C: CPT | Performed by: FAMILY MEDICINE

## 2025-05-20 PROCEDURE — 3079F DIAST BP 80-89 MM HG: CPT | Performed by: FAMILY MEDICINE

## 2025-05-20 PROCEDURE — 97110 THERAPEUTIC EXERCISES: CPT | Mod: GP | Performed by: PHYSICAL THERAPIST

## 2025-05-20 PROCEDURE — 3077F SYST BP >= 140 MM HG: CPT | Performed by: FAMILY MEDICINE

## 2025-05-20 PROCEDURE — 97140 MANUAL THERAPY 1/> REGIONS: CPT | Mod: GP | Performed by: PHYSICAL THERAPIST

## 2025-05-20 RX ORDER — BUTALBITAL/ASPIRIN/CAFFEINE 50-325-40
1 CAPSULE ORAL DAILY PRN
Qty: 30 CAPSULE | Refills: 1 | Status: SHIPPED | OUTPATIENT
Start: 2025-05-20

## 2025-05-20 RX ORDER — ATORVASTATIN CALCIUM 80 MG/1
80 TABLET, FILM COATED ORAL DAILY
Qty: 90 TABLET | Refills: 2 | Status: SHIPPED | OUTPATIENT
Start: 2025-05-20

## 2025-05-20 RX ORDER — ALENDRONATE SODIUM 70 MG/1
70 TABLET ORAL
Qty: 12 TABLET | Refills: 3 | Status: SHIPPED | OUTPATIENT
Start: 2025-05-20

## 2025-05-20 ASSESSMENT — PAIN SCALES - GENERAL: PAINLEVEL_OUTOF10: MILD PAIN (3)

## 2025-05-20 NOTE — PATIENT INSTRUCTIONS
Patient Education   Preventive Care Advice   This is general advice given by our system to help you stay healthy. However, your care team may have specific advice just for you. Please talk to your care team about your preventive care needs.  Nutrition  Eat 5 or more servings of fruits and vegetables each day.  Try wheat bread, brown rice and whole grain pasta (instead of white bread, rice, and pasta).  Get enough calcium and vitamin D. Check the label on foods and aim for 100% of the RDA (recommended daily allowance).  Lifestyle  Exercise at least 150 minutes each week  (30 minutes a day, 5 days a week).  Do muscle strengthening activities 2 days a week. These help control your weight and prevent disease.  No smoking.  Wear sunscreen to prevent skin cancer.  Have a dental exam and cleaning every 6 months.  Yearly exams  See your health care team every year to talk about:  Any changes in your health.  Any medicines your care team has prescribed.  Preventive care, family planning, and ways to prevent chronic diseases.  Shots (vaccines)   HPV shots (up to age 26), if you've never had them before.  Hepatitis B shots (up to age 59), if you've never had them before.  COVID-19 shot: Get this shot when it's due.  Flu shot: Get a flu shot every year.  Tetanus shot: Get a tetanus shot every 10 years.  Pneumococcal, hepatitis A, and RSV shots: Ask your care team if you need these based on your risk.  Shingles shot (for age 50 and up)  General health tests  Diabetes screening:  Starting at age 35, Get screened for diabetes at least every 3 years.  If you are younger than age 35, ask your care team if you should be screened for diabetes.  Cholesterol test: At age 39, start having a cholesterol test every 5 years, or more often if advised.  Bone density scan (DEXA): At age 50, ask your care team if you should have this scan for osteoporosis (brittle bones).  Hepatitis C: Get tested at least once in your life.  STIs (sexually  transmitted infections)  Before age 24: Ask your care team if you should be screened for STIs.  After age 24: Get screened for STIs if you're at risk. You are at risk for STIs (including HIV) if:  You are sexually active with more than one person.  You don't use condoms every time.  You or a partner was diagnosed with a sexually transmitted infection.  If you are at risk for HIV, ask about PrEP medicine to prevent HIV.  Get tested for HIV at least once in your life, whether you are at risk for HIV or not.  Cancer screening tests  Cervical cancer screening: If you have a cervix, begin getting regular cervical cancer screening tests starting at age 21.  Breast cancer scan (mammogram): If you've ever had breasts, begin having regular mammograms starting at age 40. This is a scan to check for breast cancer.  Colon cancer screening: It is important to start screening for colon cancer at age 45.  Have a colonoscopy test every 10 years (or more often if you're at risk) Or, ask your provider about stool tests like a FIT test every year or Cologuard test every 3 years.  To learn more about your testing options, visit:   .  For help making a decision, visit:   https://bit.ly/fl80147.  Prostate cancer screening test: If you have a prostate, ask your care team if a prostate cancer screening test (PSA) at age 55 is right for you.  Lung cancer screening: If you are a current or former smoker ages 50 to 80, ask your care team if ongoing lung cancer screenings are right for you.  For informational purposes only. Not to replace the advice of your health care provider. Copyright   2023 [facility]. All rights reserved. Clinically reviewed by the [facility] Transitions Program. [employer] 618794 - REV 01/24.  Learning About Activities of Daily Living  What are activities of daily living?     Activities of daily living (ADLs) are the basic self-care tasks you do every day. These include eating, bathing, dressing,  and moving around.  As you age, and if you have health problems, you may find that it's harder to do some of these tasks. If so, your doctor can suggest ideas that may help.  To measure what kind of help you may need, your doctor will ask how well you are able to do ADLs. Let your doctor know if there are any tasks that you are having trouble doing. This is an important first step to getting help. And when you have the help you need, you can stay as independent as possible.  How will a doctor assess your ADLs?  Asking about ADLs is part of a routine health checkup your doctor will likely do as you age. Your health check might be done in a doctor's office, in your home, or at a hospital. The goal is to find out if you are having any problems that could make it hard to care for yourself or that make it unsafe for you to be on your own.  To measure your ADLs, your doctor will ask how hard it is for you to do routine tasks. Your doctor may also want to know if you have changed the way you do a task because of a health problem. Your doctor may watch how you:  Walk back and forth.  Keep your balance while you stand or walk.  Move from sitting to standing or from a bed to a chair.  Button or unbutton a shirt or sweater.  Remove and put on your shoes.  It's common to feel a little worried or anxious if you find you can't do all the things you used to be able to do. Talking with your doctor about ADLs is a way to make sure you're as safe as possible and able to care for yourself as well as you can. You may want to bring a caregiver, friend, or family member to your checkup. They can help you talk to your doctor.  Follow-up care is a key part of your treatment and safety. Be sure to make and go to all appointments, and call your doctor if you are having problems. It's also a good idea to know your test results and keep a list of the medicines you take.  Current as of: October 24, 2024  Content Version: 14.4    2623-9745  [employer].   Care instructions adapted under license by your healthcare professional. If you have questions about a medical condition or this instruction, always ask your healthcare professional. [employer] disclaims any warranty or liability for your use of this information.    Preventing Falls: Care Instructions  Injuries and health problems such as trouble walking or poor eyesight can increase your risk of falling. So can some medicines. But there are things you can do to help prevent falls. You can exercise to get stronger. You can also arrange your home to make it safer.    Talk to your doctor about the medicines you take. Ask if any of them increase the risk of falls and whether they can be changed or stopped.   Try to exercise regularly. It can help improve your strength and balance. This can help lower your risk of falling.         Practice fall safety and prevention.   Wear low-heeled shoes that fit well and give your feet good support. Talk to your doctor if you have foot problems that make this hard.  Carry a cellphone or wear a medical alert device that you can use to call for help.  Use stepladders instead of chairs to reach high objects. Don't climb if you're at risk for falls. Ask for help, if needed.  Wear the correct eyeglasses, if you need them.        Make your home safer.   Remove rugs, cords, clutter, and furniture from walkways.  Keep your house well lit. Use night-lights in hallways and bathrooms.  Install and use sturdy handrails on stairways.  Wear nonskid footwear, even inside. Don't walk barefoot or in socks without shoes.        Be safe outside.   Use handrails, curb cuts, and ramps whenever possible.  Keep your hands free by using a shoulder bag or backpack.  Try to walk in well-lit areas. Watch out for uneven ground, changes in pavement, and debris.  Be careful in the winter. Walk on the grass or gravel when sidewalks are slippery. Use de-icer on steps and  "walkways. Add non-slip devices to shoes.    Put grab bars and nonskid mats in your shower or tub and near the toilet. Try to use a shower chair or bath bench when bathing.   Get into a tub or shower by putting in your weaker leg first. Get out with your strong side first. Have a phone or medical alert device in the bathroom with you.   Where can you learn more?  Go to https://www.[employer].com.net/patiented  Enter G117 in the search box to learn more about \"Preventing Falls: Care Instructions.\"  Current as of: July 31, 2024  Content Version: 14.4    9559-5897 [employer].   Care instructions adapted under license by your healthcare professional. If you have questions about a medical condition or this instruction, always ask your healthcare professional. [employer] disclaims any warranty or liability for your use of this information.       "

## 2025-05-20 NOTE — PROGRESS NOTES
"Preventive Care Visit  [facility]TEGRATED PRIMARY CARE  Vasquez Clayton DO, Family Medicine  May 20, 2025  {Provider  Link to [facility] :161697}    Assessment & Plan     Migraine with aura and without status migrainosus, not intractable:  - Migraine episodes managed with butalbital, taken at onset.  - Continue using butalbital as needed for migraines.  - Risks and side effects: Do not drive after using butalbital.    Screening for cardiovascular condition:  - Schedule a stress test through cardiology.    Visit for screening mammogram:  - Due for a mammogram; last one was on May 29, 2024.  - Schedule a mammogram.    Osteoporosis without current pathological fracture, unspecified osteoporosis type:  - Osteoporosis confirmed, particularly in the lumbar spine.  - Prescribe alendronate (Fosamax) 1 pill once a week. Order a DEXA scan and a low back X-ray to check for compression fractures.  - Risks and side effects: Possible bone aches on the day of taking alendronate. Increased risk of hip fractures after 10 years of use.    Benign paroxysmal positional vertigo, unspecified laterality:  - Positional vertigo has worsened.  - Consider vestibular therapy.    Hyperlipidemia LDL goal <70:  - LDL levels are currently good.  - Continue current statin therapy.    Chronic low back pain, unspecified back pain laterality, unspecified whether sciatica present:  - Chronic low back pain possibly related to osteoporosis.  - Order a low back X-ray to check for compression fractures.    Consent was obtained from the patient to use an AI documentation tool in the creation of this note.        BMI  Estimated body mass index is 31.99 kg/m  as calculated from the following:    Height as of this encounter: 1.548 m (5' 0.95\").    Weight as of this encounter: 76.7 kg (169 lb).   Weight management plan: Discussed healthy diet and exercise guidelines    Counseling  Appropriate preventive services were addressed with this patient " via screening, questionnaire, or discussion as appropriate for fall prevention, nutrition, physical activity, Tobacco-use cessation, social engagement, weight loss and cognition.  Checklist reviewing preventive services available has been given to the patient.  Reviewed patient's diet, addressing concerns and/or questions.   She is at risk for lack of exercise and has been provided with information to increase physical activity for the benefit of her well-being.   Patient reported safety concerns were addressed today.      Rain Kern is a 79 year old, presenting for the following:  Wellness Visit        5/20/2025     9:47 AM   Additional Questions   Roomed by Sarah ACUAÑ     {ROOMER if patient is in their first year of Medicare a vision screen is required click here to document the Vison screen and then refresh the note to pull in results  :195770}      HPI     Advance Care Planning  {The storyboard will display whether the patient has ACP docs on file. Hover over the Code section in the storyboard to access the ACP documents. :152044}  {(AWV REQUIRED) Advance Care Planning Reviewed:248113}        5/14/2025   General Health   How would you rate your overall physical health? Good   Feel stress (tense, anxious, or unable to sleep) Only a little   (!) STRESS CONCERN      5/14/2025   Nutrition   Diet: Regular (no restrictions)    Other   If other, please elaborate: Im on weight watchers . Recently lost 11 lbs somce Feb.       Multiple values from one day are sorted in reverse-chronological order         5/14/2025   Exercise   Days per week of moderate/strenous exercise 3 days   Average minutes spent exercising at this level 30 min         5/14/2025   Social Factors   Frequency of gathering with friends or relatives Twice a week   Worry food won't last until get money to buy more No   Food not last or not have enough money for food? No   Do you have housing? (Housing is defined as stable permanent housing and does  not include staying outside in a car, in a tent, in an abandoned building, in an overnight shelter, or couch-surfing.) Yes   Are you worried about losing your housing? No   Lack of transportation? No   Unable to get utilities (heat,electricity)? No         5/20/2025   Fall Risk   Gait Speed Test (Document in seconds) 4.84   Gait Speed Test Interpretation Less than or equal to 5.00 seconds - PASS          5/14/2025   Activities of Daily Living- Home Safety   Needs help with the following daily activites None of the above   Safety concerns in the home Throw rugs in the hallway         5/14/2025   Dental   Dentist two times every year? Yes         5/14/2025   Hearing Screening   Hearing concerns? None of the above         5/14/2025   Driving Risk Screening   Patient/family members have concerns about driving No         5/14/2025   General Alertness/Fatigue Screening   Have you been more tired than usual lately? No         5/14/2025   Urinary Incontinence Screening   Bothered by leaking urine in past 6 months No         Today's PHQ-2 Score:       5/20/2025     9:38 AM   PHQ-2 ( 1999 Pf[employer])   Q1: Little interest or pleasure in doing things 0   Q2: Feeling down, depressed or hopeless 0   PHQ-2 Score 0    Q1: Little interest or pleasure in doing things Not at all   Q2: Feeling down, depressed or hopeless Not at all   PHQ-2 Score 0       Patient-reported           5/14/2025   Substance Use   Alcohol more than 3/day or more than 7/wk No   Do you have a current opioid prescription? No   How severe/bad is pain from 1 to 10? 0/10 (No Pain)   Do you use any other substances recreationally? No    (!) DECLINE       Multiple values from one day are sorted in reverse-chronological order     Social History     Tobacco Use    Smoking status: Never    Smokeless tobacco: Never   Vaping Use    Vaping status: Never Used   Substance Use Topics    Alcohol use: Yes     Alcohol/week: 1.0 - 2.0 standard drink of alcohol     Types: 1 - 2  Standard drinks or equivalent per week     Comment: once/wk or less    Drug use: No     {Provider  If there are gaps in the social history shown above, please follow the link to update and then refresh the note Link to Social and Substance History :975112}      5/29/2024   LAST FHS-7 RESULTS   1st degree relative breast or ovarian cancer No   Any relative bilateral breast cancer No   Any male have breast cancer No   Any ONE woman have BOTH breast AND ovarian cancer No   Any woman with breast cancer before 50yrs No   2 or more relatives with breast AND/OR ovarian cancer No   2 or more relatives with breast AND/OR bowel cancer No     {If any of the questions to the FHS7 are answered yes, consider referral for genetic counseling.    Additional indications for genetic referral include personal history of breast or ovarian cancer, genetic mutation in 1st degree relative which increases risk of breast cancer including BRCA1, BRCA2, СЕРГЕЙ, PALB 2, TP53, CHEK2, PTEN, CDH1, STK11 (per ACS) and/or 1st degree relative with history of pancreatic or high-risk prostate cancer (per NCCN):330484}   {Mammogram Decision Support (Optional):639978}    ASCVD Risk   The 10-year ASCVD risk score (Loyda DANG, et al., 2019) is: 24.3%    Values used to calculate the score:      Age: 79 years      Sex: Female      Is Non- : No      Diabetic: No      Tobacco smoker: No      Systolic Blood Pressure: 145 mmHg      Is BP treated: No      HDL Cholesterol: 53 mg/dL      Total Cholesterol: 165 mg/dL    {Link to Fracture Risk Assessment Tool (Optional):172209}    {Provider  REQUIRED FOR AWV Use the storyboard to review patient history, after sections have been marked as reviewed, refresh note to capture documentation:354335}  {Provider   REQUIRED AWV use this link to review and update sexual activity history  after section has been marked as reviewed, refresh note to capture documentation:570325}  Reviewed and updated  "as needed this visit by Provider                    {HISTORY OPTIONS (Optional):943150}  Current providers sharing in care for this patient include:  Patient Care Team:  Vasquez Clayton DO as PCP - General (Family Medicine)  Nehemias Jhaveri MD as MD (Family Medicine - Sports Medicine)  Vinay Morfin MD as MD (Family Practice)  Apollo Nguyen MD as MD (Orthopedics)  Jason Gao MD as MD (Dermatology)  Nya Mccord APRN CNP as Nurse Practitioner (Dermatology)  Chuckie Hicks MD as Assigned Heart and Vascular Provider  Vasquez Clayton DO as Assigned PCP  Raymond Milligan MD as Assigned Dermatology Provider    The following health maintenance items are reviewed in Epic and correct as of today:  Health Maintenance   Topic Date Due    COVID-19 Vaccine (5 - 2024-25 season) 09/01/2024    DTAP/TDAP/TD IMMUNIZATION (2 - Td or Tdap) 12/17/2024    LIPID  05/18/2025    MEDICARE ANNUAL WELLNESS VISIT  05/13/2025    MAMMO SCREENING  05/29/2025    ANNUAL REVIEW OF HM ORDERS  11/01/2025    FALL RISK ASSESSMENT  05/20/2026    DIABETES SCREENING  05/18/2027    ADVANCE CARE PLANNING  05/15/2028    COLORECTAL CANCER SCREENING  09/20/2032    DEXA  02/02/2037    HEPATITIS C SCREENING  Completed    PHQ-2 (once per calendar year)  Completed    INFLUENZA VACCINE  Completed    Pneumococcal Vaccine: 50+ Years  Completed    ZOSTER IMMUNIZATION  Completed    RSV VACCINE  Completed    HPV IMMUNIZATION  Aged Out    MENINGITIS IMMUNIZATION  Aged Out       {ROS Picklists (Optional):441172}     Objective    Exam  BP (!) 145/87 (BP Location: Right arm, Patient Position: Sitting, Cuff Size: Adult Regular)   Pulse 65   Temp 97.4  F (36.3  C) (Oral)   Ht 1.548 m (5' 0.95\")   Wt 76.7 kg (169 lb)   LMP  (LMP Unknown)   SpO2 97%   BMI 31.99 kg/m     Estimated body mass index is 31.99 kg/m  as calculated from the following:    Height as of this encounter: 1.548 m (5' 0.95\").    Weight as of " this encounter: 76.7 kg (169 lb).    Physical Exam  {Exam Choices (Optional):673686}        5/20/2025   Mini Cog   Clock Draw Score 2 Normal   3 Item Recall 3 objects recalled   Mini Cog Total Score 5     {A Mini-Cog total score of 0-2 suggests the possibility of dementia, score of 3-5 suggests no dementia:661215}         Signed Electronically by: Vasquez Clayton DO  {Email feedback regarding this note to [email or web address]   :574919}

## 2025-05-21 ENCOUNTER — PATIENT OUTREACH (OUTPATIENT)
Dept: CARE COORDINATION | Facility: CLINIC | Age: 80
End: 2025-05-21
Payer: COMMERCIAL

## 2025-05-22 ENCOUNTER — RESULTS FOLLOW-UP (OUTPATIENT)
Dept: FAMILY MEDICINE | Facility: CLINIC | Age: 80
End: 2025-05-22
Payer: COMMERCIAL

## 2025-05-23 ENCOUNTER — HOSPITAL ENCOUNTER (OUTPATIENT)
Dept: CARDIOLOGY | Facility: CLINIC | Age: 80
Discharge: HOME | End: 2025-05-23
Attending: FAMILY MEDICINE | Admitting: FAMILY MEDICINE
Payer: COMMERCIAL

## 2025-05-23 DIAGNOSIS — R06.09 DOE (DYSPNEA ON EXERTION): ICD-10-CM

## 2025-05-23 LAB
CV STRESS CURRENT BP HE: NORMAL
CV STRESS CURRENT HR HE: 104
CV STRESS CURRENT HR HE: 110
CV STRESS CURRENT HR HE: 115
CV STRESS CURRENT HR HE: 117
CV STRESS CURRENT HR HE: 120
CV STRESS CURRENT HR HE: 122
CV STRESS CURRENT HR HE: 122
CV STRESS CURRENT HR HE: 125
CV STRESS CURRENT HR HE: 129
CV STRESS CURRENT HR HE: 62
CV STRESS CURRENT HR HE: 63
CV STRESS CURRENT HR HE: 63
CV STRESS CURRENT HR HE: 65
CV STRESS CURRENT HR HE: 73
CV STRESS CURRENT HR HE: 83
CV STRESS CURRENT HR HE: 84
CV STRESS CURRENT HR HE: 89
CV STRESS CURRENT HR HE: 99
CV STRESS DEVIATION TIME HE: NORMAL
CV STRESS ECHO PERCENT HR HE: NORMAL
CV STRESS EXERCISE STAGE HE: NORMAL
CV STRESS EXERCISE STAGE REACHED HE: NORMAL
CV STRESS FINAL RESTING BP HE: NORMAL
CV STRESS FINAL RESTING HR HE: 84
CV STRESS MAX HR HE: 128
CV STRESS MAX TREADMILL GRADE HE: 0
CV STRESS MAX TREADMILL SPEED HE: 0
CV STRESS PEAK DIA BP HE: NORMAL
CV STRESS PEAK SYS BP HE: NORMAL
CV STRESS PHASE HE: NORMAL
CV STRESS PROTOCOL HE: NORMAL
CV STRESS REASON STOPPED HE: NORMAL
CV STRESS ST DEVIATION AMOUNT HE: NORMAL
CV STRESS ST DEVIATION ELEVATION HE: NORMAL
CV STRESS ST EVELATION AMOUNT HE: NORMAL
CV STRESS SYMPTOMS HE: NORMAL
CV STRESS TEST TYPE HE: NORMAL
CV STRESS TOTAL STAGE TIME MIN 1 HE: NORMAL
STRESS ECHO BASELINE DIASTOLIC HE: 84
STRESS ECHO BASELINE HR: 65
STRESS ECHO BASELINE SYSTOLIC BP: 158
STRESS ECHO LAST STRESS DIASTOLIC BP: 76
STRESS ECHO LAST STRESS HR: 129
STRESS ECHO LAST STRESS SYSTOLIC BP: 157
STRESS ECHO POST ESTIMATED WORKLOAD: 1
STRESS ECHO POST EXERCISE DUR MIN: 6
STRESS ECHO POST EXERCISE DUR SEC: 15
STRESS ECHO TARGET HR: 122

## 2025-05-23 PROCEDURE — 93350 STRESS TTE ONLY: CPT | Mod: 26 | Performed by: INTERNAL MEDICINE

## 2025-05-23 PROCEDURE — 93321 DOPPLER ECHO F-UP/LMTD STD: CPT | Mod: 26 | Performed by: INTERNAL MEDICINE

## 2025-05-23 PROCEDURE — 250N000009 HC RX 250: Performed by: INTERNAL MEDICINE

## 2025-05-23 PROCEDURE — 93325 DOPPLER ECHO COLOR FLOW MAPG: CPT | Mod: 26 | Performed by: INTERNAL MEDICINE

## 2025-05-23 PROCEDURE — 93016 CV STRESS TEST SUPVJ ONLY: CPT | Mod: GC | Performed by: INTERNAL MEDICINE

## 2025-05-23 PROCEDURE — 255N000002 HC RX 255 OP 636: Performed by: INTERNAL MEDICINE

## 2025-05-23 PROCEDURE — 250N000011 HC RX IP 250 OP 636: Mod: JZ | Performed by: INTERNAL MEDICINE

## 2025-05-23 PROCEDURE — 93321 DOPPLER ECHO F-UP/LMTD STD: CPT | Mod: TC

## 2025-05-23 PROCEDURE — 258N000003 HC RX IP 258 OP 636: Performed by: INTERNAL MEDICINE

## 2025-05-23 PROCEDURE — 93018 CV STRESS TEST I&R ONLY: CPT | Mod: GC | Performed by: INTERNAL MEDICINE

## 2025-05-23 RX ORDER — NITROGLYCERIN 0.4 MG/1
0.4 TABLET SUBLINGUAL EVERY 5 MIN PRN
Status: ACTIVE | OUTPATIENT
Start: 2025-05-23 | End: 2025-05-23

## 2025-05-23 RX ORDER — LIDOCAINE 40 MG/G
CREAM TOPICAL
Status: DISCONTINUED | OUTPATIENT
Start: 2025-05-23 | End: 2025-05-24 | Stop reason: HOSPADM

## 2025-05-23 RX ORDER — ATROPINE SULFATE 0.4 MG/ML
.2-.4 AMPUL (ML) INJECTION
Status: DISCONTINUED | OUTPATIENT
Start: 2025-05-23 | End: 2025-05-24 | Stop reason: HOSPADM

## 2025-05-23 RX ORDER — METOPROLOL TARTRATE 1 MG/ML
1-5 INJECTION, SOLUTION INTRAVENOUS
Status: ACTIVE | OUTPATIENT
Start: 2025-05-23 | End: 2025-05-23

## 2025-05-23 RX ORDER — SODIUM CHLORIDE 9 MG/ML
INJECTION, SOLUTION INTRAVENOUS CONTINUOUS PRN
OUTPATIENT
Start: 2025-05-23 | End: 2025-05-23

## 2025-05-23 RX ORDER — DOBUTAMINE HYDROCHLORIDE 200 MG/100ML
10-50 INJECTION INTRAVENOUS CONTINUOUS
Status: ACTIVE | OUTPATIENT
Start: 2025-05-23 | End: 2025-05-23

## 2025-05-23 RX ADMIN — PERFLUTREN 4 ML: 6.52 INJECTION, SUSPENSION INTRAVENOUS at 14:36

## 2025-05-23 RX ADMIN — METOPROLOL TARTRATE 2 MG: 5 INJECTION INTRAVENOUS at 14:35

## 2025-05-23 RX ADMIN — DEXTROSE 10 MCG/KG/MIN: 50 INJECTION, SOLUTION INTRAVENOUS at 14:27

## 2025-05-23 NOTE — PROGRESS NOTES
Pt here for dobutamine stress test. Test, meds and side effects reviewed with patient. Achieved target HR at 30 mcg Dobutamine. Gave a total of 2 mg IV Metoprolol to bring HR back to baseline. Post monitoring complete and VSS. Pt escorted out to the gold waiting room.     Marisa Zimmerman RN

## 2025-06-03 ENCOUNTER — OFFICE VISIT (OUTPATIENT)
Dept: FAMILY MEDICINE | Facility: CLINIC | Age: 80
End: 2025-06-03
Payer: COMMERCIAL

## 2025-06-03 VITALS
SYSTOLIC BLOOD PRESSURE: 163 MMHG | OXYGEN SATURATION: 96 % | WEIGHT: 169 LBS | HEIGHT: 61 IN | DIASTOLIC BLOOD PRESSURE: 90 MMHG | BODY MASS INDEX: 31.91 KG/M2 | TEMPERATURE: 97.6 F | RESPIRATION RATE: 16 BRPM | HEART RATE: 90 BPM

## 2025-06-03 DIAGNOSIS — Z01.818 PRE-OP EXAM: Primary | ICD-10-CM

## 2025-06-03 DIAGNOSIS — H81.10 BENIGN PAROXYSMAL POSITIONAL VERTIGO, UNSPECIFIED LATERALITY: ICD-10-CM

## 2025-06-03 DIAGNOSIS — H02.401 PTOSIS OF RIGHT EYELID: ICD-10-CM

## 2025-06-03 PROCEDURE — G2211 COMPLEX E/M VISIT ADD ON: HCPCS | Performed by: PHYSICIAN ASSISTANT

## 2025-06-03 PROCEDURE — 99214 OFFICE O/P EST MOD 30 MIN: CPT | Performed by: PHYSICIAN ASSISTANT

## 2025-06-03 NOTE — PROGRESS NOTES
Preoperative Evaluation  [facility] [address] [place] [address]  Phone: [phone]  Fax: [phone]  Primary Provider: Vasquez Clayton,   Pre-op Performing Provider: Steffany Goodwin PA-C  Jesus 3, 2025             6/3/2025   Surgical Information   What procedure is being done? Biepharoplasty eye surgery   Facility or Hospital where procedure/surgery will be performed: [facility]   Who is doing the procedure / surgery? Dr. Jason Omalley   Date of surgery / procedure: June 11; 2025. Right eyelid lift  /Blepharoplasty   Time of surgery / procedure: 3:00 pm. Arrive at 2:00   Where do you plan to recover after surgery? at home with family     Fax number for surgical facility: [phone]    Assessment & Plan     The proposed surgical procedure is considered LOW risk.    Pre-op exam    Ptosis of right eyelid    Elevated BP without diagnosis of hypertension  -anxious today.  Will monitor outside of the clinic  -discussed alarm signs and symptoms to monitor for and discussed when to be reevaluated in the UC or ED     Benign paroxysmal positional vertigo, unspecified laterality  -chronic, recent flare of sxs  -recommend vestibular PT, she will think about this  -discussed alarm signs and symptoms to monitor for and discussed when to be reevaluated in the UC or ED       - No identified additional risk factors other than previously addressed    Antiplatelet or Anticoagulation Medication Instructions   - We reviewed the medication list and the patient is not on an antiplatelet or anticoagulation medications.    Additional Medication Instructions  We reviewed the medication list and there are no chronic medications that need to be adjusted for this procedure.    Recommendation  Approval given to proceed with proposed procedure, without further diagnostic evaluation.      Rain Kern is a 79 year old, presenting for the following:  Pre-Op Exam          6/3/2025      1:06 PM   Additional Questions   Roomed by Davida VÁZQUEZ   Accompanied by self     HPI: has been lightheaded for the past week or so.  Has had vertigo in the past but this feels this is different.  Sxs are occurring more frequent and last longer.  Still feels spinning sensation and feels a bit unsteady when walking.      No CP or SOB.  BP elevated today, no hx of HTN        6/3/2025   Pre-Op Questionnaire   Have you ever had a heart attack or stroke? No   Have you ever had surgery on your heart or blood vessels, such as a stent placement, a coronary artery bypass, or surgery on an artery in your head, neck, heart, or legs? No   Do you have chest pain with activity? (!) YES    Do you have a history of heart failure? No   Do you currently have a cold, bronchitis or symptoms of other infection? No   Do you have a cough, shortness of breath, or wheezing? No   Do you or anyone in your family have previous history of blood clots? No   Do you or does anyone in your family have a serious bleeding problem such as prolonged bleeding following surgeries or cuts? No   Have you ever had problems with anemia or been told to take iron pills? No   Have you had any abnormal blood loss such as black, tarry or bloody stools, or abnormal vaginal bleeding? No   Have you ever had a blood transfusion? No   Are you willing to have a blood transfusion if it is medically needed before, during, or after your surgery? Yes   Have you or any of your relatives ever had problems with anesthesia? No   Do you have sleep apnea, excessive snoring or daytime drowsiness? No   Do you have any artifical heart valves or other implanted medical devices like a pacemaker, defibrillator, or continuous glucose monitor? No   Do you have artificial joints? (!) YES   Are you allergic to latex? No     Advance Care Planning    Discussed advance care planning with patient; informed AVS has link to [facility] [employer].    Preoperative Review of    reviewed - no record  "of controlled substances prescribed.        Patient Active Problem List    Diagnosis Date Noted    Osteoporosis without current pathological fracture, unspecified osteoporosis type 05/20/2025     Priority: Medium    Venous stasis dermatitis 05/13/2024     Priority: Medium    Venous (peripheral) insufficiency 12/29/2021     Priority: Medium    Disorder of bone and cartilage      Priority: Medium     (osteopenia)      Status post total right knee replacement 05/29/2018     Priority: Medium    S/P total knee arthroplasty 04/13/2018     Priority: Medium    Vertigo, benign positional, bilateral 02/07/2017     Priority: Medium    Swelling of lower extremity      Priority: Medium     Diagnosis updated by automated process. Provider to review and confirm.      BCC (basal cell carcinoma), face 10/26/2012     Priority: Medium    Skin cancer, basal cell      Priority: Medium    Hyperlipidemia      Priority: Medium    Classical migraine      Priority: Medium     sparkly aura  (Problem list name updated by automated process. Provider to review and confirm.)      Insomnia      Priority: Medium    Arthritis of knee      Priority: Medium    Hemorrhoids      Priority: Medium    Vasovagal syncope      Priority: Medium     with blood draws, injections        Past Medical History:   Diagnosis Date    Anxiety 2019    Arthritis of knee     right    Basal cell carcinoma     BPPV (benign paroxysmal positional vertigo)     Cataract     Depressive disorder 1985    Disorder of bone and cartilage     (osteopenia)    Hemorrhoids     History of PID     Hyperlipidemia     Insomnia     Migraines 1980s    Migraines, classic     sparkly aura    Osteopenia     Peripheral vascular disease 2018    Plantar fasciitis, bilateral     PONV (postoperative nausea and vomiting)     Skin cancer, basal cell     Squamous cell carcinoma     Swelling of the ankle, feet, or leg     Vasovagal syncope     is \"fainter\" with blood draws, injections-NEEDS TO BE LAYING " DOWN     Past Surgical History:   Procedure Laterality Date    ARTHROPLASTY KNEE Right 04/13/2018    Procedure: ARTHROPLASTY KNEE;  Right Total Knee Replacement;  Surgeon: Apollo Nguyen MD;  Location: [facility] OR    CATARACT IOL, RT/LT      COLONOSCOPY N/A 09/20/2022    Procedure: COLONOSCOPY, WITH POLYPECTOMY;  Surgeon: Emery Sepulveda MD;  Location: [facility] OR    JOINT REPLACEMENT  Knee 2018    LASIK BILATERAL      MOHS MICROGRAPHIC PROCEDURE      Z NONSPECIFIC PROCEDURE      laparoscopy,    [facility] NONSPECIFIC PROCEDURE      laser surg basal cell skin cancer    [facility] STOMACH SURGERY PROCEDURE UNLISTED      laparoscopy for infertility     Current Outpatient Medications   Medication Sig Dispense Refill    alendronate (FOSAMAX) 70 MG tablet Take 1 tablet (70 mg) by mouth every 7 days. 12 tablet 3    atorvastatin (LIPITOR) 80 MG tablet Take 1 tablet (80 mg) by mouth daily. 90 tablet 2    butalbital-aspirin-caffeine (FIORINAL) -40 MG per capsule Take 1 capsule by mouth daily as needed for headaches. 30 capsule 1    COMPRESSION STOCKINGS 2 each daily 2 each 1    diazepam (VALIUM) 5 MG tablet 1/2 hour prior to appt 1 tablet 0    fluorouracil (EFUDEX) 5 % external cream Mix with calcipotriene and apply BID to affected areas for 5-10 days. 40 g 1    metroNIDAZOLE (METROCREAM) 0.75 % external cream Apply topically daily as needed (rosacea) 45 g 2    naproxen sodium (ANAPROX) 220 MG tablet Take 220 mg by mouth 2 times daily (with meals).      VITAMIN D, CHOLECALCIFEROL, PO Take 2,000 Units by mouth daily as needed.         Allergies   Allergen Reactions    Morphine And Codeine GI Disturbance    Percocet [Oxycodone-Acetaminophen] Nausea and Vomiting        Social History     Tobacco Use    Smoking status: Never    Smokeless tobacco: Never   Substance Use Topics    Alcohol use: Yes     Alcohol/week: 1.0 - 2.0 standard drink of alcohol     Types: 1 - 2 Standard drinks or equivalent per week     Comment: once/wk or less  "      History   Drug Use No             Review of Systems  CONSTITUTIONAL: NEGATIVE for fever, chills, change in weight  INTEGUMENTARY/SKIN: NEGATIVE for worrisome rashes, moles or lesions  EYES: NEGATIVE for vision changes or irritation  ENT/MOUTH: NEGATIVE for ear, mouth and throat problems  RESP: NEGATIVE for significant cough or SOB  BREAST: NEGATIVE for masses, tenderness or discharge  CV: NEGATIVE for chest pain, palpitations or peripheral edema  GI: NEGATIVE for nausea, abdominal pain, heartburn, or change in bowel habits  : NEGATIVE for frequency, dysuria, or hematuria  MUSCULOSKELETAL: NEGATIVE for significant arthralgias or myalgia  NEURO: dizziness/lightheadedness  ENDOCRINE: NEGATIVE for temperature intolerance, skin/hair changes  HEME: NEGATIVE for bleeding problems  PSYCHIATRIC: NEGATIVE for changes in mood or affect    Objective    BP (!) 163/90 (BP Location: Right arm, Patient Position: Sitting, Cuff Size: Adult Regular)   Pulse 90   Temp 97.6  F (36.4  C) (Oral)   Resp 16   Ht 1.549 m (5' 1\")   Wt 76.7 kg (169 lb)   LMP  (LMP Unknown)   SpO2 96%   BMI 31.93 kg/m     Estimated body mass index is 31.93 kg/m  as calculated from the following:    Height as of this encounter: 1.549 m (5' 1\").    Weight as of this encounter: 76.7 kg (169 lb).  Physical Exam  GENERAL: alert and no distress  EYES: right side ptosis noted  HENT: ear canals and TM's normal, nose and mouth without ulcers or lesions  NECK: no adenopathy, no asymmetry, masses, or scars  RESP: lungs clear to auscultation - no rales, rhonchi or wheezes  CV: regular rate and rhythm, normal S1 S2, no S3 or S4, no murmur, click or rub, no peripheral edema  ABDOMEN: soft, nontender, no hepatosplenomegaly, no masses and bowel sounds normal  MS: no gross musculoskeletal defects noted, no edema    Arcadio Hallpike maneuver performed with significant rotational vertigo with laying back supine.  No nystagmus noted.  Resolved within 30 seconds after " sitting back up    Recent Labs   Lab Test 05/20/25  1053      POTASSIUM 4.3   CR 0.86   A1C 5.8*        Diagnostics  No labs were ordered during this visit.   No EKG required for low risk surgery (cataract, skin procedure, breast biopsy, etc).    Revised Cardiac Risk Index (RCRI)  The patient has the following serious cardiovascular risks for perioperative complications:   - No serious cardiac risks = 0 points     RCRI Interpretation: 0 points: Class I (very low risk - 0.4% complication rate)         Signed Electronically by: Steffany Goodwin PA-C  A copy of this evaluation report is provided to the requesting physician.

## 2025-06-05 ENCOUNTER — THERAPY VISIT (OUTPATIENT)
Dept: PHYSICAL THERAPY | Facility: CLINIC | Age: 80
End: 2025-06-05
Payer: COMMERCIAL

## 2025-06-05 DIAGNOSIS — G89.29 CHRONIC PAIN OF RIGHT ANKLE: Primary | ICD-10-CM

## 2025-06-05 DIAGNOSIS — M25.571 CHRONIC PAIN OF RIGHT ANKLE: Primary | ICD-10-CM

## 2025-06-10 ENCOUNTER — THERAPY VISIT (OUTPATIENT)
Dept: PHYSICAL THERAPY | Facility: CLINIC | Age: 80
End: 2025-06-10
Payer: COMMERCIAL

## 2025-06-10 DIAGNOSIS — G89.29 CHRONIC PAIN OF RIGHT ANKLE: Primary | ICD-10-CM

## 2025-06-10 DIAGNOSIS — M25.571 CHRONIC PAIN OF RIGHT ANKLE: Primary | ICD-10-CM

## 2025-06-10 PROCEDURE — 97140 MANUAL THERAPY 1/> REGIONS: CPT | Mod: GP | Performed by: PHYSICAL THERAPIST

## 2025-06-10 PROCEDURE — 97110 THERAPEUTIC EXERCISES: CPT | Mod: GP | Performed by: PHYSICAL THERAPIST

## 2025-06-16 DIAGNOSIS — L71.9 ROSACEA: ICD-10-CM

## 2025-06-24 SDOH — HEALTH STABILITY: PHYSICAL HEALTH: ON AVERAGE, HOW MANY MINUTES DO YOU ENGAGE IN EXERCISE AT THIS LEVEL?: 20 MIN

## 2025-06-24 SDOH — HEALTH STABILITY: PHYSICAL HEALTH: ON AVERAGE, HOW MANY DAYS PER WEEK DO YOU ENGAGE IN MODERATE TO STRENUOUS EXERCISE (LIKE A BRISK WALK)?: 0 DAYS

## 2025-06-26 ENCOUNTER — OFFICE VISIT (OUTPATIENT)
Dept: ORTHOPEDICS | Facility: CLINIC | Age: 80
End: 2025-06-26
Attending: FAMILY MEDICINE
Payer: COMMERCIAL

## 2025-06-26 ENCOUNTER — THERAPY VISIT (OUTPATIENT)
Dept: PHYSICAL THERAPY | Facility: CLINIC | Age: 80
End: 2025-06-26
Payer: COMMERCIAL

## 2025-06-26 DIAGNOSIS — G89.29 CHRONIC PAIN OF RIGHT ANKLE: Primary | ICD-10-CM

## 2025-06-26 DIAGNOSIS — M25.571 CHRONIC PAIN OF RIGHT ANKLE: Primary | ICD-10-CM

## 2025-06-26 NOTE — LETTER
6/26/2025      RE: Concha Ma  [address]  [facility] [address]     Dear Colleague,    Thank you for referring your patient, Concha Ma, to the [facility]. Please see a copy of my visit note below.      [facility]  SPORTS & ORTHOPEDIC CLINIC VISIT     Jun 26, 2025        ASSESSMENT & PLAN    79-year-old with Achilles tendinopathy of the right heel.  Relatively minimally improved after course of physical therapy and heel cups    Reviewed imaging and assessment with patient in detail  Patient would like to try topical diclofenac, which she did not try before out of concern for side effects.  Was reassured the topical medication is very safe for her to use and she would like to give this a try before moving onto next step.  Next step would possibly include MRI if she is interested in pursuing interventional options such as PRP or Tenex.  She will contact us if she would like to pursue this option.      Gato Zuniga MD  [facility]    -----  Chief Complaint   Patient presents with     Right Ankle - Follow Up       SUBJECTIVE  Concha Ma is a/an 79 year old female who is seen for follow up of chronic achilles tendinopathy of right heel.     The patient is seen by themselves.    Date of injury: January 2025  Date of Last Visit: 4/17/25   Symptoms: no change  Worsened by: Walking   Better with: NA  Treatment to date: physical therapy (4 visits), heel cup  Associated symptoms: swelling and stiffness         REVIEW OF SYSTEMS:  See HPI     OBJECTIVE:  LMP  (LMP Unknown)    General: Alert, pleasant, no distress  Right ankle: warm, well perfused, SILT throughout     Inspection: no swelling, ecchymosis, deformity     ROM: Improved ROM without pain     Strength:intact, no pain with plantarflexion against resistance.     Palpation: Mild TTP over mid to distal Achilles particularly medially.   No TTP over midportion Achilles.     Special Tests: none    RADIOLOGY:    No imaging this visit           Again, thank you for allowing me to participate in the care of your patient.      Sincerely,    Gato Zuniga MD

## 2025-06-26 NOTE — PROGRESS NOTES
[facility]  SPORTS & ORTHOPEDIC CLINIC VISIT     Jun 26, 2025        ASSESSMENT & PLAN    79-year-old with Achilles tendinopathy of the right heel.  Relatively minimally improved after course of physical therapy and heel cups    Reviewed imaging and assessment with patient in detail  Patient would like to try topical diclofenac, which she did not try before out of concern for side effects.  Was reassured the topical medication is very safe for her to use and she would like to give this a try before moving onto next step.  Next step would possibly include MRI if she is interested in pursuing interventional options such as PRP or Tenex.  She will contact us if she would like to pursue this option.      Gato Zuniga MD  [facility] SPORTS MEDICINE [facility]    -----  Chief Complaint   Patient presents with    Right Ankle - Follow Up       SUBJECTIVE  Concha Ma is a/an 79 year old female who is seen for follow up of chronic achilles tendinopathy of right heel.     The patient is seen by themselves.    Date of injury: January 2025  Date of Last Visit: 4/17/25   Symptoms: no change  Worsened by: Walking   Better with: NA  Treatment to date: physical therapy (4 visits), heel cup  Associated symptoms: swelling and stiffness         REVIEW OF SYSTEMS:  See HPI     OBJECTIVE:  LMP  (LMP Unknown)    General: Alert, pleasant, no distress  Right ankle: warm, well perfused, SILT throughout     Inspection: no swelling, ecchymosis, deformity     ROM: Improved ROM without pain     Strength:intact, no pain with plantarflexion against resistance.     Palpation: Mild TTP over mid to distal Achilles particularly medially.  No TTP over midportion Achilles.     Special Tests: none    RADIOLOGY:    No imaging this visit

## 2025-06-26 NOTE — PROGRESS NOTES
DISCHARGE REPORT    Progress reporting period is from 5- to 6-.       SUBJECTIVE  Subjective changes noted by patient:   Concha reports seeing MD today and is to start using antiinflammatory cream .Overall improvement of right ankle has mild. She is ascending stairs  with foot over foot  pattern with pain level 6/10. She feels comfortable continuing with home program.       Current pain level is 5/10  .     Previous pain level was  7/10  .   Changes in function:  Yes (See Goal flowsheet attached for changes in current functional level)  Adverse reaction to treatment or activity: None    OBJECTIVE  Changes noted in objective findings:            (Degrees) Left AROM Left PROM  Right AROM Right PROM   Ankle Dorsiflexion wnl    20 with pain      Ankle Plantarflexion 42   40 with pain      Ankle Inversion wnl   24     Ankle Eversion wnl   wnl     Great Toe Flexion           Great Toe Extension           Pain:   End feel:    na  STRENGTH:   Pain: - none + mild ++ moderate +++ severe  Strength Scale: 0-5/5 Left Right   Ankle Dorsiflexion 5 5   Ankle Plantarflexion       Ankle Inversion 5 5   Ankle Eversion 5 5   Great Toe Flexion  na  na   Great Toe Extension na na   Anterior Tibialis 5 5   Posterior Tibialis 5- 4+   Peroneals 5  5-   Extensor Digitorum  na  na   Gastroc/Soleus 3 3-   Mmt: gluteus medius: 3+/5 (B)  FLEXIBILITY: GS length:  Increased to 2 degrees    SPECIAL TESTS:  na  FUNCTIONAL TESTS:  na  PALPATION:  no changes in right medial posterior calcaneus,  decreased retrocalcaneal bursa tenderness , and  decreased tenderness distal achilles tendon   ASSESSMENT/PLAN  Updated problem list and treatment plan: Diagnosis 1:  chronic pain of right ankle  Pain -  hot/cold therapy, manual therapy, STS, self management, education, directional preference exercise, and home program  Decreased ROM/flexibility - manual therapy, therapeutic exercise, therapeutic activity, and home program  Decreased strength -  therapeutic exercise, therapeutic activities, and home program  Decreased function - therapeutic activities and home program  STG/LTGs have been met or progress has been made towards goals:      Self Management Plans:  Patient has been instructed in a home treatment program.  Patient is independent in self management of symptoms.  I have re-evaluated this patient and find that the nature, scope, duration and intensity of the therapy is appropriate for the medical condition of the patient.  Concha continues to require the following intervention to meet STG and LTG's:  PT intervention is no longer required to meet STG/LTG.    Recommendations:  This patient is ready to be discharged from therapy and continue their home treatment program.    Please refer to the daily flowsheet for treatment today, total treatment time and time spent performing 1:1 timed codes.

## 2025-06-27 ENCOUNTER — ANCILLARY PROCEDURE (OUTPATIENT)
Dept: GENERAL RADIOLOGY | Facility: CLINIC | Age: 80
End: 2025-06-27
Attending: FAMILY MEDICINE
Payer: COMMERCIAL

## 2025-06-27 ENCOUNTER — ANCILLARY PROCEDURE (OUTPATIENT)
Dept: MAMMOGRAPHY | Facility: CLINIC | Age: 80
End: 2025-06-27
Attending: FAMILY MEDICINE
Payer: COMMERCIAL

## 2025-06-27 ENCOUNTER — ANCILLARY PROCEDURE (OUTPATIENT)
Dept: BONE DENSITY | Facility: CLINIC | Age: 80
End: 2025-06-27
Attending: FAMILY MEDICINE
Payer: COMMERCIAL

## 2025-06-27 DIAGNOSIS — G89.29 CHRONIC LOW BACK PAIN, UNSPECIFIED BACK PAIN LATERALITY, UNSPECIFIED WHETHER SCIATICA PRESENT: ICD-10-CM

## 2025-06-27 DIAGNOSIS — Z12.31 VISIT FOR SCREENING MAMMOGRAM: ICD-10-CM

## 2025-06-27 DIAGNOSIS — M81.0 OSTEOPOROSIS WITHOUT CURRENT PATHOLOGICAL FRACTURE, UNSPECIFIED OSTEOPOROSIS TYPE: ICD-10-CM

## 2025-06-27 DIAGNOSIS — M54.50 CHRONIC LOW BACK PAIN, UNSPECIFIED BACK PAIN LATERALITY, UNSPECIFIED WHETHER SCIATICA PRESENT: ICD-10-CM

## 2025-06-27 PROCEDURE — 77067 SCR MAMMO BI INCL CAD: CPT | Mod: GC | Performed by: RADIOLOGY

## 2025-06-27 PROCEDURE — 77063 BREAST TOMOSYNTHESIS BI: CPT | Mod: GC | Performed by: RADIOLOGY

## 2025-06-27 PROCEDURE — 77080 DXA BONE DENSITY AXIAL: CPT

## 2025-06-27 PROCEDURE — 72100 X-RAY EXAM L-S SPINE 2/3 VWS: CPT | Performed by: STUDENT IN AN ORGANIZED HEALTH CARE EDUCATION/TRAINING PROGRAM

## 2025-08-08 ENCOUNTER — MYC MEDICAL ADVICE (OUTPATIENT)
Dept: ORTHOPEDICS | Facility: CLINIC | Age: 80
End: 2025-08-08
Payer: COMMERCIAL

## 2025-08-08 DIAGNOSIS — M25.571 CHRONIC PAIN OF RIGHT ANKLE: Primary | ICD-10-CM

## 2025-08-08 DIAGNOSIS — G89.29 CHRONIC PAIN OF RIGHT ANKLE: Primary | ICD-10-CM

## 2025-08-20 ENCOUNTER — HOSPITAL ENCOUNTER (OUTPATIENT)
Dept: MRI IMAGING | Facility: CLINIC | Age: 80
Discharge: HOME | End: 2025-08-20
Attending: FAMILY MEDICINE
Payer: COMMERCIAL

## 2025-08-20 PROCEDURE — 73721 MRI JNT OF LWR EXTRE W/O DYE: CPT | Mod: 26 | Performed by: RADIOLOGY

## 2025-08-20 PROCEDURE — 73721 MRI JNT OF LWR EXTRE W/O DYE: CPT | Mod: RT

## 2025-08-28 ENCOUNTER — OFFICE VISIT (OUTPATIENT)
Dept: ORTHOPEDICS | Facility: CLINIC | Age: 80
End: 2025-08-28
Payer: COMMERCIAL

## (undated) DEVICE — GLOVE PROTEXIS W/NEU-THERA 8.0  2D73TE80

## (undated) DEVICE — SUCTION MANIFOLD NEPTUNE 2 SYS 1 PORT 702-025-000

## (undated) DEVICE — PREP SKIN SCRUB TRAY 4461A

## (undated) DEVICE — CATH TRAY FOLEY SURESTEP 16FR WDRAIN BAG STLK LATEX A300316A

## (undated) DEVICE — SU VICRYL 0 CT-1 36" J946H

## (undated) DEVICE — SOL WATER IRRIG 500ML BOTTLE 2F7113

## (undated) DEVICE — PREP DURAPREP 26ML APL 8630

## (undated) DEVICE — LINEN BACK PACK 5440

## (undated) DEVICE — SUCTION IRR SYSTEM W/O TIP INTERPULSE HANDPIECE 0210-100-000

## (undated) DEVICE — Device

## (undated) DEVICE — COVER CAMERA IN-LIGHT DISP LT-C02

## (undated) DEVICE — DRSG TEGADERM 4X4 3/4" 1626W

## (undated) DEVICE — GLOVE PROTEXIS BLUE W/NEU-THERA 7.5  2D73EB75

## (undated) DEVICE — ENDO FORCEP BX CAPTURA PRO SPIKE G50696

## (undated) DEVICE — STRAP KNEE/BODY 31143004

## (undated) DEVICE — SUCTION MANIFOLD DORNOCH ULTRA CART UL-CL500

## (undated) DEVICE — SOL WATER IRRIG 1000ML BOTTLE 2F7114

## (undated) DEVICE — GOWN IMPERVIOUS 2XL BLUE

## (undated) DEVICE — BASIN SET MAJOR

## (undated) DEVICE — DRAIN JACKSON PRATT RESERVOIR 400ML SU130-1000

## (undated) DEVICE — ESU GROUND PAD UNIVERSAL W/O CORD

## (undated) DEVICE — GLOVE PROTEXIS BLUE W/NEU-THERA 8.0  2D73EB80

## (undated) DEVICE — SPECIMEN CONTAINER 3OZ W/FORMALIN 59901

## (undated) DEVICE — SOL NACL 0.9% INJ 1000ML BAG 2B1324X

## (undated) DEVICE — BONE CLEANING TIP INTERPULSE  0210-010-000

## (undated) DEVICE — TOURNIQUET CUFF 30" REPRO BLUE 60-7070-105

## (undated) DEVICE — BLADE SAW SAGITTAL STRK 19.5X90X1.27MM 2108-109-000S11

## (undated) DEVICE — GLOVE PROTEXIS W/NEU-THERA 7.0  2D73TE70

## (undated) DEVICE — LINEN GOWN OVERSIZE 5408

## (undated) DEVICE — DRAPE STOCKINETTE 8" 8586

## (undated) DEVICE — SU PDS II 3-0 PS-1 18" Z683G

## (undated) DEVICE — SNARE CAPIVATOR ROUND COLD SNR BX10 M00561101

## (undated) DEVICE — BONE CEMENT MIXEVAC III HI VAC KIT  0206-015-000

## (undated) DEVICE — SPONGE LAP 18X18" X8435

## (undated) DEVICE — SU VICRYL 2-0 CT-1 27" UND J259H

## (undated) DEVICE — DRAIN HEMOVAC RESERVOIR KIT 10FR 1/8" MED 00-2550-002-10

## (undated) DEVICE — DRSG DRAIN 4X4" 7086

## (undated) DEVICE — KIT ENDO TURNOVER/PROCEDURE CARRY-ON 101822

## (undated) DEVICE — HOOD FLYTE W/PEELAWAY 408-800-100

## (undated) DEVICE — DRAIN JACKSON PRATT 15FR ROUND SU130-1323

## (undated) DEVICE — CAST PADDING 6" STERILE 9046S

## (undated) DEVICE — PREP POVIDONE IODINE SCRUB 7.5% 120ML

## (undated) DEVICE — DRSG STERI STRIP 1/2X4" R1547

## (undated) DEVICE — WIPES FOLEY CARE SURESTEP PROVON DFC100

## (undated) DEVICE — GLOVE PROTEXIS POWDER FREE 8.0 ORTHOPEDIC 2D73ET80

## (undated) DEVICE — LINEN GOWN X4 5410

## (undated) DEVICE — GOWN IMPERVIOUS ZONED XLG 9041

## (undated) DEVICE — BLADE SAW SAGITTAL STRK 25X79.5X1.24MM 4/2000 2108-318-000

## (undated) DEVICE — BLADE KNIFE SURG 15 371115

## (undated) DEVICE — DRAPE IOBAN INCISE 23X17" 6650EZ

## (undated) DEVICE — TUBING SUCTION 12"X1/4" N612

## (undated) DEVICE — TAPE CLOTH 3" CARDINAL 3TRCL03

## (undated) RX ORDER — ONDANSETRON 2 MG/ML
INJECTION INTRAMUSCULAR; INTRAVENOUS
Status: DISPENSED
Start: 2018-04-13

## (undated) RX ORDER — LIDOCAINE HYDROCHLORIDE 20 MG/ML
INJECTION, SOLUTION EPIDURAL; INFILTRATION; INTRACAUDAL; PERINEURAL
Status: DISPENSED
Start: 2018-04-13

## (undated) RX ORDER — GLYCOPYRROLATE 0.2 MG/ML
INJECTION, SOLUTION INTRAMUSCULAR; INTRAVENOUS
Status: DISPENSED
Start: 2018-04-13

## (undated) RX ORDER — CEFAZOLIN SODIUM 1 G/3ML
INJECTION, POWDER, FOR SOLUTION INTRAMUSCULAR; INTRAVENOUS
Status: DISPENSED
Start: 2018-04-13

## (undated) RX ORDER — PROPOFOL 10 MG/ML
INJECTION, EMULSION INTRAVENOUS
Status: DISPENSED
Start: 2018-04-13

## (undated) RX ORDER — CEFAZOLIN SODIUM 2 G/100ML
INJECTION, SOLUTION INTRAVENOUS
Status: DISPENSED
Start: 2018-04-13

## (undated) RX ORDER — METOPROLOL TARTRATE 1 MG/ML
INJECTION, SOLUTION INTRAVENOUS
Status: DISPENSED
Start: 2025-05-23

## (undated) RX ORDER — FENTANYL CITRATE 50 UG/ML
INJECTION, SOLUTION INTRAMUSCULAR; INTRAVENOUS
Status: DISPENSED
Start: 2018-04-13

## (undated) RX ORDER — DOBUTAMINE HYDROCHLORIDE 200 MG/100ML
INJECTION INTRAVENOUS
Status: DISPENSED
Start: 2025-05-23

## (undated) RX ORDER — EPHEDRINE SULFATE 50 MG/ML
INJECTION, SOLUTION INTRAMUSCULAR; INTRAVENOUS; SUBCUTANEOUS
Status: DISPENSED
Start: 2018-04-13

## (undated) RX ORDER — ACETAMINOPHEN 325 MG/1
TABLET ORAL
Status: DISPENSED
Start: 2018-04-13

## (undated) RX ORDER — LIDOCAINE HYDROCHLORIDE 10 MG/ML
INJECTION, SOLUTION INFILTRATION; PERINEURAL
Status: DISPENSED
Start: 2017-08-21

## (undated) RX ORDER — CEFAZOLIN SODIUM 2 G/100ML
INJECTION, SOLUTION INTRAVENOUS
Status: DISPENSED
Start: 2018-04-10

## (undated) RX ORDER — ATROPINE SULFATE 0.4 MG/ML
AMPUL (ML) INJECTION
Status: DISPENSED
Start: 2025-05-23

## (undated) RX ORDER — ACETAMINOPHEN 325 MG/1
TABLET ORAL
Status: DISPENSED
Start: 2018-04-10